# Patient Record
Sex: FEMALE | Race: WHITE | NOT HISPANIC OR LATINO | Employment: OTHER | URBAN - METROPOLITAN AREA
[De-identification: names, ages, dates, MRNs, and addresses within clinical notes are randomized per-mention and may not be internally consistent; named-entity substitution may affect disease eponyms.]

---

## 2017-01-02 ENCOUNTER — COMMUNICATION - HEALTHEAST (OUTPATIENT)
Dept: INTERNAL MEDICINE | Facility: CLINIC | Age: 82
End: 2017-01-02

## 2017-01-03 ENCOUNTER — COMMUNICATION - HEALTHEAST (OUTPATIENT)
Dept: SCHEDULING | Facility: CLINIC | Age: 82
End: 2017-01-03

## 2017-01-04 ENCOUNTER — OFFICE VISIT - HEALTHEAST (OUTPATIENT)
Dept: INTERNAL MEDICINE | Facility: CLINIC | Age: 82
End: 2017-01-04

## 2017-01-04 DIAGNOSIS — R42 DIZZINESS: ICD-10-CM

## 2017-01-04 DIAGNOSIS — I10 ESSENTIAL HYPERTENSION: ICD-10-CM

## 2017-01-06 ENCOUNTER — COMMUNICATION - HEALTHEAST (OUTPATIENT)
Dept: INTERNAL MEDICINE | Facility: CLINIC | Age: 82
End: 2017-01-06

## 2017-01-18 ENCOUNTER — OFFICE VISIT - HEALTHEAST (OUTPATIENT)
Dept: INTERNAL MEDICINE | Facility: CLINIC | Age: 82
End: 2017-01-18

## 2017-01-18 DIAGNOSIS — I10 ESSENTIAL HYPERTENSION: ICD-10-CM

## 2017-01-18 DIAGNOSIS — H81.03 MENIERE'S DISEASE, BILATERAL: ICD-10-CM

## 2017-01-18 ASSESSMENT — MIFFLIN-ST. JEOR: SCORE: 842.73

## 2017-02-04 ENCOUNTER — COMMUNICATION - HEALTHEAST (OUTPATIENT)
Dept: INTERNAL MEDICINE | Facility: CLINIC | Age: 82
End: 2017-02-04

## 2017-02-08 ENCOUNTER — COMMUNICATION - HEALTHEAST (OUTPATIENT)
Dept: INTERNAL MEDICINE | Facility: CLINIC | Age: 82
End: 2017-02-08

## 2017-02-15 ENCOUNTER — OFFICE VISIT - HEALTHEAST (OUTPATIENT)
Dept: INTERNAL MEDICINE | Facility: CLINIC | Age: 82
End: 2017-02-15

## 2017-02-15 DIAGNOSIS — I10 ESSENTIAL HYPERTENSION: ICD-10-CM

## 2017-02-15 DIAGNOSIS — E87.1 HYPONATREMIA: ICD-10-CM

## 2017-02-15 DIAGNOSIS — G56.01 CARPAL TUNNEL SYNDROME OF RIGHT WRIST: ICD-10-CM

## 2017-02-15 ASSESSMENT — MIFFLIN-ST. JEOR: SCORE: 851.8

## 2017-02-16 ENCOUNTER — COMMUNICATION - HEALTHEAST (OUTPATIENT)
Dept: INTERNAL MEDICINE | Facility: CLINIC | Age: 82
End: 2017-02-16

## 2017-02-21 ENCOUNTER — COMMUNICATION - HEALTHEAST (OUTPATIENT)
Dept: SCHEDULING | Facility: CLINIC | Age: 82
End: 2017-02-21

## 2017-03-02 ENCOUNTER — COMMUNICATION - HEALTHEAST (OUTPATIENT)
Dept: INTERNAL MEDICINE | Facility: CLINIC | Age: 82
End: 2017-03-02

## 2017-03-02 DIAGNOSIS — I10 ESSENTIAL HYPERTENSION: ICD-10-CM

## 2017-03-05 ENCOUNTER — COMMUNICATION - HEALTHEAST (OUTPATIENT)
Dept: INTERNAL MEDICINE | Facility: CLINIC | Age: 82
End: 2017-03-05

## 2017-03-07 ENCOUNTER — OFFICE VISIT - HEALTHEAST (OUTPATIENT)
Dept: INTERNAL MEDICINE | Facility: CLINIC | Age: 82
End: 2017-03-07

## 2017-03-07 DIAGNOSIS — I10 ESSENTIAL HYPERTENSION: ICD-10-CM

## 2017-03-07 DIAGNOSIS — H81.03 MENIERE'S DISEASE, BILATERAL: ICD-10-CM

## 2017-03-07 DIAGNOSIS — F41.9 ANXIETY: ICD-10-CM

## 2017-03-07 ASSESSMENT — MIFFLIN-ST. JEOR: SCORE: 842.73

## 2017-03-13 ENCOUNTER — COMMUNICATION - HEALTHEAST (OUTPATIENT)
Dept: INTERNAL MEDICINE | Facility: CLINIC | Age: 82
End: 2017-03-13

## 2017-03-20 ENCOUNTER — RECORDS - HEALTHEAST (OUTPATIENT)
Dept: ADMINISTRATIVE | Facility: OTHER | Age: 82
End: 2017-03-20

## 2017-03-28 ENCOUNTER — OFFICE VISIT - HEALTHEAST (OUTPATIENT)
Dept: INTERNAL MEDICINE | Facility: CLINIC | Age: 82
End: 2017-03-28

## 2017-03-28 DIAGNOSIS — H81.03 MENIERE'S DISEASE, BILATERAL: ICD-10-CM

## 2017-03-28 DIAGNOSIS — I10 ESSENTIAL HYPERTENSION: ICD-10-CM

## 2017-03-28 ASSESSMENT — MIFFLIN-ST. JEOR: SCORE: 851.8

## 2017-04-05 ENCOUNTER — RECORDS - HEALTHEAST (OUTPATIENT)
Dept: ADMINISTRATIVE | Facility: OTHER | Age: 82
End: 2017-04-05

## 2017-04-18 ENCOUNTER — OFFICE VISIT - HEALTHEAST (OUTPATIENT)
Dept: INTERNAL MEDICINE | Facility: CLINIC | Age: 82
End: 2017-04-18

## 2017-04-18 DIAGNOSIS — E87.1 HYPONATREMIA: ICD-10-CM

## 2017-04-18 DIAGNOSIS — I10 ESSENTIAL HYPERTENSION: ICD-10-CM

## 2017-04-18 ASSESSMENT — MIFFLIN-ST. JEOR: SCORE: 856.34

## 2017-04-19 ENCOUNTER — COMMUNICATION - HEALTHEAST (OUTPATIENT)
Dept: INTERNAL MEDICINE | Facility: CLINIC | Age: 82
End: 2017-04-19

## 2017-04-26 ENCOUNTER — COMMUNICATION - HEALTHEAST (OUTPATIENT)
Dept: INTERNAL MEDICINE | Facility: CLINIC | Age: 82
End: 2017-04-26

## 2017-04-26 DIAGNOSIS — I10 ESSENTIAL HYPERTENSION: ICD-10-CM

## 2017-05-01 ENCOUNTER — COMMUNICATION - HEALTHEAST (OUTPATIENT)
Dept: INTERNAL MEDICINE | Facility: CLINIC | Age: 82
End: 2017-05-01

## 2017-05-08 ENCOUNTER — COMMUNICATION - HEALTHEAST (OUTPATIENT)
Dept: INTERNAL MEDICINE | Facility: CLINIC | Age: 82
End: 2017-05-08

## 2017-05-15 ENCOUNTER — COMMUNICATION - HEALTHEAST (OUTPATIENT)
Dept: INTERNAL MEDICINE | Facility: CLINIC | Age: 82
End: 2017-05-15

## 2017-05-30 ENCOUNTER — COMMUNICATION - HEALTHEAST (OUTPATIENT)
Dept: MEDSURG UNIT | Facility: CLINIC | Age: 82
End: 2017-05-30

## 2017-05-31 ENCOUNTER — COMMUNICATION - HEALTHEAST (OUTPATIENT)
Dept: INTERNAL MEDICINE | Facility: CLINIC | Age: 82
End: 2017-05-31

## 2017-06-05 ENCOUNTER — COMMUNICATION - HEALTHEAST (OUTPATIENT)
Dept: INTERNAL MEDICINE | Facility: CLINIC | Age: 82
End: 2017-06-05

## 2017-06-06 ENCOUNTER — OFFICE VISIT - HEALTHEAST (OUTPATIENT)
Dept: INTERNAL MEDICINE | Facility: CLINIC | Age: 82
End: 2017-06-06

## 2017-06-06 DIAGNOSIS — M25.512 ACUTE PAIN OF LEFT SHOULDER: ICD-10-CM

## 2017-06-06 ASSESSMENT — MIFFLIN-ST. JEOR: SCORE: 860.87

## 2017-06-14 ENCOUNTER — OFFICE VISIT - HEALTHEAST (OUTPATIENT)
Dept: INTERNAL MEDICINE | Facility: CLINIC | Age: 82
End: 2017-06-14

## 2017-06-14 DIAGNOSIS — T14.8XXA TORN TENDON: ICD-10-CM

## 2017-06-14 DIAGNOSIS — I10 ESSENTIAL HYPERTENSION: ICD-10-CM

## 2017-06-14 DIAGNOSIS — E87.1 HYPONATREMIA: ICD-10-CM

## 2017-06-14 ASSESSMENT — MIFFLIN-ST. JEOR: SCORE: 860.87

## 2017-06-15 ENCOUNTER — COMMUNICATION - HEALTHEAST (OUTPATIENT)
Dept: INTERNAL MEDICINE | Facility: CLINIC | Age: 82
End: 2017-06-15

## 2017-06-30 ENCOUNTER — COMMUNICATION - HEALTHEAST (OUTPATIENT)
Dept: INTERNAL MEDICINE | Facility: CLINIC | Age: 82
End: 2017-06-30

## 2017-07-23 ENCOUNTER — COMMUNICATION - HEALTHEAST (OUTPATIENT)
Dept: SCHEDULING | Facility: CLINIC | Age: 82
End: 2017-07-23

## 2017-07-24 ENCOUNTER — OFFICE VISIT - HEALTHEAST (OUTPATIENT)
Dept: INTERNAL MEDICINE | Facility: CLINIC | Age: 82
End: 2017-07-24

## 2017-07-24 DIAGNOSIS — E87.1 HYPONATREMIA: ICD-10-CM

## 2017-07-24 DIAGNOSIS — I10 ESSENTIAL HYPERTENSION: ICD-10-CM

## 2017-07-24 DIAGNOSIS — R42 DIZZINESS: ICD-10-CM

## 2017-07-24 ASSESSMENT — MIFFLIN-ST. JEOR: SCORE: 851.8

## 2017-07-25 ENCOUNTER — COMMUNICATION - HEALTHEAST (OUTPATIENT)
Dept: INTERNAL MEDICINE | Facility: CLINIC | Age: 82
End: 2017-07-25

## 2017-08-28 ENCOUNTER — COMMUNICATION - HEALTHEAST (OUTPATIENT)
Dept: INTERNAL MEDICINE | Facility: CLINIC | Age: 82
End: 2017-08-28

## 2017-09-19 ENCOUNTER — OFFICE VISIT - HEALTHEAST (OUTPATIENT)
Dept: INTERNAL MEDICINE | Facility: CLINIC | Age: 82
End: 2017-09-19

## 2017-09-19 ENCOUNTER — RECORDS - HEALTHEAST (OUTPATIENT)
Dept: ADMINISTRATIVE | Facility: OTHER | Age: 82
End: 2017-09-19

## 2017-09-19 DIAGNOSIS — H81.03 MENIERE'S DISEASE, BILATERAL: ICD-10-CM

## 2017-09-19 DIAGNOSIS — I10 ESSENTIAL HYPERTENSION: ICD-10-CM

## 2017-09-19 ASSESSMENT — MIFFLIN-ST. JEOR: SCORE: 851.8

## 2017-09-21 ENCOUNTER — COMMUNICATION - HEALTHEAST (OUTPATIENT)
Dept: INTERNAL MEDICINE | Facility: CLINIC | Age: 82
End: 2017-09-21

## 2017-09-28 ENCOUNTER — COMMUNICATION - HEALTHEAST (OUTPATIENT)
Dept: INTERNAL MEDICINE | Facility: CLINIC | Age: 82
End: 2017-09-28

## 2017-09-29 ENCOUNTER — COMMUNICATION - HEALTHEAST (OUTPATIENT)
Dept: INTERNAL MEDICINE | Facility: CLINIC | Age: 82
End: 2017-09-29

## 2017-10-25 ENCOUNTER — COMMUNICATION - HEALTHEAST (OUTPATIENT)
Dept: INTERNAL MEDICINE | Facility: CLINIC | Age: 82
End: 2017-10-25

## 2017-10-27 ENCOUNTER — COMMUNICATION - HEALTHEAST (OUTPATIENT)
Dept: INTERNAL MEDICINE | Facility: CLINIC | Age: 82
End: 2017-10-27

## 2017-10-29 ENCOUNTER — COMMUNICATION - HEALTHEAST (OUTPATIENT)
Dept: INTERNAL MEDICINE | Facility: CLINIC | Age: 82
End: 2017-10-29

## 2017-10-29 DIAGNOSIS — I10 ESSENTIAL HYPERTENSION: ICD-10-CM

## 2017-10-30 ENCOUNTER — RECORDS - HEALTHEAST (OUTPATIENT)
Dept: ADMINISTRATIVE | Facility: OTHER | Age: 82
End: 2017-10-30

## 2017-10-31 ENCOUNTER — RECORDS - HEALTHEAST (OUTPATIENT)
Dept: ADMINISTRATIVE | Facility: OTHER | Age: 82
End: 2017-10-31

## 2017-11-14 ENCOUNTER — OFFICE VISIT - HEALTHEAST (OUTPATIENT)
Dept: INTERNAL MEDICINE | Facility: CLINIC | Age: 82
End: 2017-11-14

## 2017-11-14 DIAGNOSIS — K11.7 XEROSTOMIA: ICD-10-CM

## 2017-11-14 DIAGNOSIS — E87.1 HYPONATREMIA: ICD-10-CM

## 2017-11-14 DIAGNOSIS — I10 ESSENTIAL HYPERTENSION: ICD-10-CM

## 2017-11-14 ASSESSMENT — MIFFLIN-ST. JEOR: SCORE: 874.48

## 2017-11-15 ENCOUNTER — COMMUNICATION - HEALTHEAST (OUTPATIENT)
Dept: INTERNAL MEDICINE | Facility: CLINIC | Age: 82
End: 2017-11-15

## 2017-11-22 ENCOUNTER — COMMUNICATION - HEALTHEAST (OUTPATIENT)
Dept: INTERNAL MEDICINE | Facility: CLINIC | Age: 82
End: 2017-11-22

## 2017-11-24 ENCOUNTER — RECORDS - HEALTHEAST (OUTPATIENT)
Dept: ADMINISTRATIVE | Facility: OTHER | Age: 82
End: 2017-11-24

## 2017-11-27 ENCOUNTER — RECORDS - HEALTHEAST (OUTPATIENT)
Dept: ADMINISTRATIVE | Facility: OTHER | Age: 82
End: 2017-11-27

## 2017-11-28 ENCOUNTER — COMMUNICATION - HEALTHEAST (OUTPATIENT)
Dept: INTERNAL MEDICINE | Facility: CLINIC | Age: 82
End: 2017-11-28

## 2017-12-17 ENCOUNTER — COMMUNICATION - HEALTHEAST (OUTPATIENT)
Dept: INTERNAL MEDICINE | Facility: CLINIC | Age: 82
End: 2017-12-17

## 2017-12-22 ENCOUNTER — COMMUNICATION - HEALTHEAST (OUTPATIENT)
Dept: INTERNAL MEDICINE | Facility: CLINIC | Age: 82
End: 2017-12-22

## 2017-12-29 ENCOUNTER — OFFICE VISIT - HEALTHEAST (OUTPATIENT)
Dept: CARDIOLOGY | Facility: CLINIC | Age: 82
End: 2017-12-29

## 2017-12-29 DIAGNOSIS — I10 ESSENTIAL HYPERTENSION: ICD-10-CM

## 2017-12-29 DIAGNOSIS — R01.1 HEART MURMUR, SYSTOLIC: ICD-10-CM

## 2017-12-29 DIAGNOSIS — R00.2 PALPITATIONS: ICD-10-CM

## 2017-12-29 ASSESSMENT — MIFFLIN-ST. JEOR: SCORE: 856.34

## 2018-01-18 ENCOUNTER — COMMUNICATION - HEALTHEAST (OUTPATIENT)
Dept: INTERNAL MEDICINE | Facility: CLINIC | Age: 83
End: 2018-01-18

## 2018-01-24 ENCOUNTER — RECORDS - HEALTHEAST (OUTPATIENT)
Dept: ADMINISTRATIVE | Facility: OTHER | Age: 83
End: 2018-01-24

## 2018-02-01 ENCOUNTER — HOSPITAL ENCOUNTER (OUTPATIENT)
Dept: CARDIOLOGY | Facility: CLINIC | Age: 83
Discharge: HOME | End: 2018-02-01
Attending: INTERNAL MEDICINE

## 2018-02-01 DIAGNOSIS — R00.2 PALPITATIONS: ICD-10-CM

## 2018-02-01 DIAGNOSIS — R01.1 HEART MURMUR, SYSTOLIC: ICD-10-CM

## 2018-02-01 LAB
AORTIC ROOT: 2.2 CM
AORTIC VALVE MEAN VELOCITY: 169 CM/S
AR DECEL SLOPE: 1910 MM/S2
AR PEAK VELOCITY: 425 CM/S
AV DIMENSIONLESS INDEX VTI: 0.4
AV MEAN GRADIENT: 13 MMHG
AV PEAK GRADIENT: 21 MMHG
AV REGURGITANT PEAK GRADIENT: 72.3 MMHG
AV REGURGITATION PRESSURE HALF TIME: 652 MS
AV VALVE AREA: 0.9 CM2
AV VELOCITY RATIO: 0.4
BSA FOR ECHO PROCEDURE: 1.48 M2
CV BLOOD PRESSURE: NORMAL MMHG
CV ECHO HEIGHT: 59 IN
CV ECHO WEIGHT: 117 LBS
DOP CALC AO PEAK VEL: 229 CM/S
DOP CALC AO VTI: 58.3 CM
DOP CALC LVOT AREA: 2.54 CM2
DOP CALC LVOT DIAMETER: 1.8 CM
DOP CALC LVOT PEAK VEL: 94 CM/S
DOP CALC LVOT STROKE VOLUME: 54.2 CM3
DOP CALC MV VTI: 35.8 CM
DOP CALCLVOT PEAK VEL VTI: 21.3 CM
EJECTION FRACTION: 67 % (ref 55–75)
FRACTIONAL SHORTENING: 40 % (ref 28–44)
INTERVENTRICULAR SEPTUM IN END DIASTOLE: 0.77 CM (ref 0.6–0.9)
IVS/PW RATIO: 1
LA AREA 1: 20.4 CM2
LA AREA 2: 10.2 CM2
LEFT ATRIUM LENGTH: 3.97 CM
LEFT ATRIUM SIZE: 4.2 CM
LEFT ATRIUM TO AORTIC ROOT RATIO: 1.91 NO UNITS
LEFT ATRIUM VOLUME INDEX: 30.1 ML/M2
LEFT ATRIUM VOLUME: 44.6 ML
LEFT VENTRICLE CARDIAC INDEX: 2 L/MIN/M2
LEFT VENTRICLE CARDIAC OUTPUT: 2.9 L/MIN
LEFT VENTRICLE DIASTOLIC VOLUME INDEX: 34.5 CM3/M2 (ref 34–74)
LEFT VENTRICLE DIASTOLIC VOLUME: 51 CM3 (ref 46–106)
LEFT VENTRICLE HEART RATE: 54 BPM
LEFT VENTRICLE MASS INDEX: 77.6 G/M2
LEFT VENTRICLE SYSTOLIC VOLUME INDEX: 11.5 CM3/M2 (ref 11–31)
LEFT VENTRICLE SYSTOLIC VOLUME: 17 CM3 (ref 14–42)
LEFT VENTRICULAR INTERNAL DIMENSION IN DIASTOLE: 4.58 CM (ref 3.8–5.2)
LEFT VENTRICULAR INTERNAL DIMENSION IN SYSTOLE: 2.75 CM (ref 2.2–3.5)
LEFT VENTRICULAR MASS: 114.9 G
LEFT VENTRICULAR OUTFLOW TRACT MEAN GRADIENT: 2 MMHG
LEFT VENTRICULAR OUTFLOW TRACT MEAN VELOCITY: 65.9 CM/S
LEFT VENTRICULAR OUTFLOW TRACT PEAK GRADIENT: 4 MMHG
LEFT VENTRICULAR POSTERIOR WALL IN END DIASTOLE: 0.81 CM (ref 0.6–0.9)
LV STROKE VOLUME INDEX: 36.6 ML/M2
MITRAL VALVE E/A RATIO: 1
MITRAL VALVE MEAN INFLOW VELOCITY: 65 CM/S
MITRAL VALVE PEAK VELOCITY: 106 CM/S
MV AREA VTI: 1.51 CM2
MV AVERAGE E/E' RATIO: 14.2 CM/S
MV DECELERATION TIME: 194 MS
MV E'TISSUE VEL-LAT: 7.51 CM/S
MV E'TISSUE VEL-MED: 5.46 CM/S
MV LATERAL E/E' RATIO: 12.2
MV MEAN GRADIENT: 2 MMHG
MV MEDIAL E/E' RATIO: 16.8
MV PEAK A VELOCITY: 95.3 CM/S
MV PEAK E VELOCITY: 91.8 CM/S
MV PEAK GRADIENT: 4.5 MMHG
MV VALVE AREA BY CONTINUITY EQUATION: 1.5 CM2
NUC REST DIASTOLIC VOLUME INDEX: 1872 LBS
NUC REST SYSTOLIC VOLUME INDEX: 59 IN
TRICUSPID REGURGITATION PEAK PRESSURE GRADIENT: 23.8 MMHG
TRICUSPID VALVE ANULAR PLANE SYSTOLIC EXCURSION: 1.8 CM
TRICUSPID VALVE PEAK REGURGITANT VELOCITY: 244 CM/S

## 2018-02-01 ASSESSMENT — MIFFLIN-ST. JEOR: SCORE: 856.34

## 2018-02-03 ENCOUNTER — COMMUNICATION - HEALTHEAST (OUTPATIENT)
Dept: INTERNAL MEDICINE | Facility: CLINIC | Age: 83
End: 2018-02-03

## 2018-02-03 DIAGNOSIS — H81.03 MENIERE'S DISEASE, BILATERAL: ICD-10-CM

## 2018-02-03 DIAGNOSIS — R42 DIZZINESS: ICD-10-CM

## 2018-02-05 ENCOUNTER — COMMUNICATION - HEALTHEAST (OUTPATIENT)
Dept: INTERNAL MEDICINE | Facility: CLINIC | Age: 83
End: 2018-02-05

## 2018-02-07 ENCOUNTER — OFFICE VISIT - HEALTHEAST (OUTPATIENT)
Dept: CARDIOLOGY | Facility: CLINIC | Age: 83
End: 2018-02-07

## 2018-02-07 DIAGNOSIS — I49.3 PVC (PREMATURE VENTRICULAR CONTRACTION): ICD-10-CM

## 2018-02-07 DIAGNOSIS — R42 DIZZINESS: ICD-10-CM

## 2018-02-07 DIAGNOSIS — I35.0 NONRHEUMATIC AORTIC VALVE STENOSIS: ICD-10-CM

## 2018-02-07 DIAGNOSIS — I10 ESSENTIAL HYPERTENSION: ICD-10-CM

## 2018-02-07 ASSESSMENT — MIFFLIN-ST. JEOR: SCORE: 842.73

## 2018-02-19 ENCOUNTER — OFFICE VISIT - HEALTHEAST (OUTPATIENT)
Dept: INTERNAL MEDICINE | Facility: CLINIC | Age: 83
End: 2018-02-19

## 2018-02-19 DIAGNOSIS — E87.1 HYPONATREMIA: ICD-10-CM

## 2018-02-19 DIAGNOSIS — I10 ESSENTIAL HYPERTENSION: ICD-10-CM

## 2018-02-19 ASSESSMENT — MIFFLIN-ST. JEOR: SCORE: 842.73

## 2018-02-20 LAB
ANION GAP SERPL CALCULATED.3IONS-SCNC: 12 MMOL/L (ref 5–18)
BUN SERPL-MCNC: 13 MG/DL (ref 8–28)
CALCIUM SERPL-MCNC: 9.6 MG/DL (ref 8.5–10.5)
CHLORIDE BLD-SCNC: 97 MMOL/L (ref 98–107)
CO2 SERPL-SCNC: 23 MMOL/L (ref 22–31)
CREAT SERPL-MCNC: 0.75 MG/DL (ref 0.6–1.1)
GFR SERPL CREATININE-BSD FRML MDRD: >60 ML/MIN/1.73M2
GLUCOSE BLD-MCNC: 101 MG/DL (ref 70–125)
POTASSIUM BLD-SCNC: 3.8 MMOL/L (ref 3.5–5)
SODIUM SERPL-SCNC: 132 MMOL/L (ref 136–145)

## 2018-02-21 ENCOUNTER — COMMUNICATION - HEALTHEAST (OUTPATIENT)
Dept: INTERNAL MEDICINE | Facility: CLINIC | Age: 83
End: 2018-02-21

## 2018-02-22 ENCOUNTER — COMMUNICATION - HEALTHEAST (OUTPATIENT)
Dept: INTERNAL MEDICINE | Facility: CLINIC | Age: 83
End: 2018-02-22

## 2018-02-26 ENCOUNTER — COMMUNICATION - HEALTHEAST (OUTPATIENT)
Dept: INTERNAL MEDICINE | Facility: CLINIC | Age: 83
End: 2018-02-26

## 2018-03-05 ENCOUNTER — COMMUNICATION - HEALTHEAST (OUTPATIENT)
Dept: INTERNAL MEDICINE | Facility: CLINIC | Age: 83
End: 2018-03-05

## 2018-03-05 DIAGNOSIS — I10 HYPERTENSION: ICD-10-CM

## 2018-03-17 ENCOUNTER — COMMUNICATION - HEALTHEAST (OUTPATIENT)
Dept: SCHEDULING | Facility: CLINIC | Age: 83
End: 2018-03-17

## 2018-03-20 ENCOUNTER — OFFICE VISIT - HEALTHEAST (OUTPATIENT)
Dept: INTERNAL MEDICINE | Facility: CLINIC | Age: 83
End: 2018-03-20

## 2018-03-20 DIAGNOSIS — R63.4 WEIGHT LOSS: ICD-10-CM

## 2018-03-20 DIAGNOSIS — I10 ESSENTIAL HYPERTENSION: ICD-10-CM

## 2018-03-20 DIAGNOSIS — E87.1 HYPONATREMIA: ICD-10-CM

## 2018-03-20 DIAGNOSIS — R30.0 DYSURIA: ICD-10-CM

## 2018-03-20 LAB
ALBUMIN SERPL-MCNC: 3.6 G/DL (ref 3.5–5)
ALBUMIN UR-MCNC: NEGATIVE MG/DL
ALP SERPL-CCNC: 81 U/L (ref 45–120)
ALT SERPL W P-5'-P-CCNC: 15 U/L (ref 0–45)
ANION GAP SERPL CALCULATED.3IONS-SCNC: 12 MMOL/L (ref 5–18)
APPEARANCE UR: ABNORMAL
AST SERPL W P-5'-P-CCNC: 21 U/L (ref 0–40)
BACTERIA #/AREA URNS HPF: ABNORMAL HPF
BILIRUB SERPL-MCNC: 0.9 MG/DL (ref 0–1)
BILIRUB UR QL STRIP: NEGATIVE
BUN SERPL-MCNC: 11 MG/DL (ref 8–28)
C REACTIVE PROTEIN LHE: 0.3 MG/DL (ref 0–0.8)
CALCIUM SERPL-MCNC: 9.4 MG/DL (ref 8.5–10.5)
CHLORIDE BLD-SCNC: 96 MMOL/L (ref 98–107)
CO2 SERPL-SCNC: 24 MMOL/L (ref 22–31)
COLOR UR AUTO: YELLOW
CREAT SERPL-MCNC: 0.74 MG/DL (ref 0.6–1.1)
ERYTHROCYTE [DISTWIDTH] IN BLOOD BY AUTOMATED COUNT: 12.8 % (ref 11–14.5)
GFR SERPL CREATININE-BSD FRML MDRD: >60 ML/MIN/1.73M2
GLUCOSE BLD-MCNC: 115 MG/DL (ref 70–125)
GLUCOSE UR STRIP-MCNC: NEGATIVE MG/DL
HCT VFR BLD AUTO: 42.4 % (ref 35–47)
HGB BLD-MCNC: 13.9 G/DL (ref 12–16)
HGB UR QL STRIP: ABNORMAL
HYALINE CASTS #/AREA URNS LPF: ABNORMAL LPF
KETONES UR STRIP-MCNC: NEGATIVE MG/DL
LEUKOCYTE ESTERASE UR QL STRIP: ABNORMAL
MCH RBC QN AUTO: 31 PG (ref 27–34)
MCHC RBC AUTO-ENTMCNC: 32.8 G/DL (ref 32–36)
MCV RBC AUTO: 94 FL (ref 80–100)
MUCOUS THREADS #/AREA URNS LPF: ABNORMAL LPF
NITRATE UR QL: NEGATIVE
PH UR STRIP: 7.5 [PH] (ref 4.5–8)
PLATELET # BLD AUTO: 235 THOU/UL (ref 140–440)
PMV BLD AUTO: 8.1 FL (ref 7–10)
POTASSIUM BLD-SCNC: 3.6 MMOL/L (ref 3.5–5)
PROT SERPL-MCNC: 7 G/DL (ref 6–8)
RBC # BLD AUTO: 4.5 MILL/UL (ref 3.8–5.4)
RBC #/AREA URNS AUTO: ABNORMAL HPF
SODIUM SERPL-SCNC: 132 MMOL/L (ref 136–145)
SP GR UR STRIP: 1.01 (ref 1–1.03)
SQUAMOUS #/AREA URNS AUTO: ABNORMAL LPF
TRANS CELLS #/AREA URNS HPF: ABNORMAL LPF
TRI-PHOS CRY #/AREA URNS HPF: PRESENT /[HPF]
TSH SERPL DL<=0.005 MIU/L-ACNC: 2.36 UIU/ML (ref 0.3–5)
UROBILINOGEN UR STRIP-ACNC: ABNORMAL
WBC #/AREA URNS AUTO: ABNORMAL HPF
WBC CLUMPS #/AREA URNS HPF: PRESENT /[HPF]
WBC: 9.4 THOU/UL (ref 4–11)

## 2018-03-20 ASSESSMENT — MIFFLIN-ST. JEOR: SCORE: 815.51

## 2018-03-21 LAB — BACTERIA SPEC CULT: NO GROWTH

## 2018-03-25 ENCOUNTER — RECORDS - HEALTHEAST (OUTPATIENT)
Dept: ADMINISTRATIVE | Facility: OTHER | Age: 83
End: 2018-03-25

## 2018-04-03 ENCOUNTER — COMMUNICATION - HEALTHEAST (OUTPATIENT)
Dept: SCHEDULING | Facility: CLINIC | Age: 83
End: 2018-04-03

## 2018-04-03 ENCOUNTER — AMBULATORY - HEALTHEAST (OUTPATIENT)
Dept: INTERNAL MEDICINE | Facility: CLINIC | Age: 83
End: 2018-04-03

## 2018-04-09 ENCOUNTER — COMMUNICATION - HEALTHEAST (OUTPATIENT)
Dept: INTERNAL MEDICINE | Facility: CLINIC | Age: 83
End: 2018-04-09

## 2018-04-09 ENCOUNTER — COMMUNICATION - HEALTHEAST (OUTPATIENT)
Dept: SCHEDULING | Facility: CLINIC | Age: 83
End: 2018-04-09

## 2018-04-10 ENCOUNTER — AMBULATORY - HEALTHEAST (OUTPATIENT)
Dept: INTERNAL MEDICINE | Facility: CLINIC | Age: 83
End: 2018-04-10

## 2018-04-10 DIAGNOSIS — F41.9 ANXIETY: ICD-10-CM

## 2018-04-11 ENCOUNTER — COMMUNICATION - HEALTHEAST (OUTPATIENT)
Dept: SCHEDULING | Facility: CLINIC | Age: 83
End: 2018-04-11

## 2018-04-11 ENCOUNTER — RECORDS - HEALTHEAST (OUTPATIENT)
Dept: ADMINISTRATIVE | Facility: OTHER | Age: 83
End: 2018-04-11

## 2018-04-17 ENCOUNTER — OFFICE VISIT - HEALTHEAST (OUTPATIENT)
Dept: INTERNAL MEDICINE | Facility: CLINIC | Age: 83
End: 2018-04-17

## 2018-04-17 DIAGNOSIS — F41.9 CHRONIC ANXIETY: ICD-10-CM

## 2018-04-17 DIAGNOSIS — E87.1 HYPONATREMIA: ICD-10-CM

## 2018-04-17 DIAGNOSIS — I10 ESSENTIAL HYPERTENSION: ICD-10-CM

## 2018-04-17 ASSESSMENT — MIFFLIN-ST. JEOR: SCORE: 833.66

## 2018-05-01 ENCOUNTER — HOSPITAL ENCOUNTER (OUTPATIENT)
Facility: CLINIC | Age: 83
End: 2018-05-01
Attending: OPHTHALMOLOGY | Admitting: OPHTHALMOLOGY
Payer: MEDICARE

## 2018-05-03 ENCOUNTER — COMMUNICATION - HEALTHEAST (OUTPATIENT)
Dept: INTERNAL MEDICINE | Facility: CLINIC | Age: 83
End: 2018-05-03

## 2018-05-03 DIAGNOSIS — I10 ESSENTIAL HYPERTENSION: ICD-10-CM

## 2018-05-08 ENCOUNTER — OFFICE VISIT - HEALTHEAST (OUTPATIENT)
Dept: INTERNAL MEDICINE | Facility: CLINIC | Age: 83
End: 2018-05-08

## 2018-05-08 DIAGNOSIS — F41.9 CHRONIC ANXIETY: ICD-10-CM

## 2018-05-08 DIAGNOSIS — R47.02 DYSPHASIA: ICD-10-CM

## 2018-05-08 DIAGNOSIS — E87.1 HYPONATREMIA: ICD-10-CM

## 2018-05-08 DIAGNOSIS — I10 ESSENTIAL HYPERTENSION: ICD-10-CM

## 2018-05-08 LAB
ANION GAP SERPL CALCULATED.3IONS-SCNC: 9 MMOL/L (ref 5–18)
BUN SERPL-MCNC: 12 MG/DL (ref 8–28)
CALCIUM SERPL-MCNC: 9.7 MG/DL (ref 8.5–10.5)
CHLORIDE BLD-SCNC: 97 MMOL/L (ref 98–107)
CO2 SERPL-SCNC: 26 MMOL/L (ref 22–31)
CREAT SERPL-MCNC: 0.74 MG/DL (ref 0.6–1.1)
GFR SERPL CREATININE-BSD FRML MDRD: >60 ML/MIN/1.73M2
GLUCOSE BLD-MCNC: 113 MG/DL (ref 70–125)
HGB BLD-MCNC: 13.1 G/DL (ref 12–16)
POTASSIUM BLD-SCNC: 3.4 MMOL/L (ref 3.5–5)
SODIUM SERPL-SCNC: 132 MMOL/L (ref 136–145)

## 2018-05-08 ASSESSMENT — MIFFLIN-ST. JEOR: SCORE: 820.05

## 2018-05-10 ENCOUNTER — COMMUNICATION - HEALTHEAST (OUTPATIENT)
Dept: INTERNAL MEDICINE | Facility: CLINIC | Age: 83
End: 2018-05-10

## 2018-05-11 ENCOUNTER — HOSPITAL ENCOUNTER (OUTPATIENT)
Dept: RADIOLOGY | Facility: CLINIC | Age: 83
Discharge: HOME | End: 2018-05-11
Attending: INTERNAL MEDICINE

## 2018-05-11 DIAGNOSIS — R47.02 DYSPHASIA: ICD-10-CM

## 2018-05-15 ENCOUNTER — COMMUNICATION - HEALTHEAST (OUTPATIENT)
Dept: INTERNAL MEDICINE | Facility: CLINIC | Age: 83
End: 2018-05-15

## 2018-05-15 ENCOUNTER — AMBULATORY - HEALTHEAST (OUTPATIENT)
Dept: INTERNAL MEDICINE | Facility: CLINIC | Age: 83
End: 2018-05-15

## 2018-05-15 DIAGNOSIS — R13.14 PHARYNGOESOPHAGEAL DYSPHAGIA: ICD-10-CM

## 2018-05-16 ENCOUNTER — COMMUNICATION - HEALTHEAST (OUTPATIENT)
Dept: INTERNAL MEDICINE | Facility: CLINIC | Age: 83
End: 2018-05-16

## 2018-05-18 ENCOUNTER — RECORDS - HEALTHEAST (OUTPATIENT)
Dept: ADMINISTRATIVE | Facility: OTHER | Age: 83
End: 2018-05-18

## 2018-05-29 ENCOUNTER — RECORDS - HEALTHEAST (OUTPATIENT)
Dept: ADMINISTRATIVE | Facility: OTHER | Age: 83
End: 2018-05-29

## 2018-05-30 ENCOUNTER — COMMUNICATION - HEALTHEAST (OUTPATIENT)
Dept: INTERNAL MEDICINE | Facility: CLINIC | Age: 83
End: 2018-05-30

## 2018-05-31 ENCOUNTER — COMMUNICATION - HEALTHEAST (OUTPATIENT)
Dept: SCHEDULING | Facility: CLINIC | Age: 83
End: 2018-05-31

## 2018-06-03 ENCOUNTER — COMMUNICATION - HEALTHEAST (OUTPATIENT)
Dept: MEDSURG UNIT | Facility: CLINIC | Age: 83
End: 2018-06-03

## 2018-06-04 ENCOUNTER — OFFICE VISIT - HEALTHEAST (OUTPATIENT)
Dept: INTERNAL MEDICINE | Facility: CLINIC | Age: 83
End: 2018-06-04

## 2018-06-04 DIAGNOSIS — K13.0 PERLECHE: ICD-10-CM

## 2018-06-04 ASSESSMENT — MIFFLIN-ST. JEOR: SCORE: 820.05

## 2018-06-06 ENCOUNTER — COMMUNICATION - HEALTHEAST (OUTPATIENT)
Dept: INTERNAL MEDICINE | Facility: CLINIC | Age: 83
End: 2018-06-06

## 2018-06-14 ENCOUNTER — RECORDS - HEALTHEAST (OUTPATIENT)
Dept: ADMINISTRATIVE | Facility: OTHER | Age: 83
End: 2018-06-14

## 2018-06-17 ENCOUNTER — COMMUNICATION - HEALTHEAST (OUTPATIENT)
Dept: INTERNAL MEDICINE | Facility: CLINIC | Age: 83
End: 2018-06-17

## 2018-06-18 ENCOUNTER — OFFICE VISIT - HEALTHEAST (OUTPATIENT)
Dept: INTERNAL MEDICINE | Facility: CLINIC | Age: 83
End: 2018-06-18

## 2018-06-18 DIAGNOSIS — F41.9 CHRONIC ANXIETY: ICD-10-CM

## 2018-06-18 DIAGNOSIS — E87.1 HYPONATREMIA: ICD-10-CM

## 2018-06-18 DIAGNOSIS — I10 ESSENTIAL HYPERTENSION: ICD-10-CM

## 2018-06-18 LAB
ANION GAP SERPL CALCULATED.3IONS-SCNC: 11 MMOL/L (ref 5–18)
BUN SERPL-MCNC: 13 MG/DL (ref 8–28)
CALCIUM SERPL-MCNC: 9.8 MG/DL (ref 8.5–10.5)
CHLORIDE BLD-SCNC: 93 MMOL/L (ref 98–107)
CO2 SERPL-SCNC: 25 MMOL/L (ref 22–31)
CREAT SERPL-MCNC: 0.72 MG/DL (ref 0.6–1.1)
GFR SERPL CREATININE-BSD FRML MDRD: >60 ML/MIN/1.73M2
GLUCOSE BLD-MCNC: 112 MG/DL (ref 70–125)
POTASSIUM BLD-SCNC: 4.2 MMOL/L (ref 3.5–5)
SODIUM SERPL-SCNC: 129 MMOL/L (ref 136–145)

## 2018-06-18 ASSESSMENT — MIFFLIN-ST. JEOR: SCORE: 824.59

## 2018-06-19 ENCOUNTER — COMMUNICATION - HEALTHEAST (OUTPATIENT)
Dept: INTERNAL MEDICINE | Facility: CLINIC | Age: 83
End: 2018-06-19

## 2018-06-30 ENCOUNTER — COMMUNICATION - HEALTHEAST (OUTPATIENT)
Dept: SCHEDULING | Facility: CLINIC | Age: 83
End: 2018-06-30

## 2018-06-30 ENCOUNTER — OFFICE VISIT - HEALTHEAST (OUTPATIENT)
Dept: FAMILY MEDICINE | Facility: CLINIC | Age: 83
End: 2018-06-30

## 2018-06-30 ENCOUNTER — COMMUNICATION - HEALTHEAST (OUTPATIENT)
Dept: FAMILY MEDICINE | Facility: CLINIC | Age: 83
End: 2018-06-30

## 2018-06-30 DIAGNOSIS — R60.0 BILATERAL LOWER EXTREMITY EDEMA: ICD-10-CM

## 2018-06-30 DIAGNOSIS — R42 DIZZINESS: ICD-10-CM

## 2018-06-30 LAB
ATRIAL RATE - MUSE: 76 BPM
DIASTOLIC BLOOD PRESSURE - MUSE: NORMAL MMHG
INTERPRETATION ECG - MUSE: NORMAL
P AXIS - MUSE: 1 DEGREES
PR INTERVAL - MUSE: 150 MS
QRS DURATION - MUSE: 116 MS
QT - MUSE: 394 MS
QTC - MUSE: 443 MS
R AXIS - MUSE: -21 DEGREES
SYSTOLIC BLOOD PRESSURE - MUSE: NORMAL MMHG
T AXIS - MUSE: 83 DEGREES
VENTRICULAR RATE- MUSE: 76 BPM

## 2018-07-03 ENCOUNTER — COMMUNICATION - HEALTHEAST (OUTPATIENT)
Dept: INTERNAL MEDICINE | Facility: CLINIC | Age: 83
End: 2018-07-03

## 2018-07-03 ENCOUNTER — OFFICE VISIT - HEALTHEAST (OUTPATIENT)
Dept: INTERNAL MEDICINE | Facility: CLINIC | Age: 83
End: 2018-07-03

## 2018-07-03 DIAGNOSIS — F41.9 CHRONIC ANXIETY: ICD-10-CM

## 2018-07-03 DIAGNOSIS — E87.1 HYPONATREMIA: ICD-10-CM

## 2018-07-03 DIAGNOSIS — I10 ESSENTIAL HYPERTENSION: ICD-10-CM

## 2018-07-03 DIAGNOSIS — I73.9 PVD (PERIPHERAL VASCULAR DISEASE) (H): ICD-10-CM

## 2018-07-03 LAB
ANION GAP SERPL CALCULATED.3IONS-SCNC: 8 MMOL/L (ref 5–18)
BASOPHILS # BLD AUTO: 0 THOU/UL (ref 0–0.2)
BASOPHILS NFR BLD AUTO: 0 % (ref 0–2)
BUN SERPL-MCNC: 14 MG/DL (ref 8–28)
CALCIUM SERPL-MCNC: 9.7 MG/DL (ref 8.5–10.5)
CHLORIDE BLD-SCNC: 96 MMOL/L (ref 98–107)
CO2 SERPL-SCNC: 27 MMOL/L (ref 22–31)
CREAT SERPL-MCNC: 0.71 MG/DL (ref 0.6–1.1)
EOSINOPHIL # BLD AUTO: 0.1 THOU/UL (ref 0–0.4)
EOSINOPHIL NFR BLD AUTO: 1 % (ref 0–6)
ERYTHROCYTE [DISTWIDTH] IN BLOOD BY AUTOMATED COUNT: 10.9 % (ref 11–14.5)
GFR SERPL CREATININE-BSD FRML MDRD: >60 ML/MIN/1.73M2
GLUCOSE BLD-MCNC: 113 MG/DL (ref 70–125)
HCT VFR BLD AUTO: 39.1 % (ref 35–47)
HGB BLD-MCNC: 13.2 G/DL (ref 12–16)
LYMPHOCYTES # BLD AUTO: 1.2 THOU/UL (ref 0.8–4.4)
LYMPHOCYTES NFR BLD AUTO: 12 % (ref 20–40)
MCH RBC QN AUTO: 33.2 PG (ref 27–34)
MCHC RBC AUTO-ENTMCNC: 33.7 G/DL (ref 32–36)
MCV RBC AUTO: 98 FL (ref 80–100)
MONOCYTES # BLD AUTO: 0.9 THOU/UL (ref 0–0.9)
MONOCYTES NFR BLD AUTO: 9 % (ref 2–10)
NEUTROPHILS # BLD AUTO: 7.8 THOU/UL (ref 2–7.7)
NEUTROPHILS NFR BLD AUTO: 78 % (ref 50–70)
PLATELET # BLD AUTO: 257 THOU/UL (ref 140–440)
PMV BLD AUTO: 7.8 FL (ref 7–10)
POTASSIUM BLD-SCNC: 4.9 MMOL/L (ref 3.5–5)
RBC # BLD AUTO: 3.97 MILL/UL (ref 3.8–5.4)
SODIUM SERPL-SCNC: 131 MMOL/L (ref 136–145)
WBC: 10.1 THOU/UL (ref 4–11)

## 2018-07-03 ASSESSMENT — MIFFLIN-ST. JEOR: SCORE: 838.19

## 2018-07-09 ENCOUNTER — RECORDS - HEALTHEAST (OUTPATIENT)
Dept: ADMINISTRATIVE | Facility: OTHER | Age: 83
End: 2018-07-09

## 2018-07-10 ENCOUNTER — HOSPITAL ENCOUNTER (OUTPATIENT)
Dept: ULTRASOUND IMAGING | Facility: CLINIC | Age: 83
Discharge: HOME | End: 2018-07-10
Attending: INTERNAL MEDICINE

## 2018-07-10 DIAGNOSIS — I73.9 PVD (PERIPHERAL VASCULAR DISEASE) (H): ICD-10-CM

## 2018-07-13 ENCOUNTER — AMBULATORY - HEALTHEAST (OUTPATIENT)
Dept: INTERNAL MEDICINE | Facility: CLINIC | Age: 83
End: 2018-07-13

## 2018-07-16 ENCOUNTER — OFFICE VISIT - HEALTHEAST (OUTPATIENT)
Dept: INTERNAL MEDICINE | Facility: CLINIC | Age: 83
End: 2018-07-16

## 2018-07-16 DIAGNOSIS — F41.9 CHRONIC ANXIETY: ICD-10-CM

## 2018-07-16 DIAGNOSIS — E87.1 HYPONATREMIA: ICD-10-CM

## 2018-07-16 DIAGNOSIS — H26.9 CATARACT: ICD-10-CM

## 2018-07-16 DIAGNOSIS — Z01.818 PREOP GENERAL PHYSICAL EXAM: ICD-10-CM

## 2018-07-16 DIAGNOSIS — H81.03 MENIERE'S DISEASE, BILATERAL: ICD-10-CM

## 2018-07-16 DIAGNOSIS — I10 ESSENTIAL HYPERTENSION: ICD-10-CM

## 2018-07-16 ASSESSMENT — MIFFLIN-ST. JEOR: SCORE: 829.12

## 2018-07-17 LAB
ANION GAP SERPL CALCULATED.3IONS-SCNC: 12 MMOL/L (ref 5–18)
BUN SERPL-MCNC: 10 MG/DL (ref 8–28)
CALCIUM SERPL-MCNC: 9.7 MG/DL (ref 8.5–10.5)
CHLORIDE BLD-SCNC: 99 MMOL/L (ref 98–107)
CO2 SERPL-SCNC: 23 MMOL/L (ref 22–31)
CREAT SERPL-MCNC: 0.69 MG/DL (ref 0.6–1.1)
GFR SERPL CREATININE-BSD FRML MDRD: >60 ML/MIN/1.73M2
GLUCOSE BLD-MCNC: 102 MG/DL (ref 70–125)
POTASSIUM BLD-SCNC: 4 MMOL/L (ref 3.5–5)
SODIUM SERPL-SCNC: 134 MMOL/L (ref 136–145)

## 2018-07-18 ENCOUNTER — COMMUNICATION - HEALTHEAST (OUTPATIENT)
Dept: INTERNAL MEDICINE | Facility: CLINIC | Age: 83
End: 2018-07-18

## 2018-07-18 RX ORDER — LOSARTAN POTASSIUM 25 MG/1
25 TABLET ORAL 2 TIMES DAILY
COMMUNITY
End: 2021-09-21

## 2018-07-18 RX ORDER — CLOTRIMAZOLE AND BETAMETHASONE DIPROPIONATE 10; .64 MG/G; MG/G
CREAM TOPICAL 2 TIMES DAILY
COMMUNITY
End: 2021-09-21

## 2018-07-18 RX ORDER — MIRTAZAPINE 15 MG/1
15 TABLET, FILM COATED ORAL AT BEDTIME
COMMUNITY
End: 2021-09-21

## 2018-07-18 RX ORDER — VITAMIN B COMPLEX
1000 TABLET ORAL DAILY
COMMUNITY

## 2018-07-19 ENCOUNTER — ANESTHESIA (OUTPATIENT)
Dept: SURGERY | Facility: CLINIC | Age: 83
End: 2018-07-19
Payer: MEDICARE

## 2018-07-19 ENCOUNTER — SURGERY (OUTPATIENT)
Age: 83
End: 2018-07-19

## 2018-07-19 ENCOUNTER — HOSPITAL ENCOUNTER (OUTPATIENT)
Facility: CLINIC | Age: 83
Discharge: HOME | End: 2018-07-19
Attending: OPHTHALMOLOGY | Admitting: OPHTHALMOLOGY
Payer: MEDICARE

## 2018-07-19 ENCOUNTER — ANESTHESIA EVENT (OUTPATIENT)
Dept: SURGERY | Facility: CLINIC | Age: 83
End: 2018-07-19
Payer: MEDICARE

## 2018-07-19 VITALS
SYSTOLIC BLOOD PRESSURE: 177 MMHG | RESPIRATION RATE: 15 BRPM | BODY MASS INDEX: 22.38 KG/M2 | TEMPERATURE: 97 F | HEIGHT: 59 IN | WEIGHT: 111 LBS | DIASTOLIC BLOOD PRESSURE: 96 MMHG | OXYGEN SATURATION: 96 %

## 2018-07-19 PROCEDURE — 40000170 ZZH STATISTIC PRE-PROCEDURE ASSESSMENT II: Performed by: OPHTHALMOLOGY

## 2018-07-19 PROCEDURE — 71000028 ZZH EYE RECOVERY PHASE 2 EACH 15 MINS: Performed by: OPHTHALMOLOGY

## 2018-07-19 PROCEDURE — 25000125 ZZHC RX 250: Performed by: NURSE ANESTHETIST, CERTIFIED REGISTERED

## 2018-07-19 PROCEDURE — 36000135 ZZH KERATOTOMY ARCUATE W FEMTOSECOND LASER/IMAGING FOR ATIOL: Performed by: OPHTHALMOLOGY

## 2018-07-19 PROCEDURE — 25000128 H RX IP 250 OP 636: Performed by: OPHTHALMOLOGY

## 2018-07-19 PROCEDURE — 25000125 ZZHC RX 250: Performed by: OPHTHALMOLOGY

## 2018-07-19 PROCEDURE — 37000008 ZZH ANESTHESIA TECHNICAL FEE, 1ST 30 MIN: Performed by: OPHTHALMOLOGY

## 2018-07-19 PROCEDURE — 36000101 ZZH EYE SURGERY LEVEL 3 1ST 30 MIN: Performed by: OPHTHALMOLOGY

## 2018-07-19 PROCEDURE — 25000128 H RX IP 250 OP 636: Performed by: ANESTHESIOLOGY

## 2018-07-19 PROCEDURE — V2632 POST CHMBR INTRAOCULAR LENS: HCPCS | Performed by: OPHTHALMOLOGY

## 2018-07-19 PROCEDURE — V2787 ASTIGMATISM-CORRECT FUNCTION: HCPCS | Performed by: OPHTHALMOLOGY

## 2018-07-19 PROCEDURE — 27210794 ZZH OR GENERAL SUPPLY STERILE: Performed by: OPHTHALMOLOGY

## 2018-07-19 PROCEDURE — 25000128 H RX IP 250 OP 636: Performed by: NURSE ANESTHETIST, CERTIFIED REGISTERED

## 2018-07-19 DEVICE — IMPLANTABLE DEVICE: Type: IMPLANTABLE DEVICE | Site: EYE | Status: FUNCTIONAL

## 2018-07-19 RX ORDER — MOXIFLOXACIN 5 MG/ML
1 SOLUTION/ DROPS OPHTHALMIC
Status: COMPLETED | OUTPATIENT
Start: 2018-07-19 | End: 2018-07-19

## 2018-07-19 RX ORDER — TROPICAMIDE 10 MG/ML
1 SOLUTION/ DROPS OPHTHALMIC
Status: COMPLETED | OUTPATIENT
Start: 2018-07-19 | End: 2018-07-19

## 2018-07-19 RX ORDER — BALANCED SALT SOLUTION 6.4; .75; .48; .3; 3.9; 1.7 MG/ML; MG/ML; MG/ML; MG/ML; MG/ML; MG/ML
SOLUTION OPHTHALMIC PRN
Status: DISCONTINUED | OUTPATIENT
Start: 2018-07-19 | End: 2018-07-19 | Stop reason: HOSPADM

## 2018-07-19 RX ORDER — DICLOFENAC SODIUM 1 MG/ML
1 SOLUTION/ DROPS OPHTHALMIC
Status: COMPLETED | OUTPATIENT
Start: 2018-07-19 | End: 2018-07-19

## 2018-07-19 RX ORDER — ONDANSETRON 2 MG/ML
INJECTION INTRAMUSCULAR; INTRAVENOUS PRN
Status: DISCONTINUED | OUTPATIENT
Start: 2018-07-19 | End: 2018-07-19

## 2018-07-19 RX ORDER — TETRACAINE HYDROCHLORIDE 5 MG/ML
SOLUTION OPHTHALMIC PRN
Status: DISCONTINUED | OUTPATIENT
Start: 2018-07-19 | End: 2018-07-19 | Stop reason: HOSPADM

## 2018-07-19 RX ORDER — PROPARACAINE HYDROCHLORIDE 5 MG/ML
1 SOLUTION/ DROPS OPHTHALMIC ONCE
Status: COMPLETED | OUTPATIENT
Start: 2018-07-19 | End: 2018-07-19

## 2018-07-19 RX ORDER — LIDOCAINE HYDROCHLORIDE 10 MG/ML
INJECTION, SOLUTION EPIDURAL; INFILTRATION; INTRACAUDAL; PERINEURAL PRN
Status: DISCONTINUED | OUTPATIENT
Start: 2018-07-19 | End: 2018-07-19 | Stop reason: HOSPADM

## 2018-07-19 RX ORDER — PROPARACAINE HYDROCHLORIDE 5 MG/ML
SOLUTION/ DROPS OPHTHALMIC PRN
Status: DISCONTINUED | OUTPATIENT
Start: 2018-07-19 | End: 2018-07-19 | Stop reason: HOSPADM

## 2018-07-19 RX ORDER — SODIUM CHLORIDE, SODIUM LACTATE, POTASSIUM CHLORIDE, CALCIUM CHLORIDE 600; 310; 30; 20 MG/100ML; MG/100ML; MG/100ML; MG/100ML
INJECTION, SOLUTION INTRAVENOUS CONTINUOUS
Status: DISCONTINUED | OUTPATIENT
Start: 2018-07-19 | End: 2018-07-19 | Stop reason: HOSPADM

## 2018-07-19 RX ORDER — PHENYLEPHRINE HYDROCHLORIDE 25 MG/ML
1 SOLUTION/ DROPS OPHTHALMIC
Status: COMPLETED | OUTPATIENT
Start: 2018-07-19 | End: 2018-07-19

## 2018-07-19 RX ADMIN — PHENYLEPHRINE HYDROCHLORIDE 1 DROP: 2.5 SOLUTION/ DROPS OPHTHALMIC at 13:16

## 2018-07-19 RX ADMIN — TETRACAINE HYDROCHLORIDE 1 DROP: 5 SOLUTION OPHTHALMIC at 14:43

## 2018-07-19 RX ADMIN — PHENYLEPHRINE HYDROCHLORIDE 1 DROP: 2.5 SOLUTION/ DROPS OPHTHALMIC at 13:12

## 2018-07-19 RX ADMIN — Medication 0.8 ML: at 14:53

## 2018-07-19 RX ADMIN — LIDOCAINE HYDROCHLORIDE 1 ML: 10 INJECTION, SOLUTION EPIDURAL; INFILTRATION; INTRACAUDAL; PERINEURAL at 14:52

## 2018-07-19 RX ADMIN — MOXIFLOXACIN 1 DROP: 5 SOLUTION/ DROPS OPHTHALMIC at 13:16

## 2018-07-19 RX ADMIN — MOXIFLOXACIN 1 DROP: 5 SOLUTION/ DROPS OPHTHALMIC at 13:23

## 2018-07-19 RX ADMIN — Medication 1 DROP: at 14:56

## 2018-07-19 RX ADMIN — BALANCED SALT SOLUTION 15 ML: 6.4; .75; .48; .3; 3.9; 1.7 SOLUTION OPHTHALMIC at 14:41

## 2018-07-19 RX ADMIN — TROPICAMIDE 1 DROP: 10 SOLUTION/ DROPS OPHTHALMIC at 13:16

## 2018-07-19 RX ADMIN — TROPICAMIDE 1 DROP: 10 SOLUTION/ DROPS OPHTHALMIC at 13:12

## 2018-07-19 RX ADMIN — DICLOFENAC SODIUM 1 DROP: 1 SOLUTION OPHTHALMIC at 13:23

## 2018-07-19 RX ADMIN — PHENYLEPHRINE HYDROCHLORIDE 1 DROP: 2.5 SOLUTION/ DROPS OPHTHALMIC at 13:23

## 2018-07-19 RX ADMIN — Medication 1 DROP: at 14:51

## 2018-07-19 RX ADMIN — EPINEPHRINE 500 ML: 1 INJECTION, SOLUTION, CONCENTRATE INTRAVENOUS at 14:51

## 2018-07-19 RX ADMIN — PROPARACAINE HYDROCHLORIDE 2 DROP: 5 SOLUTION/ DROPS OPHTHALMIC at 14:41

## 2018-07-19 RX ADMIN — SODIUM CHLORIDE, POTASSIUM CHLORIDE, SODIUM LACTATE AND CALCIUM CHLORIDE: 600; 310; 30; 20 INJECTION, SOLUTION INTRAVENOUS at 14:41

## 2018-07-19 RX ADMIN — DICLOFENAC SODIUM 1 DROP: 1 SOLUTION OPHTHALMIC at 13:12

## 2018-07-19 RX ADMIN — DEXMEDETOMIDINE HYDROCHLORIDE 8 MCG: 100 INJECTION, SOLUTION INTRAVENOUS at 14:41

## 2018-07-19 RX ADMIN — BALANCED SALT SOLUTION 15 ML: 6.4; .75; .48; .3; 3.9; 1.7 SOLUTION OPHTHALMIC at 14:51

## 2018-07-19 RX ADMIN — MOXIFLOXACIN 1 DROP: 5 SOLUTION/ DROPS OPHTHALMIC at 13:12

## 2018-07-19 RX ADMIN — DICLOFENAC SODIUM 1 DROP: 1 SOLUTION OPHTHALMIC at 13:16

## 2018-07-19 RX ADMIN — ONDANSETRON 4 MG: 2 INJECTION INTRAMUSCULAR; INTRAVENOUS at 14:43

## 2018-07-19 RX ADMIN — PROPARACAINE HYDROCHLORIDE 1 DROP: 5 SOLUTION/ DROPS OPHTHALMIC at 13:12

## 2018-07-19 RX ADMIN — TROPICAMIDE 1 DROP: 10 SOLUTION/ DROPS OPHTHALMIC at 13:23

## 2018-07-19 ASSESSMENT — ENCOUNTER SYMPTOMS: SEIZURES: 0

## 2018-07-19 ASSESSMENT — LIFESTYLE VARIABLES: TOBACCO_USE: 0

## 2018-07-19 ASSESSMENT — COPD QUESTIONNAIRES: COPD: 0

## 2018-07-19 NOTE — IP AVS SNAPSHOT
[facility]    [address]    [place] [address]    Phone:  [phone]                                       After Visit Summary   7/19/2018    Mariluz Arana    MRN: [id number]           After Visit Summary Signature Page     I have received my discharge instructions, and my questions have been answered. I have discussed any challenges I see with this plan with the nurse or doctor.    ..........................................................................................................................................  Patient/Patient Representative Signature      ..........................................................................................................................................  Patient Representative Print Name and Relationship to Patient    ..................................................               ................................................  Date                                            Time    ..........................................................................................................................................  Reviewed by Signature/Title    ...................................................              ..............................................  Date                                                            Time

## 2018-07-19 NOTE — DISCHARGE INSTRUCTIONS
"[facility] Discharge  Instructions  Anesthesia (Eye Care Center)   Adult Discharge Instructions    For 24 hours after surgery    1. Get plenty of rest.  Make arrangements to have a responsible adult stay with you for at least 24 hours after you leave the hospital.  2. Do not drive or use heavy equipment for 24 hours.    3. Do not drink alcohol for 24 hours.  4. Do not sign legal documents or make important decisions for 24 hours.  5. Avoid strenuous or risky activities. You may feel lightheaded.  If so, sit for a few minutes before standing.  Have someone help you get up.   6. Conscious sedation patients may resume a regular diet..  7. Any questions of medical nature, call your physician.    Cataract Surgery Postoperative Instructions  Dr. Roberth Chambers      Postoperative Medications: After surgery, you will use eye drop medications. In most cases, you will start these eye drops 2 days before the day of surgery.   Follow the directions provided to you from the pharmacy or from the doctor's office.    The drops might sting a little when they are instilled, and that is normal.    It doesn't matter what order you put the drops into your eyes, but you should wait at least one minute between drops.    Recently we started using a compounded drop that contains all three prescriptions in one bottle. If you have this drop you only need to use one drop 4 time per day. Many people choose to do the drops at breakfast, lunch, dinner, and bedtime.    Artificial Tears- Are lubricating drops used to moisturize the eye.  You can use these as much as you want.  Particularly if your eyes feel watery, gritty, or uncomfortable.  Chilling these drops in the refrigerator results in a more soothing feeling.  There are several brands of artificial tears available including, but not limited to, Optive, Refresh, Systane, Blink, Genteal, Soothe, and others.  You should not use drops that are \"gets the red " "out.\"  You do not need a prescription for these medications.     Please continue any glaucoma, dry eye, or other medications you were using prior to the surgery.      Please allow 24 to 48 hours when requesting refills, and call BEFORE you run out of drops.    Restriction on Activities-  It is extremely important that you DO NOT RUB THE TREATED EYE.    - You will be given a clear plastic shield to wear as protection over your eye the night after surgery.    - Refrain from many activities that may put your eye at risk of injury, as well as areas containing a high volume of chemicals, dust, and debris.    - Do Not wear any eye makeup or moisturizer around the eye for 1 week after surgery.    - Do Not swim or go into a hot-tub, jacuzzi, or sauna for 1 week following surgery.  You can take showers as normal, but avoid getting shampoo or soap into your eyes.     - Avoid strenuous activity, including lifting more than 10 pounds, for 1 week after surgery.       - It is fine to bathe, read, watch TV, and use the computer.    Symptoms requiring medical attention:     - Sudden onset of increased discharge from the eye.  - Persistent or increasing pain in the eye.  - Sudden decrease in vision.  - Persistent nausea or vomiting.      If you have any questions or concerns before or after your surgery, please contact Dr. Chambers's office at [phone].         Revised 3/27/2017  "

## 2018-07-19 NOTE — ANESTHESIA POSTPROCEDURE EVALUATION
Patient: Mariluz Arana    Procedure(s):  RIGHT EYE PHACOEMULSIFICATION CLEAR CORNEA WITH STANDARD INTRAOCULAR LENS IMPLANT  - Wound Class: I-Clean    Diagnosis:CATARACTS RIGHT EYE  Diagnosis Additional Information: No value filed.    Anesthesia Type:  MAC    Note:  Anesthesia Post Evaluation    Patient location during evaluation: Bedside  Patient participation: Able to fully participate in evaluation  Level of consciousness: awake and alert  Pain management: adequate  Airway patency: patent  Cardiovascular status: acceptable  Respiratory status: acceptable  Hydration status: acceptable  PONV: none             Last vitals:  Vitals:    07/19/18 1314 07/19/18 1507 07/19/18 1519   BP: (!) 197/99 157/87 (!) 177/96   Resp: 16 15 15   Temp: 36.1  C (97  F)     SpO2: 98% 96% 96%         Electronically Signed By: Hal Simental MD  July 19, 2018  5:24 PM

## 2018-07-19 NOTE — OP NOTE
PATIENT NAME:  Mariluz Arana    :  1931    PATIENT NUMBER:  [id number]    DATE OF SURGERY:  2018    SURGEON:  Roberth Chambers M.D.    PREOPERATIVE DIAGNOSIS:   1. Cataract right eye  2. Astigmatism right eye    POSTOPERATIVE DIAGNOSIS:  Same    PROCEDURE:   1. Phacoemulusification of cataract with intraocular lens implant right eye.  2. Femtosecond LASER incisions (astigmatic correction) for cataract surgery right eye and nuclear softening.    DETAILS: The patient was brought to the LASER suite. In a supine position the head was secured and the docking apparatus was applied to the eye. When good suction was achieved BSS was added to the well. The chair was then positioned underneath the LASER and docking was successfully achieved. OCT scanning was initiated and approved. The LASER was then used to deliver the desired incisions. See scanned paper note for LASER parameters.     The patient was then moved to the operative suite in stable condition where the eye was prepped and draped in the usual sterile fashion.  A lid speculum was applied. A super sharp blade was used to create a paracentesis, through which 1% preservative free Lidocaine was injected.  Visoelastic was then used to inflate the anterior chamber.  A biplanar incision at the temporal limbus was created with a 2.4 mm keratome or if the main wound was made with the Femtosecond LASER it was bluntly dissected.  A continuous curvilinear capsulorrhexis was started with a cystitome and completed using Utrata forceps.  The lens was hydrodissected and hydro delineated using BSS on a cannula.  The lens nucleus was removed using phacoemulsification.  Remaining cortex was removed using irrigation and aspiration.  Viscoelastic was injected to inflate the capsular bag and a 23.0 D HPX599 (TORIC) IOL was inserted into the capsular bag without difficulty.  The lens markings were then aligned in the 22 degree meridian that was previously marked  on the corneal surface.  Residual viscoelastic and provisc material was removed with irrigation and aspiration.  BSS was used to hydrate the corneal incision and paracentesis sites which were checked and noted to be watertight.  Vigamox  was applied to drop of Vigamox was applied to the eye and a clear plastic shield was placed.  The patient tolerated the procedure well and left the operative suite in stable condition.    Roberth Chambers MD

## 2018-07-19 NOTE — ANESTHESIA CARE TRANSFER NOTE
Patient: Mariluz Arana    Procedure(s):  RIGHT EYE PHACOEMULSIFICATION CLEAR CORNEA WITH STANDARD INTRAOCULAR LENS IMPLANT  - Wound Class: I-Clean    Diagnosis: CATARACTS RIGHT EYE  Diagnosis Additional Information: No value filed.    Anesthesia Type:   MAC     Note:  Airway :Room Air  Patient transferred to:PACU  Handoff Report: Identifed the Patient, Identified the Reponsible Provider, Reviewed the pertinent medical history, Discussed the surgical course, Reviewed Intra-OP anesthesia mangement and issues during anesthesia, Set expectations for post-procedure period and Allowed opportunity for questions and acknowledgement of understanding      Vitals: (Last set prior to Anesthesia Care Transfer)    CRNA VITALS  7/19/2018 1433 - 7/19/2018 1505      7/19/2018             Resp Rate (set): 10                Electronically Signed By: DUSTIN Chester CRNA  July 19, 2018  3:05 PM

## 2018-07-19 NOTE — IP AVS SNAPSHOT
MRN:[id number]                      After Visit Summary   7/19/2018    Mariluz Arana    MRN: [id number]           Thank you!     Thank you for choosing [facility] for your care. Our goal is always to provide you with excellent care. Hearing back from our patients is one way we can continue to improve our services. Please take a few minutes to complete the written survey that you may receive in the mail after you visit with us. Thank you!        Patient Information     Date Of Birth          2/13/1931        About your hospital stay     You were admitted on:  July 19, 2018 You last received care in the:  [facility] [place]    You were discharged on:  July 19, 2018       Who to Call     For medical emergencies, please call 911.  For non-urgent questions about your medical care, please call your primary care provider or clinic, [phone]  For questions related to your surgery, please call your surgery clinic        Attending Provider     Provider Specialty    Roberth Chambers MD Ophthalmology       Primary Care Provider Office Phone # Fax #    Sang Owens [phone]      Further instructions from your care team       [facility] Discharge  Instructions  Anesthesia (Eye Care Center)   Adult Discharge Instructions    For 24 hours after surgery    1. Get plenty of rest.  Make arrangements to have a responsible adult stay with you for at least 24 hours after you leave the hospital.  2. Do not drive or use heavy equipment for 24 hours.    3. Do not drink alcohol for 24 hours.  4. Do not sign legal documents or make important decisions for 24 hours.  5. Avoid strenuous or risky activities. You may feel lightheaded.  If so, sit for a few minutes before standing.  Have someone help you get up.   6. Conscious sedation patients may resume a regular diet..  7. Any questions of medical nature, call your physician.    Cataract Surgery  "Postoperative Instructions  Dr. Roberth Chambers      Postoperative Medications: After surgery, you will use eye drop medications. In most cases, you will start these eye drops 2 days before the day of surgery.   Follow the directions provided to you from the pharmacy or from the doctor's office.    The drops might sting a little when they are instilled, and that is normal.    It doesn't matter what order you put the drops into your eyes, but you should wait at least one minute between drops.    Recently we started using a compounded drop that contains all three prescriptions in one bottle. If you have this drop you only need to use one drop 4 time per day. Many people choose to do the drops at breakfast, lunch, dinner, and bedtime.    Artificial Tears- Are lubricating drops used to moisturize the eye.  You can use these as much as you want.  Particularly if your eyes feel watery, gritty, or uncomfortable.  Chilling these drops in the refrigerator results in a more soothing feeling.  There are several brands of artificial tears available including, but not limited to, Optive, Refresh, Systane, Blink, Genteal, Soothe, and others.  You should not use drops that are \"gets the red out.\"  You do not need a prescription for these medications.     Please continue any glaucoma, dry eye, or other medications you were using prior to the surgery.      Please allow 24 to 48 hours when requesting refills, and call BEFORE you run out of drops.    Restriction on Activities-  It is extremely important that you DO NOT RUB THE TREATED EYE.    - You will be given a clear plastic shield to wear as protection over your eye the night after surgery.    - Refrain from many activities that may put your eye at risk of injury, as well as areas containing a high volume of chemicals, dust, and debris.    - Do Not wear any eye makeup or moisturizer around the eye for 1 week after surgery.    - Do Not swim or go into a hot-tub, jacuzzi, or sauna " "for 1 week following surgery.  You can take showers as normal, but avoid getting shampoo or soap into your eyes.     - Avoid strenuous activity, including lifting more than 10 pounds, for 1 week after surgery.       - It is fine to bathe, read, watch TV, and use the computer.    Symptoms requiring medical attention:     - Sudden onset of increased discharge from the eye.  - Persistent or increasing pain in the eye.  - Sudden decrease in vision.  - Persistent nausea or vomiting.      If you have any questions or concerns before or after your surgery, please contact Dr. Chambers's office at [phone].         Revised 3/27/2017    Pending Results     No orders found from 2018 to 2018.            Admission Information     Date & Time Provider Department Dept. Phone    2018 Roberth Chambers MD [facility] [phone]      Your Vitals Were     Blood Pressure Temperature Respirations Height Weight Pulse Oximetry    197/99 97  F (36.1  C) (Temporal) 16 1.499 m (4' 11\") 50.3 kg (111 lb) 98%    BMI (Body Mass Index)                   22.42 kg/m2           MyChart Information     [employer] lets you send messages to your doctor, view your test results, renew your prescriptions, schedule appointments and more. To sign up, go to [email or web address] . Click on \"Log in\" on the left side of the screen, which will take you to the Welcome page. Then click on \"Sign up Now\" on the right side of the page.     You will be asked to enter the access code listed below, as well as some personal information. Please follow the directions to create your username and password.     Your access code is: [email or web address]  Expires: 10/17/2018  2:59 PM     Your access code will  in 90 days. If you need help or a new code, please call your [place] clinic or [phone].        Care EveryWhere ID     This is your Care EveryWhere ID. This could be used by other organizations to access your [place] " medical records  [id number]        Equal Access to Services     ELIAS MONTOYA : Hadii aad lindsey alessandra Soanshuali, waaxda luqadaha, qaybta kaalmada yifanabbey, waxolamide idiin haykimasael sahaharley starkeycandy ogden. So [facility] [phone].    ATENCIÓN: Si habla español, tiene a murrell disposición servicios gratuitos de asistencia lingüística. Llame al [phone].    We comply with applicable federal civil rights laws and [place] laws. We do not discriminate on the basis of race, color, national origin, age, disability, sex, sexual orientation, or gender identity.               Review of your medicines      UNREVIEWED medicines. Ask your doctor about these medicines        Dose / Directions    ATENOLOL PO        Dose:  25 mg   Take 25 mg by mouth daily.   Refills:  0       CENTRUM SILVER per tablet        Dose:  1 tablet   Take 1 tablet by mouth daily.   Refills:  0       clotrimazole-betamethasone cream   Commonly known as:  LOTRISONE        Apply topically 2 times daily   Refills:  0       Fluticasone Propionate Powd        Refills:  0       IBUPROFEN PO        Dose:  200-400 mg   Take 200-400 mg by mouth every 8 hours as needed for moderate pain   Refills:  0       loperamide 2 MG capsule   Commonly known as:  IMODIUM        Dose:  2 mg   Take 2 mg by mouth 4 times daily as needed.   Refills:  0       LORAZEPAM PO        Dose:  0.5 mg   Take 0.5 mg by mouth At Bedtime.   Refills:  0       losartan 25 MG tablet   Commonly known as:  COZAAR        Dose:  25 mg   Take 25 mg by mouth 2 times daily   Refills:  0       meclizine 25 MG tablet   Commonly known as:  ANTIVERT        Dose:  25 mg   Take 25 mg by mouth 4 times daily.   Refills:  0       mirtazapine 15 MG tablet   Commonly known as:  REMERON        Dose:  15 mg   Take 15 mg by mouth At Bedtime   Refills:  0       oxazepam 10 MG capsule   Commonly known as:  SERAX        Dose:  10 mg   Take 10 mg by mouth nightly as needed.   Refills:  0       SIMVASTATIN PO        Dose:  20 mg    Take 20 mg by mouth At Bedtime.   Refills:  0       VITAMIN D (CHOLECALCIFEROL) PO        Dose:  1000 Units   Take 1,000 Units by mouth daily   Refills:  0       ZOFRAN PO        Dose:  4 mg   Take 4 mg by mouth every 8 hours as needed.   Refills:  0                Protect others around you: Learn how to safely use, store and throw away your medicines at www.disposemymeds.org.             Medication List: This is a list of all your medications and when to take them. Check marks below indicate your daily home schedule. Keep this list as a reference.      Medications           Morning Afternoon Evening Bedtime As Needed    ATENOLOL PO   Take 25 mg by mouth daily.                                CENTRUM SILVER per tablet   Take 1 tablet by mouth daily.                                clotrimazole-betamethasone cream   Commonly known as:  LOTRISONE   Apply topically 2 times daily                                Fluticasone Propionate Powd                                IBUPROFEN PO   Take 200-400 mg by mouth every 8 hours as needed for moderate pain                                loperamide 2 MG capsule   Commonly known as:  IMODIUM   Take 2 mg by mouth 4 times daily as needed.                                LORAZEPAM PO   Take 0.5 mg by mouth At Bedtime.                                losartan 25 MG tablet   Commonly known as:  COZAAR   Take 25 mg by mouth 2 times daily                                meclizine 25 MG tablet   Commonly known as:  ANTIVERT   Take 25 mg by mouth 4 times daily.                                mirtazapine 15 MG tablet   Commonly known as:  REMERON   Take 15 mg by mouth At Bedtime                                oxazepam 10 MG capsule   Commonly known as:  SERAX   Take 10 mg by mouth nightly as needed.                                SIMVASTATIN PO   Take 20 mg by mouth At Bedtime.                                VITAMIN D (CHOLECALCIFEROL) PO   Take 1,000 Units by mouth daily                                 ZOFRAN PO   Take 4 mg by mouth every 8 hours as needed.

## 2018-07-19 NOTE — ANESTHESIA PREPROCEDURE EVALUATION
"  Anesthesia Evaluation     . Pt has had prior anesthetic.     No history of anesthetic complications          ROS/MED HX    ENT/Pulmonary:      (-) tobacco use, asthma, COPD and sleep apnea   Neurologic:      (-) seizures, CVA and TIA   Cardiovascular:     (+) Dyslipidemia, hypertension----. : . . . :. Irregular Heartbeat/Palpitations, valvular problems/murmurs type: AS mild-moderate per most recent TTE:.       METS/Exercise Tolerance:     Hematologic:        (-) history of blood clots   Musculoskeletal:   (+) arthritis, , , -       GI/Hepatic:     (+) GERD      (-) hepatitis   Renal/Genitourinary:      (-) renal disease   Endo:      (-) Type II DM   Psychiatric:     (+) psychiatric history anxiety      Infectious Disease:         Malignancy:         Other: Comment: Vertigo/Meniere's disease                    Physical Exam  Normal systems: dental    Airway   Mallampati: II  TM distance: >3 FB  Neck ROM: full    Dental     Cardiovascular   Rhythm and rate: regular      Pulmonary    breath sounds clear to auscultation                    Anesthesia Plan      History & Physical Review  History and physical reviewed and following examination; no interval change.    ASA Status:  2 .    NPO Status:  > 8 hours    Plan for MAC Reason for MAC:  Procedure to face, neck, head or breast  PONV prophylaxis:  Ondansetron (or other 5HT-3)  Hard of hearing, may need to lean close to ear, particularly in the supine position    Low dose midazolam and fentanyl, precedex as needed      Postoperative Care  Postoperative pain management:  Oral pain medications.      Consents        Procedure: Procedure(s):  PHACOEMULSIFICATION CLEAR CORNEA WITH STANDARD INTRAOCULAR LENS IMPLANT  Preop diagnosis: CATARACTS RIGHT EYE    Allergies   Allergen Reactions     Actonel [Risedronate]      GI upset     Clindamycin      Cortisone Other (See Comments)     \"heart symptoms\", low blood pressure     Fosamax Other (See Comments)     \"burning my lower " "esophagus\"     Hmg-Coa-R Inhibitors      Shortness of breath.  Tolerating Simvastatin (Sept 2016)     Kenalog [Triamcinolone]      Metronidazole      Minipress [Prazosin]      Hypotension     Penicillin G      Tetanus Toxoid      Prednisone Rash     Zithromax [Azithromycin Dihydrate] Rash     States got rash after using z rubio     Past Medical History:   Diagnosis Date     Arthritis     Osteoarthritis     Bilateral cataracts      Chronic anxiety      Gastroesophageal reflux disease      Heart murmur      Hypercholesteremia      Hypertension      Hyponatremia      Irregular heart beat      Irritable bowel      Meniere syndrome      Meniere's disease, bilateral      Vertigo      Past Surgical History:   Procedure Laterality Date     DILATION AND CURETTAGE       ENHANCE ENDOLYMPHATIC SAC, DEC SIGMOID SINUS, EXTND FACIAL RECESS APPRCH, MASTOIDECTOMY, BMT, COMBO  4/23/2012    Procedure:COMBINED ENHANCE ENDOLYMPHATIC SAC, DECOMPRESS SIGMOID SINUS, EXTENDED FACIAL RECESS APPROACH, COMPLETE MASTOIDECTOMY, MYRINGOTOMY, INSERT TUBE; Left Endolymphatic Sac Enhancement, Sigmoid Sinus Decom-  pression, Extended Facial Recess Approach, Myringotomy  , Complete Mastoidectomy; Surgeon:LINN MATHIS; Location:[facility]     TONSILLECTOMY       Social History   Substance Use Topics     Smoking status: Never Smoker     Smokeless tobacco: Never Used     Alcohol use Yes      Comment: 2-3 times weekly, glass of wine     Prior to Admission medications    Medication Sig Start Date End Date Taking? Authorizing Provider   ATENOLOL PO Take 25 mg by mouth daily.   Yes Reported, Patient   clotrimazole-betamethasone (LOTRISONE) cream Apply topically 2 times daily   Yes Reported, Patient   Fluticasone Propionate POWD    Yes Reported, Patient   IBUPROFEN PO Take 200-400 mg by mouth every 8 hours as needed for moderate pain   Yes Reported, Patient   loperamide (IMODIUM) 2 MG capsule Take 2 mg by mouth 4 times daily as needed.   Yes Reported, Patient "   LORAZEPAM PO Take 0.5 mg by mouth At Bedtime.   Yes Reported, Patient   losartan (COZAAR) 25 MG tablet Take 25 mg by mouth 2 times daily   Yes Reported, Patient   VITAMIN D, CHOLECALCIFEROL, PO Take 1,000 Units by mouth daily   Yes Reported, Patient   meclizine (ANTIVERT) 25 MG tablet Take 25 mg by mouth 4 times daily.    Reported, Patient   mirtazapine (REMERON) 15 MG tablet Take 15 mg by mouth At Bedtime    Reported, Patient   Multiple Vitamins-Minerals (CENTRUM SILVER) per tablet Take 1 tablet by mouth daily.    Reported, Patient   Ondansetron HCl (ZOFRAN PO) Take 4 mg by mouth every 8 hours as needed.    Reported, Patient   oxazepam (SERAX) 10 MG capsule Take 10 mg by mouth nightly as needed.    Reported, Patient   SIMVASTATIN PO Take 20 mg by mouth At Bedtime.    Reported, Patient     Current Facility-Administered Medications Ordered in Epic   Medication Dose Route Frequency Last Rate Last Dose     lactated ringers infusion   Intravenous Continuous         lidocaine 1 % 1 mL  1 mL Other Q1H PRN         sodium chloride (PF) 0.9% PF flush 3 mL  3 mL Intracatheter Q1H PRN         No current [facility]-ordered outpatient prescriptions on file.       lactated ringers       Wt Readings from Last 1 Encounters:   18 50.3 kg (111 lb)     Temp Readings from Last 1 Encounters:   18 36.1  C (97  F) (Temporal)     BP Readings from Last 6 Encounters:   18 (!) 197/99   09/10/12 148/91   12 95/52     Pulse Readings from Last 4 Encounters:   09/10/12 72     Resp Readings from Last 1 Encounters:   18 16     SpO2 Readings from Last 1 Encounters:   18 98%     EC - Sinus bradycardia  Left ventricular hypertrophy with QRS widening and repolarization abnormality  Cannot rule out Septal infarct , age undetermined  Abnormal ECG  When compared with ECG of 22-DEC-2017 13:24,  Minimal criteria for Septal infarct are now Present  ECHO: 2018 1. Normal left ventricular size and systolic performance  with a visually   estimated ejection fraction of 55%.   2. There is mild to moderate aortic stenosis.   3. There is trace aortic insufficiency.   4. Normal right ventricular size and systolic performance.   5. There is mild left atrial enlargement.

## 2018-07-19 NOTE — OR NURSING
Post op instructions reviewed with pt and responsible adult.  MDA aware of BP, pt and dtr state this is normal for her.  Writer advised pt to take her BP meds as usual before bed tonight., denies pain.  D/C'd to home.

## 2018-07-24 ENCOUNTER — COMMUNICATION - HEALTHEAST (OUTPATIENT)
Dept: SCHEDULING | Facility: CLINIC | Age: 83
End: 2018-07-24

## 2018-07-25 ENCOUNTER — COMMUNICATION - HEALTHEAST (OUTPATIENT)
Dept: INTERNAL MEDICINE | Facility: CLINIC | Age: 83
End: 2018-07-25

## 2018-07-25 ENCOUNTER — COMMUNICATION - HEALTHEAST (OUTPATIENT)
Dept: SCHEDULING | Facility: CLINIC | Age: 83
End: 2018-07-25

## 2018-07-30 ENCOUNTER — OFFICE VISIT - HEALTHEAST (OUTPATIENT)
Dept: INTERNAL MEDICINE | Facility: CLINIC | Age: 83
End: 2018-07-30

## 2018-07-30 DIAGNOSIS — F41.9 CHRONIC ANXIETY: ICD-10-CM

## 2018-07-30 DIAGNOSIS — I10 ESSENTIAL HYPERTENSION: ICD-10-CM

## 2018-07-30 ASSESSMENT — MIFFLIN-ST. JEOR: SCORE: 829.12

## 2018-07-31 ENCOUNTER — COMMUNICATION - HEALTHEAST (OUTPATIENT)
Dept: INTERNAL MEDICINE | Facility: CLINIC | Age: 83
End: 2018-07-31

## 2018-08-01 ENCOUNTER — COMMUNICATION - HEALTHEAST (OUTPATIENT)
Dept: INTERNAL MEDICINE | Facility: CLINIC | Age: 83
End: 2018-08-01

## 2018-08-03 ENCOUNTER — COMMUNICATION - HEALTHEAST (OUTPATIENT)
Dept: INTERNAL MEDICINE | Facility: CLINIC | Age: 83
End: 2018-08-03

## 2018-08-06 ENCOUNTER — COMMUNICATION - HEALTHEAST (OUTPATIENT)
Dept: INTERNAL MEDICINE | Facility: CLINIC | Age: 83
End: 2018-08-06

## 2018-08-06 ENCOUNTER — OFFICE VISIT - HEALTHEAST (OUTPATIENT)
Dept: INTERNAL MEDICINE | Facility: CLINIC | Age: 83
End: 2018-08-06

## 2018-08-06 DIAGNOSIS — F41.9 CHRONIC ANXIETY: ICD-10-CM

## 2018-08-06 DIAGNOSIS — I10 ESSENTIAL HYPERTENSION: ICD-10-CM

## 2018-08-06 LAB
ANION GAP SERPL CALCULATED.3IONS-SCNC: 10 MMOL/L (ref 5–18)
BUN SERPL-MCNC: 11 MG/DL (ref 8–28)
CALCIUM SERPL-MCNC: 9.7 MG/DL (ref 8.5–10.5)
CHLORIDE BLD-SCNC: 102 MMOL/L (ref 98–107)
CO2 SERPL-SCNC: 25 MMOL/L (ref 22–31)
CREAT SERPL-MCNC: 0.74 MG/DL (ref 0.6–1.1)
GFR SERPL CREATININE-BSD FRML MDRD: >60 ML/MIN/1.73M2
GLUCOSE BLD-MCNC: 109 MG/DL (ref 70–125)
POTASSIUM BLD-SCNC: 4.2 MMOL/L (ref 3.5–5)
SODIUM SERPL-SCNC: 137 MMOL/L (ref 136–145)

## 2018-08-06 ASSESSMENT — MIFFLIN-ST. JEOR: SCORE: 815.51

## 2018-08-07 ENCOUNTER — COMMUNICATION - HEALTHEAST (OUTPATIENT)
Dept: INTERNAL MEDICINE | Facility: CLINIC | Age: 83
End: 2018-08-07

## 2018-08-08 ENCOUNTER — HOSPITAL ENCOUNTER (OUTPATIENT)
Dept: ULTRASOUND IMAGING | Facility: CLINIC | Age: 83
Discharge: HOME | End: 2018-08-08
Attending: INTERNAL MEDICINE

## 2018-08-08 DIAGNOSIS — I10 ESSENTIAL HYPERTENSION: ICD-10-CM

## 2018-08-09 ENCOUNTER — COMMUNICATION - HEALTHEAST (OUTPATIENT)
Dept: INTERNAL MEDICINE | Facility: CLINIC | Age: 83
End: 2018-08-09

## 2018-08-12 ENCOUNTER — COMMUNICATION - HEALTHEAST (OUTPATIENT)
Dept: SCHEDULING | Facility: CLINIC | Age: 83
End: 2018-08-12

## 2018-08-13 ENCOUNTER — OFFICE VISIT - HEALTHEAST (OUTPATIENT)
Dept: INTERNAL MEDICINE | Facility: CLINIC | Age: 83
End: 2018-08-13

## 2018-08-13 DIAGNOSIS — I10 ESSENTIAL HYPERTENSION: ICD-10-CM

## 2018-08-13 DIAGNOSIS — R42 DIZZINESS: ICD-10-CM

## 2018-08-13 DIAGNOSIS — F41.9 CHRONIC ANXIETY: ICD-10-CM

## 2018-08-13 LAB
ANION GAP SERPL CALCULATED.3IONS-SCNC: 14 MMOL/L (ref 5–18)
BUN SERPL-MCNC: 15 MG/DL (ref 8–28)
CALCIUM SERPL-MCNC: 9.8 MG/DL (ref 8.5–10.5)
CHLORIDE BLD-SCNC: 86 MMOL/L (ref 98–107)
CO2 SERPL-SCNC: 24 MMOL/L (ref 22–31)
CREAT SERPL-MCNC: 0.76 MG/DL (ref 0.6–1.1)
GFR SERPL CREATININE-BSD FRML MDRD: >60 ML/MIN/1.73M2
GLUCOSE BLD-MCNC: 112 MG/DL (ref 70–125)
POTASSIUM BLD-SCNC: 3.6 MMOL/L (ref 3.5–5)
SODIUM SERPL-SCNC: 124 MMOL/L (ref 136–145)

## 2018-08-13 ASSESSMENT — MIFFLIN-ST. JEOR: SCORE: 820.05

## 2018-08-14 LAB
COLLECT DURATION TIME SPEC: 24 HR
CREAT 24H UR-MRATE: 481 MG/D (ref 400–1300)
CREAT UR-MCNC: 26 MG/DL
METANEPH 24H UR-MCNC: 62 UG/L
METANEPH 24H UR-MRATE: 115 UG/D (ref 39–143)
METANEPH+NORMETANEPH UR-IMP: ABNORMAL
METANEPH/CREAT 24H UR: 238 UG/G CRT (ref 0–300)
NORMETANEPHRINE 24H UR-MCNC: 167 UG/L
NORMETANEPHRINE 24H UR-MRATE: 309 UG/D (ref 109–393)
NORMETANEPHRINE/CREAT 24H UR: 642 UG/G CRT (ref 0–400)
SPECIMEN VOL ?TM UR: 1850 ML

## 2018-08-15 ENCOUNTER — COMMUNICATION - HEALTHEAST (OUTPATIENT)
Dept: INTERNAL MEDICINE | Facility: CLINIC | Age: 83
End: 2018-08-15

## 2018-08-15 LAB
5HIAA & CREATININE UR-IMP: NORMAL
5OH-INDOLEACETATE 24H UR-MCNC: 1.6 MG/L
5OH-INDOLEACETATE 24H UR-MRATE: 3 MG/D (ref 0–15)
5OH-INDOLEACETATE/CREAT 24H UR: 6 MG/GCR (ref 0–14)
COLLECT DURATION TIME SPEC: 24 HR
CREAT 24H UR-MRATE: 481 MG/D (ref 400–1300)
CREAT UR-MCNC: 26 MG/DL
SPECIMEN VOL ?TM UR: 1850 ML

## 2018-08-27 ENCOUNTER — OFFICE VISIT - HEALTHEAST (OUTPATIENT)
Dept: INTERNAL MEDICINE | Facility: CLINIC | Age: 83
End: 2018-08-27

## 2018-08-27 DIAGNOSIS — E87.1 HYPONATREMIA: ICD-10-CM

## 2018-08-27 DIAGNOSIS — H81.03 MENIERE'S DISEASE, BILATERAL: ICD-10-CM

## 2018-08-27 DIAGNOSIS — F41.9 CHRONIC ANXIETY: ICD-10-CM

## 2018-08-27 DIAGNOSIS — I10 ESSENTIAL HYPERTENSION: ICD-10-CM

## 2018-08-27 LAB
ANION GAP SERPL CALCULATED.3IONS-SCNC: 8 MMOL/L (ref 5–18)
BUN SERPL-MCNC: 11 MG/DL (ref 8–28)
CALCIUM SERPL-MCNC: 9.8 MG/DL (ref 8.5–10.5)
CHLORIDE BLD-SCNC: 99 MMOL/L (ref 98–107)
CO2 SERPL-SCNC: 26 MMOL/L (ref 22–31)
CREAT SERPL-MCNC: 0.72 MG/DL (ref 0.6–1.1)
GFR SERPL CREATININE-BSD FRML MDRD: >60 ML/MIN/1.73M2
GLUCOSE BLD-MCNC: 117 MG/DL (ref 70–125)
POTASSIUM BLD-SCNC: 3.9 MMOL/L (ref 3.5–5)
SODIUM SERPL-SCNC: 133 MMOL/L (ref 136–145)

## 2018-08-27 ASSESSMENT — MIFFLIN-ST. JEOR: SCORE: 829.12

## 2018-08-28 ENCOUNTER — COMMUNICATION - HEALTHEAST (OUTPATIENT)
Dept: INTERNAL MEDICINE | Facility: CLINIC | Age: 83
End: 2018-08-28

## 2018-09-17 ENCOUNTER — OFFICE VISIT - HEALTHEAST (OUTPATIENT)
Dept: INTERNAL MEDICINE | Facility: CLINIC | Age: 83
End: 2018-09-17

## 2018-09-17 ENCOUNTER — COMMUNICATION - HEALTHEAST (OUTPATIENT)
Dept: INTERNAL MEDICINE | Facility: CLINIC | Age: 83
End: 2018-09-17

## 2018-09-17 DIAGNOSIS — F41.9 CHRONIC ANXIETY: ICD-10-CM

## 2018-09-17 DIAGNOSIS — I10 ESSENTIAL HYPERTENSION: ICD-10-CM

## 2018-09-17 DIAGNOSIS — Z23 NEED FOR VACCINATION: ICD-10-CM

## 2018-09-17 LAB
ANION GAP SERPL CALCULATED.3IONS-SCNC: 9 MMOL/L (ref 5–18)
BUN SERPL-MCNC: 16 MG/DL (ref 8–28)
CALCIUM SERPL-MCNC: 9.4 MG/DL (ref 8.5–10.5)
CHLORIDE BLD-SCNC: 98 MMOL/L (ref 98–107)
CO2 SERPL-SCNC: 25 MMOL/L (ref 22–31)
CREAT SERPL-MCNC: 0.71 MG/DL (ref 0.6–1.1)
GFR SERPL CREATININE-BSD FRML MDRD: >60 ML/MIN/1.73M2
GLUCOSE BLD-MCNC: 119 MG/DL (ref 70–125)
POTASSIUM BLD-SCNC: 4 MMOL/L (ref 3.5–5)
SODIUM SERPL-SCNC: 132 MMOL/L (ref 136–145)

## 2018-09-17 ASSESSMENT — MIFFLIN-ST. JEOR: SCORE: 829.12

## 2018-09-18 ENCOUNTER — RECORDS - HEALTHEAST (OUTPATIENT)
Dept: ADMINISTRATIVE | Facility: OTHER | Age: 83
End: 2018-09-18

## 2018-09-19 ENCOUNTER — COMMUNICATION - HEALTHEAST (OUTPATIENT)
Dept: INTERNAL MEDICINE | Facility: CLINIC | Age: 83
End: 2018-09-19

## 2018-10-01 ENCOUNTER — COMMUNICATION - HEALTHEAST (OUTPATIENT)
Dept: INTERNAL MEDICINE | Facility: CLINIC | Age: 83
End: 2018-10-01

## 2018-10-18 ENCOUNTER — COMMUNICATION - HEALTHEAST (OUTPATIENT)
Dept: SCHEDULING | Facility: CLINIC | Age: 83
End: 2018-10-18

## 2018-10-19 ENCOUNTER — OFFICE VISIT - HEALTHEAST (OUTPATIENT)
Dept: INTERNAL MEDICINE | Facility: CLINIC | Age: 83
End: 2018-10-19

## 2018-10-19 DIAGNOSIS — E87.1 HYPONATREMIA: ICD-10-CM

## 2018-10-19 DIAGNOSIS — I10 ESSENTIAL HYPERTENSION: ICD-10-CM

## 2018-10-19 DIAGNOSIS — F41.9 CHRONIC ANXIETY: ICD-10-CM

## 2018-10-19 LAB
ANION GAP SERPL CALCULATED.3IONS-SCNC: 15 MMOL/L (ref 5–18)
BUN SERPL-MCNC: 12 MG/DL (ref 8–28)
CALCIUM SERPL-MCNC: 9.8 MG/DL (ref 8.5–10.5)
CHLORIDE BLD-SCNC: 101 MMOL/L (ref 98–107)
CO2 SERPL-SCNC: 21 MMOL/L (ref 22–31)
CREAT SERPL-MCNC: 0.75 MG/DL (ref 0.6–1.1)
GFR SERPL CREATININE-BSD FRML MDRD: >60 ML/MIN/1.73M2
GLUCOSE BLD-MCNC: 110 MG/DL (ref 70–125)
POTASSIUM BLD-SCNC: 3.5 MMOL/L (ref 3.5–5)
SODIUM SERPL-SCNC: 137 MMOL/L (ref 136–145)

## 2018-10-19 ASSESSMENT — MIFFLIN-ST. JEOR: SCORE: 842.73

## 2018-10-22 ENCOUNTER — COMMUNICATION - HEALTHEAST (OUTPATIENT)
Dept: INTERNAL MEDICINE | Facility: CLINIC | Age: 83
End: 2018-10-22

## 2018-10-22 ENCOUNTER — RECORDS - HEALTHEAST (OUTPATIENT)
Dept: ADMINISTRATIVE | Facility: OTHER | Age: 83
End: 2018-10-22

## 2018-10-24 ENCOUNTER — COMMUNICATION - HEALTHEAST (OUTPATIENT)
Dept: INTERNAL MEDICINE | Facility: CLINIC | Age: 83
End: 2018-10-24

## 2018-10-25 ENCOUNTER — COMMUNICATION - HEALTHEAST (OUTPATIENT)
Dept: INTERNAL MEDICINE | Facility: CLINIC | Age: 83
End: 2018-10-25

## 2018-10-26 ENCOUNTER — COMMUNICATION - HEALTHEAST (OUTPATIENT)
Dept: INTERNAL MEDICINE | Facility: CLINIC | Age: 83
End: 2018-10-26

## 2018-10-26 ENCOUNTER — RECORDS - HEALTHEAST (OUTPATIENT)
Dept: ADMINISTRATIVE | Facility: OTHER | Age: 83
End: 2018-10-26

## 2018-10-30 ENCOUNTER — COMMUNICATION - HEALTHEAST (OUTPATIENT)
Dept: INTERNAL MEDICINE | Facility: CLINIC | Age: 83
End: 2018-10-30

## 2018-10-30 DIAGNOSIS — I10 HTN (HYPERTENSION): ICD-10-CM

## 2018-10-30 DIAGNOSIS — F41.9 CHRONIC ANXIETY: ICD-10-CM

## 2018-11-20 ENCOUNTER — COMMUNICATION - HEALTHEAST (OUTPATIENT)
Dept: INTERNAL MEDICINE | Facility: CLINIC | Age: 83
End: 2018-11-20

## 2018-11-27 ENCOUNTER — RECORDS - HEALTHEAST (OUTPATIENT)
Dept: ADMINISTRATIVE | Facility: OTHER | Age: 83
End: 2018-11-27

## 2018-11-29 ENCOUNTER — COMMUNICATION - HEALTHEAST (OUTPATIENT)
Dept: INTERNAL MEDICINE | Facility: CLINIC | Age: 83
End: 2018-11-29

## 2018-12-10 ENCOUNTER — RECORDS - HEALTHEAST (OUTPATIENT)
Dept: ADMINISTRATIVE | Facility: OTHER | Age: 83
End: 2018-12-10

## 2018-12-17 ENCOUNTER — COMMUNICATION - HEALTHEAST (OUTPATIENT)
Dept: INTERNAL MEDICINE | Facility: CLINIC | Age: 83
End: 2018-12-17

## 2018-12-17 DIAGNOSIS — F41.9 CHRONIC ANXIETY: ICD-10-CM

## 2018-12-19 ENCOUNTER — OFFICE VISIT - HEALTHEAST (OUTPATIENT)
Dept: INTERNAL MEDICINE | Facility: CLINIC | Age: 83
End: 2018-12-19

## 2018-12-19 DIAGNOSIS — R30.0 DYSURIA: ICD-10-CM

## 2018-12-19 DIAGNOSIS — F41.9 CHRONIC ANXIETY: ICD-10-CM

## 2018-12-19 DIAGNOSIS — I10 ESSENTIAL HYPERTENSION: ICD-10-CM

## 2018-12-19 LAB
ALBUMIN UR-MCNC: NEGATIVE MG/DL
ANION GAP SERPL CALCULATED.3IONS-SCNC: 13 MMOL/L (ref 5–18)
APPEARANCE UR: CLEAR
BACTERIA #/AREA URNS HPF: ABNORMAL HPF
BILIRUB UR QL STRIP: NEGATIVE
BUN SERPL-MCNC: 15 MG/DL (ref 8–28)
CALCIUM SERPL-MCNC: 10.1 MG/DL (ref 8.5–10.5)
CHLORIDE BLD-SCNC: 99 MMOL/L (ref 98–107)
CO2 SERPL-SCNC: 22 MMOL/L (ref 22–31)
COLOR UR AUTO: YELLOW
CREAT SERPL-MCNC: 0.7 MG/DL (ref 0.6–1.1)
GFR SERPL CREATININE-BSD FRML MDRD: >60 ML/MIN/1.73M2
GLUCOSE BLD-MCNC: 100 MG/DL (ref 70–125)
GLUCOSE UR STRIP-MCNC: NEGATIVE MG/DL
HGB UR QL STRIP: NEGATIVE
KETONES UR STRIP-MCNC: NEGATIVE MG/DL
LEUKOCYTE ESTERASE UR QL STRIP: ABNORMAL
NITRATE UR QL: NEGATIVE
PH UR STRIP: 7 [PH] (ref 5–8)
POTASSIUM BLD-SCNC: 4.1 MMOL/L (ref 3.5–5)
RBC #/AREA URNS AUTO: ABNORMAL HPF
SODIUM SERPL-SCNC: 134 MMOL/L (ref 136–145)
SP GR UR STRIP: 1.01 (ref 1–1.03)
SQUAMOUS #/AREA URNS AUTO: ABNORMAL LPF
UROBILINOGEN UR STRIP-ACNC: ABNORMAL
WBC #/AREA URNS AUTO: ABNORMAL HPF

## 2018-12-19 ASSESSMENT — MIFFLIN-ST. JEOR: SCORE: 838.19

## 2018-12-20 LAB — BACTERIA SPEC CULT: NO GROWTH

## 2018-12-28 ENCOUNTER — RECORDS - HEALTHEAST (OUTPATIENT)
Dept: ADMINISTRATIVE | Facility: OTHER | Age: 83
End: 2018-12-28

## 2018-12-31 ENCOUNTER — RECORDS - HEALTHEAST (OUTPATIENT)
Dept: ADMINISTRATIVE | Facility: OTHER | Age: 83
End: 2018-12-31

## 2019-01-21 ENCOUNTER — OFFICE VISIT - HEALTHEAST (OUTPATIENT)
Dept: INTERNAL MEDICINE | Facility: CLINIC | Age: 84
End: 2019-01-21

## 2019-01-21 DIAGNOSIS — F41.9 CHRONIC ANXIETY: ICD-10-CM

## 2019-01-21 DIAGNOSIS — I10 ESSENTIAL HYPERTENSION: ICD-10-CM

## 2019-01-21 LAB
ANION GAP SERPL CALCULATED.3IONS-SCNC: 10 MMOL/L (ref 5–18)
BUN SERPL-MCNC: 11 MG/DL (ref 8–28)
CALCIUM SERPL-MCNC: 9.5 MG/DL (ref 8.5–10.5)
CHLORIDE BLD-SCNC: 102 MMOL/L (ref 98–107)
CO2 SERPL-SCNC: 23 MMOL/L (ref 22–31)
CREAT SERPL-MCNC: 0.75 MG/DL (ref 0.6–1.1)
GFR SERPL CREATININE-BSD FRML MDRD: >60 ML/MIN/1.73M2
GLUCOSE BLD-MCNC: 96 MG/DL (ref 70–125)
POTASSIUM BLD-SCNC: 4 MMOL/L (ref 3.5–5)
SODIUM SERPL-SCNC: 135 MMOL/L (ref 136–145)

## 2019-01-21 ASSESSMENT — MIFFLIN-ST. JEOR: SCORE: 842.73

## 2019-01-22 ENCOUNTER — COMMUNICATION - HEALTHEAST (OUTPATIENT)
Dept: INTERNAL MEDICINE | Facility: CLINIC | Age: 84
End: 2019-01-22

## 2019-02-19 ENCOUNTER — COMMUNICATION - HEALTHEAST (OUTPATIENT)
Dept: INTERNAL MEDICINE | Facility: CLINIC | Age: 84
End: 2019-02-19

## 2019-02-19 DIAGNOSIS — F41.9 CHRONIC ANXIETY: ICD-10-CM

## 2019-03-04 ENCOUNTER — RECORDS - HEALTHEAST (OUTPATIENT)
Dept: ADMINISTRATIVE | Facility: OTHER | Age: 84
End: 2019-03-04

## 2019-03-20 ENCOUNTER — OFFICE VISIT - HEALTHEAST (OUTPATIENT)
Dept: INTERNAL MEDICINE | Facility: CLINIC | Age: 84
End: 2019-03-20

## 2019-03-20 ENCOUNTER — COMMUNICATION - HEALTHEAST (OUTPATIENT)
Dept: INTERNAL MEDICINE | Facility: CLINIC | Age: 84
End: 2019-03-20

## 2019-03-20 DIAGNOSIS — I35.0 NONRHEUMATIC AORTIC VALVE STENOSIS: ICD-10-CM

## 2019-03-20 DIAGNOSIS — F41.9 CHRONIC ANXIETY: ICD-10-CM

## 2019-03-20 DIAGNOSIS — E87.1 HYPONATREMIA: ICD-10-CM

## 2019-03-20 DIAGNOSIS — I10 ESSENTIAL HYPERTENSION: ICD-10-CM

## 2019-03-20 LAB
ANION GAP SERPL CALCULATED.3IONS-SCNC: 12 MMOL/L (ref 5–18)
BUN SERPL-MCNC: 16 MG/DL (ref 8–28)
CALCIUM SERPL-MCNC: 9.6 MG/DL (ref 8.5–10.5)
CHLORIDE BLD-SCNC: 101 MMOL/L (ref 98–107)
CO2 SERPL-SCNC: 22 MMOL/L (ref 22–31)
CREAT SERPL-MCNC: 0.8 MG/DL (ref 0.6–1.1)
GFR SERPL CREATININE-BSD FRML MDRD: >60 ML/MIN/1.73M2
GLUCOSE BLD-MCNC: 100 MG/DL (ref 70–125)
POTASSIUM BLD-SCNC: 3.8 MMOL/L (ref 3.5–5)
SODIUM SERPL-SCNC: 135 MMOL/L (ref 136–145)

## 2019-03-20 ASSESSMENT — MIFFLIN-ST. JEOR: SCORE: 838.19

## 2019-04-22 ENCOUNTER — COMMUNICATION - HEALTHEAST (OUTPATIENT)
Dept: INTERNAL MEDICINE | Facility: CLINIC | Age: 84
End: 2019-04-22

## 2019-04-22 DIAGNOSIS — I10 HTN (HYPERTENSION): ICD-10-CM

## 2019-04-22 DIAGNOSIS — F41.9 CHRONIC ANXIETY: ICD-10-CM

## 2019-05-01 ENCOUNTER — OFFICE VISIT - HEALTHEAST (OUTPATIENT)
Dept: INTERNAL MEDICINE | Facility: CLINIC | Age: 84
End: 2019-05-01

## 2019-05-01 DIAGNOSIS — I10 ESSENTIAL HYPERTENSION: ICD-10-CM

## 2019-05-01 DIAGNOSIS — F41.9 CHRONIC ANXIETY: ICD-10-CM

## 2019-05-01 DIAGNOSIS — E87.1 HYPONATREMIA: ICD-10-CM

## 2019-05-01 DIAGNOSIS — R42 DIZZINESS: ICD-10-CM

## 2019-05-01 LAB
ANION GAP SERPL CALCULATED.3IONS-SCNC: 13 MMOL/L (ref 5–18)
BUN SERPL-MCNC: 14 MG/DL (ref 8–28)
CALCIUM SERPL-MCNC: 9.7 MG/DL (ref 8.5–10.5)
CHLORIDE BLD-SCNC: 102 MMOL/L (ref 98–107)
CO2 SERPL-SCNC: 20 MMOL/L (ref 22–31)
CREAT SERPL-MCNC: 0.79 MG/DL (ref 0.6–1.1)
GFR SERPL CREATININE-BSD FRML MDRD: >60 ML/MIN/1.73M2
GLUCOSE BLD-MCNC: 94 MG/DL (ref 70–125)
POTASSIUM BLD-SCNC: 4.2 MMOL/L (ref 3.5–5)
SODIUM SERPL-SCNC: 135 MMOL/L (ref 136–145)

## 2019-05-01 ASSESSMENT — MIFFLIN-ST. JEOR: SCORE: 851.8

## 2019-05-02 ENCOUNTER — COMMUNICATION - HEALTHEAST (OUTPATIENT)
Dept: INTERNAL MEDICINE | Facility: CLINIC | Age: 84
End: 2019-05-02

## 2019-05-09 ENCOUNTER — COMMUNICATION - HEALTHEAST (OUTPATIENT)
Dept: INTERNAL MEDICINE | Facility: CLINIC | Age: 84
End: 2019-05-09

## 2019-05-09 DIAGNOSIS — I10 ESSENTIAL HYPERTENSION: ICD-10-CM

## 2019-06-08 ENCOUNTER — COMMUNICATION - HEALTHEAST (OUTPATIENT)
Dept: INTERNAL MEDICINE | Facility: CLINIC | Age: 84
End: 2019-06-08

## 2019-06-08 DIAGNOSIS — I10 HYPERTENSION: ICD-10-CM

## 2019-06-10 ENCOUNTER — OFFICE VISIT - HEALTHEAST (OUTPATIENT)
Dept: INTERNAL MEDICINE | Facility: CLINIC | Age: 84
End: 2019-06-10

## 2019-06-10 DIAGNOSIS — N32.81 OVERACTIVE BLADDER: ICD-10-CM

## 2019-06-10 DIAGNOSIS — I35.0 NONRHEUMATIC AORTIC VALVE STENOSIS: ICD-10-CM

## 2019-06-10 DIAGNOSIS — I10 ESSENTIAL HYPERTENSION: ICD-10-CM

## 2019-06-10 DIAGNOSIS — F41.9 CHRONIC ANXIETY: ICD-10-CM

## 2019-06-10 DIAGNOSIS — E87.1 HYPONATREMIA: ICD-10-CM

## 2019-06-10 LAB
ANION GAP SERPL CALCULATED.3IONS-SCNC: 13 MMOL/L (ref 5–18)
BUN SERPL-MCNC: 14 MG/DL (ref 8–28)
CALCIUM SERPL-MCNC: 10 MG/DL (ref 8.5–10.5)
CHLORIDE BLD-SCNC: 99 MMOL/L (ref 98–107)
CO2 SERPL-SCNC: 23 MMOL/L (ref 22–31)
CREAT SERPL-MCNC: 0.78 MG/DL (ref 0.6–1.1)
ERYTHROCYTE [DISTWIDTH] IN BLOOD BY AUTOMATED COUNT: 11.9 % (ref 11–14.5)
GFR SERPL CREATININE-BSD FRML MDRD: >60 ML/MIN/1.73M2
GLUCOSE BLD-MCNC: 103 MG/DL (ref 70–125)
HCT VFR BLD AUTO: 40.9 % (ref 35–47)
HGB BLD-MCNC: 13.6 G/DL (ref 12–16)
MCH RBC QN AUTO: 31.3 PG (ref 27–34)
MCHC RBC AUTO-ENTMCNC: 33.2 G/DL (ref 32–36)
MCV RBC AUTO: 94 FL (ref 80–100)
PLATELET # BLD AUTO: 241 THOU/UL (ref 140–440)
PMV BLD AUTO: 8.3 FL (ref 7–10)
POTASSIUM BLD-SCNC: 4.2 MMOL/L (ref 3.5–5)
RBC # BLD AUTO: 4.34 MILL/UL (ref 3.8–5.4)
SODIUM SERPL-SCNC: 135 MMOL/L (ref 136–145)
WBC: 6.9 THOU/UL (ref 4–11)

## 2019-06-10 ASSESSMENT — MIFFLIN-ST. JEOR: SCORE: 838.19

## 2019-06-11 ENCOUNTER — COMMUNICATION - HEALTHEAST (OUTPATIENT)
Dept: INTERNAL MEDICINE | Facility: CLINIC | Age: 84
End: 2019-06-11

## 2019-06-13 ENCOUNTER — AMBULATORY - HEALTHEAST (OUTPATIENT)
Dept: INTERNAL MEDICINE | Facility: CLINIC | Age: 84
End: 2019-06-13

## 2019-06-13 DIAGNOSIS — N39.41 URGE INCONTINENCE OF URINE: ICD-10-CM

## 2019-06-18 ENCOUNTER — COMMUNICATION - HEALTHEAST (OUTPATIENT)
Dept: INTERNAL MEDICINE | Facility: CLINIC | Age: 84
End: 2019-06-18

## 2019-06-18 DIAGNOSIS — F41.9 CHRONIC ANXIETY: ICD-10-CM

## 2019-07-22 ENCOUNTER — OFFICE VISIT - HEALTHEAST (OUTPATIENT)
Dept: INTERNAL MEDICINE | Facility: CLINIC | Age: 84
End: 2019-07-22

## 2019-07-22 DIAGNOSIS — I10 ESSENTIAL HYPERTENSION: ICD-10-CM

## 2019-07-22 DIAGNOSIS — E87.1 HYPONATREMIA: ICD-10-CM

## 2019-07-22 DIAGNOSIS — I35.0 NONRHEUMATIC AORTIC VALVE STENOSIS: ICD-10-CM

## 2019-07-22 LAB
ANION GAP SERPL CALCULATED.3IONS-SCNC: 14 MMOL/L (ref 5–18)
BUN SERPL-MCNC: 12 MG/DL (ref 8–28)
CALCIUM SERPL-MCNC: 9.8 MG/DL (ref 8.5–10.5)
CHLORIDE BLD-SCNC: 99 MMOL/L (ref 98–107)
CO2 SERPL-SCNC: 21 MMOL/L (ref 22–31)
CREAT SERPL-MCNC: 0.74 MG/DL (ref 0.6–1.1)
GFR SERPL CREATININE-BSD FRML MDRD: >60 ML/MIN/1.73M2
GLUCOSE BLD-MCNC: 99 MG/DL (ref 70–125)
POTASSIUM BLD-SCNC: 3.9 MMOL/L (ref 3.5–5)
SODIUM SERPL-SCNC: 134 MMOL/L (ref 136–145)

## 2019-07-22 ASSESSMENT — MIFFLIN-ST. JEOR: SCORE: 838.19

## 2019-07-23 ENCOUNTER — COMMUNICATION - HEALTHEAST (OUTPATIENT)
Dept: INTERNAL MEDICINE | Facility: CLINIC | Age: 84
End: 2019-07-23

## 2019-08-06 ENCOUNTER — COMMUNICATION - HEALTHEAST (OUTPATIENT)
Dept: INTERNAL MEDICINE | Facility: CLINIC | Age: 84
End: 2019-08-06

## 2019-08-06 DIAGNOSIS — R42 DIZZINESS: ICD-10-CM

## 2019-08-06 DIAGNOSIS — H81.03 MENIERE'S DISEASE, BILATERAL: ICD-10-CM

## 2019-08-09 ENCOUNTER — COMMUNICATION - HEALTHEAST (OUTPATIENT)
Dept: INTERNAL MEDICINE | Facility: CLINIC | Age: 84
End: 2019-08-09

## 2019-08-09 DIAGNOSIS — F41.9 CHRONIC ANXIETY: ICD-10-CM

## 2019-08-26 ENCOUNTER — COMMUNICATION - HEALTHEAST (OUTPATIENT)
Dept: INTERNAL MEDICINE | Facility: CLINIC | Age: 84
End: 2019-08-26

## 2019-08-28 ENCOUNTER — OFFICE VISIT - HEALTHEAST (OUTPATIENT)
Dept: INTERNAL MEDICINE | Facility: CLINIC | Age: 84
End: 2019-08-28

## 2019-08-28 ENCOUNTER — HOSPITAL ENCOUNTER (OUTPATIENT)
Dept: CT IMAGING | Facility: CLINIC | Age: 84
Discharge: HOME | End: 2019-08-28
Attending: INTERNAL MEDICINE

## 2019-08-28 DIAGNOSIS — I10 ESSENTIAL HYPERTENSION: ICD-10-CM

## 2019-08-28 DIAGNOSIS — R42 DIZZINESS: ICD-10-CM

## 2019-08-28 DIAGNOSIS — G44.219 EPISODIC TENSION-TYPE HEADACHE, NOT INTRACTABLE: ICD-10-CM

## 2019-08-28 LAB
ANION GAP SERPL CALCULATED.3IONS-SCNC: 11 MMOL/L (ref 5–18)
BUN SERPL-MCNC: 10 MG/DL (ref 8–28)
C REACTIVE PROTEIN LHE: <0.1 MG/DL (ref 0–0.8)
CALCIUM SERPL-MCNC: 9.8 MG/DL (ref 8.5–10.5)
CHLORIDE BLD-SCNC: 102 MMOL/L (ref 98–107)
CO2 SERPL-SCNC: 22 MMOL/L (ref 22–31)
CREAT SERPL-MCNC: 0.78 MG/DL (ref 0.6–1.1)
ERYTHROCYTE [DISTWIDTH] IN BLOOD BY AUTOMATED COUNT: 11.8 % (ref 11–14.5)
ERYTHROCYTE [SEDIMENTATION RATE] IN BLOOD BY WESTERGREN METHOD: 14 MM/HR (ref 0–20)
GFR SERPL CREATININE-BSD FRML MDRD: >60 ML/MIN/1.73M2
GLUCOSE BLD-MCNC: 90 MG/DL (ref 70–125)
HCT VFR BLD AUTO: 41.2 % (ref 35–47)
HGB BLD-MCNC: 13.7 G/DL (ref 12–16)
MCH RBC QN AUTO: 31.3 PG (ref 27–34)
MCHC RBC AUTO-ENTMCNC: 33.2 G/DL (ref 32–36)
MCV RBC AUTO: 94 FL (ref 80–100)
PLATELET # BLD AUTO: 243 THOU/UL (ref 140–440)
PMV BLD AUTO: 8.5 FL (ref 7–10)
POTASSIUM BLD-SCNC: 4.3 MMOL/L (ref 3.5–5)
RBC # BLD AUTO: 4.37 MILL/UL (ref 3.8–5.4)
SODIUM SERPL-SCNC: 135 MMOL/L (ref 136–145)
WBC: 7.2 THOU/UL (ref 4–11)

## 2019-08-28 ASSESSMENT — MIFFLIN-ST. JEOR: SCORE: 833.66

## 2019-08-29 ENCOUNTER — COMMUNICATION - HEALTHEAST (OUTPATIENT)
Dept: INTERNAL MEDICINE | Facility: CLINIC | Age: 84
End: 2019-08-29

## 2019-09-03 ENCOUNTER — COMMUNICATION - HEALTHEAST (OUTPATIENT)
Dept: INTERNAL MEDICINE | Facility: CLINIC | Age: 84
End: 2019-09-03

## 2019-10-01 ENCOUNTER — OFFICE VISIT - HEALTHEAST (OUTPATIENT)
Dept: INTERNAL MEDICINE | Facility: CLINIC | Age: 84
End: 2019-10-01

## 2019-10-01 DIAGNOSIS — I10 ESSENTIAL HYPERTENSION: ICD-10-CM

## 2019-10-01 DIAGNOSIS — Z23 NEED FOR VACCINATION: ICD-10-CM

## 2019-10-01 DIAGNOSIS — R42 DIZZINESS: ICD-10-CM

## 2019-10-01 DIAGNOSIS — E87.1 HYPONATREMIA: ICD-10-CM

## 2019-10-01 DIAGNOSIS — G44.219 EPISODIC TENSION-TYPE HEADACHE, NOT INTRACTABLE: ICD-10-CM

## 2019-10-01 DIAGNOSIS — F41.9 CHRONIC ANXIETY: ICD-10-CM

## 2019-10-01 LAB
ANION GAP SERPL CALCULATED.3IONS-SCNC: 11 MMOL/L (ref 5–18)
BUN SERPL-MCNC: 12 MG/DL (ref 8–28)
C REACTIVE PROTEIN LHE: <0.1 MG/DL (ref 0–0.8)
CALCIUM SERPL-MCNC: 9.9 MG/DL (ref 8.5–10.5)
CHLORIDE BLD-SCNC: 100 MMOL/L (ref 98–107)
CO2 SERPL-SCNC: 22 MMOL/L (ref 22–31)
CREAT SERPL-MCNC: 0.75 MG/DL (ref 0.6–1.1)
ERYTHROCYTE [SEDIMENTATION RATE] IN BLOOD BY WESTERGREN METHOD: 14 MM/HR (ref 0–20)
GFR SERPL CREATININE-BSD FRML MDRD: >60 ML/MIN/1.73M2
GLUCOSE BLD-MCNC: 98 MG/DL (ref 70–125)
POTASSIUM BLD-SCNC: 4.2 MMOL/L (ref 3.5–5)
SODIUM SERPL-SCNC: 133 MMOL/L (ref 136–145)

## 2019-10-01 ASSESSMENT — MIFFLIN-ST. JEOR: SCORE: 833.66

## 2019-10-02 ENCOUNTER — COMMUNICATION - HEALTHEAST (OUTPATIENT)
Dept: INTERNAL MEDICINE | Facility: CLINIC | Age: 84
End: 2019-10-02

## 2019-10-02 ENCOUNTER — AMBULATORY - HEALTHEAST (OUTPATIENT)
Dept: INTERNAL MEDICINE | Facility: CLINIC | Age: 84
End: 2019-10-02

## 2019-10-02 DIAGNOSIS — G44.219 EPISODIC TENSION-TYPE HEADACHE, NOT INTRACTABLE: ICD-10-CM

## 2019-10-03 ENCOUNTER — COMMUNICATION - HEALTHEAST (OUTPATIENT)
Dept: INTERNAL MEDICINE | Facility: CLINIC | Age: 84
End: 2019-10-03

## 2019-10-04 ENCOUNTER — COMMUNICATION - HEALTHEAST (OUTPATIENT)
Dept: INTERNAL MEDICINE | Facility: CLINIC | Age: 84
End: 2019-10-04

## 2019-10-04 DIAGNOSIS — G44.219 EPISODIC TENSION-TYPE HEADACHE, NOT INTRACTABLE: ICD-10-CM

## 2019-10-20 ENCOUNTER — COMMUNICATION - HEALTHEAST (OUTPATIENT)
Dept: INTERNAL MEDICINE | Facility: CLINIC | Age: 84
End: 2019-10-20

## 2019-10-20 DIAGNOSIS — F41.9 CHRONIC ANXIETY: ICD-10-CM

## 2019-10-20 DIAGNOSIS — I10 HTN (HYPERTENSION): ICD-10-CM

## 2019-11-14 ENCOUNTER — OFFICE VISIT - HEALTHEAST (OUTPATIENT)
Dept: INTERNAL MEDICINE | Facility: CLINIC | Age: 84
End: 2019-11-14

## 2019-11-14 DIAGNOSIS — F41.9 CHRONIC ANXIETY: ICD-10-CM

## 2019-11-14 DIAGNOSIS — R00.2 PALPITATIONS: ICD-10-CM

## 2019-11-14 DIAGNOSIS — E87.1 HYPONATREMIA: ICD-10-CM

## 2019-11-14 DIAGNOSIS — I10 ESSENTIAL HYPERTENSION: ICD-10-CM

## 2019-11-14 LAB
ANION GAP SERPL CALCULATED.3IONS-SCNC: 11 MMOL/L (ref 5–18)
BUN SERPL-MCNC: 12 MG/DL (ref 8–28)
CALCIUM SERPL-MCNC: 9.8 MG/DL (ref 8.5–10.5)
CHLORIDE BLD-SCNC: 102 MMOL/L (ref 98–107)
CO2 SERPL-SCNC: 25 MMOL/L (ref 22–31)
CREAT SERPL-MCNC: 0.78 MG/DL (ref 0.6–1.1)
GFR SERPL CREATININE-BSD FRML MDRD: >60 ML/MIN/1.73M2
GLUCOSE BLD-MCNC: 110 MG/DL (ref 70–125)
MAGNESIUM SERPL-MCNC: 1.8 MG/DL (ref 1.8–2.6)
POTASSIUM BLD-SCNC: 4.5 MMOL/L (ref 3.5–5)
SODIUM SERPL-SCNC: 138 MMOL/L (ref 136–145)

## 2019-11-14 ASSESSMENT — MIFFLIN-ST. JEOR: SCORE: 842.73

## 2019-11-15 ENCOUNTER — COMMUNICATION - HEALTHEAST (OUTPATIENT)
Dept: INTERNAL MEDICINE | Facility: CLINIC | Age: 84
End: 2019-11-15

## 2019-11-19 ENCOUNTER — COMMUNICATION - HEALTHEAST (OUTPATIENT)
Dept: INTERNAL MEDICINE | Facility: CLINIC | Age: 84
End: 2019-11-19

## 2019-11-26 ENCOUNTER — COMMUNICATION - HEALTHEAST (OUTPATIENT)
Dept: CARE COORDINATION | Facility: CLINIC | Age: 84
End: 2019-11-26

## 2019-12-06 ENCOUNTER — COMMUNICATION - HEALTHEAST (OUTPATIENT)
Dept: INTERNAL MEDICINE | Facility: CLINIC | Age: 84
End: 2019-12-06

## 2019-12-06 DIAGNOSIS — F41.9 CHRONIC ANXIETY: ICD-10-CM

## 2019-12-18 ENCOUNTER — COMMUNICATION - HEALTHEAST (OUTPATIENT)
Dept: SCHEDULING | Facility: CLINIC | Age: 84
End: 2019-12-18

## 2019-12-19 ENCOUNTER — COMMUNICATION - HEALTHEAST (OUTPATIENT)
Dept: SCHEDULING | Facility: CLINIC | Age: 84
End: 2019-12-19

## 2019-12-31 ENCOUNTER — OFFICE VISIT - HEALTHEAST (OUTPATIENT)
Dept: INTERNAL MEDICINE | Facility: CLINIC | Age: 84
End: 2019-12-31

## 2019-12-31 DIAGNOSIS — I10 ESSENTIAL HYPERTENSION: ICD-10-CM

## 2019-12-31 DIAGNOSIS — F41.9 CHRONIC ANXIETY: ICD-10-CM

## 2019-12-31 DIAGNOSIS — I49.3 PVC (PREMATURE VENTRICULAR CONTRACTION): ICD-10-CM

## 2019-12-31 DIAGNOSIS — I35.0 NONRHEUMATIC AORTIC VALVE STENOSIS: ICD-10-CM

## 2019-12-31 LAB
ANION GAP SERPL CALCULATED.3IONS-SCNC: 11 MMOL/L (ref 5–18)
BUN SERPL-MCNC: 17 MG/DL (ref 8–28)
CALCIUM SERPL-MCNC: 9.7 MG/DL (ref 8.5–10.5)
CHLORIDE BLD-SCNC: 100 MMOL/L (ref 98–107)
CO2 SERPL-SCNC: 22 MMOL/L (ref 22–31)
CREAT SERPL-MCNC: 0.77 MG/DL (ref 0.6–1.1)
GFR SERPL CREATININE-BSD FRML MDRD: >60 ML/MIN/1.73M2
GLUCOSE BLD-MCNC: 93 MG/DL (ref 70–125)
MAGNESIUM SERPL-MCNC: 1.7 MG/DL (ref 1.8–2.6)
POTASSIUM BLD-SCNC: 4.4 MMOL/L (ref 3.5–5)
SODIUM SERPL-SCNC: 133 MMOL/L (ref 136–145)

## 2019-12-31 ASSESSMENT — MIFFLIN-ST. JEOR: SCORE: 838.19

## 2020-01-13 ENCOUNTER — COMMUNICATION - HEALTHEAST (OUTPATIENT)
Dept: INTERNAL MEDICINE | Facility: CLINIC | Age: 85
End: 2020-01-13

## 2020-01-29 ENCOUNTER — COMMUNICATION - HEALTHEAST (OUTPATIENT)
Dept: INTERNAL MEDICINE | Facility: CLINIC | Age: 85
End: 2020-01-29

## 2020-01-29 DIAGNOSIS — F41.9 CHRONIC ANXIETY: ICD-10-CM

## 2020-02-10 ENCOUNTER — COMMUNICATION - HEALTHEAST (OUTPATIENT)
Dept: SCHEDULING | Facility: CLINIC | Age: 85
End: 2020-02-10

## 2020-02-18 ENCOUNTER — OFFICE VISIT - HEALTHEAST (OUTPATIENT)
Dept: INTERNAL MEDICINE | Facility: CLINIC | Age: 85
End: 2020-02-18

## 2020-02-18 DIAGNOSIS — I35.0 NONRHEUMATIC AORTIC VALVE STENOSIS: ICD-10-CM

## 2020-02-18 DIAGNOSIS — E87.1 HYPONATREMIA: ICD-10-CM

## 2020-02-18 DIAGNOSIS — I49.3 PVC (PREMATURE VENTRICULAR CONTRACTION): ICD-10-CM

## 2020-02-18 DIAGNOSIS — F41.9 CHRONIC ANXIETY: ICD-10-CM

## 2020-02-18 DIAGNOSIS — I10 ESSENTIAL HYPERTENSION: ICD-10-CM

## 2020-02-18 LAB
ANION GAP SERPL CALCULATED.3IONS-SCNC: 10 MMOL/L (ref 5–18)
BUN SERPL-MCNC: 13 MG/DL (ref 8–28)
CALCIUM SERPL-MCNC: 9.9 MG/DL (ref 8.5–10.5)
CHLORIDE BLD-SCNC: 101 MMOL/L (ref 98–107)
CO2 SERPL-SCNC: 25 MMOL/L (ref 22–31)
CREAT SERPL-MCNC: 0.78 MG/DL (ref 0.6–1.1)
GFR SERPL CREATININE-BSD FRML MDRD: >60 ML/MIN/1.73M2
GLUCOSE BLD-MCNC: 95 MG/DL (ref 70–125)
MAGNESIUM SERPL-MCNC: 1.9 MG/DL (ref 1.8–2.6)
POTASSIUM BLD-SCNC: 4.4 MMOL/L (ref 3.5–5)
SODIUM SERPL-SCNC: 136 MMOL/L (ref 136–145)

## 2020-02-18 ASSESSMENT — MIFFLIN-ST. JEOR: SCORE: 847.27

## 2020-02-19 ENCOUNTER — COMMUNICATION - HEALTHEAST (OUTPATIENT)
Dept: INTERNAL MEDICINE | Facility: CLINIC | Age: 85
End: 2020-02-19

## 2020-02-19 ENCOUNTER — AMBULATORY - HEALTHEAST (OUTPATIENT)
Dept: INTERNAL MEDICINE | Facility: CLINIC | Age: 85
End: 2020-02-19

## 2020-02-19 DIAGNOSIS — R00.2 PALPITATIONS: ICD-10-CM

## 2020-02-20 ENCOUNTER — COMMUNICATION - HEALTHEAST (OUTPATIENT)
Dept: INTERNAL MEDICINE | Facility: CLINIC | Age: 85
End: 2020-02-20

## 2020-02-24 ENCOUNTER — HOSPITAL ENCOUNTER (OUTPATIENT)
Dept: CARDIOLOGY | Facility: CLINIC | Age: 85
Discharge: HOME | End: 2020-02-24
Attending: INTERNAL MEDICINE

## 2020-02-24 DIAGNOSIS — R00.2 PALPITATIONS: ICD-10-CM

## 2020-02-26 ENCOUNTER — COMMUNICATION - HEALTHEAST (OUTPATIENT)
Dept: INTERNAL MEDICINE | Facility: CLINIC | Age: 85
End: 2020-02-26

## 2020-02-26 DIAGNOSIS — F41.9 CHRONIC ANXIETY: ICD-10-CM

## 2020-03-03 ENCOUNTER — RECORDS - HEALTHEAST (OUTPATIENT)
Dept: ADMINISTRATIVE | Facility: OTHER | Age: 85
End: 2020-03-03

## 2020-03-04 ENCOUNTER — COMMUNICATION - HEALTHEAST (OUTPATIENT)
Dept: INTERNAL MEDICINE | Facility: CLINIC | Age: 85
End: 2020-03-04

## 2020-03-04 ENCOUNTER — OFFICE VISIT - HEALTHEAST (OUTPATIENT)
Dept: INTERNAL MEDICINE | Facility: CLINIC | Age: 85
End: 2020-03-04

## 2020-03-04 DIAGNOSIS — F41.9 CHRONIC ANXIETY: ICD-10-CM

## 2020-03-04 DIAGNOSIS — I10 ESSENTIAL HYPERTENSION: ICD-10-CM

## 2020-03-04 ASSESSMENT — MIFFLIN-ST. JEOR
SCORE: 847.27
SCORE: 847.27

## 2020-03-13 ENCOUNTER — COMMUNICATION - HEALTHEAST (OUTPATIENT)
Dept: INTERNAL MEDICINE | Facility: CLINIC | Age: 85
End: 2020-03-13

## 2020-03-13 DIAGNOSIS — I10 ESSENTIAL HYPERTENSION: ICD-10-CM

## 2020-03-31 ENCOUNTER — OFFICE VISIT - HEALTHEAST (OUTPATIENT)
Dept: INTERNAL MEDICINE | Facility: CLINIC | Age: 85
End: 2020-03-31

## 2020-04-15 ENCOUNTER — COMMUNICATION - HEALTHEAST (OUTPATIENT)
Dept: INTERNAL MEDICINE | Facility: CLINIC | Age: 85
End: 2020-04-15

## 2020-04-15 DIAGNOSIS — F41.9 CHRONIC ANXIETY: ICD-10-CM

## 2020-05-13 ENCOUNTER — COMMUNICATION - HEALTHEAST (OUTPATIENT)
Dept: INTERNAL MEDICINE | Facility: CLINIC | Age: 85
End: 2020-05-13

## 2020-06-03 ENCOUNTER — COMMUNICATION - HEALTHEAST (OUTPATIENT)
Dept: INTERNAL MEDICINE | Facility: CLINIC | Age: 85
End: 2020-06-03

## 2020-06-03 DIAGNOSIS — F41.9 CHRONIC ANXIETY: ICD-10-CM

## 2020-06-04 ENCOUNTER — COMMUNICATION - HEALTHEAST (OUTPATIENT)
Dept: INTERNAL MEDICINE | Facility: CLINIC | Age: 85
End: 2020-06-04

## 2020-06-04 DIAGNOSIS — I10 HTN (HYPERTENSION): ICD-10-CM

## 2020-06-04 DIAGNOSIS — I10 HYPERTENSION: ICD-10-CM

## 2020-06-11 ENCOUNTER — COMMUNICATION - HEALTHEAST (OUTPATIENT)
Dept: INTERNAL MEDICINE | Facility: CLINIC | Age: 85
End: 2020-06-11

## 2020-06-11 DIAGNOSIS — I10 HTN (HYPERTENSION): ICD-10-CM

## 2020-07-13 ENCOUNTER — OFFICE VISIT - HEALTHEAST (OUTPATIENT)
Dept: INTERNAL MEDICINE | Facility: CLINIC | Age: 85
End: 2020-07-13

## 2020-07-13 DIAGNOSIS — H61.21 IMPACTED CERUMEN OF RIGHT EAR: ICD-10-CM

## 2020-07-13 DIAGNOSIS — E87.1 HYPONATREMIA: ICD-10-CM

## 2020-07-13 DIAGNOSIS — F41.9 CHRONIC ANXIETY: ICD-10-CM

## 2020-07-13 DIAGNOSIS — I49.3 PVC'S (PREMATURE VENTRICULAR CONTRACTIONS): ICD-10-CM

## 2020-07-13 DIAGNOSIS — I35.0 NONRHEUMATIC AORTIC VALVE STENOSIS: ICD-10-CM

## 2020-07-13 DIAGNOSIS — L98.9 SKIN LESION: ICD-10-CM

## 2020-07-13 DIAGNOSIS — I10 ESSENTIAL HYPERTENSION: ICD-10-CM

## 2020-07-13 DIAGNOSIS — R42 DIZZINESS: ICD-10-CM

## 2020-07-13 LAB
ALBUMIN SERPL-MCNC: 4 G/DL (ref 3.5–5)
ALP SERPL-CCNC: 100 U/L (ref 45–120)
ALT SERPL W P-5'-P-CCNC: 14 U/L (ref 0–45)
ANION GAP SERPL CALCULATED.3IONS-SCNC: 10 MMOL/L (ref 5–18)
AST SERPL W P-5'-P-CCNC: 25 U/L (ref 0–40)
BILIRUB SERPL-MCNC: 1 MG/DL (ref 0–1)
BUN SERPL-MCNC: 9 MG/DL (ref 8–28)
CALCIUM SERPL-MCNC: 9.6 MG/DL (ref 8.5–10.5)
CHLORIDE BLD-SCNC: 95 MMOL/L (ref 98–107)
CO2 SERPL-SCNC: 25 MMOL/L (ref 22–31)
CREAT SERPL-MCNC: 0.81 MG/DL (ref 0.6–1.1)
GFR SERPL CREATININE-BSD FRML MDRD: >60 ML/MIN/1.73M2
GLUCOSE BLD-MCNC: 106 MG/DL (ref 70–125)
MAGNESIUM SERPL-MCNC: 1.7 MG/DL (ref 1.8–2.6)
POTASSIUM BLD-SCNC: 4.8 MMOL/L (ref 3.5–5)
PROT SERPL-MCNC: 6.9 G/DL (ref 6–8)
SODIUM SERPL-SCNC: 130 MMOL/L (ref 136–145)

## 2020-07-13 ASSESSMENT — MIFFLIN-ST. JEOR: SCORE: 833.66

## 2020-07-15 ENCOUNTER — COMMUNICATION - HEALTHEAST (OUTPATIENT)
Dept: INTERNAL MEDICINE | Facility: CLINIC | Age: 85
End: 2020-07-15

## 2020-07-16 LAB
LAB AP CHARGES (HE HISTORICAL CONVERSION): NORMAL
PATH REPORT.COMMENTS IMP SPEC: NORMAL
PATH REPORT.COMMENTS IMP SPEC: NORMAL
PATH REPORT.FINAL DX SPEC: NORMAL
PATH REPORT.GROSS SPEC: NORMAL
PATH REPORT.MICROSCOPIC SPEC OTHER STN: NORMAL
PATH REPORT.RELEVANT HX SPEC: NORMAL
RESULT FLAG (HE HISTORICAL CONVERSION): NORMAL

## 2020-07-23 ENCOUNTER — COMMUNICATION - HEALTHEAST (OUTPATIENT)
Dept: INTERNAL MEDICINE | Facility: CLINIC | Age: 85
End: 2020-07-23

## 2020-08-03 ENCOUNTER — COMMUNICATION - HEALTHEAST (OUTPATIENT)
Dept: INTERNAL MEDICINE | Facility: CLINIC | Age: 85
End: 2020-08-03

## 2020-08-03 DIAGNOSIS — F41.9 CHRONIC ANXIETY: ICD-10-CM

## 2020-08-10 ENCOUNTER — OFFICE VISIT - HEALTHEAST (OUTPATIENT)
Dept: INTERNAL MEDICINE | Facility: CLINIC | Age: 85
End: 2020-08-10

## 2020-08-10 DIAGNOSIS — E87.1 HYPONATREMIA: ICD-10-CM

## 2020-08-10 DIAGNOSIS — Z12.31 ENCOUNTER FOR SCREENING MAMMOGRAM FOR BREAST CANCER: ICD-10-CM

## 2020-08-10 DIAGNOSIS — I10 ESSENTIAL HYPERTENSION: ICD-10-CM

## 2020-08-10 LAB
ANION GAP SERPL CALCULATED.3IONS-SCNC: 12 MMOL/L (ref 5–18)
BUN SERPL-MCNC: 12 MG/DL (ref 8–28)
CALCIUM SERPL-MCNC: 10.2 MG/DL (ref 8.5–10.5)
CHLORIDE BLD-SCNC: 98 MMOL/L (ref 98–107)
CO2 SERPL-SCNC: 23 MMOL/L (ref 22–31)
CREAT SERPL-MCNC: 0.82 MG/DL (ref 0.6–1.1)
GFR SERPL CREATININE-BSD FRML MDRD: >60 ML/MIN/1.73M2
GLUCOSE BLD-MCNC: 103 MG/DL (ref 70–125)
POTASSIUM BLD-SCNC: 4.3 MMOL/L (ref 3.5–5)
SODIUM SERPL-SCNC: 133 MMOL/L (ref 136–145)

## 2020-08-10 ASSESSMENT — MIFFLIN-ST. JEOR: SCORE: 838.19

## 2020-08-11 ENCOUNTER — COMMUNICATION - HEALTHEAST (OUTPATIENT)
Dept: INTERNAL MEDICINE | Facility: CLINIC | Age: 85
End: 2020-08-11

## 2020-08-18 ENCOUNTER — OFFICE VISIT - HEALTHEAST (OUTPATIENT)
Dept: INTERNAL MEDICINE | Facility: CLINIC | Age: 85
End: 2020-08-18

## 2020-08-18 DIAGNOSIS — R33.9 URINARY RETENTION: ICD-10-CM

## 2020-08-19 ENCOUNTER — COMMUNICATION - HEALTHEAST (OUTPATIENT)
Dept: INTERNAL MEDICINE | Facility: CLINIC | Age: 85
End: 2020-08-19

## 2020-09-08 ENCOUNTER — RECORDS - HEALTHEAST (OUTPATIENT)
Dept: ADMINISTRATIVE | Facility: OTHER | Age: 85
End: 2020-09-08

## 2020-10-01 ENCOUNTER — COMMUNICATION - HEALTHEAST (OUTPATIENT)
Dept: INTERNAL MEDICINE | Facility: CLINIC | Age: 85
End: 2020-10-01

## 2020-10-01 DIAGNOSIS — F41.9 CHRONIC ANXIETY: ICD-10-CM

## 2020-10-12 ENCOUNTER — COMMUNICATION - HEALTHEAST (OUTPATIENT)
Dept: INTERNAL MEDICINE | Facility: CLINIC | Age: 85
End: 2020-10-12

## 2020-10-12 ENCOUNTER — OFFICE VISIT - HEALTHEAST (OUTPATIENT)
Dept: INTERNAL MEDICINE | Facility: CLINIC | Age: 85
End: 2020-10-12

## 2020-10-12 DIAGNOSIS — M65.30 TRIGGER FINGER, ACQUIRED: ICD-10-CM

## 2020-10-12 DIAGNOSIS — H91.93 DECREASED HEARING OF BOTH EARS: ICD-10-CM

## 2020-10-12 DIAGNOSIS — E87.1 HYPONATREMIA: ICD-10-CM

## 2020-10-12 DIAGNOSIS — I10 ESSENTIAL HYPERTENSION: ICD-10-CM

## 2020-10-12 DIAGNOSIS — Z23 NEED FOR VACCINATION: ICD-10-CM

## 2020-10-12 LAB
ANION GAP SERPL CALCULATED.3IONS-SCNC: 11 MMOL/L (ref 5–18)
BUN SERPL-MCNC: 13 MG/DL (ref 8–28)
CALCIUM SERPL-MCNC: 9.7 MG/DL (ref 8.5–10.5)
CHLORIDE BLD-SCNC: 101 MMOL/L (ref 98–107)
CO2 SERPL-SCNC: 22 MMOL/L (ref 22–31)
CREAT SERPL-MCNC: 0.84 MG/DL (ref 0.6–1.1)
GFR SERPL CREATININE-BSD FRML MDRD: >60 ML/MIN/1.73M2
GLUCOSE BLD-MCNC: 103 MG/DL (ref 70–125)
POTASSIUM BLD-SCNC: 4.3 MMOL/L (ref 3.5–5)
SODIUM SERPL-SCNC: 134 MMOL/L (ref 136–145)

## 2020-10-12 ASSESSMENT — MIFFLIN-ST. JEOR: SCORE: 838.19

## 2020-11-02 ENCOUNTER — COMMUNICATION - HEALTHEAST (OUTPATIENT)
Dept: INTERNAL MEDICINE | Facility: CLINIC | Age: 85
End: 2020-11-02

## 2020-11-02 DIAGNOSIS — F41.9 CHRONIC ANXIETY: ICD-10-CM

## 2020-11-09 ENCOUNTER — RECORDS - HEALTHEAST (OUTPATIENT)
Dept: ADMINISTRATIVE | Facility: OTHER | Age: 85
End: 2020-11-09

## 2020-11-17 ENCOUNTER — COMMUNICATION - HEALTHEAST (OUTPATIENT)
Dept: INTERNAL MEDICINE | Facility: CLINIC | Age: 85
End: 2020-11-17

## 2020-11-17 ENCOUNTER — AMBULATORY - HEALTHEAST (OUTPATIENT)
Dept: INTERNAL MEDICINE | Facility: CLINIC | Age: 85
End: 2020-11-17

## 2020-11-17 DIAGNOSIS — R60.0 LOCALIZED EDEMA: ICD-10-CM

## 2020-12-04 ENCOUNTER — COMMUNICATION - HEALTHEAST (OUTPATIENT)
Dept: INTERNAL MEDICINE | Facility: CLINIC | Age: 85
End: 2020-12-04

## 2020-12-04 DIAGNOSIS — F41.9 CHRONIC ANXIETY: ICD-10-CM

## 2020-12-08 ENCOUNTER — OFFICE VISIT - HEALTHEAST (OUTPATIENT)
Dept: INTERNAL MEDICINE | Facility: CLINIC | Age: 85
End: 2020-12-08

## 2020-12-08 DIAGNOSIS — F41.9 CHRONIC ANXIETY: ICD-10-CM

## 2020-12-08 DIAGNOSIS — I35.0 NONRHEUMATIC AORTIC VALVE STENOSIS: ICD-10-CM

## 2020-12-08 DIAGNOSIS — I10 ESSENTIAL HYPERTENSION: ICD-10-CM

## 2020-12-08 DIAGNOSIS — E87.1 HYPONATREMIA: ICD-10-CM

## 2020-12-08 LAB
ANION GAP SERPL CALCULATED.3IONS-SCNC: 16 MMOL/L (ref 5–18)
BUN SERPL-MCNC: 12 MG/DL (ref 8–28)
CALCIUM SERPL-MCNC: 9.4 MG/DL (ref 8.5–10.5)
CHLORIDE BLD-SCNC: 101 MMOL/L (ref 98–107)
CO2 SERPL-SCNC: 18 MMOL/L (ref 22–31)
CREAT SERPL-MCNC: 0.77 MG/DL (ref 0.6–1.1)
GFR SERPL CREATININE-BSD FRML MDRD: >60 ML/MIN/1.73M2
GLUCOSE BLD-MCNC: 110 MG/DL (ref 70–125)
POTASSIUM BLD-SCNC: 4.4 MMOL/L (ref 3.5–5)
SODIUM SERPL-SCNC: 135 MMOL/L (ref 136–145)

## 2020-12-08 ASSESSMENT — MIFFLIN-ST. JEOR: SCORE: 829.12

## 2020-12-09 ENCOUNTER — COMMUNICATION - HEALTHEAST (OUTPATIENT)
Dept: INTERNAL MEDICINE | Facility: CLINIC | Age: 85
End: 2020-12-09

## 2020-12-16 ENCOUNTER — COMMUNICATION - HEALTHEAST (OUTPATIENT)
Dept: SCHEDULING | Facility: CLINIC | Age: 85
End: 2020-12-16

## 2020-12-21 ENCOUNTER — OFFICE VISIT - HEALTHEAST (OUTPATIENT)
Dept: INTERNAL MEDICINE | Facility: CLINIC | Age: 85
End: 2020-12-21

## 2020-12-21 DIAGNOSIS — M79.672 LEFT FOOT PAIN: ICD-10-CM

## 2020-12-21 DIAGNOSIS — I10 ESSENTIAL HYPERTENSION: ICD-10-CM

## 2020-12-21 DIAGNOSIS — R35.0 URINARY FREQUENCY: ICD-10-CM

## 2020-12-21 LAB
ALBUMIN UR-MCNC: NEGATIVE MG/DL
APPEARANCE UR: CLEAR
BACTERIA #/AREA URNS HPF: ABNORMAL HPF
BILIRUB UR QL STRIP: NEGATIVE
COLOR UR AUTO: YELLOW
GLUCOSE UR STRIP-MCNC: NEGATIVE MG/DL
HGB UR QL STRIP: NEGATIVE
KETONES UR STRIP-MCNC: NEGATIVE MG/DL
LEUKOCYTE ESTERASE UR QL STRIP: ABNORMAL
NITRATE UR QL: NEGATIVE
PH UR STRIP: 7 [PH] (ref 5–8)
RBC #/AREA URNS AUTO: ABNORMAL HPF
SP GR UR STRIP: 1.01 (ref 1–1.03)
SQUAMOUS #/AREA URNS AUTO: ABNORMAL LPF
UROBILINOGEN UR STRIP-ACNC: ABNORMAL
WBC #/AREA URNS AUTO: ABNORMAL HPF

## 2020-12-21 ASSESSMENT — MIFFLIN-ST. JEOR: SCORE: 833.66

## 2020-12-22 ENCOUNTER — COMMUNICATION - HEALTHEAST (OUTPATIENT)
Dept: INTERNAL MEDICINE | Facility: CLINIC | Age: 85
End: 2020-12-22

## 2020-12-22 LAB — BACTERIA SPEC CULT: NO GROWTH

## 2020-12-28 ENCOUNTER — COMMUNICATION - HEALTHEAST (OUTPATIENT)
Dept: INTERNAL MEDICINE | Facility: CLINIC | Age: 85
End: 2020-12-28

## 2021-01-27 ENCOUNTER — COMMUNICATION - HEALTHEAST (OUTPATIENT)
Dept: INTERNAL MEDICINE | Facility: CLINIC | Age: 86
End: 2021-01-27

## 2021-01-27 DIAGNOSIS — F41.9 CHRONIC ANXIETY: ICD-10-CM

## 2021-02-09 ENCOUNTER — OFFICE VISIT - HEALTHEAST (OUTPATIENT)
Dept: INTERNAL MEDICINE | Facility: CLINIC | Age: 86
End: 2021-02-09

## 2021-02-09 DIAGNOSIS — I83.893 VARICOSE VEINS OF BILATERAL LOWER EXTREMITIES WITH OTHER COMPLICATIONS: ICD-10-CM

## 2021-02-09 DIAGNOSIS — F51.01 PRIMARY INSOMNIA: ICD-10-CM

## 2021-02-09 DIAGNOSIS — E83.42 HYPOMAGNESEMIA: ICD-10-CM

## 2021-02-09 DIAGNOSIS — I10 ESSENTIAL HYPERTENSION: ICD-10-CM

## 2021-02-09 DIAGNOSIS — H81.03 MENIERE'S DISEASE, BILATERAL: ICD-10-CM

## 2021-02-09 DIAGNOSIS — Z91.81 RISK FOR FALLS: ICD-10-CM

## 2021-02-09 DIAGNOSIS — E87.1 HYPONATREMIA: ICD-10-CM

## 2021-02-09 DIAGNOSIS — I49.3 PVC (PREMATURE VENTRICULAR CONTRACTION): ICD-10-CM

## 2021-02-09 DIAGNOSIS — R29.898 MUSCULAR DECONDITIONING: ICD-10-CM

## 2021-02-09 DIAGNOSIS — H54.7 VISUAL IMPAIRMENT: ICD-10-CM

## 2021-02-09 DIAGNOSIS — I35.0 NONRHEUMATIC AORTIC VALVE STENOSIS: ICD-10-CM

## 2021-02-09 DIAGNOSIS — M15.0 PRIMARY OSTEOARTHRITIS INVOLVING MULTIPLE JOINTS: ICD-10-CM

## 2021-02-09 DIAGNOSIS — F41.9 CHRONIC ANXIETY: ICD-10-CM

## 2021-02-09 DIAGNOSIS — R26.81 GAIT INSTABILITY: ICD-10-CM

## 2021-02-09 LAB
ANION GAP SERPL CALCULATED.3IONS-SCNC: 11 MMOL/L (ref 5–18)
BUN SERPL-MCNC: 14 MG/DL (ref 8–28)
CALCIUM SERPL-MCNC: 9.7 MG/DL (ref 8.5–10.5)
CHLORIDE BLD-SCNC: 102 MMOL/L (ref 98–107)
CO2 SERPL-SCNC: 23 MMOL/L (ref 22–31)
CREAT SERPL-MCNC: 0.91 MG/DL (ref 0.6–1.1)
GFR SERPL CREATININE-BSD FRML MDRD: 58 ML/MIN/1.73M2
GLUCOSE BLD-MCNC: 91 MG/DL (ref 70–125)
MAGNESIUM SERPL-MCNC: 1.8 MG/DL (ref 1.8–2.6)
POTASSIUM BLD-SCNC: 4.6 MMOL/L (ref 3.5–5)
SODIUM SERPL-SCNC: 136 MMOL/L (ref 136–145)
TSH SERPL DL<=0.005 MIU/L-ACNC: 3.02 UIU/ML (ref 0.3–5)

## 2021-02-10 ENCOUNTER — COMMUNICATION - HEALTHEAST (OUTPATIENT)
Dept: INTERNAL MEDICINE | Facility: CLINIC | Age: 86
End: 2021-02-10

## 2021-02-10 DIAGNOSIS — R26.81 GAIT INSTABILITY: ICD-10-CM

## 2021-02-10 DIAGNOSIS — R29.898 MUSCULAR DECONDITIONING: ICD-10-CM

## 2021-02-10 DIAGNOSIS — Z91.81 RISK FOR FALLS: ICD-10-CM

## 2021-02-23 ENCOUNTER — COMMUNICATION - HEALTHEAST (OUTPATIENT)
Dept: SCHEDULING | Facility: CLINIC | Age: 86
End: 2021-02-23

## 2021-03-02 ENCOUNTER — COMMUNICATION - HEALTHEAST (OUTPATIENT)
Dept: ADMINISTRATIVE | Facility: CLINIC | Age: 86
End: 2021-03-02

## 2021-03-08 ENCOUNTER — COMMUNICATION - HEALTHEAST (OUTPATIENT)
Dept: ADMINISTRATIVE | Facility: CLINIC | Age: 86
End: 2021-03-08

## 2021-03-16 ENCOUNTER — RECORDS - HEALTHEAST (OUTPATIENT)
Dept: ADMINISTRATIVE | Facility: OTHER | Age: 86
End: 2021-03-16

## 2021-03-24 ENCOUNTER — COMMUNICATION - HEALTHEAST (OUTPATIENT)
Dept: FAMILY MEDICINE | Facility: CLINIC | Age: 86
End: 2021-03-24

## 2021-03-29 ENCOUNTER — COMMUNICATION - HEALTHEAST (OUTPATIENT)
Dept: INTERNAL MEDICINE | Facility: CLINIC | Age: 86
End: 2021-03-29

## 2021-03-29 DIAGNOSIS — F41.9 CHRONIC ANXIETY: ICD-10-CM

## 2021-03-30 ENCOUNTER — RECORDS - HEALTHEAST (OUTPATIENT)
Dept: ADMINISTRATIVE | Facility: OTHER | Age: 86
End: 2021-03-30

## 2021-04-01 ENCOUNTER — COMMUNICATION - HEALTHEAST (OUTPATIENT)
Dept: ADMINISTRATIVE | Facility: CLINIC | Age: 86
End: 2021-04-01

## 2021-04-02 ENCOUNTER — COMMUNICATION - HEALTHEAST (OUTPATIENT)
Dept: INTERNAL MEDICINE | Facility: CLINIC | Age: 86
End: 2021-04-02

## 2021-04-06 ENCOUNTER — COMMUNICATION - HEALTHEAST (OUTPATIENT)
Dept: ADMINISTRATIVE | Facility: CLINIC | Age: 86
End: 2021-04-06

## 2021-04-06 ENCOUNTER — RECORDS - HEALTHEAST (OUTPATIENT)
Dept: ADMINISTRATIVE | Facility: OTHER | Age: 86
End: 2021-04-06

## 2021-04-07 ENCOUNTER — RECORDS - HEALTHEAST (OUTPATIENT)
Dept: ADMINISTRATIVE | Facility: OTHER | Age: 86
End: 2021-04-07

## 2021-04-09 ENCOUNTER — COMMUNICATION - HEALTHEAST (OUTPATIENT)
Dept: ADMINISTRATIVE | Facility: CLINIC | Age: 86
End: 2021-04-09

## 2021-04-12 ENCOUNTER — COMMUNICATION - HEALTHEAST (OUTPATIENT)
Dept: ADMINISTRATIVE | Facility: CLINIC | Age: 86
End: 2021-04-12

## 2021-04-13 ENCOUNTER — RECORDS - HEALTHEAST (OUTPATIENT)
Dept: ADMINISTRATIVE | Facility: OTHER | Age: 86
End: 2021-04-13

## 2021-04-14 ENCOUNTER — OFFICE VISIT - HEALTHEAST (OUTPATIENT)
Dept: INTERNAL MEDICINE | Facility: CLINIC | Age: 86
End: 2021-04-14

## 2021-04-14 DIAGNOSIS — H81.03 MENIERE'S DISEASE, BILATERAL: ICD-10-CM

## 2021-04-14 DIAGNOSIS — I49.3 PVC (PREMATURE VENTRICULAR CONTRACTION): ICD-10-CM

## 2021-04-14 DIAGNOSIS — F41.9 CHRONIC ANXIETY: ICD-10-CM

## 2021-04-14 DIAGNOSIS — R26.81 GAIT INSTABILITY: ICD-10-CM

## 2021-04-14 DIAGNOSIS — R29.898 MUSCULAR DECONDITIONING: ICD-10-CM

## 2021-04-14 DIAGNOSIS — Z91.81 RISK FOR FALLS: ICD-10-CM

## 2021-04-14 DIAGNOSIS — I10 ESSENTIAL HYPERTENSION: ICD-10-CM

## 2021-04-14 DIAGNOSIS — I35.0 NONRHEUMATIC AORTIC VALVE STENOSIS: ICD-10-CM

## 2021-04-22 ENCOUNTER — COMMUNICATION - HEALTHEAST (OUTPATIENT)
Dept: ADMINISTRATIVE | Facility: CLINIC | Age: 86
End: 2021-04-22

## 2021-04-23 ENCOUNTER — COMMUNICATION - HEALTHEAST (OUTPATIENT)
Dept: ADMINISTRATIVE | Facility: CLINIC | Age: 86
End: 2021-04-23

## 2021-04-30 ENCOUNTER — COMMUNICATION - HEALTHEAST (OUTPATIENT)
Dept: ADMINISTRATIVE | Facility: CLINIC | Age: 86
End: 2021-04-30

## 2021-05-04 ENCOUNTER — COMMUNICATION - HEALTHEAST (OUTPATIENT)
Dept: ADMINISTRATIVE | Facility: CLINIC | Age: 86
End: 2021-05-04

## 2021-05-11 ENCOUNTER — RECORDS - HEALTHEAST (OUTPATIENT)
Dept: ADMINISTRATIVE | Facility: OTHER | Age: 86
End: 2021-05-11

## 2021-05-13 ENCOUNTER — RECORDS - HEALTHEAST (OUTPATIENT)
Dept: ADMINISTRATIVE | Facility: OTHER | Age: 86
End: 2021-05-13

## 2021-05-17 ENCOUNTER — COMMUNICATION - HEALTHEAST (OUTPATIENT)
Dept: INTERNAL MEDICINE | Facility: CLINIC | Age: 86
End: 2021-05-17

## 2021-05-19 ENCOUNTER — RECORDS - HEALTHEAST (OUTPATIENT)
Dept: ADMINISTRATIVE | Facility: OTHER | Age: 86
End: 2021-05-19

## 2021-05-20 ENCOUNTER — RECORDS - HEALTHEAST (OUTPATIENT)
Dept: ADMINISTRATIVE | Facility: OTHER | Age: 86
End: 2021-05-20

## 2021-05-20 ENCOUNTER — OFFICE VISIT - HEALTHEAST (OUTPATIENT)
Dept: INTERNAL MEDICINE | Facility: CLINIC | Age: 86
End: 2021-05-20

## 2021-05-20 DIAGNOSIS — I35.0 NONRHEUMATIC AORTIC VALVE STENOSIS: ICD-10-CM

## 2021-05-20 DIAGNOSIS — Z91.81 RISK FOR FALLS: ICD-10-CM

## 2021-05-20 DIAGNOSIS — R26.81 GAIT INSTABILITY: ICD-10-CM

## 2021-05-20 DIAGNOSIS — I49.3 PVC (PREMATURE VENTRICULAR CONTRACTION): ICD-10-CM

## 2021-05-20 DIAGNOSIS — I10 ESSENTIAL HYPERTENSION: ICD-10-CM

## 2021-05-21 ENCOUNTER — COMMUNICATION - HEALTHEAST (OUTPATIENT)
Dept: ADMINISTRATIVE | Facility: CLINIC | Age: 86
End: 2021-05-21

## 2021-05-24 ENCOUNTER — RECORDS - HEALTHEAST (OUTPATIENT)
Dept: ADMINISTRATIVE | Facility: CLINIC | Age: 86
End: 2021-05-24

## 2021-05-25 ENCOUNTER — RECORDS - HEALTHEAST (OUTPATIENT)
Dept: ADMINISTRATIVE | Facility: OTHER | Age: 86
End: 2021-05-25

## 2021-05-25 ENCOUNTER — RECORDS - HEALTHEAST (OUTPATIENT)
Dept: ADMINISTRATIVE | Facility: CLINIC | Age: 86
End: 2021-05-25

## 2021-05-26 ENCOUNTER — RECORDS - HEALTHEAST (OUTPATIENT)
Dept: ADMINISTRATIVE | Facility: CLINIC | Age: 86
End: 2021-05-26

## 2021-05-27 ENCOUNTER — RECORDS - HEALTHEAST (OUTPATIENT)
Dept: ADMINISTRATIVE | Facility: CLINIC | Age: 86
End: 2021-05-27

## 2021-05-27 ENCOUNTER — COMMUNICATION - HEALTHEAST (OUTPATIENT)
Dept: INTERNAL MEDICINE | Facility: CLINIC | Age: 86
End: 2021-05-27

## 2021-05-27 VITALS
SYSTOLIC BLOOD PRESSURE: 130 MMHG | DIASTOLIC BLOOD PRESSURE: 86 MMHG | TEMPERATURE: 98.3 F | OXYGEN SATURATION: 98 % | HEART RATE: 66 BPM

## 2021-05-27 VITALS — BODY MASS INDEX: 22.38 KG/M2 | WEIGHT: 110.8 LBS

## 2021-05-27 DIAGNOSIS — F41.9 CHRONIC ANXIETY: ICD-10-CM

## 2021-05-28 ENCOUNTER — RECORDS - HEALTHEAST (OUTPATIENT)
Dept: ADMINISTRATIVE | Facility: CLINIC | Age: 86
End: 2021-05-28

## 2021-05-28 NOTE — TELEPHONE ENCOUNTER
Controlled Substance Refill Request  Medication:   Requested Prescriptions     Pending Prescriptions Disp Refills     losartan (COZAAR) 50 MG tablet [Pharmacy Med Name: LOSARTAN 50MG TAB] 90 tablet 2     Sig: TAKE 1/2  TABLET (= 25 MG) BY MOUTH ONCE DAILY IN THE MORNING AND  1  TABLET (= 50 MG) IN  THE  AFTERNOON.     LORazepam (ATIVAN) 0.5 MG tablet [Pharmacy Med Name: LORAZEPAM 0.5MG     TAB] 60 tablet 0     Sig: TAKE 1 TABLET BY MOUTH TWICE DAILY AS NEEDED FOR ANXIETY     Date Last Fill: 2/21/19  Pharmacy: [id number]   Submit electronically to pharmacy  Controlled Substance Agreement on File:   Encounter-Level CSA Scan Date:    There are no encounter-level csa scan date.       Last office visit: Last office visit pertaining to requested medication was 3/20/19.    Rx renewed per Medication Renewal policy  Losartan  Lab Results   Component Value Date    CREATININE 0.80 03/20/2019    BUN 16 03/20/2019     (L) 03/20/2019    K 3.8 03/20/2019     03/20/2019    CO2 22 03/20/2019     Michelle Ivan RN [facility] Triage/Medication Refill

## 2021-05-28 NOTE — TELEPHONE ENCOUNTER
Prescription Monitoring Program activity reviewed with no discrepancies noted.      Last fill per : 02/22/2019  Quantity/days supply: #60 for a 30 day supply    Controlled Substance Agreement on file: No  Date: N/A    Last office visit with provider:  3/20/2019 Sang Owens MD    Please advise.    Ayesha COTTON LPN .......... 8:10 AM  04/24/19

## 2021-05-28 NOTE — PROGRESS NOTES
"OFFICE VISIT NOTE    Subjective:   Chief Complaint:  Follow-up    This is an 88-year-old woman in for follow-up regarding hypertension, aortic stenosis, hyponatremia, chronic anxiety, chronic recurring dizziness.  He is fairly stable.  She denies any increasing dyspnea.  There is been no syncope or falls.  No unusual amount of dizziness.  Somewhat depressed.    Current Outpatient Medications   Medication Sig     atenolol (TENORMIN) 25 MG tablet TAKE ONE TABLET (25MG TOTAL) BY MOUTH TWICE DAILY     cholecalciferol, vitamin D3, 1,000 unit tablet Take 1,000 Units by mouth. approx 5 days per week     fluticasone (FLONASE) 50 mcg/actuation nasal spray Apply 2 sprays into each nostril daily.            ibuprofen (ADVIL,MOTRIN) 200 MG tablet Take 200-400 mg by mouth every 8 (eight) hours as needed for pain.      loperamide (IMODIUM) 2 mg capsule Take 2 mg by mouth 4 (four) times a day as needed for diarrhea.     LORazepam (ATIVAN) 0.5 MG tablet TAKE 1 TABLET BY MOUTH TWICE DAILY AS NEEDED FOR ANXIETY     losartan (COZAAR) 25 MG tablet Take 25 mg in the morning and 50 mg at night     losartan (COZAAR) 50 MG tablet TAKE 1/2  TABLET (= 25 MG) BY MOUTH ONCE DAILY IN THE MORNING AND  1  TABLET (= 50 MG) IN  THE  AFTERNOON.     meclizine (ANTIVERT) 25 mg tablet Take 25 mg by mouth.     simvastatin (ZOCOR) 20 MG tablet TAKE ONE TABLET BY MOUTH ONCE DAILY       PSFHx: Tobacco Status:  She  reports that she has never smoked. She has never used smokeless tobacco.    Review of Systems:  A comprehensive review of systems is negative except for the comments above    Objective:    Ht 4' 11\" (1.499 m)   Wt 116 lb (52.6 kg)   LMP  (LMP Unknown)   BMI 23.43 kg/m    GENERAL: No acute distress.  Weight is up to 1/2 to 3 pounds.  Blood pressure is 138/86.  Pulse is 74.  Oxygen saturations 98% on room air.  Trace to 1+ edema of the feet only.  Lungs are clear.  Heart shows a sinus rhythm.  There is grade 2/6 systolic ejection murmur in a " sash distribution consistent with mild to moderate aortic stenosis.  Speech is normal.  Memory seems to be intact.  Gait is okay with the use of a walker.  Cranial nerves are all intact.    Assessment & Plan   Mariluz Arana is a 88 y.o. female.    Clinically she is stable.  The dizziness tends to come and go.  Medications will remain the same as I am content with her blood pressure at the current level.  I will see her back in 6 weeks.  I am checking a basic metabolic profile today.    Diagnoses and all orders for this visit:    Dizziness    Chronic anxiety    Essential hypertension    Hyponatremia            Sang Owens MD  Transcription using voice recognition software, may contain typographical errors.

## 2021-05-29 ENCOUNTER — RECORDS - HEALTHEAST (OUTPATIENT)
Dept: ADMINISTRATIVE | Facility: CLINIC | Age: 86
End: 2021-05-29

## 2021-05-29 NOTE — TELEPHONE ENCOUNTER
Controlled Substance Refill Request  Medication:   Requested Prescriptions     Pending Prescriptions Disp Refills     LORazepam (ATIVAN) 0.5 MG tablet [Pharmacy Med Name: LORAZEPAM 0.5MG     TAB] 60 tablet 0     Sig: TAKE 1 TABLET BY MOUTH TWICE DAILY AS NEEDED FOR ANXIETY     Date Last Fill: 4/24/19  Pharmacy: [facility] [place], [place]   Submit electronically to pharmacy  Controlled Substance Agreement on File: None  Encounter-Level CSA Scan Date:    There are no encounter-level csa scan date.       Last office visit: 6/10/2019 Sang Owens MD

## 2021-05-29 NOTE — TELEPHONE ENCOUNTER
Dr. Owens,  Please review prior authorization denial below and advise how you would like to proceed.  Thank you.  Christiane FIERRO, MARISSA/CARMEN....................3:46 PM

## 2021-05-29 NOTE — TELEPHONE ENCOUNTER
Fax received from [facility], they have started the Prior Authorization Process via Cover My Meds    CoverMyMeds Key: [id number]    Medication Name:   darifenacin (ENABLEX) 7.5 MG ER tablet 7 tablet 1 6/10/2019     Sig - Route: Take 1 tablet (7.5 mg total) by mouth daily. - Oral    Sent to pharmacy as: darifenacin (ENABLEX) 7.5 MG ER tablet    E-Prescribing Status: Receipt confirmed by pharmacy (6/10/2019  2:36 PM CDT)          Insurance Plan: BCBS Part D  PBM:   Patient ID: not provided on fax    Please complete the PA process

## 2021-05-29 NOTE — PROGRESS NOTES
"OFFICE VISIT NOTE    Subjective:   Chief Complaint:  Follow-up (6 weeks) and Sinus Problem (headache X 1 week over her eyes)    This is an 88-year-old woman with multiple problems including hypertension, natremia, Ménière's disease, chronic anxiety.  Having some difficulties which she describes as urgency of urination.  No incontinence.  No hematuria no fevers or chills.  Urinary frequency at night every 2-3 hours.  Also having some difficulty with some sinus congestion especially above the right eye.  No bloody drainage.    Current Outpatient Medications   Medication Sig     atenolol (TENORMIN) 25 MG tablet Take 1 tablet (25 mg total) by mouth 2 (two) times a day.     cholecalciferol, vitamin D3, 1,000 unit tablet Take 1,000 Units by mouth. approx 5 days per week     darifenacin (ENABLEX) 7.5 MG ER tablet Take 1 tablet (7.5 mg total) by mouth daily.     fluticasone (FLONASE) 50 mcg/actuation nasal spray Apply 2 sprays into each nostril daily.            ibuprofen (ADVIL,MOTRIN) 200 MG tablet Take 200-400 mg by mouth every 8 (eight) hours as needed for pain.      loperamide (IMODIUM) 2 mg capsule Take 2 mg by mouth 4 (four) times a day as needed for diarrhea.     LORazepam (ATIVAN) 0.5 MG tablet TAKE 1 TABLET BY MOUTH TWICE DAILY AS NEEDED FOR ANXIETY     losartan (COZAAR) 25 MG tablet Take 25 mg in the morning and 50 mg at night     losartan (COZAAR) 50 MG tablet TAKE 1/2  TABLET (= 25 MG) BY MOUTH ONCE DAILY IN THE MORNING AND  1  TABLET (= 50 MG) IN  THE  AFTERNOON.     meclizine (ANTIVERT) 25 mg tablet Take 25 mg by mouth.     simvastatin (ZOCOR) 20 MG tablet TAKE ONE TABLET BY MOUTH ONCE DAILY       PSFHx: Tobacco Status:  She  reports that she has never smoked. She has never used smokeless tobacco.    Review of Systems:  A comprehensive review of systems is negative except for the comments above    Objective:    Ht 4' 11\" (1.499 m)   Wt 113 lb (51.3 kg)   LMP  (LMP Unknown)   BMI 22.82 kg/m    GENERAL: No " acute distress.  Weight is down 3 pounds.  Today's blood pressure is 154/80.  Pulse in the 80s.  Oxygen saturation 98% on room air.  Trace edema of the feet only.  Lungs are clear.  Heart does show sinus rhythm with occasional ectopic beat heard.  Abdomen is nontender.  No flank tenderness.  No nystagmus.  ENT exams normal for age.  Not tender on percussing facial sinuses    Assessment & Plan   Mariluz Arana is a 88 y.o. female.    She seems clinically stable but is bothered by a lot of minor things.  We will try her on Enablex 7.5 mg for the next week and see if it improves her urinary frequency.  She can try Claritin daily for the sinus congestion.  Avoid Sudafed as needed use of anti-anxiety agent lorazepam.  Return to clinic in 6 weeks.  She should call me in 1 week to let me know how the Enablex is working for her.  If she has any significant side effects, the drug will be discontinued.    Diagnoses and all orders for this visit:    Chronic anxiety    Essential hypertension    Nonrheumatic aortic valve stenosis  -     HM2(CBC w/o Differential)    Hyponatremia  -     Basic Metabolic Panel    Overactive bladder  -     darifenacin (ENABLEX) 7.5 MG ER tablet; Take 1 tablet (7.5 mg total) by mouth daily.  Dispense: 7 tablet; Refill: 1        The following high BMI interventions were performed this visit: encouragement to exercise    Sang Owens MD  Transcription using voice recognition software, may contain typographical errors.

## 2021-05-29 NOTE — TELEPHONE ENCOUNTER
Central PA team  [phone]  Pool: HE PA MED ([id number])          PA has been initiated.       PA form completed and faxed insurance via Cover My Meds     Key:  [id number]        Medication:Darifenacin Hydrobromide ER 7.5MG er tablets      Insurance:  [employer] [place] Medicare         Response will be received via fax and may take up to 5-10 business days depending on plan

## 2021-05-29 NOTE — TELEPHONE ENCOUNTER
Left message to call back for: response/new medication  Information to relay to patient:  See below

## 2021-05-30 ENCOUNTER — RECORDS - HEALTHEAST (OUTPATIENT)
Dept: ADMINISTRATIVE | Facility: CLINIC | Age: 86
End: 2021-05-30

## 2021-05-30 VITALS — HEIGHT: 59 IN | BODY MASS INDEX: 23.59 KG/M2 | WEIGHT: 117 LBS

## 2021-05-30 VITALS — HEIGHT: 59 IN | WEIGHT: 114 LBS | BODY MASS INDEX: 22.98 KG/M2

## 2021-05-30 VITALS — BODY MASS INDEX: 22.98 KG/M2 | WEIGHT: 114 LBS | HEIGHT: 59 IN

## 2021-05-30 VITALS — WEIGHT: 116 LBS | BODY MASS INDEX: 23.39 KG/M2 | HEIGHT: 59 IN

## 2021-05-30 VITALS — BODY MASS INDEX: 23.39 KG/M2 | WEIGHT: 116 LBS | HEIGHT: 59 IN

## 2021-05-30 NOTE — PROGRESS NOTES
"OFFICE VISIT NOTE    Subjective:   Chief Complaint:  Follow-up    88-year-old woman in for follow-up regarding hypertension, palpitations, nonrheumatic aortic stenosis, chronic anxiety.  She is having some difficulty with decreased hearing.  She wonders if she might have wax in her ears.  This has been a chronic problem for her.  Her jaw is out of line.  She has a difficult time opening the mouth all the way.  Some swelling over the left side of the face.  No fevers or chills.    Current Outpatient Medications   Medication Sig     atenolol (TENORMIN) 25 MG tablet Take 1 tablet (25 mg total) by mouth 2 (two) times a day.     cholecalciferol, vitamin D3, 1,000 unit tablet Take 1,000 Units by mouth. approx 5 days per week     darifenacin (ENABLEX) 7.5 MG ER tablet Take 1 tablet (7.5 mg total) by mouth daily.     fluticasone (FLONASE) 50 mcg/actuation nasal spray Apply 2 sprays into each nostril daily.            ibuprofen (ADVIL,MOTRIN) 200 MG tablet Take 200-400 mg by mouth every 8 (eight) hours as needed for pain.      loperamide (IMODIUM) 2 mg capsule Take 2 mg by mouth 4 (four) times a day as needed for diarrhea.     LORazepam (ATIVAN) 0.5 MG tablet TAKE 1 TABLET BY MOUTH TWICE DAILY AS NEEDED FOR ANXIETY     losartan (COZAAR) 25 MG tablet Take 25 mg in the morning and 50 mg at night     losartan (COZAAR) 50 MG tablet TAKE 1/2  TABLET (= 25 MG) BY MOUTH ONCE DAILY IN THE MORNING AND  1  TABLET (= 50 MG) IN  THE  AFTERNOON.     meclizine (ANTIVERT) 25 mg tablet Take 25 mg by mouth.     simvastatin (ZOCOR) 20 MG tablet TAKE 1 TABLET BY MOUTH ONCE DAILY     tolterodine (DETROL LA) 4 MG ER capsule Take 1 capsule (4 mg total) by mouth daily.       PSFHx: Tobacco Status:  She  reports that she has never smoked. She has never used smokeless tobacco.    Review of Systems:  A comprehensive review of systems is negative except for the comments above    Objective:    Ht 4' 11\" (1.499 m)   Wt 113 lb (51.3 kg)   LMP  (LMP " Unknown)   BMI 22.82 kg/m    GENERAL: No acute distress.  Today's blood pressure is 146/82.  Pulse is 82 and regular.  Oxygen saturation is 98%.  The left ear had a moderate amount of cerumen present.  I was able to remove most of it with a cerumen spoon.  Right ear looked normal.  She did have decreased ability to open and close the mouth.  Are clear.  Heart shows a sinus rhythm.  There is a grade 2/6 systolic ejection murmur present.  No gallop is heard.  Trace edema of the right lower extremity.    Assessment & Plan   Mariluz Arana is a 88 y.o. female.    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  Was able to manually put anterior pressure and felt a click as it went back into place.  She could not open her mouth okay.  Cerumen was removed from the left ear with some improvement in here her medications remain the same.  Check electrolytes to make sure she still does not having trouble with hyper  Medication Lorazepam seems to help with her anxiety.  Return to clinic in 6 weeks.    Diagnoses and all orders for this visit:    Essential hypertension    Nonrheumatic aortic valve stenosis    Hyponatremia  -     Basic Metabolic Panel            Sang Owens MD  Transcription using voice recognition software, may contain typographical errors.

## 2021-05-31 ENCOUNTER — RECORDS - HEALTHEAST (OUTPATIENT)
Dept: ADMINISTRATIVE | Facility: CLINIC | Age: 86
End: 2021-05-31

## 2021-05-31 VITALS — BODY MASS INDEX: 24.39 KG/M2 | WEIGHT: 121 LBS | HEIGHT: 59 IN

## 2021-05-31 VITALS — BODY MASS INDEX: 23.79 KG/M2 | HEIGHT: 59 IN | WEIGHT: 118 LBS

## 2021-05-31 VITALS — BODY MASS INDEX: 23.59 KG/M2 | HEIGHT: 59 IN | WEIGHT: 117 LBS

## 2021-05-31 VITALS — HEIGHT: 59 IN | BODY MASS INDEX: 23.39 KG/M2 | WEIGHT: 116 LBS

## 2021-05-31 VITALS — HEIGHT: 59 IN | BODY MASS INDEX: 23.59 KG/M2 | WEIGHT: 117 LBS

## 2021-05-31 NOTE — TELEPHONE ENCOUNTER
Test Results  Who is calling?:  Mariluz  Who ordered the test:  Sang Owens MD   Type of test: Lab and imaging  Date of test:  8/28/19  Where was the test performed:  Clinic  What are your questions/concerns?:  What are the results of my tests?  Okay to leave a detailed message?:  No     Please call today before 4:00 pm.

## 2021-05-31 NOTE — TELEPHONE ENCOUNTER
Left message to call back for: Mariluz  Information to relay to patient:  Please relay message below from Dr. Owens.  Thank you.  Christiane FIERRO, MARISSA/CMT....................4:51 PM

## 2021-05-31 NOTE — TELEPHONE ENCOUNTER
Spoke with patient and she is concerned regarding the dizziness. I offered her an appointment on Wednesday, but she would like to check with you first, double book in tomorrow or wait to wait till Wednesday? Thank you.    Melissa Crespo, CMA

## 2021-05-31 NOTE — TELEPHONE ENCOUNTER
The results letter was mailed to her on August 29.  Head CT did not show anything significant.  Lab blood studies looked okay.  Nothing that would cause headache or dizziness.

## 2021-05-31 NOTE — TELEPHONE ENCOUNTER
Nothing was seen in the sinuses .  If she has a lot of congestion, she can certainly see her ENT physician, Dr. Gordon.

## 2021-05-31 NOTE — TELEPHONE ENCOUNTER
Controlled Substance Refill Request  Medication:   Requested Prescriptions     Pending Prescriptions Disp Refills     LORazepam (ATIVAN) 0.5 MG tablet [Pharmacy Med Name: LORAZEPAM 0.5MG     TAB] 60 tablet 0     Sig: TAKE 1 TABLET BY MOUTH TWICE DAILY AS NEEDED FOR ANXIETY     Date Last Fill: 6/18/19  Pharmacy:  [id number]  Submit electronically to pharmacy  Controlled Substance Agreement on File:   Encounter-Level CSA Scan Date:    There are no encounter-level csa scan date.       Last office visit: Last office visit pertaining to requested medication was 7/22/19.

## 2021-05-31 NOTE — TELEPHONE ENCOUNTER
Spoke with patient and relayed below message. I have scheduled her Wednesday- but only a 20 minute time slot as that is all that is available. HILL Crespo, CMA

## 2021-05-31 NOTE — TELEPHONE ENCOUNTER
These dizzy spells are chronic recurring problem for her.  Lets have her hold off until Wednesday as my schedule is full on Tuesday.

## 2021-05-31 NOTE — TELEPHONE ENCOUNTER
Dr. Owens,  Spoke with the patient and relayed message below, she verbalized understanding, but states that she is still having headaches and is unsure what she should do next.  Patient would like to know if anything was seen in her sinuses or if she should follow up with Dr. Gordon, ENT?  Please advise.  Thank you.  Christiane FIERRO CMA/CARMEN....................1:17 PM

## 2021-05-31 NOTE — TELEPHONE ENCOUNTER
New Appointment Needed  What is the reason for the visit:    Same Date/Next Day Appt Request  What is the reason for your visit?: Headache x 10 days, dizziness    Provider Preference: PCP only  How soon do you need to be seen?: tomorrow  Waitlist offered?: No  Okay to leave a detailed message:  Yes

## 2021-05-31 NOTE — TELEPHONE ENCOUNTER
RN cannot approve Refill Request    RN can NOT refill this medication med is not covered by policy/route to provider. Last office visit: 7/22/2019 Sang Owens MD Last Physical: 7/16/2018 Last MTM visit: Visit date not found Last visit same specialty: 7/22/2019 Sang Owens MD.  Next visit within 3 mo: Visit date not found  Next physical within 3 mo: Visit date not found      Padmini Foley, [facility] Triage/Med Refill 8/7/2019    Requested Prescriptions   Pending Prescriptions Disp Refills     meclizine (ANTIVERT) 25 mg tablet [Pharmacy Med Name: MECLIZINE 25MG      TAB] 90 tablet 3     Sig: TAKE ONE TABLET BY MOUTH THREE TIMES DAILY AS NEEDED       There is no refill protocol information for this order

## 2021-05-31 NOTE — PROGRESS NOTES
"OFFICE VISIT NOTE    Subjective:   Chief Complaint:  Follow-up (pt c/o constant headaches for the past 2 weeks,she states that her eyes are bothering her and she feels not like herself-pt also c/o dizziness, the severe dizziness started on Monday)    88-year-old woman who is been having difficulty with headaches and some dizziness for the past 2 to 3 weeks.  She describes intermittent headaches which can be felt over the entire head.  Sometimes above the eyebrows.  Stated nausea or vomiting.  No loss of speech or vision.  She is having spells of dizziness.  Not true vertigo.  Negative for shortness of breath or any chest pain.  No fevers    Current Outpatient Medications   Medication Sig     atenolol (TENORMIN) 25 MG tablet Take 1 tablet (25 mg total) by mouth 2 (two) times a day.     cholecalciferol, vitamin D3, 1,000 unit tablet Take 1,000 Units by mouth. approx 5 days per week     fluticasone (FLONASE) 50 mcg/actuation nasal spray Apply 2 sprays into each nostril daily.            ibuprofen (ADVIL,MOTRIN) 200 MG tablet Take 200-400 mg by mouth every 8 (eight) hours as needed for pain.      loperamide (IMODIUM) 2 mg capsule Take 2 mg by mouth 4 (four) times a day as needed for diarrhea.     LORazepam (ATIVAN) 0.5 MG tablet TAKE 1 TABLET BY MOUTH TWICE DAILY AS NEEDED FOR ANXIETY     losartan (COZAAR) 25 MG tablet Take 25 mg in the morning and 50 mg at night     losartan (COZAAR) 50 MG tablet TAKE 1/2  TABLET (= 25 MG) BY MOUTH ONCE DAILY IN THE MORNING AND  1  TABLET (= 50 MG) IN  THE  AFTERNOON.     meclizine (ANTIVERT) 25 mg tablet TAKE ONE TABLET BY MOUTH THREE TIMES DAILY AS NEEDED     simvastatin (ZOCOR) 20 MG tablet TAKE 1 TABLET BY MOUTH ONCE DAILY       PSFHx: Tobacco Status:  She  reports that she has never smoked. She has never used smokeless tobacco.    Review of Systems:  A comprehensive review of systems is negative except for the comments above    Objective:    /86   Pulse 66   Ht 4' 11\" " (1.499 m)   Wt 112 lb (50.8 kg)   LMP  (LMP Unknown)   SpO2 98% Comment: RA  BMI 22.62 kg/m    GENERAL: No acute distress.  This is a pleasant if not worried woman in 52/86.  Pulse 66.  No nystagmus.  No hemorrhages in the eyes.  Handgrip is normal.  Finger-to-nose was normal.  Toe tap is normal.  No tenderness of the temporal arteries.  Heart does show a regular rhythm today.  No significant gallop.  [place] range of motion of the neck seems quite normal for age.  Significant decreased range of motion of both shoulders because of arthritis    Assessment & Plan   Mariluz Arana is a 88 y.o. female.    Headaches with associated dizziness.  Symptoms are severe for her.  Not going away the past 3 weeks.  Of CT scan make sure she has not had a stroke or subdural hematoma.  6 CBC as well as sed rate basic metabolic profile    Diagnoses and all orders for this visit:    Episodic tension-type headache, not intractable  -     C-Reactive Protein  -     Erythrocyte Sedimentation Rate  -     CT Head Without Contrast; Future; Expected date: 08/28/2019    Dizziness  -     HM2(CBC w/o Differential)    Essential hypertension  -     Basic Metabolic Panel            Sang Owens MD  Transcription using voice recognition software, may contain typographical errors.

## 2021-06-01 ENCOUNTER — RECORDS - HEALTHEAST (OUTPATIENT)
Dept: ADMINISTRATIVE | Facility: CLINIC | Age: 86
End: 2021-06-01

## 2021-06-01 VITALS — HEIGHT: 59 IN | WEIGHT: 113 LBS | BODY MASS INDEX: 22.78 KG/M2

## 2021-06-01 VITALS — WEIGHT: 114 LBS | BODY MASS INDEX: 22.98 KG/M2 | HEIGHT: 59 IN

## 2021-06-01 VITALS — HEIGHT: 59 IN | BODY MASS INDEX: 21.77 KG/M2 | WEIGHT: 108 LBS

## 2021-06-01 VITALS — HEIGHT: 59 IN | BODY MASS INDEX: 22.98 KG/M2 | WEIGHT: 114 LBS

## 2021-06-01 VITALS — BODY MASS INDEX: 22.38 KG/M2 | WEIGHT: 111 LBS | HEIGHT: 59 IN

## 2021-06-01 VITALS — WEIGHT: 112 LBS | HEIGHT: 59 IN | BODY MASS INDEX: 22.58 KG/M2

## 2021-06-01 VITALS — HEIGHT: 59 IN | BODY MASS INDEX: 21.97 KG/M2 | WEIGHT: 109 LBS

## 2021-06-01 VITALS — BODY MASS INDEX: 22.38 KG/M2 | HEIGHT: 59 IN | WEIGHT: 111 LBS

## 2021-06-01 VITALS — HEIGHT: 59 IN | WEIGHT: 110 LBS | BODY MASS INDEX: 22.18 KG/M2

## 2021-06-01 VITALS — WEIGHT: 111 LBS | HEIGHT: 59 IN | BODY MASS INDEX: 22.38 KG/M2

## 2021-06-01 VITALS — BODY MASS INDEX: 22.02 KG/M2 | WEIGHT: 109 LBS

## 2021-06-01 NOTE — TELEPHONE ENCOUNTER
I have called her [facility] pharmacy and sent in the prescription she can take twice daily if she needs it, for headache.  Fluids should remain restricted to 40 to 50 ounces per day.  She may increase her salt intake a little.  I would recommend a handful of salted pretzels daily

## 2021-06-01 NOTE — TELEPHONE ENCOUNTER
Gave patient her lab results and she would like to know if you have any other suggestions regarding her headaches. Medications or referral to PT/specialist? Does she just have to live with them? She is concerned about taking too much Advil. She is wondering what to do about her low sodium. Should she just increase her sodium in her diet?

## 2021-06-01 NOTE — TELEPHONE ENCOUNTER
"Patient Returning Call  Reason for call:  Provider message   Information relayed to patient:  \"Nothing was seen in the sinuses .  If she has a lot of congestion, she can certainly see her ENT physician, Dr. Gordon.\"  Patient has additional questions:  No  If YES, what are your questions/concerns:  N/A  Okay to leave a detailed message?: No call back needed  "

## 2021-06-01 NOTE — PROGRESS NOTES
OFFICE VISIT NOTE    Subjective:   Chief Complaint:  No chief complaint on file.    88-year-old woman in for follow-up regarding hypertension, chronic anxiety, hyponatremia, intermittent problems with dizziness, and chronic headaches.  She continues to be troubled by almost daily headaches.  They often start in the back of the neck and move up to the top of the scalp.  Sometimes it will settling behind her eyes.  She is seen ENT doctor who says this not sinus infection causing the problem.  There is no loss of vision.  There is no hip or low back pain.  She is chronic severe arthritis of both shoulders.    Current Outpatient Medications   Medication Sig     atenolol (TENORMIN) 25 MG tablet Take 1 tablet (25 mg total) by mouth 2 (two) times a day.     cholecalciferol, vitamin D3, 1,000 unit tablet Take 1,000 Units by mouth. approx 5 days per week     fluticasone (FLONASE) 50 mcg/actuation nasal spray Apply 2 sprays into each nostril daily.            ibuprofen (ADVIL,MOTRIN) 200 MG tablet Take 200-400 mg by mouth every 8 (eight) hours as needed for pain.      loperamide (IMODIUM) 2 mg capsule Take 2 mg by mouth 4 (four) times a day as needed for diarrhea.     LORazepam (ATIVAN) 0.5 MG tablet TAKE 1 TABLET BY MOUTH TWICE DAILY AS NEEDED FOR ANXIETY     losartan (COZAAR) 25 MG tablet Take 25 mg in the morning and 50 mg at night     losartan (COZAAR) 50 MG tablet TAKE 1/2  TABLET (= 25 MG) BY MOUTH ONCE DAILY IN THE MORNING AND  1  TABLET (= 50 MG) IN  THE  AFTERNOON.     meclizine (ANTIVERT) 25 mg tablet TAKE ONE TABLET BY MOUTH THREE TIMES DAILY AS NEEDED     simvastatin (ZOCOR) 20 MG tablet TAKE 1 TABLET BY MOUTH ONCE DAILY       PSFHx: Tobacco Status:  She  reports that she has never smoked. She has never used smokeless tobacco.    Review of Systems:  A comprehensive review of systems is negative except for the comments above    Objective:    LMP  (LMP Unknown)   GENERAL: No acute distress.  She looks about the  same.  She obviously threats and worries about her symptoms blood pressure is 144/80.  Pulse 72.  Temperature is normal.  Oxygen saturation is 98%.  I see normal.  Slightly decreased range of motion related to neck pain.  No palpable adenopathy in the neck.  No temporal artery tenderness.  Mouth and throat look normal.  TMJ joints look okay without regional tenderness or swelling.  She is able to get out of a chair without weakness or pain in the thigh and quadriceps region.    Assessment & Plan   Mariluz Arana is a 88 y.o. female.    Chronic headaches.  This may be tension variety muscle contraction type headache.  History hypertension fair control of her bp  History of hyponatremia, recheck sodium  Check a sed rate to rule out polymyalgia rheumatica.  Will consider a low-dose medication such as Skelaxin or Robaxin for muscle contraction headaches.  Avoid narcotics in this elderly woman.  Flu shot today  Diagnoses and all orders for this visit:    Dizziness    Chronic anxiety    Essential hypertension    Hyponatremia            Sang Owens MD  Transcription using voice recognition software, may contain typographical errors.

## 2021-06-02 ENCOUNTER — RECORDS - HEALTHEAST (OUTPATIENT)
Dept: ADMINISTRATIVE | Facility: CLINIC | Age: 86
End: 2021-06-02

## 2021-06-02 VITALS — BODY MASS INDEX: 22.78 KG/M2 | WEIGHT: 113 LBS | HEIGHT: 59 IN

## 2021-06-02 VITALS — HEIGHT: 59 IN | WEIGHT: 114 LBS | BODY MASS INDEX: 22.98 KG/M2

## 2021-06-02 VITALS — WEIGHT: 114 LBS | HEIGHT: 59 IN | BODY MASS INDEX: 22.98 KG/M2

## 2021-06-02 VITALS — BODY MASS INDEX: 22.38 KG/M2 | WEIGHT: 111 LBS | HEIGHT: 59 IN

## 2021-06-02 NOTE — TELEPHONE ENCOUNTER
Prior Authorization Request  Who s requesting:  Pharmacy  Pharmacy Name and Location: [id number]  Medication Name: metaxalone  Insurance Plan: [facility]  Insurance Member ID Number:  [id number]  Informed patient that prior authorizations can take up to 10 business days for response:   No  Okay to leave a detailed message: No        KEY: [id number]

## 2021-06-02 NOTE — TELEPHONE ENCOUNTER
Controlled Substance Refill Request  Medication:   Requested Prescriptions     Pending Prescriptions Disp Refills     LORazepam (ATIVAN) 0.5 MG tablet [Pharmacy Med Name: LORAZEPAM 0.5MG     TAB] 60 tablet 0     Sig: TAKE 1 TABLET BY MOUTH TWICE DAILY AS NEEDED FOR ANXIETY     Signed Prescriptions Disp Refills     losartan (COZAAR) 50 MG tablet 135 tablet 3     Sig: TAKE 1/2 TABLET ( = 25 MG) BY MOUTH ONCE DAILY IN THE MORNING AND 1 TABLET ( = 50 MG) IN THE AFTERNOON     Authorizing Provider: BHARGAVI HICKS     Ordering User: AKHIL COTTO     Date Last Fill: 8/12/19  Pharmacy:  [id number]  Submit electronically to pharmacy  Controlled Substance Agreement on File:   Encounter-Level CSA Scan Date:    There are no encounter-level csa scan date.       Last office visit: Last office visit pertaining to requested medication was 10/1/19 .

## 2021-06-02 NOTE — TELEPHONE ENCOUNTER
Refill Approved    Rx renewed per Medication Renewal Policy. Medication was last renewed on 4/24/19.    Ashlee Kennedy, [facility] Triage/Med Refill 10/20/2019     Requested Prescriptions   Pending Prescriptions Disp Refills     losartan (COZAAR) 50 MG tablet [Pharmacy Med Name: LOSARTAN 50MG TAB] 90 tablet 2     Sig: TAKE 1/2 TABLET ( = 25 MG) BY MOUTH ONCE DAILY IN THE MORNING AND 1 TABLET ( = 50 MG) IN THE AFTERNOON       Angiotensin Receptor Blocker Protocol Passed - 10/20/2019  6:30 AM        Passed - PCP or prescribing provider visit in past 12 months       Last office visit with prescriber/PCP: 10/1/2019 Sang Owens MD OR same dept: 10/1/2019 Sang Owens MD OR same specialty: 10/1/2019 Sang Owens MD  Last physical: 7/16/2018 Last MTM visit: Visit date not found   Next visit within 3 mo: Visit date not found  Next physical within 3 mo: Visit date not found  Prescriber OR PCP: Sang Owens MD  Last diagnosis associated with med order: 1. HTN (hypertension)  - losartan (COZAAR) 50 MG tablet [Pharmacy Med Name: LOSARTAN 50MG TAB]; TAKE 1/2 TABLET ( = 25 MG) BY MOUTH ONCE DAILY IN THE MORNING AND 1 TABLET ( = 50 MG) IN THE AFTERNOON  Dispense: 90 tablet; Refill: 2    2. Chronic anxiety  - LORazepam (ATIVAN) 0.5 MG tablet [Pharmacy Med Name: LORAZEPAM 0.5MG     TAB]; TAKE 1 TABLET BY MOUTH TWICE DAILY AS NEEDED FOR ANXIETY  Dispense: 60 tablet; Refill: 0    If protocol passes may refill for 12 months if within 3 months of last provider visit (or a total of 15 months).             Passed - Serum potassium within the past 12 months     Lab Results   Component Value Date    Potassium 4.2 10/01/2019             Passed - Blood pressure filed in past 12 months     BP Readings from Last 1 Encounters:   10/01/19 138/80             Passed - Serum creatinine within the past 12 months     Creatinine   Date Value Ref Range Status   10/01/2019 0.75 0.60 - 1.10 mg/dL Final             LORazepam  (ATIVAN) 0.5 MG tablet [Pharmacy Med Name: LORAZEPAM 0.5MG     TAB] 60 tablet 0     Sig: TAKE 1 TABLET BY MOUTH TWICE DAILY AS NEEDED FOR ANXIETY       Controlled Substances Refill Protocol Failed - 10/20/2019  6:30 AM        Failed - Route all Controlled Substance Requests to Provider        Failed - Patient has controlled substance agreement in past 12 months     Encounter-Level CSA Scan Date:    There are no encounter-level csa scan date.               Passed - Visit with PCP or prescribing provider visit in past 12 months      Last office visit with prescriber/PCP: 10/1/2019 Sang Owens MD OR same dept: 10/1/2019 Sang Owens MD OR same specialty: 10/1/2019 Sang Owens MD Last physical: 7/16/2018 Last MTM visit: Visit date not found    Next visit within 3 mo: Visit date not found  Next physical within 3 mo: Visit date not found  Prescriber OR PCP: Sang Owens MD  Last diagnosis associated with med order: 1. HTN (hypertension)  - losartan (COZAAR) 50 MG tablet [Pharmacy Med Name: LOSARTAN 50MG TAB]; TAKE 1/2 TABLET ( = 25 MG) BY MOUTH ONCE DAILY IN THE MORNING AND 1 TABLET ( = 50 MG) IN THE AFTERNOON  Dispense: 90 tablet; Refill: 2    2. Chronic anxiety  - LORazepam (ATIVAN) 0.5 MG tablet [Pharmacy Med Name: LORAZEPAM 0.5MG     TAB]; TAKE 1 TABLET BY MOUTH TWICE DAILY AS NEEDED FOR ANXIETY  Dispense: 60 tablet; Refill: 0

## 2021-06-02 NOTE — TELEPHONE ENCOUNTER
Medication Question or Clarification  Who is calling: Pharmacy: [facility]  What medication are you calling about? (include dose and sig)   metaxalone (SKELAXIN) 800 MG tablet           Summary: 1 tablet p.o. twice daily as needed for headache and neck pain, Normal   Start: 10/2/2019  Ord/Sold: 10/2/2019 (O)  Report  Adh:   Taking:   Long-term:   Pharmacy: [facility] [place] [id number] [facility] Dose History       Patient Si tablet p.o. twice daily as needed for headache and neck pain        Who prescribed the medication?: Dr Owens  What is your question/concern?: The pharmacist is asking if the provider will consider an alternative. Medication is not formulary.  If the prior authorization is approved this medication would be too expensive for patient stated Jes.  Also the patient was shown the size of tablet. If if split the patient states she cannot swallow the half tab.  Pharmacist is offering flexeril 5 mg tablet for provider to consider.  Please call pharmacist with providers recommendations.   Pharmacy: [facility]  Okay to leave a detailed message?: Yes [phone]    [facility] - Please call the pharmacy to obtain any additional needed information.

## 2021-06-02 NOTE — TELEPHONE ENCOUNTER
Per notes below and call to pharmacy this is prior authorization is no longer needed, provider switched to cyclobenzaprine. Closing encounter.

## 2021-06-02 NOTE — TELEPHONE ENCOUNTER
Spoke with Jes the pharmacist at [facility] and she stated the Flexeril would be the preferred for this patient based on the size of the pill and the cost to the patient.    Please send RX if appropriate.    Ayesha COTTON LPN .......... 2:14 PM  10/04/19

## 2021-06-02 NOTE — TELEPHONE ENCOUNTER
Could we please asked the pharmacist whether they prefer Flexeril versus Robaxin as an alternative?

## 2021-06-03 VITALS — WEIGHT: 113 LBS | HEIGHT: 59 IN | BODY MASS INDEX: 22.78 KG/M2

## 2021-06-03 VITALS — WEIGHT: 113 LBS | BODY MASS INDEX: 22.78 KG/M2 | HEIGHT: 59 IN

## 2021-06-03 VITALS
SYSTOLIC BLOOD PRESSURE: 138 MMHG | BODY MASS INDEX: 22.58 KG/M2 | OXYGEN SATURATION: 98 % | HEIGHT: 59 IN | WEIGHT: 112 LBS | DIASTOLIC BLOOD PRESSURE: 80 MMHG | HEART RATE: 73 BPM

## 2021-06-03 VITALS — HEIGHT: 59 IN | WEIGHT: 116 LBS | BODY MASS INDEX: 23.39 KG/M2

## 2021-06-03 VITALS — BODY MASS INDEX: 22.58 KG/M2 | WEIGHT: 112 LBS | HEIGHT: 59 IN

## 2021-06-03 VITALS
DIASTOLIC BLOOD PRESSURE: 80 MMHG | HEIGHT: 59 IN | WEIGHT: 114 LBS | OXYGEN SATURATION: 99 % | BODY MASS INDEX: 22.98 KG/M2 | SYSTOLIC BLOOD PRESSURE: 150 MMHG | HEART RATE: 74 BPM

## 2021-06-03 NOTE — TELEPHONE ENCOUNTER
Spoke with the patient and helped her get the lab results that she was requesting.  Patient has scheduled a 6 wk f/u as stated in Dr. Owens's office visit note.  She is scheduled for 12/31/19 at 1 pm.  Patient had no further questions at this time.  Christiane FIERRO CMA/CARMEN....................12:13 PM

## 2021-06-03 NOTE — PROGRESS NOTES
"OFFICE VISIT NOTE    Subjective:   Chief Complaint:  Follow-up    88-year-old woman in for follow-up regarding hypertension, history of recurrent vertigo, hyponatremia, chronic anxiety disorder.  Generally doing okay.  Does complain of some neck stiffness and soreness.  Minor headaches.  No falls or injuries.  Denies increasing shortness of breath.  No increasing edema.    Current Outpatient Medications   Medication Sig     atenolol (TENORMIN) 25 MG tablet Take 1 tablet (25 mg total) by mouth 2 (two) times a day.     cholecalciferol, vitamin D3, 1,000 unit tablet Take 1,000 Units by mouth. approx 5 days per week     fluticasone (FLONASE) 50 mcg/actuation nasal spray Apply 2 sprays into each nostril daily.            ibuprofen (ADVIL,MOTRIN) 200 MG tablet Take 200-400 mg by mouth every 8 (eight) hours as needed for pain.      loperamide (IMODIUM) 2 mg capsule Take 2 mg by mouth 4 (four) times a day as needed for diarrhea.     LORazepam (ATIVAN) 0.5 MG tablet TAKE 1 TABLET BY MOUTH TWICE DAILY AS NEEDED FOR ANXIETY     losartan (COZAAR) 25 MG tablet Take 25 mg in the morning and 50 mg at night     losartan (COZAAR) 50 MG tablet TAKE 1/2 TABLET ( = 25 MG) BY MOUTH ONCE DAILY IN THE MORNING AND 1 TABLET ( = 50 MG) IN THE AFTERNOON     meclizine (ANTIVERT) 25 mg tablet TAKE ONE TABLET BY MOUTH THREE TIMES DAILY AS NEEDED     simvastatin (ZOCOR) 20 MG tablet TAKE 1 TABLET BY MOUTH ONCE DAILY       PSFHx: Tobacco Status:  She  reports that she has never smoked. She has never used smokeless tobacco.    Review of Systems:  A comprehensive review of systems is negative except for the comments above    Objective:    Ht 4' 11\" (1.499 m)   Wt 114 lb (51.7 kg)   LMP  (LMP Unknown)   BMI 23.03 kg/m    GENERAL: No acute distress.  Weight is up 2 pounds.  Today's blood pressure is 152/80.  Pulse is 74.  Oxygen saturation is 97% on room air.  No significant edema.  Lungs seem clear.  Heart shows a sinus rhythm.  There is an " occasional ectopic beat heard.  No runs of tachycardia noted.  No bradycardia.  No JVD.  Diffuse mild to moderate osteoarthritis.  Mentally seems sharp and alert.  Not as anxious or depressed that she has been in the past.    Assessment & Plan   Mariluz Arana is a 88 y.o. female.    She seems stable.  Blood pressure is okay for an 88-year-old.  Medicines remain the same.  Anxiety can be treated with lorazepam twice daily as needed.  She does not require this daily.  Recheck electrolytes and see what the status of her hyponatremia is.  Also check magnesium level as part of the work-up for her palpitations.  Correct any magnesium or potassium deficiency.    Diagnoses and all orders for this visit:    Essential hypertension    Chronic anxiety    Hyponatremia  -     Basic Metabolic Panel    Palpitations  -     Magnesium            Sang Owens MD  Transcription using voice recognition software, may contain typographical errors.

## 2021-06-03 NOTE — TELEPHONE ENCOUNTER
Who is calling:  Patient   Reason for Call:  Caller stated that she had questions on last office visit 11/14/19 with Sang Owens MD. Patient denied to give more information.   Date of last appointment with primary care: 11/14/19  Okay to leave a detailed message: Yes  [phone]

## 2021-06-03 NOTE — PROGRESS NOTES
TCM DISCHARGE FOLLOW UP CALL    Discharge Date:  11/22/2019  Reason for hospital stay (discharge diagnosis)::  Weakness, PVCs  Are you feeling better, the same or worse since your discharge?:  Patient is feeling the same (She is tired, fatigues easily. Needs to nap BID. She doesn't check her BP. She has been drinking ~50 oz/day.)  Do you feel like you have a plan in the event of a health emergency?: Yes (Family)    As part of your discharge plan, were  home care services ordered for you?: No    Did you receive any new medications, or was there a change to your medications?: Yes    Are you taking those medications, or do you have any established regiment?:  Pt hasn't been taking magnesium. The pills are large and she is concerned she would get too much. She did take 1/2 tab today.  Do you have any follow up visits scheduled with your PCP or Specialist?:  No (12/31)  I'm glad to hear you're doing well and we want you to continue to do well. Your PCP would like to see you for a follow-up visit. Can we help set that up for your today?: No    (RN) Provided patient the PCP's phone number to call if they have any questions or concerns?: No    RN NOTES::  Reviewed good hand hygiene. Suggested pt increased fluid intake to 60 oz daily. Also asked pt to check her BP daily to make sure it isn't low.

## 2021-06-04 VITALS
HEIGHT: 59 IN | SYSTOLIC BLOOD PRESSURE: 148 MMHG | DIASTOLIC BLOOD PRESSURE: 82 MMHG | HEART RATE: 70 BPM | BODY MASS INDEX: 22.78 KG/M2 | OXYGEN SATURATION: 98 % | WEIGHT: 113 LBS

## 2021-06-04 NOTE — TELEPHONE ENCOUNTER
"Message for Dr. Owens.    What to do about Blood pressure.  150-160    119/84-  12/11/2019    116/88.2:30pm this afternoon  Patient reports, \"this is too low for me, I'm usually about 150-160 range\"  Taking Losartan 50mg in the evening, and 25mg in the morning. Regularly    Patient is concerned about taking this much medication , because her BP is too low now. At 116/88 , 2:30pm.    Patient advised that Dr. Owens is not in today, nor is he in all week.     Patient suggests to herself , that she may just take 25mg of Losartan this evening, as she feels her BP is too low.Patient advised that I, as a nurse cannot give advise on how much medication to take regarding prescription medication.      She states she has been seen by Dr. Levine as well for BP issues.  RN writer will also send note to Dr. Levine., as well.    Please call patient or advise on dosing  .thank you  Maureen Quinones RN  [facility] Triage/refill nurse              "

## 2021-06-04 NOTE — TELEPHONE ENCOUNTER
Reason for Disposition    Fall in systolic BP > 20 mm Hg from normal and NOT dizzy, lightheaded, or weak    Protocols used: LOW BLOOD PRESSURE-A-OH

## 2021-06-04 NOTE — TELEPHONE ENCOUNTER
Controlled Substance Refill Request  Medication:   Requested Prescriptions     Pending Prescriptions Disp Refills     LORazepam (ATIVAN) 0.5 MG tablet [Pharmacy Med Name: LORAZEPAM 0.5MG     TAB] 60 tablet 0     Sig: TAKE 1 TABLET BY MOUTH TWICE DAILY AS NEEDED FOR ANXIETY     Date Last Fill: 10/21/19  Pharmacy: [id number]   Submit electronically to pharmacy  Controlled Substance Agreement on File:   Encounter-Level CSA Scan Date:    There are no encounter-level csa scan date.       Last office visit: 11/14/2019 Sang Owens MD

## 2021-06-04 NOTE — PROGRESS NOTES
"OFFICE VISIT NOTE    Subjective:   Chief Complaint:  Follow-up (was INF since last visit and given magnesium. states tablets are too big and too much)    88-year-old woman in for follow-up regarding hypertension, hyponatremia, Ménière's disease, PVCs.  Overall doing well.  She has a lot of anxiety about her health.  No complaints of any chest pain or shortness of breath.  No syncope.  Some spells of occasional lightheadedness.  No orthostatic symptoms.  She is aware of palpitations but again no near  Syncope.    Current Outpatient Medications   Medication Sig     atenolol (TENORMIN) 25 MG tablet Take 1 tablet (25 mg total) by mouth 2 (two) times a day.     cholecalciferol, vitamin D3, 1,000 unit tablet Take 1,000 Units by mouth daily.            fluticasone (FLONASE) 50 mcg/actuation nasal spray Apply 1 spray into each nostril daily.            ibuprofen (ADVIL,MOTRIN) 200 MG tablet Take 200-400 mg by mouth every 8 (eight) hours as needed for pain.      loperamide (IMODIUM) 2 mg capsule Take 2 mg by mouth 4 (four) times a day as needed for diarrhea.     LORazepam (ATIVAN) 0.5 MG tablet TAKE 1 TABLET BY MOUTH TWICE DAILY AS NEEDED FOR ANXIETY     losartan (COZAAR) 25 MG tablet Take 25 mg in the morning and 50 mg at night     losartan (COZAAR) 50 MG tablet TAKE 1/2 TABLET ( = 25 MG) BY MOUTH ONCE DAILY IN THE MORNING AND 1 TABLET ( = 50 MG) IN THE AFTERNOON     magnesium oxide (MAG-OX) 400 mg (241.3 mg magnesium) tablet Take 1 tablet (400 mg total) by mouth 2 (two) times a day.     meclizine (ANTIVERT) 25 mg tablet TAKE ONE TABLET BY MOUTH THREE TIMES DAILY AS NEEDED     simvastatin (ZOCOR) 20 MG tablet TAKE 1 TABLET BY MOUTH ONCE DAILY       PSFHx: Tobacco Status:  She  reports that she has never smoked. She has never used smokeless tobacco.    Review of Systems:  A comprehensive review of systems is negative except for the comments above    Objective:    Ht 4' 11\" (1.499 m)   Wt 113 lb (51.3 kg)   LMP  (LMP " Unknown)   BMI 22.82 kg/m    GENERAL: No acute distress.  Weight is stable.  Today's blood pressure is 148/82.  Pulse is 70 with frequent premature beats.  Oxygen saturations 98%.  No significant edema.  Lungs are clear.  Heart shows a sinus rhythm.  There is a grade 2/6 systolic ejection murmur present.  There is about 8-10 PVCs per minute.  Otteman is nontender.  No JVD.  Mentally she seems sharp and alert.  She has 2 4 mm diameter areas of actinic keratosis on the forehead.  Assessment & Plan   Mariluz Arana is a 88 y.o. female.    Overall, she seems stable.  Blood pressure of 148/82 is okay for this 88-year-old woman.  I want to avoid hypotension.  Chronic anxiety stable.  PRN Ativan is used though not too frequently.  She has mild aortic stenosis which is not significant.  We will check her electrolytes and magnesium to make sure there is nothing out of contribute to her PVCs.  The 2 areas of actinic keratoses were frozen using liquid nitrogen.  5-second freezing was done.  Diagnoses and all orders for this visit:    Essential hypertension    Chronic anxiety    Nonrheumatic aortic valve stenosis    PVC (premature ventricular contraction)    Actinic keratosis        Sang Owens MD  Transcription using voice recognition software, may contain typographical errors.

## 2021-06-05 NOTE — TELEPHONE ENCOUNTER
Prescription Monitoring Program activity reviewed with no discrepancies noted.      Last fill per : 12/9/2019  Quantity/days supply: 60    Controlled Substance Agreement on file: No  Date:     Last office visit with provider:  12/31/2019 Sang Owens MD    Please advise.

## 2021-06-05 NOTE — TELEPHONE ENCOUNTER
Controlled Substance Refill Request  Medication Name:   Requested Prescriptions     Pending Prescriptions Disp Refills     LORazepam (ATIVAN) 0.5 MG tablet [Pharmacy Med Name: LORAZEPAM 0.5 MG TABLET] 60 tablet 0     Sig: TAKE 1 TABLET BY MOUTH TWICE DAILY AS NEEDED FOR ANXIETY     Date Last Fill:   LORazepam (ATIVAN) 0.5 MG tablet 60 tablet 0 12/9/2019  No   Sig: TAKE 1 TABLET BY MOUTH TWICE DAILY AS NEEDED FOR ANXIETY   Sent to pharmacy as: LORazepam 0.5 mg tablet (ATIVAN)   E-Prescribing Status: Receipt confirmed by pharmacy (12/9/2019 12:47 PM CST)     Requested Pharmacy: [facility]  Submit electronically to pharmacy  Controlled Substance Agreement on file:   Encounter-Level CSA Scan Date:    There are no encounter-level csa scan date.        Last office visit:  12/31/19

## 2021-06-05 NOTE — TELEPHONE ENCOUNTER
Spoke with the patient and relayed message below from Dr. Owens.  He verbalized understanding and had no further questions at this time.  Christiane FIERRO, MARISSA/CMT....................4:47 PM

## 2021-06-05 NOTE — TELEPHONE ENCOUNTER
Spoke with patient and she states blood pressures are running high. 1/9/20 200/90, 1/10/20 200/105, 163/87 after sitting for awhile and taken again in the other arm. She took medication earlier and rested that night. Next day 1/11/20 it was 164/90, and Sunday 1/12/20 it was 176/87. What should she do when it is this high. Has had some dizziness/lightheadness during this time and just not feeling well.  Marisela Dixon CSS

## 2021-06-05 NOTE — TELEPHONE ENCOUNTER
Blood pressure was 148/82 when I checked it 2 weeks ago.  I do not want to give her too much medicine and cause the blood pressure to be too low. S He should stay on the same meds for now.

## 2021-06-06 NOTE — TELEPHONE ENCOUNTER
She should check her standing blood pressure once or twice daily.  I will go by her standing blood pressure readings.  Will adjust meds accordingly.

## 2021-06-06 NOTE — TELEPHONE ENCOUNTER
Pt calling with blood pressure concern  Pt states BP is usually high   Pt very concerned that the diastolic BP has been fluctuating. Previously the diastolic has been in the 70s and 80. Pt states BP tonight is 157/61, taken via automatic BP cuff at home.   7:37 PM BP is 182/87 checked during phone call.   Pt plans to take her BP med tonight.    Pt states she has been experiencing some intermittent dizziness and general weakness in both legs that started Friday. Pt wondering if this is d/t her BP meds.     Care Disposition: Protocol suggests pt be seen within 3 days. Care advice given per protocol. Pt verbalizes understanding. Pt states she already has an OV scheduled for 2/18 and is requesting a call from PCP to discuss her BP meds. Will route to PCP.         Reason for Disposition    [1] Taking BP medications AND [2] feels is having side effects (e.g., impotence, cough, dizzy upon standing)    Protocols used: HIGH BLOOD PRESSURE-A-AH

## 2021-06-06 NOTE — TELEPHONE ENCOUNTER
Spoke with patient and she states she went to the fire department yesterday to check her BP and it was high 190/?, they recommend she go to the urgency room to get evaluated because her previous reading with them last week was also high. She did go to the urgency room and it was high there also 185/88. She states they didn't make any changes but asked her to take a reading in the AM and PM and follow up with you. She did schedule an appointment for next Monday afternoon. She states her BP is always high in the evening. She takes her Atenolol 25 mg and losartan 25 mg in the morning around 7:30am and Losartan 50 mg in the evening at 7:30 pm. She states she has not been checking it herself lately because she gets so stressed out that it is going to be high. How would you suggest she proceed? Do you want to make any adjustments?    ED note is in epic.        Marisela Dixon CSS

## 2021-06-06 NOTE — TELEPHONE ENCOUNTER
Spoke with the patient and relayed message below from Dr. Owens.  Patient verbalized understanding and had no further questions at this time.  Christiane FIERRO, MARISSA/CARMEN....................9:08 AM

## 2021-06-06 NOTE — TELEPHONE ENCOUNTER
Who is calling:  Patient  Reason for Call:    Patient is questioning who is calling her back to schedule the appointment for her to  the Holter monitor.  Please reach out to patient and and advise.  Thank you.  Date of last appointment with primary care: 2/18/2020  Okay to leave a detailed message: Yes

## 2021-06-06 NOTE — TELEPHONE ENCOUNTER
Goal is to keep the majority of blood pressures under 160/90.  Her blood pressures tend to fluctuate.  I am trying to avoid any low blood pressures.  The pressure 157/61, is not too low.

## 2021-06-06 NOTE — PROGRESS NOTES
OFFICE VISIT NOTE    Subjective:   Chief Complaint:  Follow-up    The 9-year-old woman with a history hypertension, Ménière's disease, chronic anxiety disorder, PVCs.  She has been feeling weak and somewhat out of sorts.  Some complaints of minor blurred vision.  She went to the fire station checked her blood pressure.  Was no 190/90 range.  Finally went to the urgency room.  CT scans x-rays of the chest EKGs blood work etc. all looked okay except for a slightly low sodium which is a chronic problem for her.  She continues to be frightened about her blood pressure.    Current Outpatient Medications   Medication Sig     atenolol (TENORMIN) 25 MG tablet Take 1 tablet (25 mg total) by mouth 2 (two) times a day.     cholecalciferol, vitamin D3, 1,000 unit tablet Take 1,000 Units by mouth daily.            fluticasone (FLONASE) 50 mcg/actuation nasal spray Apply 1 spray into each nostril daily.            ibuprofen (ADVIL,MOTRIN) 200 MG tablet Take 200-400 mg by mouth every 8 (eight) hours as needed for pain.      loperamide (IMODIUM) 2 mg capsule Take 2 mg by mouth 4 (four) times a day as needed for diarrhea.     LORazepam (ATIVAN) 0.5 MG tablet Take 1 tablet (0.5 mg total) by mouth 2 (two) times a day as needed for anxiety.     losartan (COZAAR) 25 MG tablet Take 25 mg in the morning and 50 mg at night     losartan (COZAAR) 50 MG tablet TAKE 1/2 TABLET ( = 25 MG) BY MOUTH ONCE DAILY IN THE MORNING AND 1 TABLET ( = 50 MG) IN THE AFTERNOON     magnesium oxide (MAG-OX) 400 mg (241.3 mg magnesium) tablet Take 1 tablet (400 mg total) by mouth 2 (two) times a day.     meclizine (ANTIVERT) 25 mg tablet TAKE ONE TABLET BY MOUTH THREE TIMES DAILY AS NEEDED     simvastatin (ZOCOR) 20 MG tablet TAKE 1 TABLET BY MOUTH ONCE DAILY       PSFHx: Tobacco Status:  She  reports that she has never smoked. She has never used smokeless tobacco.    Review of Systems:  A comprehensive review of systems is negative except for the comments  "above    Objective:    Ht 4' 11\" (1.499 m)   Wt 115 lb (52.2 kg)   LMP  (LMP Unknown)   BMI 23.23 kg/m    GENERAL: No acute distress.  Days blood pressure was 174/84.  Not much change on standing.  Pulse in the 80s.  Occasional PVC is heard.  In the past, she had bigeminy.  No edema.  Lungs seem clear.  Eyes are okay without hemorrhages or exudates.  Neurologic exam is normal except for her being hard of hearing.  Heart does not show any significant murmur or gallop.    Assessment & Plan   Mariluz Arana is a 89 y.o. female.    Primarily systolic hypertension.  Certainly made worse by her significant anxiety.  I am going to increase her losartan to 50 mg twice daily.  Continue on atenolol 25 mg twice daily.  Bring her back in 3 weeks to recheck blood pressure.  If needed, we can add low-dose Aldactone.  The thiazide diuretics are not tolerated as her sodium dropped significantly.  Ativan as needed for anxiety which is significant for her.  30 minutes was spent with the patient and son the majority which was counseling coordination of care.  We mostly discussed medications for her.  Possible side effects going forward.  Risks.  Diagnoses and all orders for this visit:    Essential hypertension    Chronic anxiety            Sang Owens MD  Transcription using voice recognition software, may contain typographical errors.    "

## 2021-06-06 NOTE — PROGRESS NOTES
OFFICE VISIT NOTE    Subjective:   Chief Complaint:  No chief complaint on file.    89-year-old woman with multiple problems including hypertension, PVCs with palpitations, chronic anxiety, Ménière's disease.  She is having trouble with palpitations.  They make her feel weak and sometimes somewhat short of breath.  No syncope.  No orthopnea.  No chest pain reported.  No increasing edema.    Current Outpatient Medications   Medication Sig     atenolol (TENORMIN) 25 MG tablet Take 1 tablet (25 mg total) by mouth 2 (two) times a day.     cholecalciferol, vitamin D3, 1,000 unit tablet Take 1,000 Units by mouth daily.            fluticasone (FLONASE) 50 mcg/actuation nasal spray Apply 1 spray into each nostril daily.            ibuprofen (ADVIL,MOTRIN) 200 MG tablet Take 200-400 mg by mouth every 8 (eight) hours as needed for pain.      loperamide (IMODIUM) 2 mg capsule Take 2 mg by mouth 4 (four) times a day as needed for diarrhea.     LORazepam (ATIVAN) 0.5 MG tablet TAKE 1 TABLET BY MOUTH TWICE DAILY AS NEEDED FOR ANXIETY     losartan (COZAAR) 25 MG tablet Take 25 mg in the morning and 50 mg at night     losartan (COZAAR) 50 MG tablet TAKE 1/2 TABLET ( = 25 MG) BY MOUTH ONCE DAILY IN THE MORNING AND 1 TABLET ( = 50 MG) IN THE AFTERNOON     magnesium oxide (MAG-OX) 400 mg (241.3 mg magnesium) tablet Take 1 tablet (400 mg total) by mouth 2 (two) times a day.     meclizine (ANTIVERT) 25 mg tablet TAKE ONE TABLET BY MOUTH THREE TIMES DAILY AS NEEDED     simvastatin (ZOCOR) 20 MG tablet TAKE 1 TABLET BY MOUTH ONCE DAILY       PSFHx: Tobacco Status:  She  reports that she has never smoked. She has never used smokeless tobacco.    Review of Systems:  A comprehensive review of systems is negative except for the comments above    Objective:    LMP  (LMP Unknown)   GENERAL: No acute distress.  Weight is stable.  Today's blood pressure 152/78.  Pulse is 84.  No JVD.  Trace edema of the feet.  Lungs seem clear.  Heart shows a  sinus rhythm.  At times there are some bigeminy.  Other times she has 6 PVCs per minute.  Neurologic exam is normal except for decreased hearing bilaterally.  No jaundice or any cyan ptosis.  Assessment & Plan   Mariluz Arana is a 89 y.o. female.    Palpitations.  Most likely PVCs.  Check a sodium potassium and magnesium level.  Correct if necessary.  Blood pressure is very difficult in this woman.  We definitely need to avoid hypotension as she has had this before with near syncope.  No evidence of significant aortic stenosis causing congestive heart failure.  We will talk to her tomorrow regarding her electrolytes and magnesium.    Diagnoses and all orders for this visit:    PVC (premature ventricular contraction)  -     Basic Metabolic Panel  -     Magnesium    Nonrheumatic aortic valve stenosis    Hyponatremia    Essential hypertension    Chronic anxiety            Sang Owens MD  Transcription using voice recognition software, may contain typographical errors.

## 2021-06-06 NOTE — TELEPHONE ENCOUNTER
Refill Approved    Rx renewed per Medication Renewal Policy. Medication was last renewed on 5/9/19.    Michelle Ivan, [facility] Triage/Med Refill 3/15/2020     Requested Prescriptions   Pending Prescriptions Disp Refills     atenoloL (TENORMIN) 25 MG tablet [Pharmacy Med Name: atenolol 25 mg tablet] 180 tablet 0     Sig: TAKE 1 TABLET BY MOUTH TWICE DAILY       Beta-Blockers Refill Protocol Passed - 3/13/2020 12:03 PM        Passed - PCP or prescribing provider visit in past 12 months or next 3 months     Last office visit with prescriber/PCP: 3/4/2020 Sang Owens MD OR same dept: 3/4/2020 Sang Owens MD OR same specialty: 3/4/2020 Sang Owens MD  Last physical: 7/16/2018 Last MTM visit: Visit date not found   Next visit within 3 mo: Visit date not found  Next physical within 3 mo: Visit date not found  Prescriber OR PCP: Sang Owens MD  Last diagnosis associated with med order: 1. Essential hypertension  - atenoloL (TENORMIN) 25 MG tablet [Pharmacy Med Name: atenolol 25 mg tablet]; TAKE 1 TABLET BY MOUTH TWICE DAILY  Dispense: 180 tablet; Refill: 0    If protocol passes may refill for 12 months if within 3 months of last provider visit (or a total of 15 months).             Passed - Blood pressure filed in past 12 months     BP Readings from Last 1 Encounters:   03/04/20 170/82

## 2021-06-07 NOTE — TELEPHONE ENCOUNTER
Controlled Substance Refill Request  Medication Name:   Requested Prescriptions     Pending Prescriptions Disp Refills     LORazepam (ATIVAN) 0.5 MG tablet [Pharmacy Med Name: lorazepam 0.5 mg tablet] 60 tablet 0     Sig: Take 1 tablet (0.5 mg total) by mouth 2 (two) times a day as needed for anxiety.     Date Last Fill: 2/26/20  Requested Pharmacy: [facility]  Submit electronically to pharmacy  Controlled Substance Agreement on file:   Encounter-Level CSA Scan Date:    There are no encounter-level csa scan date.        Last office visit:  3/31/20

## 2021-06-07 NOTE — PROGRESS NOTES
"Mariluz Arana is a 89 y.o. female who is being evaluated via a billable telephone visit.      The patient has been notified of following:     \"This telephone visit will be conducted via a call between you and your physician/provider. We have found that certain health care needs can be provided without the need for a physical exam.  This service lets us provide the care you need with a short phone conversation.  If a prescription is necessary we can send it directly to your pharmacy.  If lab work is needed we can place an order for that and you can then stop by our lab to have the test done at a later time.    If during the course of the call the physician/provider feels a telephone visit is not appropriate, you will not be charged for this service.\"     Patient has given verbal consent to a Telephone visit? Yes    Mariluz Arana complains of    Chief Complaint   Patient presents with     Follow-up       I have reviewed and updated the patient's Past Medical History, Social History, Family History and Medication List.    ALLERGIES  Metronidazole; Actonel [risedronate]; Clindamycin; Cortisone (hydrocortisone) [hydrocortisone]; Fosamax [alendronate]; Kenalog [triamcinolone acetonide]; Minipress [prazosin]; Penicillin g benzathine; Statins-hmg-coa reductase inhibitors; Tetanus and diphtheria toxoids, adsorbed, adult; Tetanus toxoid, adsorbed; Azithromycin; and Prednisone    Additional provider notes: Telephone call was made to this patient.  She is 89 years old with multiple problems including hypertension as well as anxiety.  Some irritable bowel syndrome.  She has been most concerned about her blood pressures.  They have been running in the 170/80 range.  No chest pain or shortness of breath.  Some minor headaches which start in the neck and move up over the head.  No nausea or vomiting.  No TIA-like symptoms.  No vertigo.  No orthopnea or swelling of the legs.  No recent infections.    Assessment/Plan:  1 " hypertension primarily systolic.  At this time, since she is so sensitive to meds, will continue her same medications.  Namely, losartan and atenolol.  2 anxiety.  This seems to be fairly stable.  No changes in medication needed.  3 Ménière's disease.  Currently quiet.  Not causing a problem.  I will see her when we are  once again scheduling patients after this coronavirus time.    Phone call duration:  20 minutes    Maureen Dixon CMA

## 2021-06-08 NOTE — TELEPHONE ENCOUNTER
Refill Request  Did you contact pharmacy: Yes  Medication name:   Requested Prescriptions     Pending Prescriptions Disp Refills     losartan (COZAAR) 50 MG tablet [Pharmacy Med Name: losartan 50 mg tablet] 135 tablet 1     Sig: TAKE 1/2 TABLET BY MOUTH IN THE MORNING AND 1 TABLET IN THE AFTERNOON     simvastatin (ZOCOR) 20 MG tablet [Pharmacy Med Name: simvastatin 20 mg tablet] 90 tablet 1     Sig: TAKE 1 TABLET BY MOUTH ONCE DAILY     Who prescribed the medication: Sang Owens MD  Requested Pharmacy: [facility] in [place]  Is patient out of medication: No, But is leaving town on Sunday. Please advise asap!  Patient notified refills processed in 3 business days:  yes  Okay to leave a detailed message: yes

## 2021-06-08 NOTE — TELEPHONE ENCOUNTER
Dr. Lanier, Dr. Owens is out of the office all next week. This patient is going to stay with her Son for a few weeks to get out of her assisted living facility. She was not doing well on quarantine. Last fill was 4/19/20 for a 30 day supply. She hasn't had to take this medication two times a day as directed so it is not an early refill. She would like to fill this before leaving on Sunday.

## 2021-06-08 NOTE — TELEPHONE ENCOUNTER
Spoke with patient and relayed your message. She was upset because she is worried about her blood pressure. It hasn't been checked since she last saw you. There is no one at her facility that is able to do it. She stated she isn't having any symptoms of elevated BP. I scheduled her in June when you are at [place] clinic to put her at ease for now. I did explain that it is not safe to go out right now for her and that in a couple of weeks we can revisit this and see if she should have her BP checked at the [place] clinic as a nurse only visit and that we may not want her to come in to clinic in June also but we won't know until closer to that date. She seemed better after this. She will call back in a few weeks. Sooner if she has any issues.    Marisela Dixon CSS

## 2021-06-08 NOTE — PROGRESS NOTES
OFFICE VISIT NOTE    Subjective:   Chief Complaint:  Follow-up (Went to ER 1/3/17 suddenly became weak, gets really lightheaded and is having a hard time walking due to this)    85-year-old woman was in emergency room last night with headache and blood pressure elevations.  She's had labile hypertension in the recent past.  She is getting lightheaded when she stands up.  Near syncope but no actual blackouts.    Current Outpatient Prescriptions   Medication Sig     atenolol (TENORMIN) 25 MG tablet Take 25 mg by mouth 2 (two) times a day.     cholecalciferol, vitamin D3, 1,000 unit tablet Take 1,000 Units by mouth daily.     cloNIDine HCl (CATAPRES) 0.1 MG tablet Take 1 tablet in the morning and 2 tablets at bedtime     ibuprofen (ADVIL,MOTRIN) 200 MG tablet Take 200-400 mg by mouth every 8 (eight) hours as needed for pain.      loperamide (IMODIUM) 2 mg capsule Take 2 mg by mouth 4 (four) times a day as needed for diarrhea.     LORazepam (ATIVAN) 0.5 MG tablet Take 1-2 tablets (0.5-1 mg total) by mouth bedtime.     LORazepam (ATIVAN) 0.5 MG tablet TAKE ONE TABLET BY MOUTH EVERY 8 HOURS AS NEEDED FOR ANXIETY     losartan (COZAAR) 100 MG tablet TAKE ONE TABLET BY MOUTH ONCE DAILY (Patient taking differently: TAKE ONE  HALF TABLET BY MOUTH ONCE DAILY)     meclizine (ANTIVERT) 25 mg tablet TAKE ONE TABLET BY MOUTH THREE TIMES DAILY AS NEEDED     simvastatin (ZOCOR) 20 MG tablet TAKE ONE TABLET BY MOUTH  ONCE DAILY     atenolol (TENORMIN) 25 MG tablet Take 1 tablet (25 mg total) by mouth 2 (two) times a day.     indapamide (LOZOL) 1.25 MG tablet Take 1 tab every other day       PSFHx: Tobacco Status:  She  reports that she has never smoked. She has never used smokeless tobacco.    Review of Systems:  A comprehensive review of systems is negative except for the comments above    Objective:      Visit Vitals     Pulse 64     SpO2 98%     GENERAL: No acute distress.  Today's blood pressure was 164/84 sitting down when she  stands it drops to 148/82.  Pulse in the 70s.  Lungs are clear.  Heart shows a regular rhythm with an occasional ectopic beat.  Neurologic exam is normal.  No peripheral edema.  She complains of left ear fullness but I do not see any signs of otitis.  She's had surgery in the left ear before.    Assessment & Plan   Mariluz Arana is a 85 y.o. female.    History of hypertension.  Dizziness may be orthostatic in nature.  I'm going to discontinue clonidine.  He was indapamide 1.25 mg every other day.  Return to clinic in 2 weeks.    Diagnoses and all orders for this visit:    Essential hypertension  -     indapamide (LOZOL) 1.25 MG tablet; Take 1 tab every other day  Dispense: 30 tablet; Refill: 0  -     atenolol (TENORMIN) 25 MG tablet; Take 1 tablet (25 mg total) by mouth 2 (two) times a day.  Dispense: 180 tablet; Refill: 3    Dizziness            Sang Owens MD  Transcription using voice recognition software, may contain typographical errors.

## 2021-06-08 NOTE — TELEPHONE ENCOUNTER
Controlled Substance Refill Request  Medication Name:   Requested Prescriptions     Pending Prescriptions Disp Refills     LORazepam (ATIVAN) 0.5 MG tablet [Pharmacy Med Name: lorazepam 0.5 mg tablet] 60 tablet 0     Sig: Take 1 tablet (0.5 mg total) by mouth 2 (two) times a day as needed for anxiety.     Date Last Fill: 4/16/20  [facility]  Submit electronically to pharmacy  Controlled Substance Agreement on file:   Encounter-Level CSA Scan Date:    There are no encounter-level csa scan date.        Last office visit:  3/31/20

## 2021-06-08 NOTE — TELEPHONE ENCOUNTER
Refills Approved x 2    Rx renewed per Medication Renewal Policy. Medication was last renewed on 10/20/2019-Losartan, and 06/10/2019 for the Simvastatin.  Last office visit was on 03/31/2020 with PCP.     Adriana Stevenson, [facility] Triage/Med Refill 6/5/2020     Requested Prescriptions   Pending Prescriptions Disp Refills     losartan (COZAAR) 50 MG tablet [Pharmacy Med Name: losartan 50 mg tablet] 135 tablet 1     Sig: TAKE 1/2 TABLET BY MOUTH IN THE MORNING AND 1 TABLET IN THE AFTERNOON       Angiotensin Receptor Blocker Protocol Passed - 6/5/2020  1:21 PM        Passed - PCP or prescribing provider visit in past 12 months       Last office visit with prescriber/PCP: 3/4/2020 Sang Owens MD OR same dept: 3/4/2020 Sang Owens MD OR same specialty: 3/4/2020 Sang Owens MD  Last physical: 7/16/2018 Last MTM visit: Visit date not found   Next visit within 3 mo: Visit date not found  Next physical within 3 mo: Visit date not found  Prescriber OR PCP: Sang Owens MD  Last diagnosis associated with med order: 1. HTN (hypertension)  - losartan (COZAAR) 50 MG tablet [Pharmacy Med Name: losartan 50 mg tablet]; TAKE 1/2 TABLET BY MOUTH IN THE MORNING AND 1 TABLET IN THE AFTERNOON  Dispense: 135 tablet; Refill: 1    2. Hypertension  - simvastatin (ZOCOR) 20 MG tablet [Pharmacy Med Name: simvastatin 20 mg tablet]; TAKE 1 TABLET BY MOUTH ONCE DAILY  Dispense: 90 tablet; Refill: 1    If protocol passes may refill for 12 months if within 3 months of last provider visit (or a total of 15 months).             Passed - Serum potassium within the past 12 months     Lab Results   Component Value Date    Potassium 4.4 02/18/2020             Passed - Blood pressure filed in past 12 months     BP Readings from Last 1 Encounters:   03/04/20 170/82             Passed - Serum creatinine within the past 12 months     Creatinine   Date Value Ref Range Status   02/18/2020 0.78 0.60 - 1.10 mg/dL Final                 simvastatin (ZOCOR) 20 MG tablet [Pharmacy Med Name: simvastatin 20 mg tablet] 90 tablet 1     Sig: TAKE 1 TABLET BY MOUTH ONCE DAILY       Statins Refill Protocol (Hmg CoA Reductase Inhibitors) Passed - 6/5/2020  1:21 PM        Passed - PCP or prescribing provider visit in past 12 months      Last office visit with prescriber/PCP: 3/4/2020 Sang Owens MD OR same dept: 3/4/2020 Sang Owens MD OR same specialty: 3/4/2020 Sang Owens MD  Last physical: 7/16/2018 Last MTM visit: Visit date not found   Next visit within 3 mo: Visit date not found  Next physical within 3 mo: Visit date not found  Prescriber OR PCP: Sang Owens MD  Last diagnosis associated with med order: 1. HTN (hypertension)  - losartan (COZAAR) 50 MG tablet [Pharmacy Med Name: losartan 50 mg tablet]; TAKE 1/2 TABLET BY MOUTH IN THE MORNING AND 1 TABLET IN THE AFTERNOON  Dispense: 135 tablet; Refill: 1    2. Hypertension  - simvastatin (ZOCOR) 20 MG tablet [Pharmacy Med Name: simvastatin 20 mg tablet]; TAKE 1 TABLET BY MOUTH ONCE DAILY  Dispense: 90 tablet; Refill: 1    If protocol passes may refill for 12 months if within 3 months of last provider visit (or a total of 15 months).

## 2021-06-08 NOTE — TELEPHONE ENCOUNTER
Patient will be leaving town on Sunday and would like to get this medication before leaving. Please advise asap!    Controlled Substance Refill Request  Medication Name:   Requested Prescriptions     Pending Prescriptions Disp Refills     LORazepam (ATIVAN) 0.5 MG tablet [Pharmacy Med Name: lorazepam 0.5 mg tablet] 60 tablet 0     Sig: Take 1 tablet (0.5 mg total) by mouth 2 (two) times a day as needed for anxiety.     Date Last Fill: 4/16/2020  Is patient out of medication?: No, 5 days left  Patient notified refills processed within 3 business days:  Yes  Requested Pharmacy: [facility]  Submit electronically to pharmacy  Controlled Substance Agreement on file:   Encounter-Level CSA Scan Date:    There are no encounter-level csa scan date.        Last office visit:  3/31/2020, virtual visit

## 2021-06-08 NOTE — TELEPHONE ENCOUNTER
Prescription Monitoring Program activity reviewed with no discrepancies noted.      Last fill per : 4/16  Quantity/days supply: 60/30    Controlled Substance Agreement on file: No  Date:     Last office visit with provider: 3/31 virtual visit    Please advise.  Alesia Cooper CMA (AAMA)

## 2021-06-08 NOTE — PROGRESS NOTES
OFFICE VISIT NOTE    Subjective:   Chief Complaint:  Hypertension (follow up, d/c'd clonidine and added indapamide 1.25mg qod)    85-year-old woman with history of hypertension as well as Ménière's disease.  I recently discontinued the medication clonidine because of fatigue and dry mouth.  In its place, added the medication indapamide 1.25 mg orally 3 times per week.  She's feeling a little better.  She still very anxious about blood pressures fluctuate.  I reviewed her blood pressure recordings and are actually quite good.  She did have one day when the systolic pressure dropped to 105.  No syncope.    Current Outpatient Prescriptions   Medication Sig     atenolol (TENORMIN) 25 MG tablet Take 25 mg by mouth 2 (two) times a day.     atenolol (TENORMIN) 25 MG tablet Take 1 tablet (25 mg total) by mouth 2 (two) times a day.     cholecalciferol, vitamin D3, 1,000 unit tablet Take 1,000 Units by mouth daily.     ibuprofen (ADVIL,MOTRIN) 200 MG tablet Take 200-400 mg by mouth every 8 (eight) hours as needed for pain.      indapamide (LOZOL) 1.25 MG tablet Take 1 tab every other day     loperamide (IMODIUM) 2 mg capsule Take 2 mg by mouth 4 (four) times a day as needed for diarrhea.     LORazepam (ATIVAN) 0.5 MG tablet Take 1-2 tablets (0.5-1 mg total) by mouth bedtime.     LORazepam (ATIVAN) 0.5 MG tablet TAKE ONE TABLET BY MOUTH EVERY 8 HOURS AS NEEDED FOR ANXIETY     losartan (COZAAR) 100 MG tablet TAKE ONE TABLET BY MOUTH ONCE DAILY (Patient taking differently: TAKE ONE  HALF TABLET BY MOUTH ONCE DAILY)     meclizine (ANTIVERT) 25 mg tablet TAKE ONE TABLET BY MOUTH THREE TIMES DAILY AS NEEDED     simvastatin (ZOCOR) 20 MG tablet TAKE ONE TABLET BY MOUTH  ONCE DAILY       PSFHx: Tobacco Status:  She  reports that she has never smoked. She has never used smokeless tobacco.    Review of Systems:  A comprehensive review of systems is negative except for the comments above    Objective:      Visit Vitals     Pulse 70      "Ht 4' 11\" (1.499 m)     Wt 114 lb (51.7 kg)     SpO2 98%     BMI 23.03 kg/m2     GENERAL: No acute distress.  Today's blood pressure 138/84.  Pulse 68 and regular.  Oxygen saturation is 96%.  There is no edema.  Lungs are clear.  Heart shows sinus rhythm without any ectopic beats today.  No JVD.  All cranial nerves are intact.  Memory is good.  Gait is good.  She seems very worried or concerned about blood pressures.    Assessment & Plan   Mariluz Arana is a 85 y.o. female.    Her blood pressure is much better.  I told her to check her blood pressure daily in the afternoon.  If the systolic pressure is less than 120, she will hold her Cozaar and atenolol.  Return to clinic in 4 weeks.  At that time I will check a basic metabolic profile.  We had a long discussion regarding medications, side effects, expectations and symmetric.  Diagnoses and all orders for this visit:    Essential hypertension    Meniere's disease, bilateral            Sang Owens MD  Transcription using voice recognition software, may contain typographical errors.    "

## 2021-06-08 NOTE — PROGRESS NOTES
"OFFICE VISIT NOTE    Subjective:   Chief Complaint:  Hypertension (follow up has been feeling better since Sunday)    86-year-old woman in for follow-up regarding hypertension.  She also has a past history of Ménière's disease.  She is also having some trouble with weakness and numbness in the right hand and will require carpal tunnel surgery in the near future.  Blood pressure recordings at home are much improved.  She's feeling a little better.  No cardiopulmonary symptoms such as chest pain or shortness of breath.    Current Outpatient Prescriptions   Medication Sig     atenolol (TENORMIN) 25 MG tablet Take 25 mg by mouth 2 (two) times a day.     atenolol (TENORMIN) 25 MG tablet Take 1 tablet (25 mg total) by mouth 2 (two) times a day.     cholecalciferol, vitamin D3, 1,000 unit tablet Take 1,000 Units by mouth daily.     ibuprofen (ADVIL,MOTRIN) 200 MG tablet Take 200-400 mg by mouth every 8 (eight) hours as needed for pain.      indapamide (LOZOL) 1.25 MG tablet Take 1 tab every other day     loperamide (IMODIUM) 2 mg capsule Take 2 mg by mouth 4 (four) times a day as needed for diarrhea.     LORazepam (ATIVAN) 0.5 MG tablet Take 1-2 tablets (0.5-1 mg total) by mouth bedtime.     LORazepam (ATIVAN) 0.5 MG tablet TAKE ONE TABLET BY MOUTH EVERY 8 HOURS AS NEEDED FOR ANXIETY     losartan (COZAAR) 100 MG tablet TAKE ONE TABLET BY MOUTH ONCE DAILY (Patient taking differently: TAKE ONE  HALF TABLET BY MOUTH ONCE DAILY)     meclizine (ANTIVERT) 25 mg tablet TAKE ONE TABLET BY MOUTH THREE TIMES DAILY AS NEEDED     simvastatin (ZOCOR) 20 MG tablet TAKE ONE TABLET BY MOUTH  ONCE DAILY       PSFHx: Tobacco Status:  She  reports that she has never smoked. She has never used smokeless tobacco.    Review of Systems:  A comprehensive review of systems is negative except for the comments above    Objective:      Visit Vitals     Ht 4' 11\" (1.499 m)     Wt 116 lb (52.6 kg)     BMI 23.43 kg/m2     GENERAL: No acute " distress.  Blood pressure today is 142/76.  Pulse 72 and regular.  No JVD.  Lungs are clear.  Heart shows a sinus rhythm without ectopic beats.  No peripheral edema.  She has significant atrophy of the muscles of the right hand from her chronic carpal tunnel syndrome.  Abdomen is nontender.  Mentally sharp and alert.  All cranial nerves are intact.    Assessment & Plan   Mariluz Arana is a 86 y.o. female.    Hypertension is under much better control.  I will check a basic metabolic profile.  Medications should remain the same.  She is certainly stable to have carpal tunnel surgery in the near future.    Diagnoses and all orders for this visit:    Essential hypertension  -     Basic Metabolic Panel    Hyponatremia    Carpal tunnel syndrome of right wrist            Sang Owens MD  Transcription using voice recognition software, may contain typographical errors.

## 2021-06-08 NOTE — TELEPHONE ENCOUNTER
Spoke with patient and she was wondering if she should go to her mental health appointment next week? It is an in person visit. She stated she would actually benefit more seeing you instead. Wondering if she should come see you next week? She is still not sure if she is allowed to leave her facility. She is in an independent living.   Marisela Dixon CSS

## 2021-06-09 NOTE — TELEPHONE ENCOUNTER
Ok to wait for Monday. Notified patient of test results. She stated she is not able to get green leafy vegetables due to her meals being prepared by her facility. They never serve her a salad. She was taking the Slo-magnesium but is unable to find it now. Can a prescription be called in? Or is there another type she can take? She stated they have 2 kinds of magnesium supplements.

## 2021-06-09 NOTE — PROGRESS NOTES
OFFICE VISIT NOTE    Subjective:   Chief Complaint:  Follow-up (has been feeling a little better since the medication change, still has a few dizzy spells but not everyday anymore. has chronic sinus problems that are acting up also)    86-year-old woman in for follow-up regarding hypertension as well as Ménière's disease.  She is also had some difficulties with hyponatremia.  She has been stable.  She quit taking the Lexapro after only 1 pill because it caused some nausea.  About 75% of her blood pressures are normal.  She will get some high ones in the 190/100 range.  I am trying to avoid low blood pressures.    Current Outpatient Prescriptions   Medication Sig     atenolol (TENORMIN) 25 MG tablet Take 25 mg by mouth 2 (two) times a day. Hold for BP less 120     cholecalciferol, vitamin D3, 1,000 unit tablet Take 1,000 Units by mouth daily.     escitalopram oxalate (LEXAPRO) 10 MG tablet Take 1 tablet (10 mg total) by mouth daily.     ibuprofen (ADVIL,MOTRIN) 200 MG tablet Take 200-400 mg by mouth every 8 (eight) hours as needed for pain.      indapamide (LOZOL) 1.25 MG tablet TAKE ONE TABLET BY MOUTH EVERY OTHER DAY     loperamide (IMODIUM) 2 mg capsule Take 2 mg by mouth 4 (four) times a day as needed for diarrhea.     LORazepam (ATIVAN) 0.5 MG tablet Take 1-2 tablets (0.5-1 mg total) by mouth bedtime.     LORazepam (ATIVAN) 0.5 MG tablet Take 1 tablet (0.5 mg total) by mouth 2 (two) times a day as needed for anxiety.     losartan (COZAAR) 25 MG tablet Take 1 tablet (25 mg total) by mouth daily.     meclizine (ANTIVERT) 25 mg tablet TAKE ONE TABLET BY MOUTH THREE TIMES DAILY AS NEEDED     simvastatin (ZOCOR) 20 MG tablet Take 20 mg by mouth bedtime.     simvastatin (ZOCOR) 20 MG tablet TAKE ONE TABLET BY MOUTH ONCE DAILY       PSFHx: Tobacco Status:  She  reports that she has never smoked. She has never used smokeless tobacco.    Review of Systems:  A comprehensive review of systems is negative except for the  "comments above    Objective:    Pulse 72  Ht 4' 11\" (1.499 m)  Wt 116 lb (52.6 kg)  SpO2 99%  BMI 23.43 kg/m2  GENERAL: No acute distress.  Blood pressure today is 152/76.  Pulse 78.  Lungs are clear.  Heart shows a sinus rhythm.  There are no ectopic beats today.  No JVD no peripheral edema.  No cyanosis.  Speech is normal.  Memory is intact.  She ambulation is normal.  The abdomen is nontender.    Assessment & Plan   Mariluz Arana is a 86 y.o. female.    Clinically she is a little better.  Would like to hold the medications as is.  She is asking about increasing losartan in the morning but I would like to avoid that for now since low blood pressures have put her in the hospital.  We will see what the blood pressures do over the next 3-4 weeks.    Diagnoses and all orders for this visit:    Essential hypertension    Meniere's disease, bilateral            Sang Owens MD  Transcription using voice recognition software, may contain typographical errors.    "

## 2021-06-09 NOTE — PROGRESS NOTES
"OFFICE VISIT NOTE    Subjective:   Chief Complaint:  Follow-up    89-year-old woman with multiple problems.  She has history of hypertension has been running high on the systolic pressure.  Denies chest pain syncope or shortness of breath.  Swallowing is sometimes a problem.  She cannot hear out of the right ear and wants me to check for wax.  She has an enlarging lesion on the right side of her face that she wants me to check.  She is having some trouble with the pandemic and the quarantine  She is anxious and more depressed.    Current Outpatient Medications   Medication Sig     atenoloL (TENORMIN) 25 MG tablet TAKE 1 TABLET BY MOUTH TWICE DAILY     cholecalciferol, vitamin D3, 1,000 unit tablet Take 1,000 Units by mouth daily.            fluticasone (FLONASE) 50 mcg/actuation nasal spray Apply 1 spray into each nostril daily.            ibuprofen (ADVIL,MOTRIN) 200 MG tablet Take 200-400 mg by mouth every 8 (eight) hours as needed for pain.      loperamide (IMODIUM) 2 mg capsule Take 2 mg by mouth 4 (four) times a day as needed for diarrhea.     LORazepam (ATIVAN) 0.5 MG tablet Take 1 tablet (0.5 mg total) by mouth 2 (two) times a day as needed for anxiety.     losartan (COZAAR) 50 MG tablet Take 1 tablet (50 mg total) by mouth 2 (two) times a day.     magnesium oxide (MAG-OX) 400 mg (241.3 mg magnesium) tablet Take 1 tablet (400 mg total) by mouth 2 (two) times a day.     meclizine (ANTIVERT) 25 mg tablet TAKE ONE TABLET BY MOUTH THREE TIMES DAILY AS NEEDED     simvastatin (ZOCOR) 20 MG tablet TAKE 1 TABLET BY MOUTH ONCE DAILY       PSFHx: Tobacco Status:  She  reports that she has never smoked. She has never used smokeless tobacco.    Review of Systems:  A comprehensive review of systems is negative except for the comments above    Objective:    Ht 4' 11\" (1.499 m)   Wt 112 lb (50.8 kg)   LMP  (LMP Unknown)   BMI 22.62 kg/m    GENERAL: No acute distress.  Blood pressure is 162/78.  Pulse 72 and " regular.  Right ear is occluded with cerumen.  She complains of a stiff jaw but she seems to have fairly good range of motion of the jaw.  Clicking on the left side.  There is an 8 mm raised red lesion on the right side of her face just anterior to the right ear  Lungs are clear  Heart shows a sinus rhythm.  There is a grade 2/6 systolic ejection murmur consistent with moderate aortic stenosis.  Pedal pulses are normal for age.  Osteoarthritis.  Hard of hearing.    Assessment & Plan   Mariluz Arana is a 89 y.o. female.        Diagnoses and all orders for this visit:    Essential hypertension  -     Comprehensive Metabolic Panel  Systolic hypertension is a problem.  She does not tolerate low pressures.  We will check her electrolytes and kidney function.  Hyponatremia  Check electrolytes  Chronic anxiety  As needed use of alprazolam.  Does not use it often.  Dizziness  Fortunately, not much of a problem at this time.  Nonrheumatic aortic valve stenosis  No chest pain shortness of breath or syncope.  Skin lesion  -     Surgical Pathology Exam  8 mm lesion.  I used half cc of Xylocaine for anesthesia.  I then curetted the lesion off the right side of her face.  Cauterized the base to stop any bleeding.  Specimen was sent for pathologic exam.  The lesion was quite superficial.  I think I removed at all.  Impacted cerumen of right ear  Cerumen was removed from the right ear, USING A CERUMEN SPOON.  PVC's (premature ventricular contractions)  -     Magnesium            Sang Owens MD  Transcription using voice recognition software, may contain typographical errors.

## 2021-06-09 NOTE — PROGRESS NOTES
"OFFICE VISIT NOTE    Subjective:   Chief Complaint:  Hospital Visit Follow Up (fatigue and dizziness, still having problems with this)    86-year-old woman for follow-up regarding recent hospitalization For fatigue and dizziness.  In the hospital blood pressures were mostly low.  I actually backed off her dosage of losartan.  She continues to have spells of weakness and dizziness.  No falls or injuries.  No rapid palpitations.  No chest pain or shortness of breath.    Current Outpatient Prescriptions   Medication Sig     atenolol (TENORMIN) 25 MG tablet Take 25 mg by mouth 2 (two) times a day. Hold for BP less 120     cholecalciferol, vitamin D3, 1,000 unit tablet Take 1,000 Units by mouth daily.     ibuprofen (ADVIL,MOTRIN) 200 MG tablet Take 200-400 mg by mouth every 8 (eight) hours as needed for pain.      indapamide (LOZOL) 1.25 MG tablet TAKE ONE TABLET BY MOUTH EVERY OTHER DAY     loperamide (IMODIUM) 2 mg capsule Take 2 mg by mouth 4 (four) times a day as needed for diarrhea.     LORazepam (ATIVAN) 0.5 MG tablet Take 1-2 tablets (0.5-1 mg total) by mouth bedtime.     LORazepam (ATIVAN) 0.5 MG tablet Take 1 tablet (0.5 mg total) by mouth 2 (two) times a day as needed for anxiety.     losartan (COZAAR) 25 MG tablet Take 1 tablet (25 mg total) by mouth daily.     meclizine (ANTIVERT) 25 mg tablet TAKE ONE TABLET BY MOUTH THREE TIMES DAILY AS NEEDED     simvastatin (ZOCOR) 20 MG tablet Take 20 mg by mouth bedtime.     escitalopram oxalate (LEXAPRO) 10 MG tablet Take 1 tablet (10 mg total) by mouth daily.       PSFHx: Tobacco Status:  She  reports that she has never smoked. She has never used smokeless tobacco.    Review of Systems:  A comprehensive review of systems is negative except for the comments above    Objective:      Visit Vitals     Pulse 73     Ht 4' 11\" (1.499 m)     Wt 114 lb (51.7 kg)     SpO2 100%     BMI 23.03 kg/m2     GENERAL: No acute distress.  Blood pressure today is 132/78.  Pulse 72.  Some " premature beats.  There is some wax in the left ear canal which I removed with a cerumen spoon.  Lungs are clear.  Heart shows sinus rhythm.  His every 2/6 systolic ejection murmur consistent with mild stenosis.  Occasional ectopic beat is heard.  No significant peripheral edema.  Speech is normal.  No nystagmus.  Mild ataxia only.  She can stand without being held up.    Assessment & Plan   Mariluz Arana is a 86 y.o. female.    Labile hypertension  Ménière's disease  Anxiety disorder.  We'll try her on low-dose Lexapro 10 mg daily.  Reduce atenolol to 25 mg once daily.  Continue to take losartan 25 mg late afternoon.  She will take indapamide every other day.  Return to clinic in 3 weeks.    Diagnoses and all orders for this visit:    Anxiety  -     escitalopram oxalate (LEXAPRO) 10 MG tablet; Take 1 tablet (10 mg total) by mouth daily.  Dispense: 30 tablet; Refill: 2    Essential hypertension    Meniere's disease, bilateral            Sang Owens MD  Transcription using voice recognition software, may contain typographical errors.

## 2021-06-10 NOTE — PROGRESS NOTES
"OFFICE VISIT NOTE    Subjective:   Chief Complaint:  Follow-up (HTN, feeling much better now)    86-year-old woman in for follow-up regarding hypertension, Ménière's disease, hyponatremia.  She is feeling a little better.  Much less dizziness.  No falls.  No nausea.  No dyspnea.  Does note occasional palpitations.  No near syncope.    Current Outpatient Prescriptions   Medication Sig     atenolol (TENORMIN) 25 MG tablet Take 25 mg by mouth 2 (two) times a day. Hold for BP less 120     cholecalciferol, vitamin D3, 1,000 unit tablet Take 1,000 Units by mouth daily.     escitalopram oxalate (LEXAPRO) 10 MG tablet Take 1 tablet (10 mg total) by mouth daily.     ibuprofen (ADVIL,MOTRIN) 200 MG tablet Take 200-400 mg by mouth every 8 (eight) hours as needed for pain.      indapamide (LOZOL) 1.25 MG tablet TAKE ONE TABLET BY MOUTH EVERY OTHER DAY     loperamide (IMODIUM) 2 mg capsule Take 2 mg by mouth 4 (four) times a day as needed for diarrhea.     LORazepam (ATIVAN) 0.5 MG tablet Take 1-2 tablets (0.5-1 mg total) by mouth bedtime.     LORazepam (ATIVAN) 0.5 MG tablet Take 1 tablet (0.5 mg total) by mouth 2 (two) times a day as needed for anxiety.     losartan (COZAAR) 25 MG tablet Take 1 tablet (25 mg total) by mouth daily.     meclizine (ANTIVERT) 25 mg tablet TAKE ONE TABLET BY MOUTH THREE TIMES DAILY AS NEEDED     simvastatin (ZOCOR) 20 MG tablet Take 20 mg by mouth bedtime.     simvastatin (ZOCOR) 20 MG tablet TAKE ONE TABLET BY MOUTH ONCE DAILY       PSFHx: Tobacco Status:  She  reports that she has never smoked. She has never used smokeless tobacco.    Review of Systems:  A comprehensive review of systems is negative except for the comments above    Objective:    Pulse 67  Ht 4' 11\" (1.499 m)  Wt 117 lb (53.1 kg)  SpO2 97%  BMI 23.63 kg/m2  GENERAL: No acute distress.  Pleasant woman who weighs the same.  Blood pressure today is 150/80.  Pulse 68.  No edema.  No carotid bruits.  No nystagmus of the " eyes.  Speech is normal.  She is alert and oriented.  Lungs are clear.  Heart shows a regular rhythm.  There was one PVC and 20 seconds.  Gait is normal for age.  No ataxia.  He does have a well-healed scar on the hand from recent carpal tunnel surgery.  No signs of infection.    Assessment & Plan   Mariluz Arana is a 86 y.o. female.    Clinically she is stable.  Blood pressure for the most part is evening out.  We are trying to avoid low blood pressures which cause her to be more dizzy.  Case remain the same.  Check a BMP.  Return to clinic in 2 months.    Diagnoses and all orders for this visit:    Essential hypertension    Hyponatremia  -     Basic Metabolic Panel            Sang Owens MD  Transcription using voice recognition software, may contain typographical errors.

## 2021-06-10 NOTE — PROGRESS NOTES
"OFFICE VISIT NOTE    Subjective:   Chief Complaint:  Follow-up (sodium)    9-year-old woman for follow-up regarding history of hypertension, Ménière's disease with dizziness, chronic anxiety.  Overall, she is doing well.  She is frustrated with the chronic pandemic and  her necessary quarantining.  Minor discomfort in the right breast.  No palpitations.  No chest pain no syncope.  She restricts her water to 1500 cc daily.  She has had a problem with hyponatremia.    Current Outpatient Medications   Medication Sig     atenoloL (TENORMIN) 25 MG tablet TAKE 1 TABLET BY MOUTH TWICE DAILY     cholecalciferol, vitamin D3, 1,000 unit tablet Take 1,000 Units by mouth daily.            fluticasone (FLONASE) 50 mcg/actuation nasal spray Apply 1 spray into each nostril daily.            ibuprofen (ADVIL,MOTRIN) 200 MG tablet Take 200-400 mg by mouth every 8 (eight) hours as needed for pain.      loperamide (IMODIUM) 2 mg capsule Take 2 mg by mouth 4 (four) times a day as needed for diarrhea.     LORazepam (ATIVAN) 0.5 MG tablet Take 1 tablet (0.5 mg total) by mouth 2 (two) times a day as needed for anxiety.     losartan (COZAAR) 50 MG tablet Take 1 tablet (50 mg total) by mouth 2 (two) times a day.     magnesium oxide (MAG-OX) 400 mg (241.3 mg magnesium) tablet Take 1 tablet (400 mg total) by mouth 2 (two) times a day.     meclizine (ANTIVERT) 25 mg tablet TAKE ONE TABLET BY MOUTH THREE TIMES DAILY AS NEEDED     simvastatin (ZOCOR) 20 MG tablet TAKE 1 TABLET BY MOUTH ONCE DAILY       PSFHx: Tobacco Status:  She  reports that she has never smoked. She has never used smokeless tobacco.    Review of Systems:  A comprehensive review of systems is negative except for the comments above    Objective:    Ht 4' 11\" (1.499 m)   Wt 113 lb (51.3 kg)   LMP  (LMP Unknown)   BMI 22.82 kg/m    GENERAL: No acute distress.  Weight is stable.  Today's blood pressure is 150/82.  Pulse in the 70s.  Some premature beats.  No edema.  Lungs are " clear.  Heart shows a regular rhythm with occasional ectopic beats.  There is a grade 2/6 systolic murmur present.  No diastolic murmur.  No JVD    Assessment & Plan   Mariluz Arana is a 89 y.o. female.    Exam is stable.  Blood pressure is good for her at this level.  She has an area of erythema on the right side of her face which could be actinic keratoses.  I recommend that she see a dermatologist.  It is about 7 mm in diameter is rough to touch  Diagnoses and all orders for this visit:    Encounter for screening mammogram for breast cancer  -     Mammo Screening Bilateral; Future; Expected date: 08/24/2020    Hyponatremia  -     Basic Metabolic Panel  Recheck her sodium today.  Essential hypertension  Blood pressure medications will remain the same.          Sang Owens MD  Transcription using voice recognition software, may contain typographical errors.

## 2021-06-10 NOTE — TELEPHONE ENCOUNTER
Controlled Substance Refill Request  Medication Name:   Requested Prescriptions     Pending Prescriptions Disp Refills     LORazepam (ATIVAN) 0.5 MG tablet [Pharmacy Med Name: lorazepam 0.5 mg tablet] 60 tablet 0     Sig: Take 1 tablet (0.5 mg total) by mouth 2 (two) times a day as needed for anxiety.     Date Last Fill: 6/8/20  [facility]ed [facility]  Submit electronically to pharmacy  Controlled Substance Agreement on file:   Encounter-Level CSA Scan Date:    There are no encounter-level csa scan date.        Last office visit:  7/13/20

## 2021-06-10 NOTE — TELEPHONE ENCOUNTER
Ok to wait for Monday  Mariluz Rendon had a telephone visit Tuesday 8/18 with Dr. Claros for dysuria. (note below) she called me 8/19 to let me know she didn't go to urgent care and that she thinks she is retaining fluid. She doesn't have any noticeable swelling but states her knees feel heavy. She is going to monitor this and watch her salt intake and continue her liquids as advised by you. If she starts to have pain or if it worsens she will got to the ED. She would like a call on Monday with your thoughts on this. Thanks Marisela    Subjective: States she is having problems with urination. Has nocturia 5X. During the day, she cannot and has been a trickle. Has been present X 3 days. Never had a UTI. No f/s/c. Does not feel confused, but has had headaches and can feel her heart beating a bit. BMP from 8/10/20. Does not feel dehydrated. Not a diabetic and no abd pain. Nocturia is not new, main thing that is different are the daytime symptoms. Does have swelling in the feet, which she has time to time, but it was a bit more swollen on Sunday. Swelling improves with elevation. No new medications. Takes APAP for headaches, but on occasion, takes an advil. No bleeding with urination. Does not have many back problems. Denies constipation and notes she has IBS. Is having a BM regularly, but does tend to get diarrhea. Not sexually active.      Assessment/Plan:  1. Urinary retention  In the setting of longstanding nocturia and OS.  Patient not a diabetic.  Tried to  her on different causes for potential urinary retention including, though less likely, neurologic, obstructive, anticholinergic or infectious cause, a first important not be unreasonable to check her urinalysis for hematuria, nitrites, leukocytes, etc.  If UA unremarkable, then would probably consider referral to urology.  However after long discussion, Ms. Lee noted that she was very distressed with this change in her symptoms for the last 3  days and eval in urgent.  I recommended she visit [facility] urgent care clinic in [place].  She agreed and was going to find transportation to attend urgent care today.

## 2021-06-10 NOTE — PROGRESS NOTES
"Mariluz Arana is a 89 y.o. female who is being evaluated via a billable telephone visit.      The patient has been notified of following:     \"This telephone visit will be conducted via a call between you and your physician/provider. We have found that certain health care needs can be provided without the need for a physical exam.  This service lets us provide the care you need with a short phone conversation.  If a prescription is necessary we can send it directly to your pharmacy.  If lab work is needed we can place an order for that and you can then stop by our lab to have the test done at a later time.    Telephone visits are billed at different rates depending on your insurance coverage. During this emergency period, for some insurers they may be billed the same as an in-person visit.  Please reach out to your insurance provider with any questions.    If during the course of the call the physician/provider feels a telephone visit is not appropriate, you will not be charged for this service.\"    Patient has given verbal consent to a Telephone visit? Yes    What phone number would you like to be contacted at? [phone]    Patient would like to receive their AVS by AVS Preference: Mail a copy.    Additional provider notes:  Subjective: States she is having problems with urination. Has nocturia 5X. During the day, she cannot and has been a trickle. Has been present X 3 days. Never had a UTI. No f/s/c. Does not feel confused, but has had headaches and can feel her heart beating a bit. BMP from 8/10/20. Does not feel dehydrated. Not a diabetic and no abd pain. Nocturia is not new, main thing that is different are the daytime symptoms. Does have swelling in the feet, which she has time to time, but it was a bit more swollen on Sunday. Swelling improves with elevation. No new medications. Takes APAP for headaches, but on occasion, takes an advil. No bleeding with urination. Does not have many back problems. Denies " constipation and notes she has IBS. Is having a BM regularly, but does tend to get diarrhea. Not sexually active.     Assessment/Plan:  1. Urinary retention  In the setting of longstanding nocturia and OS.  Patient not a diabetic.  Tried to  her on different causes for potential urinary retention including, though less likely, neurologic, obstructive, anticholinergic or infectious cause, a first important not be unreasonable to check her urinalysis for hematuria, nitrites, leukocytes, etc.  If UA unremarkable, then would probably consider referral to urology.  However after long discussion, Ms. Lee noted that she was very distressed with this change in her symptoms for the last 3 days and eval in urgent.  I recommended she visit [facility] urgent care clinic in [place].  She agreed and was going to find transportation to attend urgent care today.    Would like to get her mind at ease    Phone call duration:  23 minutes

## 2021-06-11 ENCOUNTER — RECORDS - HEALTHEAST (OUTPATIENT)
Dept: ADMINISTRATIVE | Facility: OTHER | Age: 86
End: 2021-06-11

## 2021-06-11 NOTE — PROGRESS NOTES
"OFFICE VISIT NOTE    Subjective:   Chief Complaint:  Follow-up (Lt shoulder bruising, has inproved in the area it was but has expanded up the arm farther and now is hurting)    86-year-old woman in for follow-up regarding hypertension, natremia, Ménière's disease.  She also has some swelling and discoloration of the left shoulder probably from a tear of the muscle or tendon.  I think it was related to chronic arthritis of the left shoulder.  She still having some swelling and discomfort in the left shoulder.  There is still discoloration.  No increasing dyspnea.  No chest pain to report.  No syncope.    Current Outpatient Prescriptions   Medication Sig     atenolol (TENORMIN) 25 MG tablet Take 25 mg by mouth 2 (two) times a day. Hold for BP less 120     cholecalciferol, vitamin D3, 1,000 unit tablet Take 1,000 Units by mouth daily.     ibuprofen (ADVIL,MOTRIN) 200 MG tablet Take 200-400 mg by mouth every 8 (eight) hours as needed for pain.      indapamide (LOZOL) 1.25 MG tablet TAKE ONE TABLET BY MOUTH EVERY OTHER DAY     loperamide (IMODIUM) 2 mg capsule Take 2 mg by mouth 4 (four) times a day as needed for diarrhea.     LORazepam (ATIVAN) 0.5 MG tablet Take 1-2 tablets (0.5-1 mg total) by mouth bedtime.     losartan (COZAAR) 25 MG tablet Take 1 tablet (25 mg total) by mouth daily.     meclizine (ANTIVERT) 25 mg tablet TAKE ONE TABLET BY MOUTH THREE TIMES DAILY AS NEEDED     simvastatin (ZOCOR) 20 MG tablet TAKE ONE TABLET BY MOUTH ONCE DAILY       PSFHx: Tobacco Status:  She  reports that she has never smoked. She has never used smokeless tobacco.    Review of Systems:  A comprehensive review of systems is negative except for the comments above    Objective:    Pulse 72  Ht 4' 11\" (1.499 m)  Wt 118 lb (53.5 kg)  SpO2 97%  BMI 23.83 kg/m2  GENERAL: No acute distress.  Blood pressures 154/84.  Pulse is 80.  Oxygen saturation excellent at 97%.  There is still swelling of the left shoulder.  There is ecchymoses on " top the shoulder but more so in the axillary region.  There is a small joint effusion in the left shoulder.  She is afebrile.  Lungs are clear.  Heart shows a sinus rhythm with occasional ectopic beats.  Neurologic exam is normal.  Moderate amount of cerumen in the left ear.  Some of this was removed using a cerumen spoon.  Gait is normal for age.    Assessment & Plan   Mariluz Arana is a 86 y.o. female.    Hypertension which I think is okay for her.  She does not tolerate a low blood pressure at all as she gets very dizzy and lightheaded.  I am satisfied with 140-160 systolic.  A CBC and make sure the platelet count is normal.  We will also check a basic metabolic profile.  She has had low sodiums in the past.  Return to clinic in 2 months.    Diagnoses and all orders for this visit:    Essential hypertension    Torn tendon  -     HM1(CBC and Differential)  -     HM1 (CBC with Diff)    Hyponatremia  -     Basic Metabolic Panel            Sang Owens MD  Transcription using voice recognition software, may contain typographical errors.

## 2021-06-11 NOTE — PROGRESS NOTES
"OFFICE VISIT NOTE    Subjective:   Chief Complaint:  Bleeding/Bruising (left upper arm, no injury, noticed a week ago started with a small purple spot, then multiple, then turned into 1 big purple michael)    86-year-old woman who had spontaneous onset of pain in the left shoulder followed by some ecchymoses.  The ecchymosis has increased in size the past 48 hours.  There were no falls or injuries.  She has a known history of bilateral severe osteoarthritis of both shoulders.  She has been seen at the [facility].  They have told her the only thing that could be done would be bilateral shoulder joint replacement.    Current Outpatient Prescriptions   Medication Sig     atenolol (TENORMIN) 25 MG tablet Take 25 mg by mouth 2 (two) times a day. Hold for BP less 120     cholecalciferol, vitamin D3, 1,000 unit tablet Take 1,000 Units by mouth daily.     ibuprofen (ADVIL,MOTRIN) 200 MG tablet Take 200-400 mg by mouth every 8 (eight) hours as needed for pain.      indapamide (LOZOL) 1.25 MG tablet TAKE ONE TABLET BY MOUTH EVERY OTHER DAY     loperamide (IMODIUM) 2 mg capsule Take 2 mg by mouth 4 (four) times a day as needed for diarrhea.     LORazepam (ATIVAN) 0.5 MG tablet Take 1-2 tablets (0.5-1 mg total) by mouth bedtime.     losartan (COZAAR) 25 MG tablet Take 1 tablet (25 mg total) by mouth daily.     meclizine (ANTIVERT) 25 mg tablet TAKE ONE TABLET BY MOUTH THREE TIMES DAILY AS NEEDED     simvastatin (ZOCOR) 20 MG tablet TAKE ONE TABLET BY MOUTH ONCE DAILY       Review of Systems:  A comprehensive review of systems is negative except for the comments above    Objective:    Pulse 71  Ht 4' 11\" (1.499 m)  Wt 118 lb (53.5 kg)  SpO2 97%  BMI 23.83 kg/m2  GENERAL: No acute distress.  She has obvious swelling of the left shoulder.  There is ecchymoses in the shoulder down to the antecubital space.  She has markedly decreased range of motion to the left shoulder.  This is a chronic finding.  She has good flexion extension " at the elbow.  No obvious elbow effusion.  Circulation sensation are intact.    Assessment & Plan   Mariluz Arana is a 86 y.o. female.    Chronic osteoarthritis of the left shoulder.  I suspect she has had a tendon or muscle tear from fraying of the tendon.  There is associated bleeding.  She should treat this with cold packs twice a day along with some Advil 400 mg 3 times daily for the next 5 days.  THis pain and swelling and discoloration should slowly improve.    Diagnoses and all orders for this visit:    Acute pain of left shoulder        Sang Owens MD  Transcription using voice recognition software, may contain typographical errors.

## 2021-06-12 NOTE — PROGRESS NOTES
"OFFICE VISIT NOTE    Subjective:   Chief Complaint:  Follow-up    89-year-old man for follow-up regarding hypertension, history of hyponatremia, chronic anxiety, PVCs.  In general doing okay though she is not significant decrease in hearing.  Also wants to know about her arthritic knees.  Not painful but big and swollen.  No falls or injuries.  No recent infections.  Some sinus congestion.    Current Outpatient Medications   Medication Sig     atenoloL (TENORMIN) 25 MG tablet TAKE 1 TABLET BY MOUTH TWICE DAILY     cholecalciferol, vitamin D3, 1,000 unit tablet Take 1,000 Units by mouth daily.            fluticasone (FLONASE) 50 mcg/actuation nasal spray Apply 1 spray into each nostril daily.            ibuprofen (ADVIL,MOTRIN) 200 MG tablet Take 200-400 mg by mouth every 8 (eight) hours as needed for pain.      loperamide (IMODIUM) 2 mg capsule Take 2 mg by mouth 4 (four) times a day as needed for diarrhea.     LORazepam (ATIVAN) 0.5 MG tablet Take 1 tablet (0.5 mg total) by mouth 2 times a day as needed for anxiety.     losartan (COZAAR) 50 MG tablet Take 1 tablet (50 mg total) by mouth 2 (two) times a day.     magnesium oxide (MAG-OX) 400 mg (241.3 mg magnesium) tablet Take 1 tablet (400 mg total) by mouth 2 (two) times a day.     meclizine (ANTIVERT) 25 mg tablet TAKE ONE TABLET BY MOUTH THREE TIMES DAILY AS NEEDED     simvastatin (ZOCOR) 20 MG tablet TAKE 1 TABLET BY MOUTH ONCE DAILY       PSFHx: Tobacco Status:  She  reports that she has never smoked. She has never used smokeless tobacco.    Review of Systems:  A comprehensive review of systems is negative except for the comments above    Objective:    Ht 4' 11\" (1.499 m)   Wt 113 lb (51.3 kg)   LMP  (LMP Unknown)   BMI 22.82 kg/m    GENERAL: No acute distress.  Weight is stable.  Today's blood pressure 154/82.  Pulse is 70.  Oxygen saturation 97%.  Right ear looks okay.  Certainly not occluded with cerumen.  Decreased hearing bilaterally.  Lungs seem " clear.  Heart shows a regular rhythm with occasional ectopic beats.  I would estimate 8 to 10/min.  Significant OA changes of both knees.  She has a right third trigger finger which is somewhat uncomfortable for her.    Assessment & Plan   Mariluz Arana is a 89 y.o. female.    Blood pressure is about the same for her.    Diagnoses and all orders for this visit:    Hyponatremia  Check electrolytes.  Essential hypertension  Continue losartan and atenolol.  Decreased hearing of both ears  She will need to see an ear doctor for evaluation as needed.  Need for vaccination  Flu shot needed today.  Trigger finger, acquired   [facility] regarding the trigger finger.          Sang Owens MD  Transcription using voice recognition software, may contain typographical errors.

## 2021-06-12 NOTE — PROGRESS NOTES
"OFFICE VISIT NOTE    Subjective:   Chief Complaint:  Follow-up    86-year-old woman in for follow-up regarding multiple problems including hypertension, irritable bowel syndrome, Ménière's disease, hyponatremia.  She has been fairly stable.  She has not had any muscle cramps.  No seizures.  No confusion or headaches.  No increasing dyspnea.    Current Outpatient Prescriptions   Medication Sig     atenolol (TENORMIN) 25 MG tablet Take 25 mg by mouth 2 (two) times a day. Hold for BP less 120     cholecalciferol, vitamin D3, 1,000 unit tablet Take 1,000 Units by mouth daily.     ibuprofen (ADVIL,MOTRIN) 200 MG tablet Take 200-400 mg by mouth every 8 (eight) hours as needed for pain.      indapamide (LOZOL) 1.25 MG tablet TAKE ONE TABLET BY MOUTH EVERY OTHER DAY     loperamide (IMODIUM) 2 mg capsule Take 2 mg by mouth 4 (four) times a day as needed for diarrhea.     LORazepam (ATIVAN) 0.5 MG tablet Take 1-2 tablets (0.5-1 mg total) by mouth bedtime.     LORazepam (ATIVAN) 0.5 MG tablet TAKE ONE TABLET BY MOUTH TWICE DAILY AS NEEDED FOR ANXIETY     losartan (COZAAR) 25 MG tablet Take 1 tablet (25 mg total) by mouth daily.     meclizine (ANTIVERT) 25 mg tablet TAKE ONE TABLET BY MOUTH THREE TIMES DAILY AS NEEDED     simvastatin (ZOCOR) 20 MG tablet TAKE ONE TABLET BY MOUTH ONCE DAILY       PSFHx: Tobacco Status:  She  reports that she has never smoked. She has never used smokeless tobacco.    Review of Systems:  A comprehensive review of systems is negative except for the comments above    Objective:    Ht 4' 11\" (1.499 m)  Wt 116 lb (52.6 kg)  BMI 23.43 kg/m2  GENERAL: No acute distress.  Pressure today is 148/82.  Pulse 76 and seems regular.  Trace edema of the ankles only.  Weight is down 1 pound.  Lungs are clear of any rales wheezes or rhonchi.  Heart shows a sinus rhythm.  There are no PACs today.  In the past, she frequently has premature beats.  There is a grade 2/6 systolic ejection murmur consistent with mild " aortic outflow murmur.  No diastolic murmur.  Neurologic exam remains normal.    Assessment & Plan   Mariluz Arana is a 86 y.o. female.    Clinically she is stable.  Excellent blood pressure at this time.  Her most recent sodium was 131 so we will recheck this and see if this improved.  We might have to consider stopping the medication indapamide If  sodium is any lower.    Diagnoses and all orders for this visit:    Essential hypertension    Hyponatremia  -     Renal Function Profile    Dizziness            Sang Owens MD  Transcription using voice recognition software, may contain typographical errors.

## 2021-06-12 NOTE — TELEPHONE ENCOUNTER
Controlled Substance Refill Request  Medication Name:   Requested Prescriptions     Pending Prescriptions Disp Refills     LORazepam (ATIVAN) 0.5 MG tablet [Pharmacy Med Name: lorazepam 0.5 mg tablet] 60 tablet 0     Sig: Take 1 tablet (0.5 mg total) by mouth 2 times a day as needed for anxiety.     Date Last Fill: 8/3/20  [facility]  Submit electronically to pharmacy  Controlled Substance Agreement on file:   Encounter-Level CSA Scan Date:    There are no encounter-level csa scan date.        Last office visit:  8/18/20

## 2021-06-12 NOTE — TELEPHONE ENCOUNTER
Controlled Substance Refill Request  Medication Name:   Requested Prescriptions     Pending Prescriptions Disp Refills     LORazepam (ATIVAN) 0.5 MG tablet [Pharmacy Med Name: lorazepam 0.5 mg tablet] 60 tablet 0     Sig: Take 1 tablet (0.5 mg total) by mouth 2 times a day as needed for anxiety.     Date Last Fill: 10/5/20  [facility]  Submit electronically to pharmacy  Controlled Substance Agreement on file:   Encounter-Level CSA Scan Date:    There are no encounter-level csa scan date.        Last office visit:  10/12/20

## 2021-06-13 NOTE — PROGRESS NOTES
OFFICE VISIT NOTE    Subjective:   Chief Complaint:  Follow-up    89-year-old woman with a history of hypertension as well as some chronic anxiety.  Emergency room the other night with some sharp pains in the left foot.  They woke her up.  There was no swelling and no tenderness.  No pain with standing and weightbearing.  She eventually went to the emergency room.  She was screened for evidence of any clot or vascular disease and this was negative.  The pain eventually went away.  Left foot is okay.  She feels her right foot started to throb and ache a little bit.  Never any redness or swelling.  No injuries.  Burning tingling sensation.  She is having nocturia every hour at night.  No polyuria.  No hematuria.  No fevers.  Current Outpatient Medications   Medication Sig     atenoloL (TENORMIN) 25 MG tablet Take 1 tablet (25 mg total) by mouth 2 (two) times a day.     bumetanide (BUMEX) 0.5 MG tablet 1 tablet Monday Wednesday and Friday for 1 week     cholecalciferol, vitamin D3, 1,000 unit tablet Take 1,000 Units by mouth daily.            fluticasone (FLONASE) 50 mcg/actuation nasal spray Apply 1 spray into each nostril daily.            ibuprofen (ADVIL,MOTRIN) 200 MG tablet Take 200-400 mg by mouth every 8 (eight) hours as needed for pain.      lidocaine (LIDODERM) 5 % Remove & Discard patch within 12 hours or as directed by MD     loperamide (IMODIUM) 2 mg capsule Take 2 mg by mouth 4 (four) times a day as needed for diarrhea.     LORazepam (ATIVAN) 0.5 MG tablet Take 1 tablet (0.5 mg total) by mouth 2 times a day as needed for anxiety.     losartan (COZAAR) 50 MG tablet Take 1 tablet (50 mg total) by mouth 2 (two) times a day.     magnesium chloride (SLOW-MAG) 71.5 mg TbEC Take 1 tablet by mouth daily.     magnesium oxide (MAG-OX) 400 mg (241.3 mg magnesium) tablet Take 1 tablet (400 mg total) by mouth 2 (two) times a day.     meclizine (ANTIVERT) 25 mg tablet TAKE ONE TABLET BY MOUTH THREE TIMES DAILY AS  "NEEDED     simvastatin (ZOCOR) 20 MG tablet TAKE 1 TABLET BY MOUTH ONCE DAILY     predniSONE (DELTASONE) 10 mg tablet 1 tab p.o. daily for 4 days.       PSFHx: Tobacco Status:  She  reports that she has never smoked. She has never used smokeless tobacco.    Review of Systems:  A comprehensive review of systems is negative except for the comments above    Objective:    Ht 4' 11\" (1.499 m)   Wt 112 lb (50.8 kg)   LMP  (LMP Unknown)   BMI 22.62 kg/m    GENERAL: No acute distress.  Pressure is high 162/86.  Pulse is 70 oxygen saturation is 98%  Superficial varicose veins but no evidence of any phlebitis.  The foot is not red or hot to the touch.  Good range of motion of the toes ankle etc.  Really no tenderness on exam.  Full range of motion to the ankle joint.  She can weight-bear without pain.  Pedal pulses are decreased bilaterally with the toes seen pink and normal temperature.  She had a normal arterial Doppler of the lower extremities 2 years ago.    Assessment & Plan   Mariluz Arana is a 89 y.o. female.    Left foot pain.  I wonder if this might be radiculopathy with some discomfort in the left foot.  Does not appear to be ischemic.  This is not inflammatory as it is not red, swollen, or tender.  She can bear weight without pain.  She is also complained of some urinary frequency getting up every hour at night.  We will check a UA.  Prednisone 10 mg daily for the next 4 nights to see if this pain in the foot disappears.  I do not think this is gout.  I wonder if she is having nerve pain.    Diagnoses and all orders for this visit:    Left foot pain  -     predniSONE (DELTASONE) 10 mg tablet; 1 tab p.o. daily for 4 days.  Dispense: 4 tablet; Refill: 0    Urinary frequency  -     Urinalysis-UC if Indicated    Essential hypertension            Sang Owens MD  Transcription using voice recognition software, may contain typographical errors.    "

## 2021-06-13 NOTE — TELEPHONE ENCOUNTER
Controlled Substance Refill Request  Medication Name:   Requested Prescriptions     Pending Prescriptions Disp Refills     LORazepam (ATIVAN) 0.5 MG tablet [Pharmacy Med Name: lorazepam 0.5 mg tablet] 60 tablet 0     Sig: Take 1 tablet (0.5 mg total) by mouth 2 times a day as needed for anxiety.     Date Last Fill: 11/14/20  Requested Pharmacy: [facility]  Submit electronically to pharmacy  Controlled Substance Agreement on file:   Encounter-Level CSA Scan Date:    There are no encounter-level csa scan date.        Last office visit:  10/12/20  Melinda Schuler RN, MA  [facility]    Triage Nurse Advisor

## 2021-06-13 NOTE — TELEPHONE ENCOUNTER
Probably does not relate that Kenalog as a form of cortisone.  If she is having significant foot and ankle edema, I would recommend she take Bumex 0.5 mg Monday Wednesday and Friday for 3 doses only.  We will send that prescription into her pharmacist.

## 2021-06-13 NOTE — PROGRESS NOTES
"OFFICE VISIT NOTE    Subjective:   Chief Complaint:  Follow-up    89-year-old woman in for follow-up regarding history of hypertension, anxiety, irritable bowel syndrome, vertigo.  Seems to be doing okay.  She is nervous and anxious about me retiring but I think she is okay.  No increasing cardiovascular problems.  No infections.  No falls.      Current Outpatient Medications   Medication Sig     atenoloL (TENORMIN) 25 MG tablet Take 1 tablet (25 mg total) by mouth 2 (two) times a day.     bumetanide (BUMEX) 0.5 MG tablet 1 tablet Monday Wednesday and Friday for 1 week     cholecalciferol, vitamin D3, 1,000 unit tablet Take 1,000 Units by mouth daily.            fluticasone (FLONASE) 50 mcg/actuation nasal spray Apply 1 spray into each nostril daily.            ibuprofen (ADVIL,MOTRIN) 200 MG tablet Take 200-400 mg by mouth every 8 (eight) hours as needed for pain.      loperamide (IMODIUM) 2 mg capsule Take 2 mg by mouth 4 (four) times a day as needed for diarrhea.     LORazepam (ATIVAN) 0.5 MG tablet Take 1 tablet (0.5 mg total) by mouth 2 times a day as needed for anxiety.     losartan (COZAAR) 50 MG tablet Take 1 tablet (50 mg total) by mouth 2 (two) times a day.     magnesium oxide (MAG-OX) 400 mg (241.3 mg magnesium) tablet Take 1 tablet (400 mg total) by mouth 2 (two) times a day.     meclizine (ANTIVERT) 25 mg tablet TAKE ONE TABLET BY MOUTH THREE TIMES DAILY AS NEEDED     simvastatin (ZOCOR) 20 MG tablet TAKE 1 TABLET BY MOUTH ONCE DAILY       PSFHx: Tobacco Status:  She  reports that she has never smoked. She has never used smokeless tobacco.    Review of Systems:  A comprehensive review of systems is negative except for the comments above    Objective:    Ht 4' 11\" (1.499 m)   Wt 111 lb (50.3 kg)   LMP  (LMP Unknown)   BMI 22.42 kg/m    GENERAL: No acute distress.  Weight is stable.  Today's blood pressure 136/84.  Pulse 77.  Oxygen saturations 97%.  Trace edema at the ankles only.  Lungs are " clear.  Shows a sinus rhythm with about 8-10 premature beats per minute.  There is a grade 2/6 systolic ejection murmur consistent with mild aortic stenosis.  Neurologic exam is normal except for being hard of hearing.    Assessment & Plan   Mariluz Arana is a 89 y.o. female.    Clinically she is stable.  We will check her electrolytes and see if her sodium is okay.  She handles her sodium well by restricting fluids to about 1400 to 1500 cc daily.  She should plan on seeing her new doctor in 2 to 3 months.  Diagnoses and all orders for this visit:    Hyponatremia  -     Basic Metabolic Panel    Essential hypertension  -     atenoloL (TENORMIN) 25 MG tablet; Take 1 tablet (25 mg total) by mouth 2 (two) times a day.  Dispense: 180 tablet; Refill: 3    Nonrheumatic aortic valve stenosis    Chronic anxiety  -     LORazepam (ATIVAN) 0.5 MG tablet; Take 1 tablet (0.5 mg total) by mouth 2 times a day as needed for anxiety.  Dispense: 60 tablet; Refill: 0            Sang Owens MD  Transcription using voice recognition software, may contain typographical errors.

## 2021-06-13 NOTE — TELEPHONE ENCOUNTER
Left message to call back for: Mariluz  Information to relay to patient:  Please relay message below from Dr. Owens and let her know that the prescription has been sent to her pharmacy.  Christiane FIERRO CMA/CARMEN....................4:34 PM

## 2021-06-13 NOTE — TELEPHONE ENCOUNTER
An intramuscular injection of Kenalog can sometimes cause flushing.  I doubt injecting a finger or hand would cause an allergic reaction.  Edema of the feet should be treated with reduction in salt, elevation, compression stockings.  Diuretic may be needed for a few days.

## 2021-06-13 NOTE — TELEPHONE ENCOUNTER
"\"I have pain in the top of my foot.\" Symptoms starting last night with left foot pain waking from sleep.    Patient reporting she took 2 ES Tylenol at 530 a.m. with no improvement. Describes as sharp twinges of pain.    Toes cooler to touch. Denies any known injury.    Disposition see in ED.     Ermelinda Chaudhry RN  [facility] Nurse Advisors    COVID 19 Nurse Triage Plan/Patient Instructions    Please be aware that novel coronavirus (COVID-19) may be circulating in the community. If you develop symptoms such as fever, cough, or SOB or if you have concerns about the presence of another infection including coronavirus (COVID-19), please contact your health care provider or visit [email or web address].     Disposition/Instructions    ED Visit recommended. Follow protocol based instructions.      Bring Your Own Device:  Please also bring your smart device(s) (smart phones, tablets, laptops) and their charging cables for your personal use and to communicate with your care team during your visit.      Thank you for taking steps to prevent the spread of this virus.  o Limit your contact with others.  o Wear a simple mask to cover your cough.  o Wash your hands well and often.    Resources    [facility]: About COVID-19: [email or web address]    CDC: What to Do If You're Sick: www.cdc.gov/coronavirus/2019-ncov/about/steps-when-sick.html    CDC: Ending Home Isolation: www.cdc.gov/coronavirus/2019-ncov/hcp/disposition-in-home-patients.html     CDC: Caring for Someone: www.cdc.gov/coronavirus/2019-ncov/if-you-are-sick/care-for-someone.html     [facility]: Interim Guidance for Hospital Discharge to Home: www.health.[facility].[place]/diseases/coronavirus/hcp/hospdischarge.pdf    [facility] clinical trials (COVID-19 research studies): [email or web address]/umn-clinical-trials     Below are the COVID-19 hotlines at the [place] Department of [facility] ([facility]). Interpreters are available.   o For health questions: Call " 896.993.3237 or 1-426.193.5542 (7 a.m. to 7 p.m.)  o For questions about schools and childcare: Call 398-996-3822 or 1-874.510.2720 (7 a.m. to 7 p.m.)     Reason for Disposition    Entire foot is cool or blue in comparison to other foot    Additional Information    Negative: Followed an ankle or foot injury    Negative: Ankle pain is the main symptom    Protocols used: FOOT PAIN-A-[place]

## 2021-06-13 NOTE — TELEPHONE ENCOUNTER
Patient Returning Call  Reason for call:  Return call   Information relayed to patient:  Caller was informed of message below.   Patient has additional questions:  Yes  If YES, what are your questions/concerns:  Caller stated that she does not know and hung up.   Okay to leave a detailed message?: Yes

## 2021-06-13 NOTE — PROGRESS NOTES
"OFFICE VISIT NOTE    Subjective:   Chief Complaint:  Follow-up (HTN, feels her BP has been low too much) and Arthritis (having alot of pain and trouble using hands and walking. would like some PT/OT)    86-year-old woman in for follow-up regarding essential hypertension as well as recurrent Ménière's disease.  She is doing okay.  She is concerned because she has some low blood pressures.  The lowest pressures about 106/70.  Sometimes the diastolic pressure going to low 60s.  There is been no syncope.  No chest pain or shortness of breath.  No recent flare of Ménière's vertigo.    Current Outpatient Prescriptions   Medication Sig     atenolol (TENORMIN) 25 MG tablet Take 25 mg by mouth 2 (two) times a day. Hold for BP less 120     cholecalciferol, vitamin D3, 1,000 unit tablet Take 1,000 Units by mouth daily.     ibuprofen (ADVIL,MOTRIN) 200 MG tablet Take 200-400 mg by mouth every 8 (eight) hours as needed for pain.      indapamide (LOZOL) 1.25 MG tablet TAKE ONE TABLET BY MOUTH EVERY OTHER DAY     LORazepam (ATIVAN) 0.5 MG tablet Take 1-2 tablets (0.5-1 mg total) by mouth bedtime.     losartan (COZAAR) 25 MG tablet Take 1 tablet (25 mg total) by mouth daily.     simvastatin (ZOCOR) 20 MG tablet TAKE ONE TABLET BY MOUTH ONCE DAILY     loperamide (IMODIUM) 2 mg capsule Take 2 mg by mouth 4 (four) times a day as needed for diarrhea.     meclizine (ANTIVERT) 25 mg tablet TAKE ONE TABLET BY MOUTH THREE TIMES DAILY AS NEEDED       PSFHx: Tobacco Status:  She  reports that she has never smoked. She has never used smokeless tobacco.    Review of Systems:  A comprehensive review of systems is negative except for the comments above    Objective:    Pulse 69  Ht 4' 11\" (1.499 m)  Wt 116 lb (52.6 kg)  SpO2 96%  BMI 23.43 kg/m2  GENERAL: No acute distress.  His blood pressure is 158/70.  Pulse 72.  No nystagmus.  ENT examination basically negative.  Gait is normal for age.  Markedly decreased range of motion of both " shoulders related to arthritis and tendinitis.  Heart shows a sinus rhythm.  No ectopic beats today.  Lungs are clear    Assessment & Plan   Mariluz Arana is a 86 y.o. female.    I reviewed her blood pressures.  85-90% of her pressures are actually quite good.  Therefore, we will stay on the same dosage of meds.  I told her if her systolic pressures under 110, she can hold the medication losartan that Day.  Physical therapy will be arranged for her balance as well as some complaints of arm and hand weakness.    Diagnoses and all orders for this visit:    Essential hypertension    Meniere's disease, bilateral            Sang Owens MD  Transcription using voice recognition software, may contain typographical errors.

## 2021-06-13 NOTE — TELEPHONE ENCOUNTER
Dr. Owens,  Spoke with the patient who states that the swelling is mostly in her feet and ankles.  She also states that it was a cortisone injection not kenalog.  Patient would also like to know what you would like to do about a diuretic.  Please advise.  Thank you.  Christiane FIERRO, MARISSA/CMT....................1:46 PM

## 2021-06-14 ENCOUNTER — COMMUNICATION - HEALTHEAST (OUTPATIENT)
Dept: SCHEDULING | Facility: CLINIC | Age: 86
End: 2021-06-14

## 2021-06-14 ENCOUNTER — HOSPITAL ENCOUNTER (EMERGENCY)
Dept: EMERGENCY MEDICINE | Facility: CLINIC | Age: 86
Discharge: HOME | End: 2021-06-14
Attending: EMERGENCY MEDICINE
Payer: COMMERCIAL

## 2021-06-14 DIAGNOSIS — R06.02 SHORTNESS OF BREATH: ICD-10-CM

## 2021-06-14 DIAGNOSIS — R20.2 PARESTHESIA: ICD-10-CM

## 2021-06-14 LAB
ANION GAP SERPL CALCULATED.3IONS-SCNC: 10 MMOL/L (ref 5–18)
ATRIAL RATE - MUSE: 70 BPM
BNP SERPL-MCNC: 519 PG/ML (ref 0–167)
BUN SERPL-MCNC: 11 MG/DL (ref 8–28)
CALCIUM SERPL-MCNC: 9.1 MG/DL (ref 8.5–10.5)
CHLORIDE BLD-SCNC: 99 MMOL/L (ref 98–107)
CO2 SERPL-SCNC: 23 MMOL/L (ref 22–31)
CREAT SERPL-MCNC: 0.79 MG/DL (ref 0.6–1.1)
DIASTOLIC BLOOD PRESSURE - MUSE: 67 MMHG
ERYTHROCYTE [DISTWIDTH] IN BLOOD BY AUTOMATED COUNT: 12.8 % (ref 11–14.5)
GFR SERPL CREATININE-BSD FRML MDRD: >60 ML/MIN/1.73M2
GLUCOSE BLD-MCNC: 103 MG/DL (ref 70–125)
HCT VFR BLD AUTO: 41.4 % (ref 35–47)
HGB BLD-MCNC: 13.4 G/DL (ref 12–16)
INTERPRETATION ECG - MUSE: NORMAL
MAGNESIUM SERPL-MCNC: 1.8 MG/DL (ref 1.8–2.6)
MCH RBC QN AUTO: 32.2 PG (ref 27–34)
MCHC RBC AUTO-ENTMCNC: 32.4 G/DL (ref 32–36)
MCV RBC AUTO: 100 FL (ref 80–100)
P AXIS - MUSE: 66 DEGREES
PLATELET # BLD AUTO: 191 THOU/UL (ref 140–440)
PMV BLD AUTO: 10.9 FL (ref 8.5–12.5)
POTASSIUM BLD-SCNC: 4.2 MMOL/L (ref 3.5–5)
PR INTERVAL - MUSE: 166 MS
QRS DURATION - MUSE: 118 MS
QT - MUSE: 436 MS
QTC - MUSE: 470 MS
R AXIS - MUSE: -41 DEGREES
RBC # BLD AUTO: 4.16 MILL/UL (ref 3.8–5.4)
SODIUM SERPL-SCNC: 132 MMOL/L (ref 136–145)
SYSTOLIC BLOOD PRESSURE - MUSE: 149 MMHG
T AXIS - MUSE: 113 DEGREES
TROPONIN I SERPL-MCNC: 0.01 NG/ML (ref 0–0.29)
VENTRICULAR RATE- MUSE: 70 BPM
WBC: 7.2 THOU/UL (ref 4–11)

## 2021-06-14 ASSESSMENT — MIFFLIN-ST. JEOR: SCORE: 824.59

## 2021-06-14 NOTE — TELEPHONE ENCOUNTER
Ok to wait for Monday 1/4/20  Spoke with patient and relayed urinalysis results and advice. She will do this but would like to know what your thoughts are on a urology consult. She states she is still having a little urine leakage sometimes. Not producing a lot of urine during the day. Having vaginal discomfort/pain. No itching or burning. She will call back if symptoms worsen or if she feels she needs advice sooner.     Marisela Dixon CSS

## 2021-06-14 NOTE — PROGRESS NOTES
"OFFICE VISIT NOTE    Subjective:   Chief Complaint:  Follow-up (2 months)    86-year-old woman in for follow-up regarding hypertension, hyponatremia, history of Ménière's disease.  She still having some dizziness off and on but no severe nausea or vomiting.  Blood pressures are usually good but she occasionally gets 1 as well as 90/50 in the morning.  Pulse in the 70s.  No syncope.  She does get lightheaded at times.  She is aware of palpitations.    Current Outpatient Prescriptions   Medication Sig     atenolol (TENORMIN) 25 MG tablet Take 25 mg by mouth 2 (two) times a day. Hold for BP less 120     cholecalciferol, vitamin D3, 1,000 unit tablet Take 1,000 Units by mouth daily.     ibuprofen (ADVIL,MOTRIN) 200 MG tablet Take 200-400 mg by mouth every 8 (eight) hours as needed for pain.      indapamide (LOZOL) 1.25 MG tablet TAKE ONE TABLET BY MOUTH EVERY OTHER DAY     loperamide (IMODIUM) 2 mg capsule Take 2 mg by mouth 4 (four) times a day as needed for diarrhea.     LORazepam (ATIVAN) 0.5 MG tablet Take 1-2 tablets (0.5-1 mg total) by mouth bedtime.     LORazepam (ATIVAN) 0.5 MG tablet TAKE ONE TABLET BY MOUTH TWICE DAILY AS NEEDED FOR ANXIETY     losartan (COZAAR) 25 MG tablet Take 1 tablet (25 mg total) by mouth daily.     meclizine (ANTIVERT) 25 mg tablet TAKE ONE TABLET BY MOUTH THREE TIMES DAILY AS NEEDED     simvastatin (ZOCOR) 20 MG tablet TAKE ONE TABLET BY MOUTH ONCE DAILY       PSFHx: Tobacco Status:  She  reports that she has never smoked. She has never used smokeless tobacco.    Review of Systems:  A comprehensive review of systems is negative except for the comments above    Objective:    Pulse 69  Ht 4' 11\" (1.499 m)  Wt 121 lb (54.9 kg)  SpO2 96%  BMI 24.44 kg/m2  GENERAL: No acute distress.  His blood pressure was 146/78.  Pulse was 72.  Weight is up 5 pounds.  No edema.  No JVD.  Lungs do sound clear.  Heart does show sinus rhythm without significant murmur.  She does have a rare premature " beat today.  Only heard 1 and 30 seconds.  Neurologic exam is normal.  She is wide awake alert.  Speech is normal memory is intact.    Assessment & Plan   Mariluz Arana is a 86 y.o. female.    Hypertension.  She is running some low blood pressures in the morning so we will discontinue losartan for a while and see what the blood pressure does.  She continues to take indapamide but every other day.  We will check electrolytes to make sure she is not developing a low sodium or potassium.  Continue atenolol.    Diagnoses and all orders for this visit:    Essential hypertension    Hyponatremia  -     Basic Metabolic Panel    Xerostomia            Sang Owens MD  Transcription using voice recognition software, may contain typographical errors.

## 2021-06-14 NOTE — TELEPHONE ENCOUNTER
I have given her 30 day supply to get her through her [facility] care appt.    Dr. Rutherford

## 2021-06-14 NOTE — TELEPHONE ENCOUNTER
Controlled Substance Refill Request  Medication Name:   Requested Prescriptions     Pending Prescriptions Disp Refills     LORazepam (ATIVAN) 0.5 MG tablet [Pharmacy Med Name: lorazepam 0.5 mg tablet] 60 tablet 0     Sig: Take 1 tablet (0.5 mg total) by mouth 2 times a day as needed for anxiety.     Date Last Fill: 12/8/20  Requested Pharmacy: [facility]  Submit electronically to pharmacy  Controlled Substance Agreement on file:   Encounter-Level CSA Scan Date:    There are no encounter-level csa scan date.        Last office visit:  12/21/20

## 2021-06-15 ENCOUNTER — COMMUNICATION - HEALTHEAST (OUTPATIENT)
Dept: NEUROSURGERY | Facility: CLINIC | Age: 86
End: 2021-06-15

## 2021-06-15 NOTE — PATIENT INSTRUCTIONS - HE
Establishing care for this 89-year-old woman who was previously working with Dr. Owens, and who lives in [facility], and comes to clinic today accompanied by her son Parish.  Issues are as follows: Gait instability, muscular deconditioning, at risk for falls, causative factors are multisite osteoarthritis, muscular deconditioning, and chronic vertigo.  Essential hypertension, blood pressure probably well controlled on current combination of atenolol 25 mg twice a day and losartan 50 mg twice a day.  Hypomagnesemia, for which I will put her on magnesium oxide 200 mg twice a day (40 mg daily).  Anxiety and insomnia, for which she uses lorazepam at bedtime, which seems okay continue.  Irritable bowel syndrome diarrhea predominant, uses Imodium intermittently.  Mild hyponatremia with sodium of 134 measured December 16, 2020, will recheck today February 9, 2021 and also check her thyroid status.  Moderate aortic stenosis and mild regurgitation, with severe left atrial enlargement, diastolic dysfunction, and mildly decreased systolic function by echocardiogram November 21, 2019.  Palpitations and frequent premature ventricular contractions demonstrated on Holter recording February 24, 2020.  Hyperlipidemia for which she is been on moderate dose simvastatin, tolerating well.  Chronic vertigo, history of inner ear surgery on the left in 2008, uses intermittent meclizine.  Multisite osteoarthritis including both shoulders, both hands, lumbar spine, probably hips and knees as well.  Nasal congestion, for which he uses fluticasone steroid nasal spray.  Varicose veins both legs and mild leg edema probably on the basis of venous insufficiency, no longer uses diuretics.  Received Covid vaccine first shot January 20, 2021, received second shot February 17, 2021, current on pneumococcal vaccines and seasonal influenza.    Today we will have her swing by the lab to give us a basic metabolic panel to check her  sodium concentration, lets also check magnesium and thyroid status.    Going issue by issue:    Gait instability, muscular deconditioning, at risk for falls, causative factors are multisite osteoarthritis, muscular deconditioning, and chronic vertigo.  She told me she is been physically less active during the pandemic.  Exercise has been walking the hallways.  She does use a cane, and does own a Rollator walker but has not been using it all that regularly.  I encouraged her to use her Rollator walker, because I think it is the best way for her to get exercise, stay mobile, while minimizing the risk for falls.  One thing we could consider is physical therapy for gait training.  I told her the most important thing is reducing the risk of falls.  She told me that she had physical therapy or occupational therapy go through her apartment, to make sure that there were no tripping or slipping hazards.  She has grab rails in the bathroom, and adequate lighting.    Essential hypertension, blood pressure probably well controlled on current combination of atenolol 25 mg twice a day and losartan 50 mg twice a day.  Blood pressure today looks good at 132/76.  That is right about where I want her to be.  We do not want her running too low because of her aortic stenosis.  Kidney function was satisfactory when checked on December 16, 2020.    Hypomagnesemia, for which I will put her on magnesium oxide 200 mg twice a day (40 mg daily).  Magnesium was 1.6 measured on December 16, 2020.  We will check again today.  I am prescribing her magnesium oxide 400 mg tablet.  I want her to get 1 tablet on board every day.  To help with GI tolerance, I suggested she cut the tablet in half and take half a tablet twice a day.  The purpose of keeping adequate magnesium on board is to make sure she does not have hypomagnesemia which can contribute to muscle weakness.    Anxiety and insomnia, for which she uses lorazepam at bedtime, which seems okay  continue.  She did have a couple sessions [facility], these were conducted over the phone in November 2020.  Ms. Arana found these not particularly productive, and does not feel that she needs to do them anymore, now that she is having a bit more social interaction as her neighbors get vaccinated.    Irritable bowel syndrome diarrhea predominant, uses Imodium intermittently.      Mild hyponatremia with sodium of 134 measured December 16, 2020, will recheck today February 9, 2021 and also check her thyroid status.      Moderate aortic stenosis and mild regurgitation, with severe left atrial enlargement, diastolic dysfunction, and mildly decreased systolic function by echocardiogram November 21, 2019.  I can definitely hear her aortic stenosis murmur.  She is not having symptoms of angina or heart failure.  She is only a trace amount of edema in her legs, without using diuretics.    Palpitations and frequent premature ventricular contractions demonstrated on Holter recording February 24, 2020.  The Holter recording reported moderately frequent ventricular ectopy accounting for 2.8% of heartbeats.  PVCs were uniform morphology.  No sustained ventricular tachycardia.  There was some rare atrial ectopy, no sustained runs, no atrial fibrillation observed.  She told me that she feels some irregular heartbeats every now and then, is a bit of a nuisance.  She does drink a couple coffee in the morning.  I told her that caffeine can stimulate PVCs. She has been restricting her intake of fluids to try to reduce edema.  I would rather she stay adequetly hydrated, because dehydration can make her PVCs worse.    Hyperlipidemia for which she is been on moderate dose simvastatin, tolerating well.      Chronic vertigo, history of inner ear surgery on the left in 2008, uses intermittent meclizine.      Multisite osteoarthritis including both shoulders, both hands, lumbar spine,  probably hips and knees as well.  I told her the best way to protect her joints is to try to keep her muscles strong.  Thus I think we should consider doing physical therapy at some point so she can work on a strength development program.    Nasal congestion, for which he uses fluticasone steroid nasal spray.      Varicose veins both legs and mild leg edema probably on the basis of venous insufficiency, no longer uses diuretics.  Dr. Owens once recommended compression stockings.  However these were very hard for her to put on because of her arthritic hands shoulders.  I advised her to try to keep her legs elevated as much as possible when she is not up and about.  She has been restricting her intake of fluids.  I would rather she stay adequately hydrated, because dehydration can make her PVCs worse.    Impaired vision right eye, currently working with [facility]    Earwax on the left, will irrigate that out today.  I asked her to start using an over-the-counter wax dissolving eardrop kit, example brand Debrox or Murine, do that once a week.    Received Covid vaccine first shot January 20, 2021, received second shot February 17, 2021, current on pneumococcal vaccines and seasonal influenza.

## 2021-06-15 NOTE — TELEPHONE ENCOUNTER
I approve    OT: 1 time a week for 3 weeks  1 time in one month   Addressing ADL, joint protection and adaptive equipment training

## 2021-06-15 NOTE — TELEPHONE ENCOUNTER
Reason for Call:  Home Health Care    Hernandez with [facility] called regarding (reason for call): verbal    Orders are needed for this patient. PT    PT:  Gentle soft tissue massage to neck and upper back    OT: na    Skilled Nursing: na    Pt Provider: Dr Barriga    Phone Number Homecare Nurse can be reached at: [phone]    Can we leave a detailed message on this number? Yes     Best Time: any    Call taken on 3/2/2021 at 4:07 PM by Laura L Goldberg

## 2021-06-15 NOTE — PROGRESS NOTES
Office Visit - Follow Up   Mariluz rAana   89 y.o. female    Date of Visit: 2/9/2021    Chief Complaint   Patient presents with     Establish Care        -------------------------------------------------------------------------------------------------------------------------  Assessment and Plan    Establishing care for this 89-year-old woman who was previously working with Dr. Owens, and who lives in [facility], and comes to clinic today accompanied by her son Parish.  Issues are as follows: Gait instability, muscular deconditioning, at risk for falls, causative factors are multisite osteoarthritis, muscular deconditioning, and chronic vertigo.  Essential hypertension, blood pressure probably well controlled on current combination of atenolol 25 mg twice a day and losartan 50 mg twice a day.  Hypomagnesemia, for which I will put her on magnesium oxide 200 mg twice a day (40 mg daily).  Anxiety and insomnia, for which she uses lorazepam at bedtime, which seems okay continue.  Irritable bowel syndrome diarrhea predominant, uses Imodium intermittently.  Mild hyponatremia with sodium of 134 measured December 16, 2020, will recheck today February 9, 2021 and also check her thyroid status.  Moderate aortic stenosis and mild regurgitation, with severe left atrial enlargement, diastolic dysfunction, and mildly decreased systolic function by echocardiogram November 21, 2019.  Palpitations and frequent premature ventricular contractions demonstrated on Holter recording February 24, 2020.  Hyperlipidemia for which she is been on moderate dose simvastatin, tolerating well.  Chronic vertigo, history of inner ear surgery on the left in 2008, uses intermittent meclizine.  Multisite osteoarthritis including both shoulders, both hands, lumbar spine, probably hips and knees as well.  Nasal congestion, for which he uses fluticasone steroid nasal spray.  Varicose veins both legs and mild leg edema probably on the  basis of venous insufficiency, no longer uses diuretics.  Received Covid vaccine first shot January 20, 2021, received second shot February 17, 2021, current on pneumococcal vaccines and seasonal influenza.    Today we will have her swing by the lab to give us a basic metabolic panel to check her sodium concentration, lets also check magnesium and thyroid status.    Going issue by issue:    Gait instability, muscular deconditioning, at risk for falls, causative factors are multisite osteoarthritis, muscular deconditioning, and chronic vertigo.  She told me she is been physically less active during the pandemic.  Exercise has been walking the hallways.  She does use a cane, and does own a Rollator walker but has not been using it all that regularly.  I encouraged her to use her Rollator walker, because I think it is the best way for her to get exercise, stay mobile, while minimizing the risk for falls.  One thing we could consider is physical therapy for gait training.  I told her the most important thing is reducing the risk of falls.  She told me that she had physical therapy or occupational therapy go through her apartment, to make sure that there were no tripping or slipping hazards.  She has grab rails in the bathroom, and adequate lighting.  I placed a referral for her to receive physical therapy at her senior facility.    Essential hypertension, blood pressure probably well controlled on current combination of atenolol 25 mg twice a day and losartan 50 mg twice a day.  Blood pressure today looks good at 132/76.  That is right about where I want her to be.  We do not want her running too low because of her aortic stenosis.  Kidney function was satisfactory when checked on December 16, 2020.    Hypomagnesemia, for which I will put her on magnesium oxide 200 mg twice a day (40 mg daily).  Magnesium was 1.6 measured on December 16, 2020.  We will check again today.  I am prescribing her magnesium oxide 400 mg  tablet.  I want her to get 1 tablet on board every day.  To help with GI tolerance, I suggested she cut the tablet in half and take half a tablet twice a day.  The purpose of keeping adequate magnesium on board is to make sure she does not have hypomagnesemia which can contribute to muscle weakness.    Anxiety and insomnia, for which she uses lorazepam at bedtime, which seems okay continue.  She did have a couple sessions of psychotherapy with a counselor from [facility], these were conducted over the phone in November 2020.  Ms. Arana found these not particularly productive, and does not feel that she needs to do them anymore, now that she is having a bit more social interaction as her neighbors get vaccinated.    Irritable bowel syndrome diarrhea predominant, uses Imodium intermittently.      Mild hyponatremia with sodium of 134 measured December 16, 2020, will recheck today February 9, 2021 and also check her thyroid status.      Moderate aortic stenosis and mild regurgitation, with severe left atrial enlargement, diastolic dysfunction, and mildly decreased systolic function by echocardiogram November 21, 2019.  I can definitely hear her aortic stenosis murmur.  She is not having symptoms of angina or heart failure.  She is only a trace amount of edema in her legs, without using diuretics.    Palpitations and frequent premature ventricular contractions demonstrated on Holter recording February 24, 2020.  The Holter recording reported moderately frequent ventricular ectopy accounting for 2.8% of heartbeats.  PVCs were uniform morphology.  No sustained ventricular tachycardia.  There was some rare atrial ectopy, no sustained runs, no atrial fibrillation observed.  She told me that she feels some irregular heartbeats every now and then, is a bit of a nuisance.  She does drink a couple coffee in the morning.  I told her that caffeine can stimulate PVCs. She has been restricting her intake of fluids to  try to reduce edema.  I would rather she stay adequetly hydrated, because dehydration can make her PVCs worse.    Hyperlipidemia for which she is been on moderate dose simvastatin, tolerating well.      Chronic vertigo, history of inner ear surgery on the left in 2008, uses intermittent meclizine.      Multisite osteoarthritis including both shoulders, both hands, lumbar spine, probably hips and knees as well.  I told her the best way to protect her joints is to try to keep her muscles strong.  Thus I think we should consider doing physical therapy at some point so she can work on a strength development program.    Nasal congestion, for which he uses fluticasone steroid nasal spray.      Varicose veins both legs and mild leg edema probably on the basis of venous insufficiency, no longer uses diuretics.  Dr. Owens once recommended compression stockings.  However these were very hard for her to put on because of her arthritic hands shoulders.  I advised her to try to keep her legs elevated as much as possible when she is not up and about.  She has been restricting her intake of fluids.  I would rather she stay adequately hydrated, because dehydration can make her PVCs worse.    Impaired vision right eye, currently working with [facility]    Earwax on the left, will irrigate that out today.  I asked her to start using an over-the-counter wax dissolving eardrop kit, example brand Debrox or Murine, do that once a week.    Received Covid vaccine first shot January 20, 2021, received second shot February 17, 2021, current on pneumococcal vaccines and seasonal influenza.      --------------------------------------------------------------------------------------------------------------------------  History of Present Illness  This 89 y.o. old   Establishing care for this 89-year-old woman who was previously working with Dr. Owens, and who lives in [facility], and comes to clinic today  accompanied by her son Parish.  Issues are as follows: Gait instability, muscular deconditioning, at risk for falls, causative factors are multisite osteoarthritis, muscular deconditioning, and chronic vertigo.  Essential hypertension, blood pressure probably well controlled on current combination of atenolol 25 mg twice a day and losartan 50 mg twice a day.  Hypomagnesemia, for which I will put her on magnesium oxide 200 mg twice a day (40 mg daily).  Anxiety and insomnia, for which she uses lorazepam at bedtime, which seems okay continue.  Irritable bowel syndrome diarrhea predominant, uses Imodium intermittently.  Mild hyponatremia with sodium of 134 measured 2020, will recheck today 2021 and also check her thyroid status.  Moderate aortic stenosis and mild regurgitation, with severe left atrial enlargement, diastolic dysfunction, and mildly decreased systolic function by echocardiogram 2019.  Palpitations and frequent premature ventricular contractions demonstrated on Holter recording 2020.  Hyperlipidemia for which she is been on moderate dose simvastatin, tolerating well.  Chronic vertigo, history of inner ear surgery on the left in , uses intermittent meclizine.  Multisite osteoarthritis including both shoulders, both hands, lumbar spine, probably hips and knees as well.  Nasal congestion, for which he uses fluticasone steroid nasal spray.  Varicose veins both legs and mild leg edema probably on the basis of venous insufficiency, no longer uses diuretics.  Received Covid vaccine first shot 2021, received second shot 2021, current on pneumococcal vaccines and seasonal influenza.    With Son Parish  [facility]    in 2018: aneurysm    Not much exercise during pandemic  Has Walker for stability-- walking the hallways  Less energy  Meals: common dining room is  open      12/21/2020  [facility] Sang Escoto MD  Internal Medicine    history of hypertension as well as some chronic anxiety.     Resolved left foot pain  Emergency room-- sharp pains in the left foot.  12/16/2020 (2 hours)  [facility] Emergency Department  1. Paresthesia of left foot     Hypomagnesemia  Mg 1.6 12-    Hypertension  Does not have machine  losartan (COZAAR) 50 MG tablet 50 mg, 2 times daily      atenoloL (TENORMIN) 25 MG tablet 25 mg, 2 times daily     BP Readings from Last 3 Encounters:   02/09/21 132/76   12/21/20 162/86   12/16/20 175/80     Wt Readings from Last 3 Encounters:   02/09/21 112 lb 9.6 oz (51.1 kg)   12/21/20 112 lb (50.8 kg)   12/16/20 110 lb (49.9 kg)     Anxiety  lorazepam at bedtime to sleep    Irritable bowel syndrome-- Diarrhea  Many years  loperamide (IMODIUM) 2 mg capsule    Hyponatremia  Results for orders placed or performed during the hospital encounter of 12/16/20   Basic Metabolic Panel   Result Value Ref Range    Sodium 134 (L) 136 - 145 mmol/L    Potassium 4.2 3.5 - 5.0 mmol/L    Chloride 100 98 - 107 mmol/L    CO2 23 22 - 31 mmol/L    Anion Gap, Calculation 11 5 - 18 mmol/L    Glucose 107 70 - 125 mg/dL    Calcium 9.1 8.5 - 10.5 mg/dL    BUN 13 8 - 28 mg/dL    Creatinine 0.78 0.60 - 1.10 mg/dL    GFR MDRD Af Amer >60 >60 mL/min/1.73m2    GFR MDRD Non Af Amer >60 >60 mL/min/1.73m2     Lab Results   Component Value Date    TSH 2.24 11/21/2019       Nonrheumatic aortic valve stenosis  11-21-19    Severe left atrial and mild right atrial enlargement    Left ventricle ejection fraction is mildly decreased. The calculated left ventricular ejection fraction is 54% without wall motion abnormality.    Diastolic dysfunction    Normal right ventricular size and systolic function.    Moderate aortic valve stenosis with mild regurgitation    When compared to the previous study dated 2/1/2018, mean gradient has  increased across aortic valve from 13 mmHg to 21 mmHg.    Palpitation; PVC's  Holter 2-    Palpitations    The predominant rhythm throughout the tracing was normal sinus rhythm with an average heart rate of 69 bpm.  The chronotropic response to activities of daily living appears to be normal.  The MD interval was normal.  The QRS duration was normal.  The QT interval was normal.  The study demonstrated no significant bradycardia/pauses.    The patient demonstrated rare atrial ectopy.  Nonsustained ectopic atrial tachycardia was not observed.  Sustained ectopic atrial tachycardia, atrial fibrillation, or other supraventricular tachycardia was not observed.    The patient demonstrated moderately frequent ventricular ectopy which accounted for 2.8% of the total ventricular depolarizations.  The patient's PVC morphology was uniform..  Complex ventricular ectopy was not observed.  Sustained ventricular tachycardia was none observed.    The patient did return a diary.  Patient reported symptoms on 4 occasions and included symptoms of being very tired, short of breath, palpitations, and lightheaded.  2 of the 4 episodes demonstrated sinus rhythm alone with heart rates in the 70s.  There was no ectopy or other pathologic rhythm disturbance associated with those recorded events.  The other 2 episodes demonstrated normal sinus rhythm with heart rates in the 60s-70s with either isolated PVC or bigeminal PVCs.     Impression:    Borderline Holter monitor.  It is not clear based on this recording whether or not the patient is truly symptomatic from her ventricular ectopy.  Clinical symptoms are relatively poorly correlated (see above) with any pathologic rhythm disturbance.  Further clinical correlation is warranted.    No sustained atrial or ventricular tachyarrhythmia.    No profound bradycardia or pauses.    One cup coffee in morning       Chronic anxiety  -     LORazepam (ATIVAN) 0.5 MG tablet; Take 1 tablet (0.5 mg  total) by mouth 2 times a day as needed for anxiety.  Dispense: 60 tablet; Refill: 0     Hyperlipidemia  simvastatin (ZOCOR) 20 MG tablet  No results found for: CHOL  No results found for: HDL  No results found for: LDLCALC  No results found for: TRIG  No components found for: CHOLHDL      11/23/2020 Phone Office Visit [facility]    [address]    [address]    [place] [address] [phone]   [facility]    [address]    [address]    [place], [place] [id number] [phone] (Fax)   Phone Visit; Individual Therapy  Date of Service: 11/23/20  Individuals Present: patient    individual psychotherapy session.   Diagnosis:   ICD-10-CM   1. Current moderate episode of major depressive disorder without prior episode (HC) F32.1   2. Grief F43.21   3. Generalized anxiety disorder with panic attacks F41.1   Patient reported depression and anxiety and would benefit from continued treatment.     Vertigo, history inner ear surgery, left, 2008  meclizine (ANTIVERT) 25 mg tablet    Multisite osteoarthritis, gait instability, risk for falls  She owns a Rollator walker, and I encouraged her to use it more    Nasal congestion  fluticasone (FLONASE) 50 mcg/actuation nasal spray    Hand OA  Reduced mobility right 3rd 4th    Varicose veins both legs, more severe on the left.  She used to use diuretics to control swelling.  I believe the edema is probably on the basis of chronic venous insufficiency.    Has advanced arthritis of both shoulders, which limits her arm mobility, and makes it harder to put on compression stockings.    Will get COVID#2 on 2-17-21  Immunization History   Administered Date(s) Administered     COVID-19,PF,Pfizer 01/20/2021     INFLUENZA,SEASONAL QUAD, PF, =/> 6months 11/14/2017     Influenza high dose,seasonal,PF, 65+ yrs 01/25/2016, 10/26/2016, 09/17/2018, 10/01/2019     Influenza, inj, historic,unspecified 01/01/2013, 10/01/2017      Influenza,quad,high Dose,PF, 65yr + 10/12/2020     Pneumo Conj 13-V (2010&after) 03/28/2016     Pneumo Conj 7-V(before 2010) 01/01/1996     Pneumo Polysac 23-V 02/27/1996, 06/01/2006         Wt Readings from Last 3 Encounters:   02/09/21 112 lb 9.6 oz (51.1 kg)   12/21/20 112 lb (50.8 kg)   12/16/20 110 lb (49.9 kg)     BP Readings from Last 3 Encounters:   02/09/21 132/76   12/21/20 162/86   12/16/20 175/80       Lab Results   Component Value Date    WBC 5.4 12/16/2020    HGB 12.7 12/16/2020    HCT 37.9 12/16/2020     12/16/2020    ALT 14 07/13/2020    AST 25 07/13/2020     (L) 12/16/2020    K 4.2 12/16/2020     12/16/2020    CREATININE 0.78 12/16/2020    BUN 13 12/16/2020    CO2 23 12/16/2020    TSH 2.24 11/21/2019    INR 1.06 12/22/2017        Review of Systems: A comprehensive review of systems was negative except as noted.  ---------------------------------------------------------------------------------------------------------------------------    Medications, Allergies, Social, and Problem List   Current Outpatient Medications   Medication Sig Dispense Refill     atenoloL (TENORMIN) 25 MG tablet Take 1 tablet (25 mg total) by mouth 2 (two) times a day. 180 tablet 3     cholecalciferol, vitamin D3, 1,000 unit tablet Take 1,000 Units by mouth daily.              fluticasone (FLONASE) 50 mcg/actuation nasal spray Apply 1 spray into each nostril daily.              ibuprofen (ADVIL,MOTRIN) 200 MG tablet Take 200-400 mg by mouth every 8 (eight) hours as needed for pain.        loperamide (IMODIUM) 2 mg capsule Take 2 mg by mouth 4 (four) times a day as needed for diarrhea.       LORazepam (ATIVAN) 0.5 MG tablet Take 1 tablet (0.5 mg total) by mouth 2 times a day as needed for anxiety. 30 tablet 0     losartan (COZAAR) 50 MG tablet Take 1 tablet (50 mg total) by mouth 2 (two) times a day. 180 tablet 3     meclizine (ANTIVERT) 25 mg tablet TAKE ONE TABLET BY MOUTH THREE TIMES DAILY AS NEEDED 90  tablet 3     simvastatin (ZOCOR) 20 MG tablet TAKE 1 TABLET BY MOUTH ONCE DAILY 90 tablet 3     No current facility-administered medications for this visit.      Allergies   Allergen Reactions     Metronidazole      Actonel [Risedronate]      GI UPSET     Clindamycin      Cortisone (Hydrocortisone) [Hydrocortisone]      Fosamax [Alendronate]      GI UPSET       Kenalog [Triamcinolone Acetonide]      Minipress [Prazosin] Other (See Comments)     hypotension     Penicillin G Benzathine      Statins-Hmg-Coa Reductase Inhibitors Other (See Comments)     Shortness of breath  Tolerating simvastatin (Sept 2016)     Tetanus And Diphtheria Toxoids, Adsorbed, Adult Unknown     Tetanus Toxoid, Adsorbed      Azithromycin Rash     States got rash after using z rubio     Prednisone Rash     Social History     Tobacco Use     Smoking status: Never Smoker     Smokeless tobacco: Never Used   Substance Use Topics     Alcohol use: No     Alcohol/week: 0.0 standard drinks     Drug use: No     Patient Active Problem List   Diagnosis     Hyponatremia     HTN (hypertension)     Meniere's disease, bilateral     Dizziness     Chronic anxiety     Nonrheumatic aortic valve stenosis     PVC (premature ventricular contraction)        Reviewed, reconciled and updated       Physical Exam   General Appearance: Very pleasant, cognitively seems entirely intact.  Her speech is clear and strong.  Breathing comfortably.    /76 (Patient Site: Right Arm, Patient Position: Sitting, Cuff Size: Adult Regular)   Pulse 71   Temp 97.9  F (36.6  C) (Oral)   Wt 112 lb 9.6 oz (51.1 kg)   LMP  (LMP Unknown)   SpO2 96%   BMI 22.74 kg/m      She definitely has gait instability and less than solid stance.  She is able to rise from seated to standing position, but she walks with a slightly hunched posture, takes small somewhat shuffling steps, and wobbles a bit.    Lungs are clear  Heart regular rhythm, 3 of her systolic ejection murmur consistent with known  aortic stenosis.  I did not hear irregular beats today.  Abdomen nontender  Legs with trace edema, more on the right side, also has varicose veins and telangiectasias of the lower legs.  Osteoarthritis both hands, especially involving third and fourth digits DIPs right hand.     Additional Information   I spent 40 minutes on this encounter, including reviewing interval history since our last visit, examining the patient, explaining and counseling the issues enumerated in the Assessment and Plan (patient given a copy), ordering indicated tests, ordering prescriptions (Magnesium), ordering referrals (PT)     Darrius Barriga MD

## 2021-06-15 NOTE — TELEPHONE ENCOUNTER
Left voicemail for patient to return call to clinic. When patient returns call, please give them below message.    Where should we send this PT referral?    Salina Saldana CMA ............... 4:22 PM, 02/10/21

## 2021-06-15 NOTE — TELEPHONE ENCOUNTER
Left message for Nikki with verbal orders.  Salina Saldana CMA ............... 3:31 PM, 03/08/21

## 2021-06-15 NOTE — TELEPHONE ENCOUNTER
Hernandez calling back from [facility] checking on status of order.  Not able to go back out until they receive these verbal orders.  Need as soon as possible.

## 2021-06-15 NOTE — TELEPHONE ENCOUNTER
Reason for Call:  Home Health Care    Nikki with [facility] called regarding: OT Orders    Orders are needed for this patient. Occupational Therapy     OT: 1 time a week for 3 weeks  1 time in one month   Addressing ADL, joint protection and adaptive equipment training     Pt Provider: Dr. Barriga    Can we leave a detailed message on this number? Yes     Phone number patient can be reached at: [phone]    Best Time: anytime    Call taken on 3/8/2021 at 8:20 AM by Porsche Alarcon

## 2021-06-15 NOTE — TELEPHONE ENCOUNTER
Hernandez, physical therapist, calls for a verbal order for home care. She is requesting physical therapy 1 time a week for 1 week, 2 times a week for 4 weeks, and 1 time a week for 2 weeks. This is for gait training, falls prevention, standing balance training, lower extremity and core strengthening. Hernandez is also requesting an OT evaluation for ADL's.    Please review and call with verbal order.    Evelin Dickson RN, BSN Nurse Triage Advisor 4:23 PM 2/23/2021

## 2021-06-15 NOTE — TELEPHONE ENCOUNTER
I approve    verbal order for home care. She is requesting physical therapy 1 time a week for 1 week, 2 times a week for 4 weeks, and 1 time a week for 2 weeks. This is for gait training, falls prevention, standing balance training, lower extremity and core strengthening. Hernandez is also requesting an OT evaluation for ADL's.

## 2021-06-16 NOTE — TELEPHONE ENCOUNTER
Reason for Call:  [facility]    Detailed comments:  She was supposed to see patient this week but Pt just moved into a new assisted living, pt is exhausted so she cancel appt for this week. Will see her next week for 2 visits.     Phone Number to be reached at: [phone]    Best Time: anytime    Can we leave a detailed message on this number?: No call back needed    Call taken on 4/1/2021 at 12:17 PM by Aline Arzate

## 2021-06-16 NOTE — TELEPHONE ENCOUNTER
Left detailed voicemail with verbal orders.  Salina Saldana CMA ............... 8:43 AM, 04/07/21

## 2021-06-16 NOTE — TELEPHONE ENCOUNTER
I approve    Home health aide once a week for three weeks to assist with showers.     Fall noted

## 2021-06-16 NOTE — PATIENT INSTRUCTIONS - HE
Recent Fall, a few days after moving in to her new apartment  89-year-old woman who was previously working with Dr. Owens, and who lives in [facility] independent living    comes to clinic today accompanied by her son Parish.    Had been her new apartment 4 days  Fell, lost balance when bending over April 3, was down on floor90 minutes.  Did not have her panic button on    I approved    Occupational therapy:  1x week for 2 weeks  To address safety and access ability in new living environment     Elevated blood pressure, probably related to NSAID use  Since the fall, she has been taking ibuprofen on most days, and that is probably contributing to the elevated blood pressure that we observed today April 14, 2021.  I told her that she needs really needs to stop using NSAIDs like ibuprofen.  Okay to use acetaminophen.  She only acetaminophen is an optically helpful for pain relief.  In that case maximize the nonpharmacologic measures such as gentle heating pads, ice, stretching, massage, body positioning, etc.  BP Readings from Last 3 Encounters:   04/14/21 (!) 170/92   02/09/21 132/76   12/21/20 162/86     Meniere's disease  Chronic vertigo, history of inner ear surgery on the left in 2008, uses intermittent meclizine.    Gait instability, muscular deconditioning, at risk for falls, causative factors are multisite osteoarthritis, muscular deconditioning, and chronic vertigo.  She does use a cane, and does own a Rollator walker but has not been using it all that regularly.  I encouraged her to use her Rollator walker, because I think it is the best way for her to get exercise, stay mobile, while minimizing the risk for falls.    One thing we could consider is physical therapy for gait training.  I told her the most important thing is reducing the risk of falls.  She told me that she had physical therapy or occupational therapy go through her apartment, to make sure that there were no tripping or  slipping hazards.  She has grab rails in the bathroom, and adequate lighting.  I placed a referral for her to receive physical therapy at her senior facility.     Essential hypertension, blood pressure probably well controlled on current combination of atenolol 25 mg twice a day and losartan 50 mg twice a day.     That is right about where I want her to be.   We do not want her running too low because of her aortic stenosis.  Kidney function was satisfactory when checked on December 16, 2020.    I recommend she be on a low salt diet    BP Readings from Last 3 Encounters:   04/14/21 (!) 170/92   02/09/21 132/76   12/21/20 162/86      Hypomagnesemia, for which I have her on magnesium oxide 200 mg twice a day (40 mg daily).    2/9/21 1240       Magnesium 1.8 - 2.6 mg/dL 1.8      magnesium oxide 400 mg tablet.  I want her to get 1 tablet on board every day.  To help with GI tolerance, I suggested she cut the tablet in half and take half a tablet twice a day.  The purpose of keeping adequate magnesium on board is to make sure she does not have hypomagnesemia which can contribute to muscle weakness.     Anxiety and insomnia, for which she uses lorazepam at bedtime, which seems okay continue.  She did have a couple sessions of psychotherapy with a counselor from [facility], these were conducted over the phone in November 2020.  Ms. Arana found these not particularly productive, and does not feel that she needs to do them anymore, now that she is having a bit more social interaction as her neighbors get vaccinated.     Irritable bowel syndrome diarrhea predominant, uses Imodium intermittently.    Mild hyponatremia resolved  Lab Results   Component Value Date    CREATININE 0.91 02/09/2021    BUN 14 02/09/2021     02/09/2021    K 4.6 02/09/2021     02/09/2021    CO2 23 02/09/2021     Lab Results   Component Value Date    TSH 3.02 02/09/2021     Moderate aortic stenosis and mild regurgitation, with  severe left atrial enlargement, diastolic dysfunction, and mildly decreased systolic function by echocardiogram November 21, 2019.  I can definitely hear her aortic stenosis murmur.  She is not having symptoms of angina or heart failure.  She is only a trace amount of edema in her legs, without using diuretics.    Palpitations and frequent premature ventricular contractions demonstrated on Holter recording February 24, 2020.  The Holter recording reported moderately frequent ventricular ectopy accounting for 2.8% of heartbeats.  PVCs were uniform morphology.  No sustained ventricular tachycardia.  There was some rare atrial ectopy, no sustained runs, no atrial fibrillation observed.  She told me that she feels some irregular heartbeats every now and then, is a bit of a nuisance.  She does drink a couple coffee in the morning.  I told her that caffeine can stimulate PVCs. She has been restricting her intake of fluids to try to reduce edema.  I would rather she stay adequetly hydrated, because dehydration can make her PVCs worse.    Hyperlipidemia for which she is been on moderate dose simvastatin, tolerating well    Multisite osteoarthritis including both shoulders, both hands, lumbar spine, probably hips and knees as well.  I told her the best way to protect her joints is to try to keep her muscles strong.  Thus I think we should consider doing physical therapy at some point so she can work on a strength development program.     Nasal congestion, for which he uses fluticasone steroid nasal spray.       Varicose veins both legs and mild leg edema probably on the basis of venous insufficiency, no longer uses diuretics.  Dr. Owens once recommended compression stockings.  However these were very hard for her to put on because of her arthritic hands shoulders.  I advised her to try to keep her legs elevated as much as possible when she is not up and about.  She has been restricting her intake of fluids.  I would  rather she stay adequately hydrated, because dehydration can make her PVCs worse.     Impaired vision right eye, currently working with [facility]     Earwax on the left, will irrigate that out today.  I asked her to start using an over-the-counter wax dissolving eardrop kit, example brand Debrox or Murine, do that once a week.     Received Covid vaccine first shot January 20, 2021, received second shot February 17, 2021, current on pneumococcal vaccines and seasonal influenza.

## 2021-06-16 NOTE — TELEPHONE ENCOUNTER
Reason for Call:  Home Health Care    Ashlee with [facility] called regarding (reason for call):verbal    Orders are needed for this patient. OT    PT: na    OT:  1x week for 2 weeks  To address safety and access ability in new living environment     Skilled Nursing: na    Pt Provider: Dr Darrius Barriga    Phone Number Homecare Nurse can be reached at: [phone]     Can we leave a detailed message on this number? Yes     Call taken on 4/9/2021 at 3:32 PM by Laura L Goldberg

## 2021-06-16 NOTE — TELEPHONE ENCOUNTER
3-24-21  Hernandez called from [facility] care and is looking for verbal orders for:  Continued home physical therapy 2 times for 3 weeks  makenna

## 2021-06-16 NOTE — TELEPHONE ENCOUNTER
Reason for Call:  Other   I    Detailed comments: Parish calling with concerns and would like to relay prior to his Mom's upcoming appointment with Dr Barriga on 4/14/2021.    1.  Moved to new appointment a few weeks ago.  After moving in she had a fall, she was down and helpless for 1 to 1 1/2 hours and hit her face.  2. Not adjusted to move very well and has not eaten well.  Feels that she is weaker and would like you to address this.  3.  She has been experiencing dizziness, son feels that this may be due to fall even though his Mom feel this may be due to menier's.    Parish would like you to discuss these concerns with his Mom at her appointment but feels that she may become irratated if she is aware that the family initiated the call to you with their concerns.        Phone Number Patient can be reached at: Home number on file [phone] (home)    Best Time: any    Can we leave a detailed message on this number?: No call back needed    Call taken on 4/12/2021 at 10:42 AM by Laura L Goldberg

## 2021-06-16 NOTE — TELEPHONE ENCOUNTER
Controlled Substance Refill Request  Medication Name:   Requested Prescriptions     Pending Prescriptions Disp Refills     LORazepam (ATIVAN) 0.5 MG tablet [Pharmacy Med Name: lorazepam 0.5 mg tablet] 30 tablet 0     Sig: Take 1 tablet (0.5 mg total) by mouth 2 times a day as needed for anxiety.     Date Last Fill: 1/27/21  Requested Pharmacy: [facility]  Submit electronically to pharmacy  Controlled Substance Agreement on file:   Encounter-Level CSA Scan Date:    There are no encounter-level csa scan date.        Last office visit:  2/9/21

## 2021-06-16 NOTE — TELEPHONE ENCOUNTER
I approve      OT: Once a week for 4 weeks to work on ADLs, IADL's, transfers and mobility, fall prevention, adaptive equipment training

## 2021-06-16 NOTE — PROGRESS NOTES
"OFFICE VISIT NOTE    Subjective:   Chief Complaint:  Follow-up (3 months)    One in for follow-up regarding hypertension, hyponatremia, Ménière's disease.  Her  recently passed away.  She seems to be over that shock.  Blood pressures been running 130-160 systolic.  An occasional systolic pressure is low was 105.  No chest pain no shortness of breath.  No severe vertigo.    Current Outpatient Prescriptions   Medication Sig     atenolol (TENORMIN) 25 MG tablet Take 25 mg by mouth 2 (two) times a day. Hold for BP less 120     cholecalciferol, vitamin D3, 1,000 unit tablet Take 1,000 Units by mouth. approx 5 days per week     ibuprofen (ADVIL,MOTRIN) 200 MG tablet Take 200-400 mg by mouth every 8 (eight) hours as needed for pain.      indapamide (LOZOL) 1.25 MG tablet TAKE ONE TABLET BY MOUTH EVERY OTHER DAY     loperamide (IMODIUM) 2 mg capsule Take 2 mg by mouth 4 (four) times a day as needed for diarrhea.     LORazepam (ATIVAN) 0.5 MG tablet TAKE ONE TABLET BY MOUTH TWICE DAILY AS NEEDED FOR ANXIETY     losartan (COZAAR) 25 MG tablet Take 1 tablet (25 mg total) by mouth daily.     meclizine (ANTIVERT) 25 mg tablet TAKE ONE TABLET BY MOUTH THREE TIMES DAILY AS NEEDED     simvastatin (ZOCOR) 20 MG tablet TAKE ONE TABLET BY MOUTH ONCE DAILY       PSFHx: Tobacco Status:  She  reports that she has never smoked. She has never used smokeless tobacco.    Review of Systems:  A comprehensive review of systems is negative except for the comments above    Objective:    Pulse (!) 58  Ht 4' 11\" (1.499 m)  Wt 114 lb (51.7 kg)  SpO2 97%  BMI 23.03 kg/m2  GENERAL: No acute distress.  Today's blood pressure is 152/78.  Pulse 68 and regular.  1 PVC heard in 30 seconds.  Oxygen saturations 97%.  No JVD.  Lungs are clear.  Heart shows a regular rhythm with rare ectopic beats.  No significant murmur.  There is a grade 1/6 systolic ejection murmur at the left sternal border consistent with mild aortic stenosis.  Neurologic exam " is normal.  No nystagmus.  Mentally she is sharp and alert.  Gait seems normal for age.  No significant change in pulse or blood pressure on standing.  Psychologic-emotional standpoint, she is doing well.  She has good insight to her problems.  Assessment & Plan   Mariluz Arana is a 87 y.o. female.    Most likely systolic hypertension.  I want her to take atenolol 25 mg in the morning.  Every other day, she takes indapamide 1.25 mg in the morning.  She should take losartan 25 mg in the afternoon if the systolic pressure is over 130.  I will see her in 4 weeks for a physical exam.  I am checking her electrolytes and creatinine today.    Diagnoses and all orders for this visit:    Essential hypertension    Hyponatremia  -     Basic Metabolic Panel            Sang Owens MD  Transcription using voice recognition software, may contain typographical errors.

## 2021-06-16 NOTE — TELEPHONE ENCOUNTER
Telephone Encounter by Felicita Carpenter at 6/13/2019  9:01 AM     Author: Felicita Carpenter Service: -- Author Type: --    Filed: 6/13/2019  9:04 AM Encounter Date: 6/11/2019 Status: Addendum    : Felicita Carpenter    Related Notes: Original Note by Felicita Carpenter filed at 6/13/2019  9:03 AM       Per step therapy plan both tolterodine immediate release and extended release are covered formulary alternatives

## 2021-06-16 NOTE — TELEPHONE ENCOUNTER
Telephone Encounter by Felicita Carpenter at 6/12/2019  3:15 PM     Author: Felicita Carpenter Service: -- Author Type: --    Filed: 6/12/2019  3:16 PM Encounter Date: 6/11/2019 Status: Signed    : Felicita Carpenter       PRIOR AUTHORIZATION DENIED    Denial Rational: Must try/fail at least 2 other drugs for her condition listed below        Appeal Information: This medication was denied. If physician would like to appeal because patient has contraindication or allergy to covered medication please write letter of medical necessity and route back to PA team to initiate.  If no further action is needed please close encounter thank you.

## 2021-06-16 NOTE — TELEPHONE ENCOUNTER
Reason for Call:  Home Health Care    Ashlee OT with [facility] at [facility] called regarding (reason for call): Verbal order for OT    Orders are needed for this patient. OT    PT: NA    OT: Once a week for 4 weeks to work on ADLs, IADL's, transfers and mobility, fall prevention, adaptive equipment training    Skilled Nursing: NA    Pt Provider: Dr. Barriga    Phone Number Homecare Nurse can be reached at: [phone]     Can we leave a detailed message on this number? Yes     Phone number patient can be reached at: [place] number on file [phone] (home)    Best Time: anytime    Call taken on 4/23/2021 at 4:29 PM by Aline Arzate

## 2021-06-16 NOTE — TELEPHONE ENCOUNTER
I approve    OT:  1x week for 2 weeks  To address safety and access ability in new living environment

## 2021-06-16 NOTE — PROGRESS NOTES
Office Visit - Follow Up   Mariluz Arana   89 y.o. female    Date of Visit: 4/14/2021    Chief Complaint   Patient presents with     Fall     x 10 days ago - no serious injurys        -------------------------------------------------------------------------------------------------------------------------  Assessment and Plan    Recent Fall, a few days after moving in to her new apartment  89-year-old woman who was previously working with Dr. Owens, and who lives in [facility] independent living    comes to clinic today accompanied by her son Parish.    Had been her new apartment 4 days  Fell, lost balance when bending over April 3, was down on floor90 minutes.  Did not have her panic button on    I approved    Occupational therapy:  1x week for 2 weeks  To address safety and access ability in new living environment     Elevated blood pressure, probably related to NSAID use  Since the fall, she has been taking ibuprofen on most days, and that is probably contributing to the elevated blood pressure that we observed today April 14, 2021.  I told her that she needs really needs to stop using NSAIDs like ibuprofen.  Okay to use acetaminophen.  She only acetaminophen is an optically helpful for pain relief.  In that case maximize the nonpharmacologic measures such as gentle heating pads, ice, stretching, massage, body positioning, etc.  BP Readings from Last 3 Encounters:   04/14/21 (!) 170/92   02/09/21 132/76   12/21/20 162/86     Meniere's disease  Chronic vertigo, history of inner ear surgery on the left in 2008, uses intermittent meclizine.    Gait instability, muscular deconditioning, at risk for falls, causative factors are multisite osteoarthritis, muscular deconditioning, and chronic vertigo.  She does use a cane, and does own a Rollator walker but has not been using it all that regularly.  I encouraged her to use her Rollator walker, because I think it is the best way for her to get exercise,  stay mobile, while minimizing the risk for falls.    One thing we could consider is physical therapy for gait training.  I told her the most important thing is reducing the risk of falls.  She told me that she had physical therapy or occupational therapy go through her apartment, to make sure that there were no tripping or slipping hazards.  She has grab rails in the bathroom, and adequate lighting.  I placed a referral for her to receive physical therapy at her senior facility.     Essential hypertension, blood pressure probably well controlled on current combination of atenolol 25 mg twice a day and losartan 50 mg twice a day.     That is right about where I want her to be.   We do not want her running too low because of her aortic stenosis.  Kidney function was satisfactory when checked on December 16, 2020.    I recommend she be on a low salt diet    BP Readings from Last 3 Encounters:   04/14/21 (!) 170/92   02/09/21 132/76   12/21/20 162/86      Hypomagnesemia, for which I have her on magnesium oxide 200 mg twice a day (40 mg daily).    2/9/21 1240       Magnesium 1.8 - 2.6 mg/dL 1.8      magnesium oxide 400 mg tablet.  I want her to get 1 tablet on board every day.  To help with GI tolerance, I suggested she cut the tablet in half and take half a tablet twice a day.  The purpose of keeping adequate magnesium on board is to make sure she does not have hypomagnesemia which can contribute to muscle weakness.     Anxiety and insomnia, for which she uses lorazepam at bedtime, which seems okay continue.  She did have a couple sessions of psychotherapy with a counselor from [facility], these were conducted over the phone in November 2020.  Ms. Arana found these not particularly productive, and does not feel that she needs to do them anymore, now that she is having a bit more social interaction as her neighbors get vaccinated.     Irritable bowel syndrome diarrhea predominant, uses Imodium  intermittently.    Mild hyponatremia resolved  Lab Results   Component Value Date    CREATININE 0.91 02/09/2021    BUN 14 02/09/2021     02/09/2021    K 4.6 02/09/2021     02/09/2021    CO2 23 02/09/2021     Lab Results   Component Value Date    TSH 3.02 02/09/2021     Moderate aortic stenosis and mild regurgitation, with severe left atrial enlargement, diastolic dysfunction, and mildly decreased systolic function by echocardiogram November 21, 2019.  I can definitely hear her aortic stenosis murmur.  She is not having symptoms of angina or heart failure.  She is only a trace amount of edema in her legs, without using diuretics.    Palpitations and frequent premature ventricular contractions demonstrated on Holter recording February 24, 2020.  The Holter recording reported moderately frequent ventricular ectopy accounting for 2.8% of heartbeats.  PVCs were uniform morphology.  No sustained ventricular tachycardia.  There was some rare atrial ectopy, no sustained runs, no atrial fibrillation observed.  She told me that she feels some irregular heartbeats every now and then, is a bit of a nuisance.  She does drink a couple coffee in the morning.  I told her that caffeine can stimulate PVCs. She has been restricting her intake of fluids to try to reduce edema.  I would rather she stay adequetly hydrated, because dehydration can make her PVCs worse.    Hyperlipidemia for which she is been on moderate dose simvastatin, tolerating well    Multisite osteoarthritis including both shoulders, both hands, lumbar spine, probably hips and knees as well.  I told her the best way to protect her joints is to try to keep her muscles strong.  Thus I think we should consider doing physical therapy at some point so she can work on a strength development program.     Nasal congestion, for which he uses fluticasone steroid nasal spray.       Varicose veins both legs and mild leg edema probably on the basis of venous  insufficiency, no longer uses diuretics.  Dr. Owens once recommended compression stockings.  However these were very hard for her to put on because of her arthritic hands shoulders.  I advised her to try to keep her legs elevated as much as possible when she is not up and about.  She has been restricting her intake of fluids.  I would rather she stay adequately hydrated, because dehydration can make her PVCs worse.     Impaired vision right eye, currently working with [facility]     Earwax on the left, will irrigate that out today.  I asked her to start using an over-the-counter wax dissolving eardrop kit, example brand Debrox or Murine, do that once a week.     Received Covid vaccine first shot January 20, 2021, received second shot February 17, 2021, current on pneumococcal vaccines and seasonal influenza.       --------------------------------------------------------------------------------------------------------------------------  History of Present Illness  This 89 y.o. old   Recent Fall, a few days after moving in to her new apartment  89-year-old woman who was previously working with Dr. Owens, and who lives in [facility] independent living    comes to clinic today accompanied by her son Parish.    Had been her new apartment 4 days  Fell, lost balance when bending over April 3, was down on floor90 minutes.  Did not have her panic button on    I approved    Occupational therapy:  1x week for 2 weeks  To address safety and access ability in new living environment     Elevated blood pressure, probably related to NSAID use  Since the fall, she has been taking ibuprofen on most days, and that is probably contributing to the elevated blood pressure that we observed today April 14, 2021.  I told her that she needs really needs to stop using NSAIDs like ibuprofen.  Okay to use acetaminophen.  She only acetaminophen is an optically helpful for pain relief.  In that case maximize the  nonpharmacologic measures such as gentle heating pads, ice, stretching, massage, body positioning, etc.  BP Readings from Last 3 Encounters:   04/14/21 (!) 170/92   02/09/21 132/76   12/21/20 162/86     Meniere's disease  Chronic vertigo      Wt Readings from Last 3 Encounters:   04/14/21 109 lb 4.8 oz (49.6 kg)   02/09/21 112 lb 9.6 oz (51.1 kg)   12/21/20 112 lb (50.8 kg)     BP Readings from Last 3 Encounters:   04/14/21 (!) 170/92   02/09/21 132/76   12/21/20 162/86       Lab Results   Component Value Date    WBC 5.4 12/16/2020    HGB 12.7 12/16/2020    HCT 37.9 12/16/2020     12/16/2020    ALT 14 07/13/2020    AST 25 07/13/2020     02/09/2021    K 4.6 02/09/2021     02/09/2021    CREATININE 0.91 02/09/2021    BUN 14 02/09/2021    CO2 23 02/09/2021    TSH 3.02 02/09/2021    INR 1.06 12/22/2017      ---------------------------------------------------------------------------------------------------------------------------    Medications, Allergies, Social, and Problem List   Current Outpatient Medications   Medication Sig Dispense Refill     atenoloL (TENORMIN) 25 MG tablet Take 1 tablet (25 mg total) by mouth 2 (two) times a day. 180 tablet 3     cholecalciferol, vitamin D3, 1,000 unit tablet Take 1,000 Units by mouth daily.              fluticasone (FLONASE) 50 mcg/actuation nasal spray Apply 1 spray into each nostril daily.              ibuprofen (ADVIL,MOTRIN) 200 MG tablet Take 200-400 mg by mouth every 8 (eight) hours as needed for pain.        loperamide (IMODIUM) 2 mg capsule Take 2 mg by mouth 4 (four) times a day as needed for diarrhea.       LORazepam (ATIVAN) 0.5 MG tablet Take 1 tablet (0.5 mg total) by mouth 2 times a day as needed for anxiety. 30 tablet 0     losartan (COZAAR) 50 MG tablet Take 1 tablet (50 mg total) by mouth 2 (two) times a day. 180 tablet 3     magnesium oxide (MAGOX) 400 mg (241.3 mg magnesium) tablet Take 0.5 tablets (200 mg total) by mouth 2 (two) times a  day. 200 tablet 3     meclizine (ANTIVERT) 25 mg tablet TAKE ONE TABLET BY MOUTH THREE TIMES DAILY AS NEEDED 90 tablet 3     simvastatin (ZOCOR) 20 MG tablet TAKE 1 TABLET BY MOUTH ONCE DAILY 90 tablet 3     No current facility-administered medications for this visit.      Allergies   Allergen Reactions     Metronidazole      Actonel [Risedronate]      GI UPSET     Clindamycin      Cortisone (Hydrocortisone) [Hydrocortisone]      Fosamax [Alendronate]      GI UPSET       Kenalog [Triamcinolone Acetonide]      Minipress [Prazosin] Other (See Comments)     hypotension     Penicillin G Benzathine      Statins-Hmg-Coa Reductase Inhibitors Other (See Comments)     Shortness of breath  Tolerating simvastatin (Sept 2016)     Tetanus And Diphtheria Toxoids, Adsorbed, Adult Unknown     Tetanus Toxoid, Adsorbed      Azithromycin Rash     States got rash after using z rubio     Prednisone Rash     Social History     Tobacco Use     Smoking status: Never Smoker     Smokeless tobacco: Never Used   Substance Use Topics     Alcohol use: No     Alcohol/week: 0.0 standard drinks     Drug use: No     Patient Active Problem List   Diagnosis     Hyponatremia     HTN (hypertension)     Meniere's disease, bilateral     Dizziness     Chronic anxiety     Nonrheumatic aortic valve stenosis     PVC (premature ventricular contraction)     Primary osteoarthritis involving multiple joints     Hypomagnesemia     Insomnia     Gait instability     Risk for falls     Muscular deconditioning     Visual impairment     Varicose veins of both lower extremities        Reviewed, reconciled and updated       Physical Exam   General Appearance: Breathing comfortably, cognitively seems quite intact, note elevated blood pressure    BP (!) 170/92 (Patient Site: Right Arm, Patient Position: Sitting, Cuff Size: Adult Regular)   Pulse 68   Temp 97.7  F (36.5  C) (Oral)   Wt 109 lb 4.8 oz (49.6 kg)   LMP  (LMP Unknown)   SpO2 98%   BMI 22.08 kg/m      Lungs  clear  Heart regular rhythm, harsh 3/6 systolic murmur of aortic stenosis  Abdomen nontender  Extremities trace ankle edema     Additional Information   I spent 30 minutes on this encounter, including reviewing interval history since last visit, examining the patient, explaining and counseling the issues enumerated in the Assessment and Plan (patient given a copy)       Darrius Barriga MD

## 2021-06-16 NOTE — PROGRESS NOTES
OFFICE VISIT NOTE    Subjective:   Chief Complaint:  No chief complaint on file.    87-year-old woman with multiple problems including hypertension, Ménière's disease, chronic anxiety.  Having some problems with depression since her   about 6 weeks ago.  She has lost about 8-10 pounds.  She had a couple days where she has felt very warm and hot.  She has elevated blood pressures the past 3 days.  Pressures do not seem to come down.  She is terrified about the elevated pressure.  No chest pain to report.    Current Outpatient Prescriptions   Medication Sig     atenolol (TENORMIN) 25 MG tablet Take 25 mg by mouth 2 (two) times a day. Hold for BP less 120     cholecalciferol, vitamin D3, 1,000 unit tablet Take 1,000 Units by mouth. approx 5 days per week     ibuprofen (ADVIL,MOTRIN) 200 MG tablet Take 200-400 mg by mouth every 8 (eight) hours as needed for pain.      indapamide (LOZOL) 1.25 MG tablet TAKE ONE TABLET BY MOUTH EVERY OTHER DAY     loperamide (IMODIUM) 2 mg capsule Take 2 mg by mouth 4 (four) times a day as needed for diarrhea.     LORazepam (ATIVAN) 0.5 MG tablet TAKE ONE TABLET BY MOUTH TWICE DAILY AS NEEDED FOR ANXIETY     losartan (COZAAR) 25 MG tablet Take 1 tablet (25 mg total) by mouth daily.     meclizine (ANTIVERT) 25 mg tablet TAKE ONE TABLET BY MOUTH THREE TIMES DAILY AS NEEDED     simvastatin (ZOCOR) 20 MG tablet TAKE ONE TABLET BY MOUTH ONCE DAILY       PSFHx: Tobacco Status:  She  reports that she has never smoked. She has never used smokeless tobacco.    Review of Systems:  A comprehensive review of systems is negative except for the comments above    Objective:    There were no vitals taken for this visit.  GENERAL: No acute distress.  Today's blood pressure is 172/82.  Pulse 68.  No edema.  Lungs are clear.  Heart shows a regular rhythm with rare premature beats.  Abdomen is nontender.  Neurologic exam is normal.  Speech is normal.  Memory is intact.  Gait is  normal    Assessment & Plan   Mariluz Arana is a 87 y.o. female.    Mild hypertension.  Some weight loss.  Will check a TSH CBC chemistry survey CRP.  She can take losartan 25 mg p.o. twice daily if the systolic pressure is over 130.  I will call for lab results tomorrow.  Return to clinic in 4 weeks.    Diagnoses and all orders for this visit:    Essential hypertension    Hyponatremia    Weight loss  -     HM2(CBC w/o Differential)  -     Comprehensive Metabolic Panel  -     C-Reactive Protein  -     Thyroid Cascade    Dysuria  -     Urinalysis-UC if Indicated            Sang Owens MD  Transcription using voice recognition software, may contain typographical errors.

## 2021-06-16 NOTE — TELEPHONE ENCOUNTER
Reason for Call: Request for an order or referral:    Order or referral being requested: Verbal orders for 1 additional OT visit the week of 4/5/21. Pt had a yesterday and did not want to do OT visit for today. Will reschedule to next week.    Please call verbal order to [phone]. Ok to .    Date needed: as soon as possible    Has the patient been seen by the PCP for this problem? YES    Additional comments: Verbal order only    Phone number Patient can be reached at:  Other phone number:  [phone]    Best Time:      Can we leave a detailed message on this number?  Yes    Call taken on 4/2/2021 at 11:42 AM by Aleida Hauser

## 2021-06-16 NOTE — TELEPHONE ENCOUNTER
Reason for Call: Request for an verbal order     Order or referral being requested: Calling to get Recert PT orders for 4 additional visits-1x a wk for 4 wks.     Pt is not feeling well, thinks her menienrs seems flare up. She is fatigued, lethargic. Her electrolytes have been off in the past. She will talk with the family to see if they want to bring her in.     Date needed: as soon as possible    Has the patient been seen by the PCP for this problem? YES and NO    Additional comments: NO    Phone number to be reached at:  [phone]    Best Time:  Anytime    Can we leave a detailed message on this number?  Yes    Call taken on 4/22/2021 at 2:46 PM by Aline Arzate

## 2021-06-16 NOTE — TELEPHONE ENCOUNTER
Samreen, with [facility] requesting the following orders.      -Home health aid once a week for three weeks to assist with showers.     Samreen also has FYI for provider- States that pt had a fall on Friday but had no significant injury but reporting that her shoulder are sore.      Ok to leave detailed message.       Cherry Bowers

## 2021-06-17 NOTE — TELEPHONE ENCOUNTER
Telephone Encounter by Maureen Dixon CMA at 11/23/2020  7:55 AM     Author: Maureen Dixon CMA Service: -- Author Type: Certified Medical Assistant    Filed: 11/23/2020  8:15 AM Encounter Date: 11/17/2020 Status: Signed    : Maureen Dixon CMA (Certified Medical Assistant)       Spoke with patient and she stated she had an injection before this time. Found in chart see below. Still ok for injection if needed?

## 2021-06-17 NOTE — TELEPHONE ENCOUNTER
[facility] notified - they will tell pt to make an appointment     MARISSA Tobias  5:08 PM ........ 5/4/2021

## 2021-06-17 NOTE — PROGRESS NOTES
"OFFICE VISIT NOTE    Subjective:   Chief Complaint:  Follow-up    87-year-old woman with multiple problems including chronic anxiety, hypertension, Ménière's disease.  She has been having a lot of problems since her   3 months ago.  Some panic attacks.  She is tearful.  Denies any chest pain or shortness of breath.  Appetite has not been very good.  She is a little better than she was 2 weeks ago.    Current Outpatient Prescriptions   Medication Sig     atenolol (TENORMIN) 25 MG tablet Take 25 mg by mouth daily. Hold for BP less 120     cholecalciferol, vitamin D3, 1,000 unit tablet Take 1,000 Units by mouth. approx 5 days per week     citalopram (CELEXA) 10 MG tablet Take 1 tablet (10 mg total) by mouth daily.     ibuprofen (ADVIL,MOTRIN) 200 MG tablet Take 200-400 mg by mouth every 8 (eight) hours as needed for pain.      indapamide (LOZOL) 1.25 MG tablet TAKE ONE TABLET BY MOUTH EVERY OTHER DAY     loperamide (IMODIUM) 2 mg capsule Take 2 mg by mouth 4 (four) times a day as needed for diarrhea.     LORazepam (ATIVAN) 0.5 MG tablet Take 1 tablet (0.5 mg total) by mouth 2 (two) times a day as needed for anxiety.     LORazepam (ATIVAN) 0.5 MG tablet TAKE 1 TABLET BY MOUTH TWICE DAILY AS NEEDED FOR ANXIETY     losartan (COZAAR) 25 MG tablet Take 1 tablet (25 mg total) by mouth daily.     meclizine (ANTIVERT) 25 mg tablet TAKE ONE TABLET BY MOUTH THREE TIMES DAILY AS NEEDED     simvastatin (ZOCOR) 20 MG tablet TAKE ONE TABLET BY MOUTH ONCE DAILY       PSFHx: Tobacco Status:  She  reports that she has never smoked. She has never used smokeless tobacco.    Review of Systems:  A comprehensive review of systems is negative except for the comments above    Objective:    Ht 4' 11\" (1.499 m)  Wt 112 lb (50.8 kg)  BMI 22.62 kg/m2  GENERAL: No acute distress.  Blood pressure is 144/82.  Pulse is 64.  No edema.  Weight is up 4 pounds.  Lungs are clear.  Heart shows a sinus rhythm with an occasional ectopic " beat.  Handgrip is normal.  Decreased range of motion of both shoulders with is a chronic finding.  Gait is normal.  Mentally she is sharp.  She admits to depression and some anxiety.    Assessment & Plan   Mariluz Arana is a 87 y.o. female.    Hypertension under fair control.  Potassium was only 3.4 last week.  Have her reduce indapamide to twice per week.  Other meds remain the same.  She is seeing a psychologist again this Thursday.  She took Celexa 10 mg for only 1 dose and felt sick on it so stopped it.  Right now, she is using lorazepam 0.5 mg at at bedtime.  Occasionally she might take an extra doses during the day.  She says she has taken 3 extra tablets the last 3 weeks.  I will see her again in 3 weeks and see how she is doing.    There are no diagnoses linked to this encounter.        Sang Owens MD  Transcription using voice recognition software, may contain typographical errors.

## 2021-06-17 NOTE — TELEPHONE ENCOUNTER
Telephone Encounter by Maureen Dixon CMA at 11/17/2020  7:36 AM     Author: Maureen Dixon CMA Service: -- Author Type: Certified Medical Assistant    Filed: 11/17/2020  7:48 AM Encounter Date: 11/17/2020 Status: Signed    : Maureen Dixon CMA (Certified Medical Assistant)       Spoke with patient, she wanted you to know she has had edema in both feet X 2 weeks and is wondering if there is anything she can do for this. She also would like to have PT in home for her legs/balance. She stated she is going to have imaging on her hand and was concerned because she had a reaction in the past to a cortisone injection. I did find in her chart in 2014 Dr. Gordon gave her a kenalog injection that she had an allergic reaction to per patient phone call. Is it ok for her to have a future injection?

## 2021-06-17 NOTE — PROGRESS NOTES
Office Visit - Follow Up   Mariluz Arana   89 y.o. female    Date of Visit: 5/20/2021    Chief Complaint   Patient presents with     Follow-up        -------------------------------------------------------------------------------------------------------------------------  Assessment and Plan    Follow-up since her last visit of April 14, 2021.  She did have a visit to the emergency department on April 26, with symptoms of feeling weak.  Nothing specific was found.  Normal troponin, basic metabolic panel, and CBC.    She still adapting to life in her new apartment.  Fortunately, there have been no more falls.    Her son Parish said that she seems to be adapting okay.  Blood pressures pretty good today at 130/86, and she showed me blood pressure readings from her home machine which really look quite good, most readings are in the 130-140 range systolic, and 60s to 80s diastolic.  Him to keep her blood pressure medicines right where they are.    lives in [facility] independent living, moved in April 10, 2021  accompanied by her son Parish.    Fell, lost balance when bending over April 3, 2021 was down on floor 90 minutes.     I reminded her to avoid NSAIDs like ibuprofen.   Okay to use acetaminophen  Maximize the nonpharmacologic measures such as gentle heating pads, ice, stretching, massage, body positioning, etc.    Meniere's disease  Chronic vertigo, history of inner ear surgery on the left in 2008, uses intermittent meclizine.     Gait instability, muscular deconditioning, at risk for falls, causative factors are multisite osteoarthritis, muscular deconditioning, and chronic vertigo.    She does use a cane, and does own a Rollator walker but has not been using it all that regularly.  I encouraged her to use her Rollator walker, because I think it is the best way for her to get exercise, stay mobile, while minimizing the risk for falls.     One thing we could consider is physical therapy for gait  training.  I told her the most important thing is reducing the risk of falls.  She told me that she had physical therapy or occupational therapy go through her apartment, to make sure that there were no tripping or slipping hazards.  She has grab rails in the bathroom, and adequate lighting.  I placed a referral for her to receive physical therapy at her senior facility.     Essential hypertension, blood pressure probably well controlled on current combination of atenolol 25 mg twice a day and losartan 50 mg twice a day.   That is right about where I want her to be.   We do not want her running too low because of her aortic stenosis.  Kidney function was satisfactory when checked on December 16, 2020.     I recommend she be on a low salt diet  BP Readings from Last 3 Encounters:   05/20/21 130/86   04/14/21 (!) 160/100   02/09/21 132/76     Hypomagnesemia, for which I have her on magnesium oxide 200 mg twice a day (40 mg daily).    2/9/21 1240           Magnesium 1.8 - 2.6 mg/dL 1.8     magnesium oxide 400 mg tablet.  I want her to get 1 tablet on board every day.  To help with GI tolerance, I suggested she cut the tablet in half and take half a tablet twice a day.  The purpose of keeping adequate magnesium on board is to make sure she does not have hypomagnesemia which can contribute to muscle weakness.     Anxiety and insomnia, for which she uses lorazepam at bedtime, which seems okay continue.  She did have a couple sessions of psychotherapy with a counselor from [facility], these were conducted over the phone in November 2020.  Ms. Arana found these not particularly productive, and does not feel that she needs to do them anymore, now that she is having a bit more social interaction as her neighbors get vaccinated.     Irritable bowel syndrome diarrhea predominant, uses Imodium intermittently.     Mild hyponatremia resolved    Moderate aortic stenosis and mild regurgitation, with severe left  atrial enlargement, diastolic dysfunction, and mildly decreased systolic function by echocardiogram November 21, 2019.  I can definitely hear her aortic stenosis murmur.  She is not having symptoms of angina or heart failure.  She is only a trace amount of edema in her legs, without using diuretics.     Palpitations and frequent premature ventricular contractions demonstrated on Holter recording February 24, 2020.  The Holter recording reported moderately frequent ventricular ectopy accounting for 2.8% of heartbeats.  PVCs were uniform morphology.  No sustained ventricular tachycardia.  There was some rare atrial ectopy, no sustained runs, no atrial fibrillation observed.  She told me that she feels some irregular heartbeats every now and then, is a bit of a nuisance.  She does drink a couple coffee in the morning.  I told her that caffeine can stimulate PVCs. She has been restricting her intake of fluids to try to reduce edema.  I would rather she stay adequetly hydrated, because dehydration can make her PVCs worse.     Hyperlipidemia for which she is been on moderate dose simvastatin, tolerating well     Multisite osteoarthritis including both shoulders, both hands, lumbar spine, probably hips and knees as well.  I told her the best way to protect her joints is to try to keep her muscles strong.  Thus I think we should consider doing physical therapy at some point so she can work on a strength development program.     Nasal congestion, for which he uses fluticasone steroid nasal spray.       Varicose veins both legs and mild leg edema probably on the basis of venous insufficiency, no longer uses diuretics.  Dr. Owens once recommended compression stockings.  However these were very hard for her to put on because of her arthritic hands shoulders.  I advised her to try to keep her legs elevated as much as possible when she is not up and about.  She has been restricting her intake of fluids.  I would rather she  stay adequately hydrated, because dehydration can make her PVCs worse.     Impaired vision right eye, currently working with [facility]     Earwax left  I asked her to start using an over-the-counter wax dissolving eardrop kit, example brand Debrox or Murine, do that once a week.    Received Covid vaccine first shot January 20, 2021, received second shot February 17, 2021, current on pneumococcal vaccines and seasonal influenza.    --------------------------------------------------------------------------------------------------------------------------  History of Present Illness  This 89 y.o. old     Follow-up since her last visit of April 14, 2021.  She did have a visit to the emergency department on April 26, with symptoms of feeling weak.  Nothing specific was found.  Normal troponin, basic metabolic panel, and CBC.    She still adapting to life in her new apartment.  Fortunately, there have been no more falls.    Her son Parish said that she seems to be adapting okay.  Blood pressures pretty good today at 130/86, and she showed me blood pressure readings from her home machine which really look quite good, most readings are in the 130-140 range systolic, and 60s to 80s diastolic.  Him to keep her blood pressure medicines right where they are.    lives in [facility] independent living, moved in April 10, 2021  accompanied by her son Parish.    Fell, lost balance when bending over April 3, 2021 was down on floor 90 minutes.     I reminded her to avoid NSAIDs like ibuprofen.   Okay to use acetaminophen  Maximize the nonpharmacologic measures such as gentle heating pads, ice, stretching, massage, body positioning, etc.      Wt Readings from Last 3 Encounters:   05/20/21 110 lb 12.8 oz (50.3 kg)   04/14/21 109 lb 4.8 oz (49.6 kg)   02/09/21 112 lb 9.6 oz (51.1 kg)     BP Readings from Last 3 Encounters:   05/20/21 130/86   04/14/21 (!) 160/100   02/09/21 132/76       Lab Results   Component Value  Date    WBC 5.4 12/16/2020    HGB 12.7 12/16/2020    HCT 37.9 12/16/2020     12/16/2020    ALT 14 07/13/2020    AST 25 07/13/2020     02/09/2021    K 4.6 02/09/2021     02/09/2021    CREATININE 0.91 02/09/2021    BUN 14 02/09/2021    CO2 23 02/09/2021    TSH 3.02 02/09/2021    INR 1.06 12/22/2017      ---------------------------------------------------------------------------------------------------------------------------    Medications, Allergies, Social, and Problem List   Current Outpatient Medications   Medication Sig Dispense Refill     atenoloL (TENORMIN) 25 MG tablet Take 1 tablet (25 mg total) by mouth 2 (two) times a day. 180 tablet 3     cholecalciferol, vitamin D3, 1,000 unit tablet Take 1,000 Units by mouth daily.              fluticasone (FLONASE) 50 mcg/actuation nasal spray Apply 1 spray into each nostril daily.              ibuprofen (ADVIL,MOTRIN) 200 MG tablet Take 200-400 mg by mouth every 8 (eight) hours as needed for pain.        loperamide (IMODIUM) 2 mg capsule Take 2 mg by mouth 4 (four) times a day as needed for diarrhea.       LORazepam (ATIVAN) 0.5 MG tablet Take 1 tablet (0.5 mg total) by mouth at bedtime as needed for anxiety. 30 tablet 0     losartan (COZAAR) 50 MG tablet Take 1 tablet (50 mg total) by mouth 2 (two) times a day. 180 tablet 3     magnesium oxide (MAGOX) 400 mg (241.3 mg magnesium) tablet Take 0.5 tablets (200 mg total) by mouth 2 (two) times a day. 200 tablet 3     meclizine (ANTIVERT) 25 mg tablet TAKE ONE TABLET BY MOUTH THREE TIMES DAILY AS NEEDED 90 tablet 3     simvastatin (ZOCOR) 20 MG tablet TAKE 1 TABLET BY MOUTH ONCE DAILY 90 tablet 3     No current facility-administered medications for this visit.      Allergies   Allergen Reactions     Metronidazole      Actonel [Risedronate]      GI UPSET     Clindamycin      Cortisone (Hydrocortisone) [Hydrocortisone]      Fosamax [Alendronate]      GI UPSET       Kenalog [Triamcinolone Acetonide]       Minipress [Prazosin] Other (See Comments)     hypotension     Penicillin G Benzathine      Statins-Hmg-Coa Reductase Inhibitors Other (See Comments)     Shortness of breath  Tolerating simvastatin (Sept 2016)     Tetanus And Diphtheria Toxoids, Adsorbed, Adult Unknown     Tetanus Toxoid, Adsorbed      Azithromycin Rash     States got rash after using z rubio     Prednisone Rash     Social History     Tobacco Use     Smoking status: Never Smoker     Smokeless tobacco: Never Used   Substance Use Topics     Alcohol use: No     Alcohol/week: 0.0 standard drinks     Drug use: No     Patient Active Problem List   Diagnosis     HTN (hypertension)     Meniere's disease, bilateral     Dizziness     Chronic anxiety     Nonrheumatic aortic valve stenosis     PVC (premature ventricular contraction)     Primary osteoarthritis involving multiple joints     Hypomagnesemia     Insomnia     Gait instability     Risk for falls     Muscular deconditioning     Visual impairment     Varicose veins of both lower extremities        Reviewed, reconciled and updated       Physical Exam   General Appearance: Appears well, breathing comfortably, blood pressure satisfactory.    /86 (Patient Site: Right Arm, Patient Position: Sitting, Cuff Size: Adult Regular)   Pulse 66   Temp 98.3  F (36.8  C) (Oral)   Wt 110 lb 12.8 oz (50.3 kg)   LMP  (LMP Unknown)   SpO2 98%   BMI 22.38 kg/m      Lungs clear  Heart regular rate and rhythm, 3/6 systolic ejection murmur of aortic stenosis  Abdomen nontender  Extremities no edema  Earwax occluding left tympanic canal     Additional Information   I spent 30 minutes on this encounter, including reviewing interval history since last visit, examining the patient, explaining and counseling the issues enumerated in the Assessment and Plan (patient given a copy)       Darrius Barriga MD

## 2021-06-17 NOTE — TELEPHONE ENCOUNTER
Orders being requested: To extend PT 3 more visits for once a week x 3 weeks   Reason service is needed/diagnosis: OA or osteoarthritis   When are orders needed by: ASAP   Where to send Orders:Verbal okay is great and then they will send a follow up fax to be signed. Please call Samreen at [phone], give your name and if okay and 's name and can leave a detailed voicemail   Okay to leave detailed message?  Yes

## 2021-06-17 NOTE — PATIENT INSTRUCTIONS - HE
Follow-up since her last visit of April 14, 2021.  She did have a visit to the emergency department on April 26, with symptoms of feeling weak.  Nothing specific was found.  Normal troponin, basic metabolic panel, and CBC.    She still adapting to life in her new apartment.  Fortunately, there have been no more falls.    Her son Parish said that she seems to be adapting okay.  Blood pressures pretty good today at 130/86, and she showed me blood pressure readings from her home machine which really look quite good, most readings are in the 130-140 range systolic, and 60s to 80s diastolic.  Him to keep her blood pressure medicines right where they are.    lives in [facility], moved in April 10, 2021  accompanied by her son Parish.    Fell, lost balance when bending over April 3, 2021 was down on floor 90 minutes.     I reminded her to avoid NSAIDs like ibuprofen.   Okay to use acetaminophen  Maximize the nonpharmacologic measures such as gentle heating pads, ice, stretching, massage, body positioning, etc.    Meniere's disease  Chronic vertigo, history of inner ear surgery on the left in 2008, uses intermittent meclizine.     Gait instability, muscular deconditioning, at risk for falls, causative factors are multisite osteoarthritis, muscular deconditioning, and chronic vertigo.    She does use a cane, and does own a Rollator walker but has not been using it all that regularly.  I encouraged her to use her Rollator walker, because I think it is the best way for her to get exercise, stay mobile, while minimizing the risk for falls.     One thing we could consider is physical therapy for gait training.  I told her the most important thing is reducing the risk of falls.  She told me that she had physical therapy or occupational therapy go through her apartment, to make sure that there were no tripping or slipping hazards.  She has grab rails in the bathroom, and adequate lighting.  I placed  a referral for her to receive physical therapy at her senior facility.     Essential hypertension, blood pressure probably well controlled on current combination of atenolol 25 mg twice a day and losartan 50 mg twice a day.   That is right about where I want her to be.   We do not want her running too low because of her aortic stenosis.  Kidney function was satisfactory when checked on December 16, 2020.     I recommend she be on a low salt diet  BP Readings from Last 3 Encounters:   05/20/21 130/86   04/14/21 (!) 160/100   02/09/21 132/76     Hypomagnesemia, for which I have her on magnesium oxide 200 mg twice a day (40 mg daily).    2/9/21 1240           Magnesium 1.8 - 2.6 mg/dL 1.8     magnesium oxide 400 mg tablet.  I want her to get 1 tablet on board every day.  To help with GI tolerance, I suggested she cut the tablet in half and take half a tablet twice a day.  The purpose of keeping adequate magnesium on board is to make sure she does not have hypomagnesemia which can contribute to muscle weakness.     Anxiety and insomnia, for which she uses lorazepam at bedtime, which seems okay continue.  She did have a couple sessions of psychotherapy with a counselor from [facility], these were conducted over the phone in November 2020.  Ms. Arana found these not particularly productive, and does not feel that she needs to do them anymore, now that she is having a bit more social interaction as her neighbors get vaccinated.     Irritable bowel syndrome diarrhea predominant, uses Imodium intermittently.     Mild hyponatremia resolved    Moderate aortic stenosis and mild regurgitation, with severe left atrial enlargement, diastolic dysfunction, and mildly decreased systolic function by echocardiogram November 21, 2019.  I can definitely hear her aortic stenosis murmur.  She is not having symptoms of angina or heart failure.  She is only a trace amount of edema in her legs, without using  diuretics.     Palpitations and frequent premature ventricular contractions demonstrated on Holter recording February 24, 2020.  The Holter recording reported moderately frequent ventricular ectopy accounting for 2.8% of heartbeats.  PVCs were uniform morphology.  No sustained ventricular tachycardia.  There was some rare atrial ectopy, no sustained runs, no atrial fibrillation observed.  She told me that she feels some irregular heartbeats every now and then, is a bit of a nuisance.  She does drink a couple coffee in the morning.  I told her that caffeine can stimulate PVCs. She has been restricting her intake of fluids to try to reduce edema.  I would rather she stay adequetly hydrated, because dehydration can make her PVCs worse.     Hyperlipidemia for which she is been on moderate dose simvastatin, tolerating well     Multisite osteoarthritis including both shoulders, both hands, lumbar spine, probably hips and knees as well.  I told her the best way to protect her joints is to try to keep her muscles strong.  Thus I think we should consider doing physical therapy at some point so she can work on a strength development program.     Nasal congestion, for which he uses fluticasone steroid nasal spray.       Varicose veins both legs and mild leg edema probably on the basis of venous insufficiency, no longer uses diuretics.  Dr. Owens once recommended compression stockings.  However these were very hard for her to put on because of her arthritic hands shoulders.  I advised her to try to keep her legs elevated as much as possible when she is not up and about.  She has been restricting her intake of fluids.  I would rather she stay adequately hydrated, because dehydration can make her PVCs worse.     Impaired vision right eye, currently working with [facility]     Earwax left  I asked her to start using an over-the-counter wax dissolving eardrop kit, example brand Debrox or Murine, do that once a  week.    Received Covid vaccine first shot January 20, 2021, received second shot February 17, 2021, current on pneumococcal vaccines and seasonal influenza.

## 2021-06-17 NOTE — TELEPHONE ENCOUNTER
Reason for Call: Request for a verbal order for SN    Order or referral being requested: Would like a verbal order for Skilled Nursing to do further assessment. Pt has been having ongoing fatigue, not feeling quite right. Pt was in the ER on Monday and everything came back normal. Pt does not want to come in prior to her already scheduled appt with Dr. Barriga on 5/20. This is why she wants a skilled nurse visit.     Date needed: as soon as possible    Has the patient been seen by the PCP for this problem? YES and NO    Additional comments: NO    Phone number to be reached at: [phone]    Best Time:  anytime    Can we leave a detailed message on this number?  Yes    Call taken on 4/30/2021 at 10:20 AM by Aline Arzate

## 2021-06-17 NOTE — TELEPHONE ENCOUNTER
Reason for Call:  Home Health Care    Hugo with [facility] called regarding (reason for call): verbal    Orders are needed for this patient. OT    PT: na    OT: 1x week for 3 weeks  Continue working on interventions of goal with focus of showering    Skilled Nursing: na    Home Health Aid -  1 Additional visit -  1x week for 1 week  For showering    Pt Provider: Dr Darrius Barriga    Phone Number Homecare Nurse can be reached at: [phone]    Can we leave a detailed message on this number? Yes     Best Time: any    Call taken on 5/21/2021 at 2:31 PM by Laura L Goldberg

## 2021-06-17 NOTE — TELEPHONE ENCOUNTER
Reason for Call:  Other      Detailed comments:  Deisy at [facility] calling to report  Swelling Right foot, no redness, not pitting  Hurts when standing doesn't hurt when sitting  New order or follow up needed?    Best Time: any    Can we leave a detailed message on this number?: Yes    Call taken on 5/4/2021 at 1:01 PM by Laura L Goldberg

## 2021-06-17 NOTE — PROGRESS NOTES
"OFFICE VISIT NOTE    Subjective:   Chief Complaint:  Follow-up    87-year-old woman in for follow-up regarding multiple problems including hypertension, anxiety, chronic dizziness, now complaining some dysphasia.  Some weight loss.  Chronically dry mouth.  No melena.  No bright red rectal bleeding.    Current Outpatient Prescriptions   Medication Sig     atenolol (TENORMIN) 25 MG tablet Take 25 mg by mouth daily. Hold for BP less 120     atenolol (TENORMIN) 25 MG tablet TAKE ONE TABLET (25MG TOTAL) BY MOUTH TWICE DAILY     cholecalciferol, vitamin D3, 1,000 unit tablet Take 1,000 Units by mouth. approx 5 days per week     citalopram (CELEXA) 10 MG tablet Take 1 tablet (10 mg total) by mouth daily.     ibuprofen (ADVIL,MOTRIN) 200 MG tablet Take 200-400 mg by mouth every 8 (eight) hours as needed for pain.      indapamide (LOZOL) 1.25 MG tablet Take 1 tablet (1.25 mg total) by mouth every other day.     loperamide (IMODIUM) 2 mg capsule Take 2 mg by mouth 4 (four) times a day as needed for diarrhea.     LORazepam (ATIVAN) 0.5 MG tablet Take 1 tablet (0.5 mg total) by mouth 2 (two) times a day as needed for anxiety.     LORazepam (ATIVAN) 0.5 MG tablet TAKE 1 TABLET BY MOUTH TWICE DAILY AS NEEDED FOR ANXIETY     losartan (COZAAR) 25 MG tablet Take 1 tablet (25 mg total) by mouth daily.     meclizine (ANTIVERT) 25 mg tablet TAKE ONE TABLET BY MOUTH THREE TIMES DAILY AS NEEDED     mirtazapine (REMERON) 15 MG tablet Take 1 tablet (15 mg total) by mouth at bedtime.     simvastatin (ZOCOR) 20 MG tablet TAKE ONE TABLET BY MOUTH ONCE DAILY       PSFHx: Tobacco Status:  She  reports that she has never smoked. She has never used smokeless tobacco.    Review of Systems:  A comprehensive review of systems is negative except for the comments above    Objective:    Ht 4' 11\" (1.499 m)  Wt 109 lb (49.4 kg)  BMI 22.02 kg/m2  GENERAL: No acute distress.  Blood pressure today is good 1 4282.  Pulse 84 regular without ectopic " beats.  No edema.  Lungs are clear.  Heart shows a regular rhythm without ectopic beats.  No significant gallop.  There is a grade 2/6 systolic ejection murmur.  The abdomen is nontender.  No JVD.  Mental she is sharp and alert.  She did as a bit of a flat anxious look.    Assessment & Plan   Mariluz Arana is a 87 y.o. female.    Still having trouble with dysphasia and anxiety.  Discontinue lorazepam which might be causing some of her nighttime nightmares.  Try Remeron 15 mg at bedtime.  Hold the medication citalopram.  Check a renal profile.  Check a hemoglobin.  Get an upper GI.    Diagnoses and all orders for this visit:    Dysphasia  -     Hemoglobin  -     XR Upper GI; Future; Expected date: 5/8/18    Essential hypertension  -     indapamide (LOZOL) 1.25 MG tablet; Take 1 tablet (1.25 mg total) by mouth every other day.  Dispense: 90 tablet; Refill: 2  -     Basic Metabolic Panel    Hyponatremia    Chronic anxiety  -     mirtazapine (REMERON) 15 MG tablet; Take 1 tablet (15 mg total) by mouth at bedtime.  Dispense: 15 tablet; Refill: 2        The following low BMI interventions were performed this visit: weight gain advised    Sang Owens MD  Transcription using voice recognition software, may contain typographical errors.

## 2021-06-17 NOTE — TELEPHONE ENCOUNTER
I approve    OT: 1x week for 3 weeks  Continue working on interventions of goal with focus of showering     Home Health Aid -  1 Additional visit -  1x week for 1 week  For showering

## 2021-06-18 NOTE — LETTER
Letter by Sang Owens MD at      Author: Sang Owens MD Service: -- Author Type: --    Filed:  Encounter Date: 1/22/2019 Status: (Other)       Mariluz Arana  [address]  [facility] [place] [address]             January 22, 2019         Dear Ms. Arana,    Below are the results from your recent visit:    Resulted Orders   Basic Metabolic Panel   Result Value Ref Range    Sodium 135 (L) 136 - 145 mmol/L    Potassium 4.0 3.5 - 5.0 mmol/L    Chloride 102 98 - 107 mmol/L    CO2 23 22 - 31 mmol/L    Anion Gap, Calculation 10 5 - 18 mmol/L    Glucose 96 70 - 125 mg/dL    Calcium 9.5 8.5 - 10.5 mg/dL    BUN 11 8 - 28 mg/dL    Creatinine 0.75 0.60 - 1.10 mg/dL    GFR MDRD Af Amer >60 >60 mL/min/1.73m2    GFR MDRD Non Af Amer >60 >60 mL/min/1.73m2    Narrative    Fasting Glucose reference range is 70-99 mg/dL per  American Diabetes Association (ADA) guidelines.       Mariluz, yesterday's labs are reviewed.  Sodium is minimally depressed at 135.  Certainly not a problem at all.  Blood sugar and kidney function tests, were normal.  I think any swelling of the feet should be treated with compression stockings as well as a low-salt diet and increasing your walking a bit.  We should try to avoid diuretics which cause a drop in your sodium and potassium.    Please call with questions or contact us using [employer].    Sincerely,        Electronically signed by Sang Owens MD

## 2021-06-18 NOTE — PROGRESS NOTES
"OFFICE VISIT NOTE    Subjective:   Chief Complaint:  Follow-up (HTN)    87-year-old woman in for follow-up regarding labile hypertension.  She also has past history these.  She is having episodes of dizziness and some high blood pressure especially in the afternoon.  She is scared and is going to the emergency room twice.  Eventually the blood pressure always seems to come down.  No chest pain and no shortness of breath    Current Outpatient Prescriptions   Medication Sig     atenolol (TENORMIN) 25 MG tablet TAKE ONE TABLET (25MG TOTAL) BY MOUTH TWICE DAILY     cholecalciferol, vitamin D3, 1,000 unit tablet Take 1,000 Units by mouth. approx 5 days per week     clotrimazole-betamethasone (LOTRISONE) cream Apply to affected area 2 times daily     ibuprofen (ADVIL,MOTRIN) 200 MG tablet Take 200-400 mg by mouth every 8 (eight) hours as needed for pain.      indapamide (LOZOL) 1.25 MG tablet Take 1 tablet (1.25 mg total) by mouth every other day.     loperamide (IMODIUM) 2 mg capsule Take 2 mg by mouth 4 (four) times a day as needed for diarrhea.     LORazepam (ATIVAN) 0.5 MG tablet TAKE 1 TABLET BY MOUTH TWICE DAILY AS NEEDED FOR ANXIETY     meclizine (ANTIVERT) 25 mg tablet TAKE ONE TABLET BY MOUTH THREE TIMES DAILY AS NEEDED     mirtazapine (REMERON) 15 MG tablet Take 1 tablet (15 mg total) by mouth at bedtime.     simvastatin (ZOCOR) 20 MG tablet TAKE ONE TABLET BY MOUTH ONCE DAILY       PSFHx: Tobacco Status:  She  reports that she has never smoked. She has never used smokeless tobacco.    Review of Systems:  A comprehensive review of systems is negative except for the comments above    Objective:    Ht 4' 11\" (1.499 m)  Wt 109 lb (49.4 kg)  BMI 22.02 kg/m2  GENERAL: No acute distress.  Today's blood pressure is 150/70.  Pulse 70.  Heart shows regular rhythm with occasional ectopic beat.  Trace edema of the feet.  Lungs are clear.  Mentally sharp and alert.  Gait is normal without any assistance.  Abdomen is " nontender.  Area of perleche corners of  mouth.    Assessment & Plan   Mariluz Arana is a 87 y.o. female.    Try Lotrisone cream to the corner of the mouth daily.  Apply twice daily.  Continue losartan 25 mg twice daily.  Continue atenolol 25 mg twice daily.  Indapamide's taken twice per week.  Return to clinic in 2 weeks.  She will need electrolytes and BUN/creatinine drawn at that time.    Diagnoses and all orders for this visit:    Perleche  -     clotrimazole-betamethasone (LOTRISONE) cream; Apply to affected area 2 times daily  Dispense: 15 g; Refill: 1        The following low BMI interventions were performed this visit: prescribed diet education    Sang Owens MD  Transcription using voice recognition software, may contain typographical errors.

## 2021-06-18 NOTE — PROGRESS NOTES
OFFICE VISIT NOTE    Subjective:   Chief Complaint:  Follow-up (HTN)    87-year-old woman in for follow-up regarding hypertension.  She also has a chronic anxiety disorder.  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  She is doing about the same.  Blood pressure recordings by her are usually okay.  Occasionally she will get a pressure to 190 systolic range.  They are usually 140-150.    Current Outpatient Prescriptions   Medication Sig     atenolol (TENORMIN) 25 MG tablet TAKE ONE TABLET (25MG TOTAL) BY MOUTH TWICE DAILY     cholecalciferol, vitamin D3, 1,000 unit tablet Take 1,000 Units by mouth. approx 5 days per week     clotrimazole-betamethasone (LOTRISONE) cream Apply to affected area 2 times daily     ibuprofen (ADVIL,MOTRIN) 200 MG tablet Take 200-400 mg by mouth every 8 (eight) hours as needed for pain.      indapamide (LOZOL) 1.25 MG tablet Take 1 tablet (1.25 mg total) by mouth every other day. (Patient taking differently: Take 1.25 mg by mouth 2 (two) times a week. )     loperamide (IMODIUM) 2 mg capsule Take 2 mg by mouth 4 (four) times a day as needed for diarrhea.     LORazepam (ATIVAN) 0.5 MG tablet TAKE 1 TABLET BY MOUTH TWICE DAILY AS NEEDED FOR ANXIETY     LORazepam (ATIVAN) 0.5 MG tablet TAKE 1 TABLET BY MOUTH TWICE DAILY AS NEEDED FOR ANXIETY     losartan (COZAAR) 25 MG tablet Take 1 tablet (25 mg total) by mouth 2 (two) times a day.     losartan (COZAAR) 25 MG tablet Take 25 mg by mouth 2 (two) times a day.     meclizine (ANTIVERT) 25 mg tablet TAKE ONE TABLET BY MOUTH THREE TIMES DAILY AS NEEDED     mirtazapine (REMERON) 15 MG tablet Take 1 tablet (15 mg total) by mouth at bedtime.     simvastatin (ZOCOR) 20 MG tablet TAKE ONE TABLET BY MOUTH ONCE DAILY       PSFHx: Tobacco Status:  She  reports that she has never smoked. She has never used smokeless tobacco.    Review of Systems:  A comprehensive review of systems is negative except for the comments above    Objective:    Ht  "4' 11\" (1.499 m)  Wt 110 lb (49.9 kg)  BMI 22.22 kg/m2  GENERAL: No acute distress.  Blood pressures 140/78.  Left ear was examined as some cerumen.  I removed with a cerumen spoon.  Lungs seem clear.  Heart shows a regular rhythm without ectopic beats today.  No JVD.  1-2+ ankle edema.  Weight is up 1 pound.  Neurologic exam is normal.  She recently had the upper GI endoscopy.  We will need to get the report and see what the recommendations are.  She feels she is swallowing okay so I am not inclined to put her through any surgery at age 87.  Other than being hard of hearing her neurologic exam remains stable and normal.    Assessment & Plan   Mariluz Arana is a 87 y.o. female.    Clinically she stable.  I am happy with the current blood pressure levels.  I will review the recent GI consultation and decide what we should do here.  She will need eye surgery sometime in July.  She has a cataract.    Diagnoses and all orders for this visit:    Essential hypertension    Hyponatremia  -     Basic Metabolic Panel    Chronic anxiety        The following low BMI interventions were performed this visit: weight gain advised    Sang Owens MD  Transcription using voice recognition software, may contain typographical errors.    "

## 2021-06-19 NOTE — PROGRESS NOTES
"  Assessment:       Dizziness  Lower extremity edema      Plan:       1. Spoke with on-call cardiologist to review current ECG due to reading of possible septal infarct. Upon review with the cardiologist and with comparison of previous ECG on 5/31/18 there were no acute changes seen from previous ECG.  2. Follow-up with PCP in 2-3 days for ongoing management and possible change in diuretic medications at that time to treat lower extremity edema        Patient Instructions   You were seen today for worsening dizziness and leg swelling. Recommend follow-up with your primary care provider in 2-3 days for further evaluation. There were no acute changes on your EKG to suggest a heart attack and your neurologic exam today was negative.    Reasons to return for immediate evaluation:  - Chest pain or discomfort with jaw pain or arm pain  - Difficulty breathing or shortness of breath  - Slurred speech, confusion, or one-sided weakness    Subjective:       Mariluz Arana is a 87 y.o. female with history of meniere's disease, dizziness, palpitations, anxiety, and HTN here for evaluation of leg swelling. Onset was 2 days ago. Associated symptoms include purple/cold toes, dizziness, and mental \"fogginess\". Patient currently taking diuretics to help treat her blood pressure. She denies chest pain, shortness of breath, vision changes, calf tenderness, numbness, and tingling.     The following portions of the patient's history were reviewed and updated as appropriate: allergies, current medications and problem list.    Review of Systems  Pertinent items are noted in HPI.     Allergies  Allergies   Allergen Reactions     Metronidazole      Actonel [Risedronate]      GI UPSET     Clindamycin      Cortisone (Hydrocortisone) [Hydrocortisone]      Fosamax [Alendronate]      GI UPSET       Kenalog [Triamcinolone Acetonide]      Minipress [Prazosin] Other (See Comments)     hypotension     Penicillin G Benzathine      Statins-Hmg-Coa " Reductase Inhibitors Other (See Comments)     Shortness of breath  Tolerating simvastatin (Sept 2016)     Tetanus And Diphtheria Toxoids, Adsorbed, Adult Unknown     Tetanus Toxoid, Adsorbed      Prednisone Rash         Objective:       /80 (Patient Site: Left Arm, Patient Position: Sitting, Cuff Size: Adult Regular)  Pulse 68  Temp 98.1  F (36.7  C) (Oral)   Resp 16  Wt 109 lb (49.4 kg)  LMP  (LMP Unknown)  SpO2 98%  Breastfeeding? No  BMI 22.02 kg/m2  General appearance: alert, appears stated age, cooperative, no distress and non-toxic  Head: Normocephalic, without obvious abnormality, atraumatic  Ears: left: unable to visualize TM due to cerumen, moderated crusted blood in the ear canal, no other discharge; ear tender to tragus palpation. Right: TM intact with no fluid, erythema, or bulging; external ear normal  Nose: no discharge  Throat: erythematous tongue; no tonsil swelling, erythema, or exudate; MMM, lips and gums normal  Neck: no adenopathy and supple, symmetrical, trachea midline  Lungs: clear to auscultation bilaterally and no rhonchi, rales, or wheezing  Heart: systolic murmur heard, regularly regular rhythm  Extremities: cyanosis of the toes and heel bilaterally, moderate edema of the feet bilaterally; no calf tenderness  Pulses: dorsalis pedal pulses intact, upper extremity pulses intact  Skin: toes and heel cool to the touch bilaterally, remaining skin warm to the touch, no rashes or lesions  Neurologic: Cranial nerves: II: pupils equal, round, reactive to light and accommodation, III,IV,VI: extraocular muscles extra-ocular motions intact, V: facial light touch sensation normal bilaterally, VII: upper facial muscle function normal bilaterally, VII: lower facial muscle function normal bilaterally, VIII: hearing normal, IX: soft palate elevation normal in midline, XI: sternocleidomastoid strength normal bilaterally, XII: tongue strength normal   Sensory: sensation to light touch  intact  Motor:grossly normal  Coordination: normal finger to nose test, normal heel to shin test     Lab Results    Recent Results (from the past 24 hour(s))   Electrocardiogram Perform and Read   Result Value Ref Range    SYSTOLIC BLOOD PRESSURE  mmHg    DIASTOLIC BLOOD PRESSURE  mmHg    VENTRICULAR RATE 76 BPM    ATRIAL RATE 76 BPM    P-R INTERVAL 150 ms    QRS DURATION 116 ms    Q-T INTERVAL 394 ms    QTC CALCULATION (BEZET) 443 ms    P Axis 1 degrees    R AXIS -21 degrees    T AXIS 83 degrees    MUSE DIAGNOSIS       Normal sinus rhythm  Possible Left atrial enlargement  Left ventricular hypertrophy with QRS widening and repolarization abnormality  Cannot rule out Septal infarct (cited on or before 31-MAY-2018)  Abnormal ECG  When compared with ECG of 31-MAY-2018 19:32,  T wave inversion more evident in Lateral leads       I personally reviewed these results and discussed findings with the patient.

## 2021-06-19 NOTE — PROGRESS NOTES
OFFICE VISIT NOTE    Subjective:   Chief Complaint:  No chief complaint on file.    87-year-old woman in for follow-up regarding labile hypertension as well as history of Ménière's disease and anxiety.  She still feels lousy despite taking Spironolactone.  Most of her blood pressure over 180 at home.  She feels like her head is swollen she was thinking is poor.  Sometimes a little clumsy.  No chest pain or any shortness of breath.    Current Outpatient Prescriptions   Medication Sig     atenolol (TENORMIN) 25 MG tablet TAKE ONE TABLET (25MG TOTAL) BY MOUTH TWICE DAILY     chlorthalidone (HYGROTEN) 25 MG tablet Half tablet p.o. daily in the morning     cholecalciferol, vitamin D3, 1,000 unit tablet Take 1,000 Units by mouth. approx 5 days per week     clotrimazole-betamethasone (LOTRISONE) cream Apply to affected area 2 times daily     ibuprofen (ADVIL,MOTRIN) 200 MG tablet Take 200-400 mg by mouth every 8 (eight) hours as needed for pain.      loperamide (IMODIUM) 2 mg capsule Take 2 mg by mouth 4 (four) times a day as needed for diarrhea.     LORazepam (ATIVAN) 0.5 MG tablet Take 1 tablet (0.5 mg total) by mouth 2 (two) times a day as needed for anxiety.     losartan (COZAAR) 25 MG tablet Take 25 mg in the morning and 50 mg at night     simvastatin (ZOCOR) 20 MG tablet TAKE ONE TABLET BY MOUTH ONCE DAILY       PSFHx: Tobacco Status:  She  reports that she has never smoked. She has never used smokeless tobacco.    Review of Systems:  A comprehensive review of systems is negative except for the comments above    Objective:    LMP  (LMP Unknown)  GENERAL: No acute distress.  I checked the blood pressure 3 times a day average is 160/80.  Pulse is 76 and regular.  Lungs are clear  1+ edema left ankle.  No other edema.  No presacral edema.  No JVD.  Heart shows a sinus rhythm without any ectopic beats or significant murmurs.    Assessment & Plan   Mariluz Arana is a 87 y.o. female.    Her pressure still is not  controlled.  I think a lot of the problem continues to be panic disorder.  Lorazepam does help her.  We will discontinue Aldactone since not working.  Try her on chlorthalidone 12.5 mg daily.  I will see her again in 1 week.  Renal ultrasound will be done.  Also urine for metanephrines and 5-hydroxyindoleacetic acid will be collected.  35 minutes was spent with this patient and to members of the family discussing her hypertension, anxiety etc.  She will be seen again in the clinic in 1 week.    Diagnoses and all orders for this visit:    Essential hypertension  -     Basic Metabolic Panel  -     Metanephrines Fractionated by HPLC-MS/MS, Urine; Future  -     5-Hydroxyindoleacetic Acid (HIAA), Urine; Future  -     US Renal Artery Bilateral; Future; Expected date: 8/6/18  -     chlorthalidone (HYGROTEN) 25 MG tablet; Half tablet p.o. daily in the morning  Dispense: 15 tablet; Refill: 0  -     losartan (COZAAR) 25 MG tablet; Take 25 mg in the morning and 50 mg at night  Dispense: 90 tablet; Refill: 2    Chronic anxiety  -     LORazepam (ATIVAN) 0.5 MG tablet; Take 1 tablet (0.5 mg total) by mouth 2 (two) times a day as needed for anxiety.  Dispense: 60 tablet; Refill: 0            Sang Owens MD  Transcription using voice recognition software, may contain typographical errors.

## 2021-06-19 NOTE — LETTER
Letter by Sang Owens MD at      Author: Sang Owens MD Service: -- Author Type: --    Filed:  Encounter Date: 11/15/2019 Status: Signed         Mariluz Arana  [address]  [facility] [place] [address]             November 15, 2019         Dear Ms. Arana,    Below are the results from your recent visit:    Resulted Orders   Basic Metabolic Panel   Result Value Ref Range    Sodium 138 136 - 145 mmol/L    Potassium 4.5 3.5 - 5.0 mmol/L    Chloride 102 98 - 107 mmol/L    CO2 25 22 - 31 mmol/L    Anion Gap, Calculation 11 5 - 18 mmol/L    Glucose 110 70 - 125 mg/dL    Calcium 9.8 8.5 - 10.5 mg/dL    BUN 12 8 - 28 mg/dL    Creatinine 0.78 0.60 - 1.10 mg/dL    GFR MDRD Af Amer >60 >60 mL/min/1.73m2    GFR MDRD Non Af Amer >60 >60 mL/min/1.73m2    Narrative    Fasting Glucose reference range is 70-99 mg/dL per  American Diabetes Association (ADA) guidelines.   Magnesium   Result Value Ref Range    Magnesium 1.8 1.8 - 2.6 mg/dL       Mariluz, recent labs are reviewed.  I was pleased to see that your sodium is 138.  Potassium and kidney functions normal.  I also measured your magnesium level which was normal, but in a low normal range.  Green leafy vegetables have a lot of magnesium.  You can certainly liberalize your fluids to 60 ounces per day.    Please call with questions or contact us using [employer].    Sincerely,        Electronically signed by Sang Owens MD

## 2021-06-19 NOTE — LETTER
Letter by Sang Owens MD at      Author: Sang Owens MD Service: -- Author Type: --    Filed:  Encounter Date: 10/2/2019 Status: Signed         Mariluz Arana  [address]  [facility] [place] [address]             October 2, 2019         Dear Ms. Arana,    Below are the results from your recent visit:    Resulted Orders   Basic Metabolic Panel   Result Value Ref Range    Sodium 133 (L) 136 - 145 mmol/L    Potassium 4.2 3.5 - 5.0 mmol/L    Chloride 100 98 - 107 mmol/L    CO2 22 22 - 31 mmol/L    Anion Gap, Calculation 11 5 - 18 mmol/L    Glucose 98 70 - 125 mg/dL    Calcium 9.9 8.5 - 10.5 mg/dL    BUN 12 8 - 28 mg/dL    Creatinine 0.75 0.60 - 1.10 mg/dL    GFR MDRD Af Amer >60 >60 mL/min/1.73m2    GFR MDRD Non Af Amer >60 >60 mL/min/1.73m2    Narrative    Fasting Glucose reference range is 70-99 mg/dL per  American Diabetes Association (ADA) guidelines.   C-Reactive Protein   Result Value Ref Range    CRP <0.1 0.0 - 0.8 mg/dL   Erythrocyte Sedimentation Rate   Result Value Ref Range    Sed Rate 14 0 - 20 mm/hr       Mariluz, recent labs are reviewed.  Electrolytes still show a slightly low sodium at 133.  Your potassium and kidney functions, were normal.  I did  Tests  called CRP and sed rate which measures inflammation.  There was no evidence of any significant inflammatory problem .  No vasculitis etc. that would cause headaches.      Please call with questions or contact us using [email or web address].    Sincerely,        Electronically signed by Sang Owens MD

## 2021-06-19 NOTE — LETTER
Letter by Sang Owens MD at      Author: Sang Owens MD Service: -- Author Type: --    Filed:  Encounter Date: 6/11/2019 Status: (Other)         Mariluz Arana  [address]  [facility] [place] [address]             June 11, 2019         Dear Ms. Arana,    Below are the results from your recent visit:    Resulted Orders   Basic Metabolic Panel   Result Value Ref Range    Sodium 135 (L) 136 - 145 mmol/L    Potassium 4.2 3.5 - 5.0 mmol/L    Chloride 99 98 - 107 mmol/L    CO2 23 22 - 31 mmol/L    Anion Gap, Calculation 13 5 - 18 mmol/L    Glucose 103 70 - 125 mg/dL    Calcium 10.0 8.5 - 10.5 mg/dL    BUN 14 8 - 28 mg/dL    Creatinine 0.78 0.60 - 1.10 mg/dL    GFR MDRD Af Amer >60 >60 mL/min/1.73m2    GFR MDRD Non Af Amer >60 >60 mL/min/1.73m2    Narrative    Fasting Glucose reference range is 70-99 mg/dL per  American Diabetes Association (ADA) guidelines.   HM2(CBC w/o Differential)   Result Value Ref Range    WBC 6.9 4.0 - 11.0 thou/uL    RBC 4.34 3.80 - 5.40 mill/uL    Hemoglobin 13.6 12.0 - 16.0 g/dL    Hematocrit 40.9 35.0 - 47.0 %    MCV 94 80 - 100 fL    MCH 31.3 27.0 - 34.0 pg    MCHC 33.2 32.0 - 36.0 g/dL    RDW 11.9 11.0 - 14.5 %    Platelets 241 140 - 440 thou/uL    MPV 8.3 7.0 - 10.0 fL       Mariluz, recent labs are reviewed.  Electrolytes are normal.  Sodium remains stable at 135.  Kidney functions are normal.  Your hemoglobin is excellent at 13.6.    Please call with questions or contact us using [employer].    Sincerely,        Electronically signed by Sang Owens MD

## 2021-06-19 NOTE — LETTER
Letter by Sang Owens MD at      Author: Sang Owens MD Service: -- Author Type: --    Filed:  Encounter Date: 5/2/2019 Status: (Other)         Mariluz Arana  [address]  [facility] [place] [address]             May 2, 2019         Dear Ms. Arana,    Below are the results from your recent visit:    Resulted Orders   Basic Metabolic Panel   Result Value Ref Range    Sodium 135 (L) 136 - 145 mmol/L    Potassium 4.2 3.5 - 5.0 mmol/L    Chloride 102 98 - 107 mmol/L    CO2 20 (L) 22 - 31 mmol/L    Anion Gap, Calculation 13 5 - 18 mmol/L    Glucose 94 70 - 125 mg/dL    Calcium 9.7 8.5 - 10.5 mg/dL    BUN 14 8 - 28 mg/dL    Creatinine 0.79 0.60 - 1.10 mg/dL    GFR MDRD Af Amer >60 >60 mL/min/1.73m2    GFR MDRD Non Af Amer >60 >60 mL/min/1.73m2    Narrative    Fasting Glucose reference range is 70-99 mg/dL per  American Diabetes Association (ADA) guidelines.       Mariluz, recent labs are reviewed.  Blood sugar and kidney functions remain normal sodium is stable.  It is at 135.  Normals 136-145, so you are very good at this stage.  I think you should stay on the same dosage of meds.    Please call with questions or contact us using [email or web address].    Sincerely,        Electronically signed by Sang Owens MD

## 2021-06-19 NOTE — PROGRESS NOTES
Preoperative Exam    Scheduled Procedure: cataract right eye  Surgery Date:  7/19/18  Surgery Location: [facility]    Surgeon: Dr. Chambers    Assessment/Plan:     1. Preop general physical exam  Medically she is stable we can proceed with surgery.    2. Cataract  Bilateral cataracts.  Right side is the worst.  She will have that side done on 7/19/2018    3. Essential hypertension  Today's blood pressure 138/78    4. Hyponatremia  This is a chronic problem.  Will check today's electrolytes  - Basic Metabolic Panel    5. Meniere's disease, bilateral  Chronic problems.  No change    6. Chronic anxiety  Uses lorazepam perhaps once per day.        Surgical Procedure Risk: Low (reported cardiac risk generally < 1%)  Have you had prior anesthesia?: No  Have you or any family members had a previous anesthesia reaction:  No  Do you or any family members have a history of a clotting or bleeding disorder?: No  Cardiac Risk Assessment: no increased risk for major cardiac complications    Patient approved for surgery with general or local anesthesia.        Functional Status: Independent  Patient plans to recover at home with family.     Subjective:      Mariluz Arana is a 87 y.o. female who presents for a preoperative consultation.  She has bilateral cataracts, right side being the worse.  Surgery has been recommended.  Medically she has been stable.  She does have a history of hypertension as well as Ménière's disease and hyponatremia.  She did not tolerate the diuretics indapamide or, Bumex.    All other systems reviewed and are negative, other than those listed in the HPI.    Pertinent History  Do you have difficulty breathing or chest pain after walking up a flight of stairs: No  History of obstructive sleep apnea: No  Steroid use in the last 6 months: No  Frequent Aspirin/NSAID use: No  Prior Blood Transfusion: No  Prior Blood Transfusion Reaction: No  If for some reason prior to, during or after the  procedure, if it is medically indicated, would you be willing to have a blood transfusion?:  There is no transfusion refusal.    Current Outpatient Prescriptions   Medication Sig Dispense Refill     atenolol (TENORMIN) 25 MG tablet TAKE ONE TABLET (25MG TOTAL) BY MOUTH TWICE DAILY 180 tablet 3     cholecalciferol, vitamin D3, 1,000 unit tablet Take 1,000 Units by mouth. approx 5 days per week       clotrimazole-betamethasone (LOTRISONE) cream Apply to affected area 2 times daily 15 g 1     LORazepam (ATIVAN) 0.5 MG tablet TAKE 1 TABLET BY MOUTH TWICE DAILY AS NEEDED FOR ANXIETY 60 tablet 0     losartan (COZAAR) 25 MG tablet Take 25 mg by mouth 2 (two) times a day.       mirtazapine (REMERON) 15 MG tablet Take 1 tablet (15 mg total) by mouth at bedtime. 15 tablet 2     simvastatin (ZOCOR) 20 MG tablet TAKE ONE TABLET BY MOUTH ONCE DAILY 90 tablet 4     fluticasone propionate, bulk, 100 % Powd Use As Directed.       ibuprofen (ADVIL,MOTRIN) 200 MG tablet Take 200-400 mg by mouth every 8 (eight) hours as needed for pain.        loperamide (IMODIUM) 2 mg capsule Take 2 mg by mouth 4 (four) times a day as needed for diarrhea.       No current facility-administered medications for this visit.         Allergies   Allergen Reactions     Metronidazole      Actonel [Risedronate]      GI UPSET     Clindamycin      Cortisone (Hydrocortisone) [Hydrocortisone]      Fosamax [Alendronate]      GI UPSET       Kenalog [Triamcinolone Acetonide]      Minipress [Prazosin] Other (See Comments)     hypotension     Penicillin G Benzathine      Statins-Hmg-Coa Reductase Inhibitors Other (See Comments)     Shortness of breath  Tolerating simvastatin (Sept 2016)     Tetanus And Diphtheria Toxoids, Adsorbed, Adult Unknown     Tetanus Toxoid, Adsorbed      Prednisone Rash       Patient Active Problem List   Diagnosis     Hyponatremia     HTN (hypertension)     Meniere's disease, bilateral     Dizziness     Ataxia     Chronic anxiety       Past  "Medical History:   Diagnosis Date     Arthritis     Osteoarthritis     Generalized anxiety disorder      GERD (gastroesophageal reflux disease)      Heart murmur      Heart palpitations      Hypercholesteremia      Hypertension      Irritable bowel      Meniere's disease      Osteoporosis        Past Surgical History:   Procedure Laterality Date     DILATION AND CURETTAGE OF UTERUS       ENDOLYMPHATIC SAC OPERATION  2012    enhancement     INNER EAR SURGERY      Per Pt     MASTOIDECTOMY  2012     TONSILLECTOMY         Social History     Social History     Marital status:      Spouse name: N/A     Number of children: 7     Years of education: N/A     Occupational History     Not on file.     Social History Main Topics     Smoking status: Never Smoker     Smokeless tobacco: Never Used     Alcohol use 2.4 oz/week     4 Glasses of wine per week     Drug use: No     Sexual activity: No     Other Topics Concern     Not on file     Social History Narrative    Patient is , they are retired.    Lives in assisted care for past 2 years (11/2/2016).             Objective:     Vitals:    07/16/18 1414   Pulse: 72   SpO2: 96%   Weight: 111 lb (50.3 kg)   Height: 4' 11\" (1.499 m)         Physical Exam:  Present woman was slightly hard of hearing.  Blood pressures 138/78.  Pulse is 70 and regular.  Bilateral cataracts.  ENT examination is normal.  1+ edema at the ankles.  Pedal pulses are graded 3 out of 4.  Abdomen is soft without masses or any  Lungs are clear.  Heart shows a sinus rhythm with an occasional ectopic beat.  No significant murmur.  Decreased range of motion of the right shoulder.  Neurologic exam remains normal.    There are no Patient Instructions on file for this visit.        Labs:  Labs pending at this time.  Results will be reviewed when available.    Immunization History   Administered Date(s) Administered     Influenza high dose, seasonal 01/25/2016, 10/26/2016     Influenza, inj, " historic,unspecified 01/01/2013, 10/01/2017     Pneumo Conj 13-V (2010&after) 03/28/2016     Pneumo Conj 7-V(before 2010) 01/01/1996     Pneumo Polysac 23-V 02/27/1996, 06/01/2006           Electronically signed by Sang Owens MD 07/16/18 2:16 PM

## 2021-06-19 NOTE — LETTER
Letter by Sang Owens MD at      Author: Sang Owens MD Service: -- Author Type: --    Filed:  Encounter Date: 8/29/2019 Status: (Other)         Mariluz Arana  [address]  [facility] [place] [address]             August 29, 2019         Dear Ms. Arana,    Below are the results from your recent visit:    Resulted Orders   Basic Metabolic Panel   Result Value Ref Range    Sodium 135 (L) 136 - 145 mmol/L    Potassium 4.3 3.5 - 5.0 mmol/L    Chloride 102 98 - 107 mmol/L    CO2 22 22 - 31 mmol/L    Anion Gap, Calculation 11 5 - 18 mmol/L    Glucose 90 70 - 125 mg/dL    Calcium 9.8 8.5 - 10.5 mg/dL    BUN 10 8 - 28 mg/dL    Creatinine 0.78 0.60 - 1.10 mg/dL    GFR MDRD Af Amer >60 >60 mL/min/1.73m2    GFR MDRD Non Af Amer >60 >60 mL/min/1.73m2    Narrative    Fasting Glucose reference range is 70-99 mg/dL per  American Diabetes Association (ADA) guidelines.   HM2(CBC w/o Differential)   Result Value Ref Range    WBC 7.2 4.0 - 11.0 thou/uL    RBC 4.37 3.80 - 5.40 mill/uL    Hemoglobin 13.7 12.0 - 16.0 g/dL    Hematocrit 41.2 35.0 - 47.0 %    MCV 94 80 - 100 fL    MCH 31.3 27.0 - 34.0 pg    MCHC 33.2 32.0 - 36.0 g/dL    RDW 11.8 11.0 - 14.5 %    Platelets 243 140 - 440 thou/uL    MPV 8.5 7.0 - 10.0 fL   C-Reactive Protein   Result Value Ref Range    CRP <0.1 0.0 - 0.8 mg/dL   Erythrocyte Sedimentation Rate   Result Value Ref Range    Sed Rate 14 0 - 20 mm/hr       Mariluz, recent labs are reviewed.  Your electrolytes looked okay with minimal decrease in sodium to 135.  That should not cause any headache or dizziness.  Hemoglobin was normal at 13.7.  Sed rate and CRP which are blood test measuring inflammation were all in a normal range.  So labs look okay.  Hopefully these headaches and dizziness will disappear.    Please call with questions or contact us using [employer].    Sincerely,        Electronically signed by Sang Owens MD

## 2021-06-19 NOTE — PROGRESS NOTES
"OFFICE VISIT NOTE    Subjective:   Chief Complaint:  Follow-up    87-year-old woman in for follow-up regarding hypertension, chronic anxiety, Ménière's disease.  I saw her last week.  She took the diuretic hydrochlorothiazide was normal within 1 day she felt sick with it.  We then reduce the dosage to 6.25 mg daily she still feels sick and upset stomach with it.  Blood pressures are actually quite good for her.    Current Outpatient Prescriptions   Medication Sig     atenolol (TENORMIN) 25 MG tablet TAKE ONE TABLET (25MG TOTAL) BY MOUTH TWICE DAILY     cholecalciferol, vitamin D3, 1,000 unit tablet Take 1,000 Units by mouth. approx 5 days per week     clotrimazole-betamethasone (LOTRISONE) cream Apply to affected area 2 times daily     ibuprofen (ADVIL,MOTRIN) 200 MG tablet Take 200-400 mg by mouth every 8 (eight) hours as needed for pain.      loperamide (IMODIUM) 2 mg capsule Take 2 mg by mouth 4 (four) times a day as needed for diarrhea.     LORazepam (ATIVAN) 0.5 MG tablet Take 1 tablet (0.5 mg total) by mouth 2 (two) times a day as needed for anxiety.     losartan (COZAAR) 25 MG tablet Take 25 mg in the morning and 50 mg at night     simvastatin (ZOCOR) 20 MG tablet TAKE ONE TABLET BY MOUTH ONCE DAILY       PSFHx: Tobacco Status:  She  reports that she has never smoked. She has never used smokeless tobacco.    Review of Systems:  A comprehensive review of systems is negative except for the comments above    Objective:    Ht 4' 11\" (1.499 m)  Wt 109 lb (49.4 kg)  LMP  (LMP Unknown)  BMI 22.02 kg/m2  GENERAL: No acute distress.  Today's blood pressure standing was 142/78.  Pulse 76 and regular.  Lungs are clear.  Heart shows a regular rhythm.  There is grade 1/6 systolic ejection murmur.  No edema.  Abdomen is soft without any bladder distention.  She was complaining of no urine output on Sunday.  No flank pain.  Emotionally quite labile today.  I spent 35 minutes talking with the patient and daughter-in-law " regarding blood pressures side effects etc.    Assessment & Plan   Mariluz Arana is a 87 y.o. female.    Discontinue Hygroton.   Continue losartan 25 mg in the morning 50 mg in the afternoon  Continue atenolol 25 mg twice daily.  Check basic metabolic profile.  Electrolytes are okay would consider very low-dose hydrochlorothiazide which she tolerated in the past.  Ménière's disease remains stable.  No recurrent vertigo  Lorazepam does help her anxiety.  Return to clinic in 2 weeks.  35 minutes spent with the patient and daughter today.    Diagnoses and all orders for this visit:    Dizziness    Essential hypertension  -     Metanephrines Fractionated by HPLC-MS/MS, Urine  -     5-Hydroxyindoleacetic Acid (HIAA), Urine  -     Basic Metabolic Panel    Chronic anxiety            Sang Owens MD  Transcription using voice recognition software, may contain typographical errors.

## 2021-06-19 NOTE — PROGRESS NOTES
"OFFICE VISIT NOTE    Subjective:   Chief Complaint:  Follow-up    Complicated 87-year-old woman with hypertension, Ménière's disease, chronic anxiety.  She continues to run hypertension crisis he is going to the emergency room etc.  She had her dosage of losartan increased last week when she was in the emergency room.  She continues to have high blood pressure recordings at home.  She is frightened.  She will spend all day in bed some days.    Current Outpatient Prescriptions   Medication Sig     atenolol (TENORMIN) 25 MG tablet TAKE ONE TABLET (25MG TOTAL) BY MOUTH TWICE DAILY     cholecalciferol, vitamin D3, 1,000 unit tablet Take 1,000 Units by mouth. approx 5 days per week     clotrimazole-betamethasone (LOTRISONE) cream Apply to affected area 2 times daily     ibuprofen (ADVIL,MOTRIN) 200 MG tablet Take 200-400 mg by mouth every 8 (eight) hours as needed for pain.      loperamide (IMODIUM) 2 mg capsule Take 2 mg by mouth 4 (four) times a day as needed for diarrhea.     LORazepam (ATIVAN) 0.5 MG tablet TAKE 1 TABLET BY MOUTH TWICE DAILY AS NEEDED FOR ANXIETY     losartan (COZAAR) 25 MG tablet Take 25 mg in the morning and 50 mg at night     simvastatin (ZOCOR) 20 MG tablet TAKE ONE TABLET BY MOUTH ONCE DAILY     spironolactone (ALDACTONE) 25 MG tablet Take 1 tablet (25 mg total) by mouth daily.       PSFHx: Tobacco Status:  She  reports that she has never smoked. She has never used smokeless tobacco.    Review of Systems:  A comprehensive review of systems is negative except for the comments above    Objective:    Ht 4' 11\" (1.499 m)  Wt 111 lb (50.3 kg)  LMP  (LMP Unknown)  BMI 22.42 kg/m2  GENERAL: No acute distress.  Today's blood pressure was 188/98.  Pulse 80 and regular.  Trace edema at the ankles.  Lungs are clear.  Heart shows a regular rhythm with no ectopic beats today.  In the past she has frequent palpitations.  Neurologic exam is normal.  No carotid bruits.  Mild to moderate osteoarthritic " changes of the hands etc.    Assessment & Plan   Mariluz Arana is a 87 y.o. female.    Hypertension.  This is labile.  Somewhat related to her anxiety.  Pressure definitely went up and we discontinued her diuretic.  Started on Aldactone 25 mg p.o. daily.  She will continue atenolol 25 mg twice daily.  Continue losartan 25 mg in the morning 50 mg at night.  She will return to clinic in 1 week and recheck her pressure as well as electrolytes and renal function.  I spent 35 minutes with this patient as well as meeting with her son before our visit.  She is very difficult.  Much of her visit was related to coordination of care, medication side effects, expectations etc.  Ménière's disease currently is stable.  Diagnoses and all orders for this visit:    Essential hypertension  -     spironolactone (ALDACTONE) 25 MG tablet; Take 1 tablet (25 mg total) by mouth daily.  Dispense: 14 tablet; Refill: 2    Chronic anxiety            Sang Owens MD  Transcription using voice recognition software, may contain typographical errors.

## 2021-06-19 NOTE — LETTER
Letter by Sang Owens MD at      Author: Sang Owens MD Service: -- Author Type: --    Filed:  Encounter Date: 3/20/2019 Status: (Other)         Mariluz Arana  [address]  [facility] [place] [address]             March 20, 2019         Dear Ms. Arana,    Below are the results from your recent visit:    Resulted Orders   Basic Metabolic Panel   Result Value Ref Range    Sodium 135 (L) 136 - 145 mmol/L    Potassium 3.8 3.5 - 5.0 mmol/L    Chloride 101 98 - 107 mmol/L    CO2 22 22 - 31 mmol/L    Anion Gap, Calculation 12 5 - 18 mmol/L    Glucose 100 70 - 125 mg/dL    Calcium 9.6 8.5 - 10.5 mg/dL    BUN 16 8 - 28 mg/dL    Creatinine 0.80 0.60 - 1.10 mg/dL    GFR MDRD Af Amer >60 >60 mL/min/1.73m2    GFR MDRD Non Af Amer >60 >60 mL/min/1.73m2    Narrative    Fasting Glucose reference range is 70-99 mg/dL per  American Diabetes Association (ADA) guidelines.       Mariluz, today's labs are reviewed.  Sodium was just a little low at 135 and the same as last time the potassium and kidney functions remain normal.  I think you are doing okay with the current fluid allow allowance.  I would not increase it.  It was good to see you again today.  I think you are doing okay.  See you again in 6 weeks.    Please call with questions or contact us using [employer].    Sincerely,        Electronically signed by Sang Owens MD

## 2021-06-19 NOTE — LETTER
Letter by Sang Owens MD at      Author: Sang Owens MD Service: -- Author Type: --    Filed:  Encounter Date: 8/29/2019 Status: (Other)         Mariluz Arana  [address]  [facility] [place] [address]             August 29, 2019         Dear Ms. Arana,    Below are the results from your recent visit:    Resulted Orders   CT Head Without Contrast    Narrative    EXAM: CT HEAD WO CONTRAST  LOCATION: [facility]  DATE/TIME: 8/28/2019 11:46 AM    INDICATION: Headache, acute, normal neuro exam  COMPARISON: Head CT August 20, 2016. MRI brain February 21, 2017.  TECHNIQUE: Routine without IV contrast. Multiplanar reformats. Dose reduction techniques were used.    FINDINGS:  No evidence of acute intracranial hemorrhage or mass effect. Chronic left basal ganglia infarct. Scattered foci of decreased attenuation within the cerebral hemispheric white matter which are nonspecific, though most commonly ascribed to chronic small   vessel ischemic disease. The ventricles and sulci are prominent consistent with mild brain parenchymal volume loss. Gray-white matter differentiation is maintained. The basilar cisterns are patent.    Postsurgical changes right cataract surgery. The partially imaged globes are otherwise unremarkable. The partially imaged paranasal sinuses are unremarkable. Postsurgical changes left wall up mastoidectomy. The mastoid bowl is clear. Mastoid air cells   are otherwise unremarkable. The visualized skull base and calvarium are unremarkable.      Impression    CONCLUSION:    1.  No evidence of acute intracranial hemorrhage or mass effect.  2.  Chronic left basal ganglia infarct.  3.  Mild nonspecific white matter changes.  4.  Mild brain parenchymal volume loss.  5.  Postsurgical changes left wall up mastoidectomy. The mastoid bowl is clear.       Mariluz, your recent CT scan did not show any significant abnormalities such as stroke, tumor, bleed, in the brain.  There  were some nonspecific chronic changes but nothing acute.  Certainly nothing that would explain headache and acute dizziness.    Please call with questions or contact us using [employer].    Sincerely,        Electronically signed by Sang Owens MD

## 2021-06-19 NOTE — PROGRESS NOTES
OFFICE VISIT NOTE    Subjective:   Chief Complaint:  Follow-up (was at [facility] over the weekend)    A 87-year-old woman in for multiple problems including Ménière's disease, recurrent dizziness, chronic anxiety, hypertension, hyponatremia.  She recently was in the urgent care center with edema.  This spontaneously did resolve.  No increasing shortness of breath though she notes palpitations.  She says the medication indapamide tends to make her sick.    Current Outpatient Prescriptions   Medication Sig     atenolol (TENORMIN) 25 MG tablet TAKE ONE TABLET (25MG TOTAL) BY MOUTH TWICE DAILY     cholecalciferol, vitamin D3, 1,000 unit tablet Take 1,000 Units by mouth. approx 5 days per week     clotrimazole-betamethasone (LOTRISONE) cream Apply to affected area 2 times daily     fluticasone propionate, bulk, 100 % Powd Use As Directed.     ibuprofen (ADVIL,MOTRIN) 200 MG tablet Take 200-400 mg by mouth every 8 (eight) hours as needed for pain.      loperamide (IMODIUM) 2 mg capsule Take 2 mg by mouth 4 (four) times a day as needed for diarrhea.     LORazepam (ATIVAN) 0.5 MG tablet TAKE 1 TABLET BY MOUTH TWICE DAILY AS NEEDED FOR ANXIETY     LORazepam (ATIVAN) 0.5 MG tablet TAKE 1 TABLET BY MOUTH TWICE DAILY AS NEEDED FOR ANXIETY     losartan (COZAAR) 25 MG tablet Take 1 tablet (25 mg total) by mouth 2 (two) times a day.     losartan (COZAAR) 25 MG tablet Take 25 mg by mouth 2 (two) times a day.     meclizine (ANTIVERT) 25 mg tablet TAKE ONE TABLET BY MOUTH THREE TIMES DAILY AS NEEDED     mirtazapine (REMERON) 15 MG tablet Take 1 tablet (15 mg total) by mouth at bedtime.     simvastatin (ZOCOR) 20 MG tablet TAKE ONE TABLET BY MOUTH ONCE DAILY     bumetanide (BUMEX) 0.5 MG tablet 1 tablet every other day in the morning     potassium bicarbonate (K-LYTE) 25 MEQ disintegrating tablet 1 tablet dissolved in water every other day       PSFHx: Tobacco Status:  She  reports that she has never smoked. She has never used smokeless  "tobacco.    Review of Systems:  A comprehensive review of systems is negative except for the comments above    Objective:    Ht 4' 11\" (1.499 m)  Wt 113 lb (51.3 kg)  LMP  (LMP Unknown)  BMI 22.82 kg/m2  GENERAL: No acute distress.  Blood pressures 138/78.  Pulse in the 70s.  Oxygen saturations 98%.    1+ edema of the ankles.  Arterial circulation on the left foot is 2 out of 4.  Right foot 1/4  Lungs are clear  Heart shows a regular rhythm.  There are no ectopic beats heard at this time.  Abdomen is soft.  Minor tenderness and sensitivity in the epigastric region.  No masses or any organomegaly.  Decreased hearing but this is a chronic finding.  Neurologically she is intact.  She is very anxious.  Discoloration of her legs and feet is basically related to superficial varicose veins.    Assessment & Plan   Mariluz Arana is a 87 y.o. female.     she continues to have lots of difficulties.  Weakness, dizziness, anxiety etc.  Discontinue indapamide which is causing some nausea.  Try Bumex 0.5 mg every other day.  On the day she takes Bumex will give her K-Lyte 25 mEq every other day.  Check electrolytes today.  She will be back in in 2 weeks for preop and I will check electrolytes again that day.  Arterial ultrasound of the lower legs has been ordered.    Diagnoses and all orders for this visit:    Essential hypertension  -     Basic Metabolic Panel  -     bumetanide (BUMEX) 0.5 MG tablet; 1 tablet every other day in the morning  Dispense: 30 tablet; Refill: 1  -     potassium bicarbonate (K-LYTE) 25 MEQ disintegrating tablet; 1 tablet dissolved in water every other day  Dispense: 30 tablet; Refill: 1    Hyponatremia    Chronic anxiety    PVD (peripheral vascular disease) (H)  -     US Arterial Legs Bilateral Complete; Future; Expected date: 7/3/18            Sang Owens MD  Transcription using voice recognition software, may contain typographical errors.    "

## 2021-06-19 NOTE — LETTER
Letter by Sang Owens MD at      Author: Sang Owens MD Service: -- Author Type: --    Filed:  Encounter Date: 7/23/2019 Status: (Other)         Mariluz Arana  [address]  [facility] [place] [address]             July 23, 2019         Dear Ms. Arana,    Below are the results from your recent visit:    Resulted Orders   Basic Metabolic Panel   Result Value Ref Range    Sodium 134 (L) 136 - 145 mmol/L    Potassium 3.9 3.5 - 5.0 mmol/L    Chloride 99 98 - 107 mmol/L    CO2 21 (L) 22 - 31 mmol/L    Anion Gap, Calculation 14 5 - 18 mmol/L    Glucose 99 70 - 125 mg/dL    Calcium 9.8 8.5 - 10.5 mg/dL    BUN 12 8 - 28 mg/dL    Creatinine 0.74 0.60 - 1.10 mg/dL    GFR MDRD Af Amer >60 >60 mL/min/1.73m2    GFR MDRD Non Af Amer >60 >60 mL/min/1.73m2    Narrative    Fasting Glucose reference range is 70-99 mg/dL per  American Diabetes Association (ADA) guidelines.       Mariluz, recent labs are reviewed.  Electrolytes are reviewed.  Sodium was just a little low at 134.  Last time in, it was 135.  You are very close to normal.  I am going to continue to have you hold your fluids to 55 to 60 ounces of fluid per day.  I think you are doing okay.    Please call with questions or contact us using [employer].    Sincerely,        Electronically signed by Sang Owens MD

## 2021-06-20 NOTE — LETTER
Letter by Sang Owens MD at      Author: Sang Owens MD Service: -- Author Type: --    Filed:  Encounter Date: 7/15/2020 Status: (Other)         Mariluz Arana  [address]  [facility] [place] [address]             July 17, 2020         Dear Ms. Arana,    Below are the results from your recent visit:    Resulted Orders   Comprehensive Metabolic Panel   Result Value Ref Range    Sodium 130 (L) 136 - 145 mmol/L    Potassium 4.8 3.5 - 5.0 mmol/L    Chloride 95 (L) 98 - 107 mmol/L    CO2 25 22 - 31 mmol/L    Anion Gap, Calculation 10 5 - 18 mmol/L    Glucose 106 70 - 125 mg/dL    BUN 9 8 - 28 mg/dL    Creatinine 0.81 0.60 - 1.10 mg/dL    GFR MDRD Af Amer >60 >60 mL/min/1.73m2    GFR MDRD Non Af Amer >60 >60 mL/min/1.73m2    Bilirubin, Total 1.0 0.0 - 1.0 mg/dL    Calcium 9.6 8.5 - 10.5 mg/dL    Protein, Total 6.9 6.0 - 8.0 g/dL    Albumin 4.0 3.5 - 5.0 g/dL    Alkaline Phosphatase 100 45 - 120 U/L    AST 25 0 - 40 U/L    ALT 14 0 - 45 U/L    Narrative    Fasting Glucose reference range is 70-99 mg/dL per  American Diabetes Association (ADA) guidelines.   Magnesium   Result Value Ref Range    Magnesium 1.7 (L) 1.8 - 2.6 mg/dL   Surgical Pathology Exam   Result Value Ref Range    Case Report       Surgical Pathology Report                         Case: [id number]                                    Authorizing Provider:  Sang Owens MD      Collected:           07/13/2020 1140              Ordering Location:     [facility]   Received:            07/13/2020 1155                                     Internal Medicine                                                            Pathologist:           Froylan Deleon MD                                                      Specimen:    Facial, Right side of face                                                                 Final Diagnosis       SKIN BIOPSY, RIGHT SIDE OF FACE:     -   SQUAMOPROLIFERATIVE LESION WITH SEVERE  "EPIDERMAL ATYPIA     -   LESION EXTENDS TO DEEP BIOPSY MARGIN (SEE COMMENT BELOW)    Comment       The deep biopsy margin partially transects the lesion, and for this reason a well-differentiated squamous cell neoplasm can not be totally excluded.    Microscopic Description       Microscopic examination performed, substantiating the above diagnosis.    Clinical Information       Clinical history: Enlarging lesion, right side of face  Reason for procedure:  Area is getting bigger    Gross Description       The specimen is received in formalin, labeled with the patient's name and \"right side of face.\" It consists of a 0.4 x 0.3-cm shave biopsy of tan skin based with a slightly raised surface. The base is inked black. Bisected, TE-1C.  Also in the specimen container are three flakes of soft tan tissue measuring 0.2-0.6 cm in greatest dimensions. TE-1C  APRIL:efrem    Charges CPT: 31635  ICD-10: L98.9     Result Flag        Comment:      SPECIMEN PROCESSING:    All histology slide preparation and stains; and cytology slide preparation, staining, and cytotechnologist screening done at [facility] are performed at [facility], [address] [place] [address], [facility], with final interpretation, frozen section analysis, and cytology adequacy assessment at indicated laboratory.           Mariluz, recent biopsy is reviewed. There were some inflammatory changes but no evidence of malignancy.I will examine the area again at your next visit.    Please call with questions or contact us using [employer].    Sincerely,        Electronically signed by Sang Owens MD       "

## 2021-06-20 NOTE — LETTER
Letter by Sang Owens MD at      Author: Sang Owens MD Service: -- Author Type: --    Filed:  Encounter Date: 7/15/2020 Status: (Other)         Mariluz Arana  [address]  [facility] [place] [address]             July 15, 2020         Dear Ms. Arana,    Below are the results from your recent visit:    Resulted Orders   Comprehensive Metabolic Panel   Result Value Ref Range    Sodium 130 (L) 136 - 145 mmol/L    Potassium 4.8 3.5 - 5.0 mmol/L    Chloride 95 (L) 98 - 107 mmol/L    CO2 25 22 - 31 mmol/L    Anion Gap, Calculation 10 5 - 18 mmol/L    Glucose 106 70 - 125 mg/dL    BUN 9 8 - 28 mg/dL    Creatinine 0.81 0.60 - 1.10 mg/dL    GFR MDRD Af Amer >60 >60 mL/min/1.73m2    GFR MDRD Non Af Amer >60 >60 mL/min/1.73m2    Bilirubin, Total 1.0 0.0 - 1.0 mg/dL    Calcium 9.6 8.5 - 10.5 mg/dL    Protein, Total 6.9 6.0 - 8.0 g/dL    Albumin 4.0 3.5 - 5.0 g/dL    Alkaline Phosphatase 100 45 - 120 U/L    AST 25 0 - 40 U/L    ALT 14 0 - 45 U/L    Narrative    Fasting Glucose reference range is 70-99 mg/dL per  American Diabetes Association (ADA) guidelines.   Magnesium   Result Value Ref Range    Magnesium 1.7 (L) 1.8 - 2.6 mg/dL       Mariluz, recent labs are reviewed.  Your electrolytes still show a sodium low at 130.  Kidney functions and liver function tests are normal.  Your magnesium level was a little low at 1.7.  Green leafy vegetables as well as knots have a lot of magnesium.  I will try to increase the dose in your diet.  I would continue to hold your water intake at about 1400 to 1500 cc/day.(About 50 ounces)    Please call with questions or contact us using [employer].    Sincerely,        Electronically signed by Sang Owens MD

## 2021-06-20 NOTE — LETTER
Letter by Sang Owens MD at      Author: Sang Owens MD Service: -- Author Type: --    Filed:  Encounter Date: 10/12/2020 Status: (Other)         Mariluz Arana  [address]  [facility] [place] [address]             October 12, 2020         Dear Ms. Arana,    Below are the results from your recent visit:    Resulted Orders   Basic Metabolic Panel   Result Value Ref Range    Sodium 134 (L) 136 - 145 mmol/L    Potassium 4.3 3.5 - 5.0 mmol/L    Chloride 101 98 - 107 mmol/L    CO2 22 22 - 31 mmol/L    Anion Gap, Calculation 11 5 - 18 mmol/L    Glucose 103 70 - 125 mg/dL    Calcium 9.7 8.5 - 10.5 mg/dL    BUN 13 8 - 28 mg/dL    Creatinine 0.84 0.60 - 1.10 mg/dL    GFR MDRD Af Amer >60 >60 mL/min/1.73m2    GFR MDRD Non Af Amer >60 >60 mL/min/1.73m2    Narrative    Fasting Glucose reference range is 70-99 mg/dL per  American Diabetes Association (ADA) guidelines.       Mariluz, recent lab data are reviewed.  Sodium was 134 which is minimally decreased.  Actually improved a little bit over last time.  Kidney functions and other electrolytes, are normal.    Please call with questions or contact us using [employer].    Sincerely,        Electronically signed by Sang Owens MD

## 2021-06-20 NOTE — PROGRESS NOTES
"OFFICE VISIT NOTE    Subjective:   Chief Complaint:  Hypertension; Blurred Vision; Edema (face,ankles and fingers); Gait Problem (unsteady); and Fatigue (feels weak)    This is a complicated 87-year-old woman with a history of labile hypertension, panic anxiety, Ménière's disease, hyponatremia.  She continues to have difficulty with feeling sluggish and lethargic.  Some edema now that she no longer takes a diuretic.  Previous diuretics made her sick to her stomach and had feeling of generally being unwell.  Now without the diuretic, she is feeling edematous.  Having some issues with insomnia and anxiety yet.    Current Outpatient Prescriptions   Medication Sig     atenolol (TENORMIN) 25 MG tablet TAKE ONE TABLET (25MG TOTAL) BY MOUTH TWICE DAILY     cholecalciferol, vitamin D3, 1,000 unit tablet Take 1,000 Units by mouth. approx 5 days per week     LORazepam (ATIVAN) 0.5 MG tablet Take 1 tablet (0.5 mg total) by mouth 2 (two) times a day as needed for anxiety.     losartan (COZAAR) 25 MG tablet Take 25 mg in the morning and 50 mg at night     simvastatin (ZOCOR) 20 MG tablet TAKE ONE TABLET BY MOUTH ONCE DAILY     hydroCHLOROthiazide (HYDRODIURIL) 25 MG tablet Half tablet p.o. daily in the morning     ibuprofen (ADVIL,MOTRIN) 200 MG tablet Take 200-400 mg by mouth every 8 (eight) hours as needed for pain.      loperamide (IMODIUM) 2 mg capsule Take 2 mg by mouth 4 (four) times a day as needed for diarrhea.       PSFHx: Tobacco Status:  She  reports that she has never smoked. She has never used smokeless tobacco.    Review of Systems:  A comprehensive review of systems is negative except for the comments above    Objective:    /90  Pulse 74  Ht 4' 11\" (1.499 m)  Wt 111 lb (50.3 kg)  LMP  (LMP Unknown)  SpO2 99%  BMI 22.42 kg/m2  GENERAL: No acute distress.  Weight is up 2 pounds.  Today's blood pressures 162/82.  Pulse is regular today.  1+ edema of the ankles.  Some periorbital edema.  Lungs are clear of " any rales wheezes or rhonchi.  Heart does show sinus rhythm today.  There are no ectopic beats.  She has had irregular heartbeats in the past.  No JVD.  No jaundice.  No ascites.  Balance is normal.  Overall coordination seems okay.  No nystagmus.    Assessment & Plan   Mariluz Arana is a 87 y.o. female.    Blood pressure is fair at this time but she is clearly showing some fluid retention.  We will start her on hydrochlorothiazide 12.5 mg daily.  Asked her to eat a high potassium food daily.  Regarding her anxiety she can take up to 2 lorazepam a day.  Think the hyponatremia should be improving now that she is no longer drinking too much water.  25 minutes spent with pt and family.  Diagnoses and all orders for this visit:    Essential hypertension  -     hydroCHLOROthiazide (HYDRODIURIL) 25 MG tablet; Half tablet p.o. daily in the morning  Dispense: 30 tablet; Refill: 2    Hyponatremia  -     Basic Metabolic Panel    Meniere's disease, bilateral    Chronic anxiety            Sang Owens MD  Transcription using voice recognition software, may contain typographical errors.

## 2021-06-20 NOTE — PROGRESS NOTES
"OFFICE VISIT NOTE    Subjective:   Chief Complaint:  Follow-up (d/c'd HCTZ 9/8 unable to tolerate, hard time seeping)    87-year-old woman with multiple problems including hypertension, chronic anxiety, Ménière's disease.  We are trying to control her pressure.  Recently hydrochlorothiazide was prescribed but she did not tolerate this and felt 6.  At about 9 days ago.  She continues to have weak spells.  She is quite anxious and nervous.  Some depression.  No suicide thoughts.  Swallowing is a little better and her weight is stable.    Current Outpatient Prescriptions   Medication Sig     atenolol (TENORMIN) 25 MG tablet TAKE ONE TABLET (25MG TOTAL) BY MOUTH TWICE DAILY     cholecalciferol, vitamin D3, 1,000 unit tablet Take 1,000 Units by mouth. approx 5 days per week     ibuprofen (ADVIL,MOTRIN) 200 MG tablet Take 200-400 mg by mouth every 8 (eight) hours as needed for pain.      loperamide (IMODIUM) 2 mg capsule Take 2 mg by mouth 4 (four) times a day as needed for diarrhea.     LORazepam (ATIVAN) 0.5 MG tablet Take 1 tablet (0.5 mg total) by mouth 2 (two) times a day as needed for anxiety.     losartan (COZAAR) 25 MG tablet Take 25 mg in the morning and 50 mg at night     simvastatin (ZOCOR) 20 MG tablet TAKE ONE TABLET BY MOUTH ONCE DAILY       PSFHx: Tobacco Status:  She  reports that she has never smoked. She has never used smokeless tobacco.    Review of Systems:  A comprehensive review of systems is negative except for the comments above    Objective:    Pulse 84  Ht 4' 11\" (1.499 m)  Wt 111 lb (50.3 kg)  LMP  (LMP Unknown)  SpO2 100%  BMI 22.42 kg/m2  GENERAL: No acute distress.  It is stable.  Today's blood pressures 140/70.  Pulse is 76.  No ectopic beats no arrhythmias.  No edema.  Abdomen is nontender.  Lungs are clear  Heart shows a sinus rhythm without any ectopic beats.  No significant murmur gallop.  No JVD no.  No orbital edema  Neurologic exam is grossly.  Decreased range of motion of the " right shoulder.    Assessment & Plan   Mariluz Arana is a 87 y.o. female.    Pressures okay today.  She continues to suffer from anxiety depression.  She does have an appointment to see a psychiatrist in early October.  For now, she should take lorazepam up to 2 daily but not to exceed that dosage.  Flu shot today.  Check electrolytes today.  Sodium tends to be low in this woman.    Diagnoses and all orders for this visit:    Chronic anxiety    Essential hypertension  -     Basic Metabolic Panel    Need for vaccination  -     Influenza High Dose, Seasonal 65+ yrs            Sang Owens MD  Transcription using voice recognition software, may contain typographical errors.

## 2021-06-21 NOTE — LETTER
Letter by Sang Owens MD at      Author: Sang Owens MD Service: -- Author Type: --    Filed:  Encounter Date: 12/9/2020 Status: (Other)         Mariluz Arana  [address]  [facility] [place] [address]             December 9, 2020         Dear Ms. Arana,    Below are the results from your recent visit:    Resulted Orders   Basic Metabolic Panel   Result Value Ref Range    Sodium 135 (L) 136 - 145 mmol/L    Potassium 4.4 3.5 - 5.0 mmol/L    Chloride 101 98 - 107 mmol/L    CO2 18 (L) 22 - 31 mmol/L    Anion Gap, Calculation 16 5 - 18 mmol/L    Glucose 110 70 - 125 mg/dL    Calcium 9.4 8.5 - 10.5 mg/dL    BUN 12 8 - 28 mg/dL    Creatinine 0.77 0.60 - 1.10 mg/dL    GFR MDRD Af Amer >60 >60 mL/min/1.73m2    GFR MDRD Non Af Amer >60 >60 mL/min/1.73m2    Narrative    Fasting Glucose reference range is 70-99 mg/dL per  American Diabetes Association (ADA) guidelines.       Mariluz, your recent electrolytes and kidney functions look good.  Sodium is almost near normal at 135.  That is one of the better levels you have had the past year.  Once again Mariluz, thanks again for having the confidence to trust me with your care.  I enjoyed taking care of you and your family the past decades.  I wish you well.  Please call with questions or contact us using [employer].    Sincerely,        Electronically signed by Sang Owens MD

## 2021-06-21 NOTE — LETTER
Letter by Sang Owens MD at      Author: Sang Owens MD Service: -- Author Type: --    Filed:  Encounter Date: 12/22/2020 Status: (Other)         Mariluz Arana  [address]  [facility] [place] [address]             December 22, 2020         Dear Ms. Arana,    Below are the results from your recent visit:    Resulted Orders   Urinalysis-UC if Indicated   Result Value Ref Range    Color, UA Yellow Colorless, Yellow, Straw, Light Yellow    Clarity, UA Clear Clear    Glucose, UA Negative Negative    Bilirubin, UA Negative Negative    Ketones, UA Negative Negative    Specific Gravity, UA 1.015 1.005 - 1.030    Blood, UA Negative Negative    pH, UA 7.0 5.0 - 8.0    Protein, UA Negative Negative mg/dL    Urobilinogen, UA 0.2 E.U./dL 0.2 E.U./dL, 1.0 E.U./dL    Nitrite, UA Negative Negative    Leukocytes, UA Moderate (!) Negative    Bacteria, UA Few (!) None Seen hpf    RBC, UA None Seen None Seen, 0-2 hpf    WBC, UA 0-5 None Seen, 0-5 hpf    Squam Epithel, UA 0-5 None Seen, 0-5 lpf    Narrative    Urine Culture ordered based on [facility]' criteria   Culture, Urine   Result Value Ref Range    Culture No Growth        Mariluz, the recent urine specimen looked okay, and the culture was negative for any infection.  Try to avoid drinking the last 2 hours before going to bed.  That may reduce the number of times you have to urinate at night.    Please call with questions or contact us using [employer].    Sincerely,        Electronically signed by Sang Owens MD

## 2021-06-21 NOTE — PROGRESS NOTES
"OFFICE VISIT NOTE    Subjective:   Chief Complaint:  swollen ankles; Fall (had fall since last seen, bruising); and medicatin check (Other Dr put her on Remeron, possible side effects)    87-year-old woman with history of hypertension, chronic anxiety, Ménière's disease.  Doing okay though she did have a fall a couple of weeks ago.  Contusion of the face only with no fractures.  No loss of consciousness.  Is having trouble with chronic arthritis of the shoulders right side worse than left.  She notes more swelling.  No increasing chest pain.  No shortness of breath.  Does have increasing swelling of the feet she relates it to starting the medication Remeron recently.  She stopped the Remeron 3 or 4 days ago.    Current Outpatient Prescriptions   Medication Sig     atenolol (TENORMIN) 25 MG tablet TAKE ONE TABLET (25MG TOTAL) BY MOUTH TWICE DAILY     cholecalciferol, vitamin D3, 1,000 unit tablet Take 1,000 Units by mouth. approx 5 days per week     ibuprofen (ADVIL,MOTRIN) 200 MG tablet Take 200-400 mg by mouth every 8 (eight) hours as needed for pain.      loperamide (IMODIUM) 2 mg capsule Take 2 mg by mouth 4 (four) times a day as needed for diarrhea.     LORazepam (ATIVAN) 0.5 MG tablet TAKE 1 TABLET BY MOUTH TWICE DAILY AS NEEDED FOR ANXIETY     losartan (COZAAR) 25 MG tablet Take 25 mg in the morning and 50 mg at night     simvastatin (ZOCOR) 20 MG tablet TAKE ONE TABLET BY MOUTH ONCE DAILY       PSFHx: Tobacco Status:  She  reports that she has never smoked. She has never used smokeless tobacco.    Review of Systems:  A comprehensive review of systems is negative except for the comments above    Objective:    Pulse 71  Ht 4' 11\" (1.499 m)  Wt 114 lb (51.7 kg)  LMP  (LMP Unknown)  SpO2 94%  BMI 23.03 kg/m2  GENERAL: No acute distress.  Weight is stable.  Blood pressures 156/82.  Pulse 70.  1-2+ edema the feet and ankles.  Legs do not look bad.  No presacral edema.  Lungs are clear.  Heart shows a sinus " rhythm without ectopic beats.  There is a grade 2/6 systolic ejection murmur which is chronic.  [place] loudest on the left sternal border.  Significant osteoarthritis of both shoulders.  Both shoulders have joint effusions right side worse than the left.  The joints are not hot.  Some discoloration under the right eye from recent fall and contusion.  Cranial nerves were intact.  TMJs normal.  Good range of motion of both eyes.    Assessment & Plan   Mariluz Arana is a 87 y.o. female.    Hypertension primarily systolic  Facial contusion  Chronic anxiety  Edema probably related to the medication.  This is been stopped.  Edema is a chronic problem for her.  She does not tolerate diuretics.  We will see if the edema clears by itself.  I am checking a basic metabolic profile today.  Return to clinic in about 8 weeks.  Blood pressure meds remain the same.    Diagnoses and all orders for this visit:    Essential hypertension  -     Basic Metabolic Panel    Hyponatremia    Chronic anxiety            Sang Owens MD  Transcription using voice recognition software, may contain typographical errors.

## 2021-06-21 NOTE — LETTER
Letter by Darrius Barriga MD at      Author: Darrius Barriga MD Service: -- Author Type: --    Filed:  Encounter Date: 2/10/2021 Status: (Other)         Mariluz Arana  [address]  [facility] [place] [address]             February 10, 2021         Dear Ms. Arana,    The sodium concentration was normal, and the TSH thyroid test was also normal.  Magnesium level was right at the lower limit of normal.  I do want you to take the magnesium tablet that I prescribed yesterday.    Resulted Orders   Basic Metabolic Panel   Result Value Ref Range    Sodium 136 136 - 145 mmol/L    Potassium 4.6 3.5 - 5.0 mmol/L    Chloride 102 98 - 107 mmol/L    CO2 23 22 - 31 mmol/L    Anion Gap, Calculation 11 5 - 18 mmol/L    Glucose 91 70 - 125 mg/dL    Calcium 9.7 8.5 - 10.5 mg/dL    BUN 14 8 - 28 mg/dL    Creatinine 0.91 0.60 - 1.10 mg/dL    GFR MDRD Af Amer >60 >60 mL/min/1.73m2    GFR MDRD Non Af Amer 58 (L) >60 mL/min/1.73m2    Narrative    Fasting Glucose reference range is 70-99 mg/dL per  American Diabetes Association (ADA) guidelines.   Thyroid Cascade   Result Value Ref Range    TSH 3.02 0.30 - 5.00 uIU/mL   Magnesium   Result Value Ref Range    Magnesium 1.8 1.8 - 2.6 mg/dL         Please call with questions or contact us using [employer].    Sincerely,        Electronically signed by Darrius Barriga MD

## 2021-06-22 NOTE — PROGRESS NOTES
"OFFICE VISIT NOTE    Subjective:   Chief Complaint:  Follow-up    87-year-old woman in for follow-up regarding hypertension, Ménière's disease, chronic anxiety.  She seems to be doing a little bit better.  Blood pressures are reviewed and they are quite good for her.  No complaints of shortness of breath or any orthopnea.  Minor edema at the end of the day.  Minor dizziness tends to come and go.  No falls since I last saw her    Current Outpatient Medications   Medication Sig     atenolol (TENORMIN) 25 MG tablet TAKE ONE TABLET (25MG TOTAL) BY MOUTH TWICE DAILY     cholecalciferol, vitamin D3, 1,000 unit tablet Take 1,000 Units by mouth. approx 5 days per week     ibuprofen (ADVIL,MOTRIN) 200 MG tablet Take 200-400 mg by mouth every 8 (eight) hours as needed for pain.      loperamide (IMODIUM) 2 mg capsule Take 2 mg by mouth 4 (four) times a day as needed for diarrhea.     LORazepam (ATIVAN) 0.5 MG tablet TAKE 1 TABLET BY MOUTH TWICE DAILY AS NEEDED FOR ANXIETY     losartan (COZAAR) 25 MG tablet Take 25 mg in the morning and 50 mg at night     losartan (COZAAR) 50 MG tablet TAKE 1/2 (ONE-HALF) TABLET (25MG) BY MOUTH ONCE DAILY IN THE MORNING AND 1 TAB (50 MG) AFTERNOON     meclizine (ANTIVERT) 25 mg tablet Take 25 mg by mouth.     simvastatin (ZOCOR) 20 MG tablet TAKE ONE TABLET BY MOUTH ONCE DAILY       PSFHx: Tobacco Status:  She  reports that  has never smoked. she has never used smokeless tobacco.    Review of Systems:  A comprehensive review of systems is negative except for the comments above    Objective:    Ht 4' 11\" (1.499 m)   Wt 113 lb (51.3 kg)   LMP  (LMP Unknown)   BMI 22.82 kg/m    GENERAL: No acute distress.  Weight is stable.  Today's blood pressure is 142/74.  Pulse is 74 and regular.  Trace to 1+ edema of the ankles.  No presacral edema.  Lungs are clear of any rales wheezes or rhonchi heart today shows a sinus rhythm.  There are no signal beats.  No jugular vein distention.  Osteoarthritic " changes of the hands.  Left ear was occluded with cerumen.  Most of this was removed using a cerumen spoon.  Her mood and affect seem much better.    Assessment & Plan   Mariluz Arana is a 87 y.o. female.    Clinically she seems to be stable.  Blood pressure is good and we will continue the same meds as before.  She does use lorazepam on a as needed basis for anxiety.  Is complaining of some minor dysuria.  I will check a urine analysis.  Also check a basic metabolic profile.  Medicines will be the same.  Return to clinic in about 2 months.    Diagnoses and all orders for this visit:    Essential hypertension  -     Basic Metabolic Panel    Chronic anxiety    Dysuria  -     Urinalysis-UC if Indicated            Sang Owens MD  Transcription using voice recognition software, may contain typographical errors.

## 2021-06-23 NOTE — PROGRESS NOTES
"OFFICE VISIT NOTE    Subjective:   Chief Complaint:  Follow-up    87-year-old woman in for follow-up regarding hypertension, anxiety, history she is doing a little bit better.  Less anxiety and no more panic attacks.  Denies chest pain.  Occasionally gets palpitations.  No syncope.    Current Outpatient Medications   Medication Sig     atenolol (TENORMIN) 25 MG tablet TAKE ONE TABLET (25MG TOTAL) BY MOUTH TWICE DAILY     cholecalciferol, vitamin D3, 1,000 unit tablet Take 1,000 Units by mouth. approx 5 days per week     fluticasone (FLONASE) 50 mcg/actuation nasal spray Apply 1 spray into each nostril daily.     ibuprofen (ADVIL,MOTRIN) 200 MG tablet Take 200-400 mg by mouth every 8 (eight) hours as needed for pain.      loperamide (IMODIUM) 2 mg capsule Take 2 mg by mouth 4 (four) times a day as needed for diarrhea.     LORazepam (ATIVAN) 0.5 MG tablet TAKE 1 TABLET BY MOUTH TWICE DAILY AS NEEDED FOR ANXIETY     losartan (COZAAR) 25 MG tablet Take 25 mg in the morning and 50 mg at night     losartan (COZAAR) 50 MG tablet TAKE 1/2 (ONE-HALF) TABLET (25MG) BY MOUTH ONCE DAILY IN THE MORNING AND 1 TAB (50 MG) AFTERNOON     meclizine (ANTIVERT) 25 mg tablet Take 25 mg by mouth.     simvastatin (ZOCOR) 20 MG tablet TAKE ONE TABLET BY MOUTH ONCE DAILY       PSFHx: Tobacco Status:  She  reports that  has never smoked. she has never used smokeless tobacco.    Review of Systems:  A comprehensive review of systems is negative except for the comments above    Objective:    Ht 4' 11\" (1.499 m)   Wt 114 lb (51.7 kg)   LMP  (LMP Unknown)   BMI 23.03 kg/m    GENERAL: No acute distress.  It is up 1 pound.  Today's blood pressure is 148/78.  Pulse 66 and regular.  Oxygen saturation 97% on room air  Mentally sharp and alert.  Lungs are clear.  Heart shows a sinus rhythm.  There is an innocent sounding ejection murmur present.  There are no ectopic beats heard today.  Trace to 1+ ankle edema.  Nothing in the pretibial region.  No " presacral edema.  Eyes are normal.  No nystagmus.    Assessment & Plan   Mariluz Arana is a 87 y.o. female.    Likely she stable.  Blood pressure is adequate for her age.  Anxiety seems to be under fairly good control.  She does use Ativan as needed.  She will continue losartan and atenolol for the blood pressure.  I me to check a basic metabolic profile.  Return to clinic in 2 months.    Diagnoses and all orders for this visit:    Chronic anxiety    Essential hypertension        The following high BMI interventions were performed this visit: encouragement to exercise    Sang Owens MD  Transcription using voice recognition software, may contain typographical errors.

## 2021-06-24 NOTE — TELEPHONE ENCOUNTER
Controlled Substance Refill Request  Medication:   Requested Prescriptions     Pending Prescriptions Disp Refills     LORazepam (ATIVAN) 0.5 MG tablet [Pharmacy Med Name: LORAZEPAM 0.5MG     TAB] 60 tablet 0     Sig: TAKE 1 TABLET BY MOUTH TWICE DAILY AS NEEDED FOR ANXIETY     Date Last Fill: 12/18/18  #60 R-0  Pharmacy: [id number]   Submit electronically to pharmacy  Controlled Substance Agreement on File:   Encounter-Level CSA Scan Date:    There are no encounter-level csa scan date.       Last office visit: 1/21/2019 Sang Owens MD Katie Beck RN [facility]

## 2021-06-25 NOTE — TELEPHONE ENCOUNTER
"Mariluz Arana was presented to [facility]'s ED on 06/14/2021 with paresthesias in all extremities. Cervical CT. MR, XR were reviewed by Dr. Barber. A neurosurgery follow up in clinic was recommended.    Called Mariluz to see how she is doing and to offer an appt in clinic this week. \"I am doing better today\", she said. She denied new or worsening symptoms. She declined appt in clinic stating she has to discuss it with her family. Our office phone number was provided.  Crystal Jackson, RN, CNRN      "

## 2021-06-25 NOTE — PROGRESS NOTES
"OFFICE VISIT NOTE    Subjective:   Chief Complaint:  Follow-up    This is an 88-year-old woman with a history of hypertension, hyponatremia, Ménière's disease, chronic anxiety.  She is doing fairly well.  She still gets periods of extreme exhaustion after visiting with relatives etc.  No falls or injuries.  She gets a little short of breath with exertion but no  orthopnea.  No chest pain .    Current Outpatient Medications   Medication Sig     atenolol (TENORMIN) 25 MG tablet TAKE ONE TABLET (25MG TOTAL) BY MOUTH TWICE DAILY     cholecalciferol, vitamin D3, 1,000 unit tablet Take 1,000 Units by mouth. approx 5 days per week     fluticasone (FLONASE) 50 mcg/actuation nasal spray Apply 2 sprays into each nostril daily.            ibuprofen (ADVIL,MOTRIN) 200 MG tablet Take 200-400 mg by mouth every 8 (eight) hours as needed for pain.      loperamide (IMODIUM) 2 mg capsule Take 2 mg by mouth 4 (four) times a day as needed for diarrhea.     LORazepam (ATIVAN) 0.5 MG tablet TAKE 1 TABLET BY MOUTH TWICE DAILY AS NEEDED FOR ANXIETY     losartan (COZAAR) 25 MG tablet Take 25 mg in the morning and 50 mg at night     losartan (COZAAR) 50 MG tablet TAKE 1/2 (ONE-HALF) TABLET (25MG) BY MOUTH ONCE DAILY IN THE MORNING AND 1 TAB (50 MG) AFTERNOON     meclizine (ANTIVERT) 25 mg tablet Take 25 mg by mouth.     simvastatin (ZOCOR) 20 MG tablet TAKE ONE TABLET BY MOUTH ONCE DAILY       PSFHx: Tobacco Status:  She  reports that  has never smoked. she has never used smokeless tobacco.    Review of Systems:  A comprehensive review of systems is negative except for the comments above    Objective:    Ht 4' 11\" (1.499 m)   Wt 113 lb (51.3 kg)   LMP  (LMP Unknown)   BMI 22.82 kg/m    GENERAL: No acute distress.  Weight is stable.  Today's blood pressure 144/78.  Pulse is 65 and regular.  Oxygen saturations 94% on room air.  No significant edema at 1:30 in the afternoon.  Lungs do sound clear of any rales or heart does show a sinus " rhythm.  There is a grade 2/6 systolic ejection murmur consistent with aortic stenosis.  No diastolic murmurs heard.  She walks with the use of a cane but she can walk without this as well.  No ataxia no nystagmus.  Left ear was occluded with cerumen.  This was removed using a cerumen spoon.    Assessment & Plan   Mariluz Arana is a 88 y.o. female.    She seems stable.  I think a lot of her exhaustion is basically some anxiety and just her age.  I will check her electrolytes as she has had trouble in the past with hyponatremia.  She wants to be seen every 6 weeks.  She uses Ativan once daily for anxiety  Diagnoses and all orders for this visit:    Chronic anxiety    Essential hypertension  -     Basic Metabolic Panel    Hyponatremia            Sang Owens MD  Transcription using voice recognition software, may contain typographical errors.

## 2021-06-25 NOTE — ED TRIAGE NOTES
Patient is here numbness and tingling bilateral to her upper extremities. She does have the issue of cool extremities due to shoulder issues, today is worse than normal. She is also complain of weakness and tingling to bilateral lower extremities. Hx of irritable bowel syndrome and did have some diarrhea today. No cardiac issues, she does have bilateral edema to her feet and ankle area. She also is complain of not being able to get good air exchange with breathing.

## 2021-06-25 NOTE — TELEPHONE ENCOUNTER
Controlled Substance Refill Request  Medication Name:   Requested Prescriptions     Pending Prescriptions Disp Refills     LORazepam (ATIVAN) 0.5 MG tablet [Pharmacy Med Name: lorazepam 0.5 mg tablet] 30 tablet 0     Sig: Take 1 tablet (0.5 mg total) by mouth at bedtime as needed for anxiety.     Date Last Fill: 4/14/21  Requested Pharmacy: [facility] pharmacy  Submit electronically to pharmacy  Controlled Substance Agreement on file:   Encounter-Level CSA Scan Date:    There are no encounter-level csa scan date.        Last office visit:  5/20/21         Jessica Dennis RN, BSN Nurse Triage Advisor 11:54 AM 5/27/2021

## 2021-06-25 NOTE — TELEPHONE ENCOUNTER
"Patient reports that she is not feeling well. She woke up at 5 AM and didn't feel right. Patient reports that she feels short of breath when sitting, denies difficulty breathing when standing. Patient is having tingling and weakness in her arms and legs, feels more unsteady than normal walking. She denies chest pain, but reports that she does have palpitations and feels is feeling them.    Patient is advised on ED visit at this time. Patient will try to get someone to take her, she does not want to call an ambulance. Patient is encouraged to call 911 if unable to get a ride soon. She verbalizes understanding and denies further questions at this time.    Evelin Dickson RN, BSN Nurse Triage Advisor 2:30 PM 6/14/2021      Reason for Disposition    Extra heart beats OR irregular heart beating   (i.e., \"palpitations\")    Additional Information    Negative: Breathing stopped and hasn't returned    Negative: Choking on something    Negative: SEVERE difficulty breathing (e.g., struggling for each breath, speaks in single words, pulse > 120)    Negative: Bluish (or gray) lips or face    Negative: Difficult to awaken or acting confused (e.g., disoriented, slurred speech)    Negative: Passed out (i.e., fainted, collapsed and was not responding)    Negative: Wheezing started suddenly after medicine, an allergic food, or bee sting    Negative: Stridor    Negative: Slow, shallow and weak breathing    Negative: Sounds like a life-threatening emergency to the triager    Negative: Chest pain    Negative: Wheezing (high pitched whistling sound) and previous asthma attacks or use of asthma medicines    Negative: Difficulty breathing and only present when coughing    Negative: Difficulty breathing and only from stuffy or runny nose    Negative: Difficulty breathing and within 14 days of COVID-19 Exposure    Negative: MODERATE difficulty breathing (e.g., speaks in phrases, SOB even at rest, pulse 100-120) of new onset or worse than " "normal    Negative: Wheezing can be heard across the room    Negative: Drooling or spitting out saliva (because can't swallow)    Negative: Any history of prior \"blood clot\" in leg or lungs    Negative: Illness requiring prolonged bedrest in past month (e.g., immobilization, long hospital stay)    Negative: Hip or leg fracture (broken bone) in past month (or had cast on leg or ankle in past month)    Negative: Major surgery in the past month    Negative: Long-distance travel in past month (e.g., car, bus, train, plane; with trip lasting 6 or more hours)    Protocols used: BREATHING DIFFICULTY-A-OH    COVID 19 Nurse Triage Plan/Patient Instructions    Please be aware that novel coronavirus (COVID-19) may be circulating in the community. If you develop symptoms such as fever, cough, or SOB or if you have concerns about the presence of another infection including coronavirus (COVID-19), please contact your health care provider or visit  [email or web address].    Disposition/Instructions    ED Visit recommended. Follow protocol based instructions.      Bring Your Own Device:  Please also bring your smart device(s) (smart phones, tablets, laptops) and their charging cables for your personal use and to communicate with your care team during your visit.      Thank you for taking steps to prevent the spread of this virus.  o Limit your contact with others.  o Wear a simple mask to cover your cough.  o Wash your hands well and often.    Resources    [facility]: About COVID-19: [email or web address]    CDC: What to Do If You're Sick: www.cdc.gov/coronavirus/2019-ncov/about/steps-when-sick.html    CDC: Ending Home Isolation: www.cdc.gov/coronavirus/2019-ncov/hcp/disposition-in-home-patients.html     CDC: Caring for Someone: www.cdc.gov/coronavirus/2019-ncov/if-you-are-sick/care-for-someone.html     [facility]: Interim Guidance for Hospital Discharge to Home: " www.health.[facility].mn.us/diseases/coronavirus/hcp/hospdischarge.pdf    [facility] clinical trials (COVID-19 research studies): [email or web address]/umn-clinical-trials     Below are the COVID-19 hotlines at the [facility] of Health ([facility]). Interpreters are available.   o For health questions: Call 223-955-8740 or 1-281.935.6062 (7 a.m. to 7 p.m.)  o For questions about schools and childcare: Call [phone] or 1-194.277.7353 (7 a.m. to 7 p.m.)

## 2021-06-26 NOTE — PLAN OF CARE
Neurosurgery plan of care:    Cervical CT, MRI, XR reviewed by Dr Barber. There is mild to moderate central canal stenosis only. There are cystic changes within the dens with surrounding sclerosis of the posterior joint capsule, severe atlantoaxial joint changes. No fractures.  There is no cord compression or instability.    Imaging is not overly impressive for etiology of patient's 4-limb tingling. That being said, tingling in all extremities is not normal, sounds pathologic.    Recommend neurology opinion. If neurology is content with her discharging, we could see her in the neurosurgery clinic tomorrow. If neurology feels she needs to be admitted, we would request anywhere other than [facility].    Angela JOSEPH-C  [facility]  O. [phone]

## 2021-06-26 NOTE — PROGRESS NOTES
Progress Notes by Tash Patterson MD at 12/29/2017  2:20 PM     Author: Tash Patterson MD Service: -- Author Type: Physician    Filed: 12/29/2017  3:11 PM Encounter Date: 12/29/2017 Status: Signed    : Tash Patterson MD (Physician)           Click to link to [facility]     Health system HEART CARE NOTE    Thank you, Dr. May, for asking the [facility] team to see Ms. Mariluz Arana in the rapid access clinic to evaluate palpitations.    Assessment/Recommendations   Assessment:    1.  Palpitations.  Suspect this may represent PACs or PVCs that she describes a sense of skipped heartbeats for many years although more prominent in the recent weeks.  Her ECG in the ED demonstrated marked sinus bradycardia at a rate of 45, likely aggravated by beta-blocker therapy.  At this point, I told her I did not want to discontinue her atenolol as it may increase the frequency of her palpitations.  I suggested that we obtain a Holter monitor as she states her palpitations occur almost daily.  2.  Systolic murmur.  This suggests the possibility of aortic valve sclerosis as well as possible mitral insufficiency.  Advised an echocardiogram for further evaluation  3.  Essential hypertension, borderline controlled with atenolol.  She had been on losartan as well although this was discontinued because of a few low blood pressure readings.  I will leave it up to the primary physician whether to restart the losartan if her systolic pressures continue to remain above 140.    Plan:  1.  Continue current medications for now  2.  Set up echocardiogram and Holter monitor  3.  Follow-up back in clinic in 3 weeks       History of Present Illness    Ms. Mariluz Arana is a 86 y.o. female with history of essential hypertension, heart murmur of unclear etiology, anxiety disorder and palpitations for many years who recently presented to the ED for evaluation of palpitations.  She states she has had these for many years now  but they have become more prevalent in the last several months.  She describes it more as a skipping sensation but occasionally notes her heart racing.  There are now occurring nearly daily.  Unclear whether these are associated with some symptoms of lightheadedness which she has periodically.  She denies any chest discomfort, shortness of breath.  She tells me she has had a heart murmur for many years.  She believes she had an echocardiogram performed although no echo is documented in the [facility] system or through Care Everywhere.    ECG (personally reviewed): Recent ECG from the ED demonstrated marked sinus bradycardia, rate 45.  There is left ventricular hypertrophy by voltage    Cardiac Imaging Studies (personally reviewed): No cardiac imaging     Physical Examination Review of Systems   Vitals:    12/29/17 1427   BP: 144/90   Pulse:    Resp:      Body mass index is 23.63 kg/(m^2).  Wt Readings from Last 3 Encounters:   12/29/17 117 lb (53.1 kg)   12/22/17 115 lb (52.2 kg)   11/14/17 121 lb (54.9 kg)     General Appearance:   Awake, Alert, No acute distress.   HEENT:  No scleral icterus; the mucous membranes were pink and moist.   Neck: No cervical bruits or jugular venous distention.  Possible transmitted systolic murmur versus bruit in the right carotid.   Chest: The spine was straight. The chest was symmetric.   Lungs:   Respirations unlabored; the lungs are clear to auscultation. No wheezing   Cardiovascular:    Regular rate and rhythm with occasional to frequent ectopic beats.  S1, S2 normal.  A 1/6 systolic murmur is heard at the left upper sternal border.  There is also 1/6 holosystolic murmur heard in the axilla.   Abdomen:  No organomegaly, masses, bruits, or tenderness. Bowels sounds are present   Extremities:  No peripheral edema, clubbing or cyanosis.   Skin: No xanthelasma. Warm, Dry.   Musculoskeletal: No tenderness.   Neurologic: Mood and affect are appropriate.    General: WNL  Eyes:  WNL  Ears/Nose/Throat: WNL  Lungs: WNL  Heart: WNL  Stomach: WNL  Bladder: WNL  Muscle/Joints: WNL  Skin: WNL  Nervous System: WNL  Mental Health: WNL     Blood: WNL     Medical History  Surgical History Family History Social History   Past Medical History:   Diagnosis Date   ? Arthritis     Osteoarthritis   ? Generalized anxiety disorder    ? GERD (gastroesophageal reflux disease)    ? Heart murmur    ? Heart palpitations    ? Hypercholesteremia    ? Hypertension    ? Irritable bowel    ? Meniere's disease    ? Osteoporosis     Past Surgical History:   Procedure Laterality Date   ? DILATION AND CURETTAGE OF UTERUS     ? ENDOLYMPHATIC SAC OPERATION  2012    enhancement   ? INNER EAR SURGERY      Per Pt   ? MASTOIDECTOMY  2012   ? TONSILLECTOMY      Family History   Problem Relation Age of Onset   ? Heart disease Mother    ? Clotting disorder Mother      Dies of a blood clot, per pt   ? Diabetes Father    ? Stroke Father    ? Hypertension Son    ? Osteoporosis Daughter    ? Hypertension Daughter    ? Cancer Sister      Colon   ? Glaucoma Sister     Social History     Social History   ? Marital status:      Spouse name: N/A   ? Number of children: 7   ? Years of education: N/A     Occupational History   ? Not on file.     Social History Main Topics   ? Smoking status: Never Smoker   ? Smokeless tobacco: Never Used   ? Alcohol use 2.4 oz/week     4 Glasses of wine per week   ? Drug use: No   ? Sexual activity: No     Other Topics Concern   ? Not on file     Social History Narrative    Patient is , they are retired.    Lives in assisted care for past 2 years (11/2/2016).          Medications  Allergies   Current Outpatient Prescriptions   Medication Sig Dispense Refill   ? atenolol (TENORMIN) 25 MG tablet Take 25 mg by mouth 2 (two) times a day. Hold for BP less 120     ? cholecalciferol, vitamin D3, 1,000 unit tablet Take 1,000 Units by mouth. approx 5 days per week     ? ibuprofen (ADVIL,MOTRIN) 200 MG  tablet Take 200-400 mg by mouth every 8 (eight) hours as needed for pain.      ? indapamide (LOZOL) 1.25 MG tablet TAKE ONE TABLET BY MOUTH EVERY OTHER DAY 90 tablet 2   ? loperamide (IMODIUM) 2 mg capsule Take 2 mg by mouth 4 (four) times a day as needed for diarrhea.     ? LORazepam (ATIVAN) 0.5 MG tablet Take 1-2 tablets (0.5-1 mg total) by mouth bedtime. 30 tablet 0   ? LORazepam (ATIVAN) 0.5 MG tablet TAKE ONE TABLET BY MOUTH TWICE DAILY AS NEEDED FOR ANXIETY 60 tablet 0   ? meclizine (ANTIVERT) 25 mg tablet TAKE ONE TABLET BY MOUTH THREE TIMES DAILY AS NEEDED 30 tablet 3   ? simvastatin (ZOCOR) 20 MG tablet TAKE ONE TABLET BY MOUTH ONCE DAILY 90 tablet 3   ? LORazepam (ATIVAN) 0.5 MG tablet TAKE ONE TABLET BY MOUTH TWICE DAILY AS NEEDED FOR ANXIETY 60 tablet 0   ? losartan (COZAAR) 25 MG tablet Take 1 tablet (25 mg total) by mouth daily. 90 tablet 3     No current facility-administered medications for this visit.       Allergies   Allergen Reactions   ? Metronidazole    ? Actonel [Risedronate]      GI UPSET   ? Clindamycin    ? Cortisone (Hydrocortisone) [Hydrocortisone]    ? Fosamax [Alendronate]      GI UPSET     ? Kenalog [Triamcinolone Acetonide]    ? Minipress [Prazosin] Other (See Comments)     hypotension   ? Penicillin G Benzathine    ? Statins-Hmg-Coa Reductase Inhibitors Other (See Comments)     Shortness of breath  Tolerating simvastatin (Sept 2016)   ? Tetanus And Diphtheria Toxoids, Adsorbed, Adult Unknown   ? Tetanus Toxoid, Adsorbed    ? Prednisone Rash         Lab Results    Chemistry/lipid CBC Cardiac Enzymes/BNP/TSH/INR   Lab Results   Component Value Date    CREATININE 0.70 12/22/2017    BUN 14 12/22/2017    K 3.5 12/22/2017     12/22/2017     12/22/2017    CO2 26 12/22/2017    Lab Results   Component Value Date    WBC 5.9 12/22/2017    HGB 12.3 12/22/2017    HCT 36.8 12/22/2017    MCV 95 12/22/2017     12/22/2017    Lab Results   Component Value Date    CKTOTAL 333 (H)  09/30/2009    CKMB 3 04/27/2015    TROPONINI <0.01 12/22/2017     (H) 12/22/2017    TSH 2.03 12/22/2017    INR 1.06 12/22/2017

## 2021-06-26 NOTE — ED PROVIDER NOTES
EMERGENCY DEPARTMENT ENCOUnter      NAME: Mariluz Arana  AGE: 89 y.o. female  YOB: 1931  MRN: [id number]  EVALUATION DATE & TIME: 6/14/2021  3:58 PM    PCP: Darrius Barriga MD    ED PROVIDER: Torey Cavazos M.D.      Chief Complaint   Patient presents with     Numbness     Fatigue         FINAL IMPRESSION:  1. Paresthesia    2. Shortness of breath          ED COURSE & MEDICAL DECISION MAKING:    Pertinent Labs & Imaging studies reviewed. (See chart for details)  89 y.o. female presents to the Emergency Department for evaluation of bilateral upper extremity tingling, shortness of breath. Patient appears non toxic with stable vitals signs, patient is afebrile with no tachycardia or hypoxia.  Patient has a GCS of 15 with no focal neuro deficits, I do not appreciate any focal weakness, she denies any falls or trauma, denies fevers, vomiting, or other systemic signs of illness.  Does endorse this shortness of breath since 7:30 this morning but denies any chest pain, pleurisy, no ripping or tearing chest discomfort of the back or shoulders, hemoptysis, productive cough.  Lungs are clear on exam.  Her story is very atypical for ACS, she is not tachycardic or hypoxic, she is afebrile, clinically nothing to suggest PE, dissection, esophageal rupture, pneumonia, Covid, CHF, COPD, asthma or other restrictive lung process.  Again benign neurologic exam with no true loss of sensation, unilateral symptoms, no true focal weakness, nothing suggest CVA, intracranial bleed or mass, hemorrhage sinus thrombosis, or other more malicious etiology of symptoms.  We will obtain screening labs, ECG and a chest x-ray.  Discussed patient case with neurology who agrees with care plan and recommended MRI imaging of the cervical spine and if negative discharge and close follow-up.     Reassessment: Troponin negative, ECG nonischemic, chest x-ray reported no acute concerning findings, did note BNP of 519, that said with a  clear chest x-ray, no chest pain, duration of symptoms and otherwise atypical presentation certainly nothing to suggest cardiac ischemia.  Otherwise labs showed no acute concerning findings.  MRI of the cervical spine reported moderate to severe neural foraminal narrowing as well as some spinal canal narrowing along the cervical spine, of note concern was for moderate marrow edema involving the dens which was new.  Did discuss these findings and patient case with neurosurgery who recommended CT imaging of the cervical spine as well as flexion and extension plain films.  We did obtain these imaging studies which reported no acute concerning findings, specifically no new fracture.  That said neurosurgery discussed possibility of admission given the findings on the extension and flexion films but ultimately neurosurgery was amenable to discharge as long as neurology agreed.  I again consulted neurology with these new findings and after discussing patient case neurology also felt it was reasonable to discharge and have the patient follow-up with outpatient neurosurgery.  I ultimately felt this was also reasonable given her benign exam and overall well appearance.  Will discharge and recommend close follow-up with neurosurgery as well as provide outpatient follow-up with pulmonology given her continued endorsement of shortness of breath the work-up here was negative.  Discussed all these findings recommendations with patient and family and they ultimately felt very reassured and comfortable with discharge.  Return precautions provided.    4:07 PM I performed my initial history and physical exam as well as discussed ED course and plan.     4:29 PM I spoke with Dr. Olsen of neurology in regards to the patient's case.  Updated patient on need for MRI.   7:39 PM I updated the patient of lab and imaging results as well as need for neurosurgery consultation.    8:10 PM I spoke with Dr. Barber of neurosurgery in regards to the  patient's case.  Updated patient of care plan for CT.    10:44 PM I spoke with Angela De Loen NP of neurosurgery who recommended neurology consult.    10:49 PM I spoke with Dr. Olsen of neurology who agrees with care plan.  I updated the patient.      At the conclusion of the encounter I discussed the results of all of the tests and the disposition. The questions were answered and return precautions provided. The patient or family acknowledged understanding and was agreeable with the care plan.         MEDICATIONS GIVEN IN THE EMERGENCY:  Medications   sodium chloride flush 10 mL (NS) (has no administration in time range)   LORazepam tablet 0.5 mg (ATIVAN) (0.5 mg Oral Given 6/14/21 1701)   gadobutroL 7.5 mmol/7.5 mL (1 mmol/mL) injection 5 mL (GADAVIST) (5 mL Intravenous Given 6/14/21 1810)       NEW PRESCRIPTIONS STARTED AT TODAY'S ER VISIT  Discharge Medication List as of 6/14/2021 11:05 PM      CONTINUE these medications which have NOT CHANGED    Details   acetaminophen (TYLENOL) 500 MG tablet Take 500 mg by mouth every 6 (six) hours as needed for pain., Historical Med      atenoloL (TENORMIN) 25 MG tablet Take 1 tablet (25 mg total) by mouth 2 (two) times a day., Starting Tue 12/8/2020, Normal      cholecalciferol, vitamin D3, 1,000 unit tablet Take 1,000 Units by mouth daily.       , Historical Med      fluticasone (FLONASE) 50 mcg/actuation nasal spray Apply 1 spray into each nostril daily.       , Historical Med      ibuprofen (ADVIL,MOTRIN) 200 MG tablet Take 200-400 mg by mouth every 8 (eight) hours as needed for pain. , Until Discontinued, Historical Med      loperamide (IMODIUM) 2 mg capsule Take 2 mg by mouth 4 (four) times a day as needed for diarrhea., Until Discontinued, Historical Med      LORazepam (ATIVAN) 0.5 MG tablet Take 1 tablet (0.5 mg total) by mouth at bedtime as needed for anxiety., Starting Thu 5/27/2021, Normal      losartan (COZAAR) 50 MG tablet Take 1 tablet (50 mg total) by mouth  2 (two) times a day., Starting Thu 6/11/2020, Normal      magnesium oxide (MAGOX) 400 mg (241.3 mg magnesium) tablet Take 0.5 tablets (200 mg total) by mouth 2 (two) times a day., Starting Tue 2/9/2021, Normal      meclizine (ANTIVERT) 25 mg tablet TAKE ONE TABLET BY MOUTH THREE TIMES DAILY AS NEEDED, Normal      simvastatin (ZOCOR) 20 MG tablet TAKE 1 TABLET BY MOUTH ONCE DAILY, Normal                =================================================================    HPI    Patient information was obtained from: Patient    Use of Intrepreter: N/A         Mariluz Arana is a 89 y.o. female with a history of hypertension, chronic anxiety ,and meniere's disease who presents with tingling and dyspnea.    Patient reports that she woke up at 0500 this morning and was unable to fall back asleep which is unusual for her.  She states while sitting at her table, she began to feel short of breath with associated tinging in her bilateral arms and hands.  This occurred approximately at 0730.  She notes these symptoms are more prominent when she is sitting down and appear to be somewhat improved when standing.  She notes chronic tingling in her fingers but notes today's symptoms were much more severe.  Patient also describes a tingling sensation in her bilateral legs.  She denies any recent medications changes or falls.  She notes no recent chest pain, vomiting, lightheadedness, numbness ,dizziness, or changes in her bowel or bladder habits.        REVIEW OF SYSTEMS   Constitutional:  Denies fever, chills, excessive weight loss  Eyes:  Denies any vision changes  Respiratory: Positive for shortness of breath.   Denies productive cough  Cardiovascular:  Denies chest pain or palpitations  GI:  Denies abdominal pain, nausea, vomiting, or change in bowel or bladder habits   Musculoskeletal:  Denies any new muscle/joint pain.    Skin:  Denies rash   Neurologic: Positive for tingling Denies headache, focal weakness or sensory changes    Psych: Mood and affect normal  All systems reviewed and are negative except as marked.     PAST MEDICAL HISTORY:  Past Medical History:   Diagnosis Date     GERD (gastroesophageal reflux disease)      Hypercholesteremia      Irritable bowel      Osteoporosis        PAST SURGICAL HISTORY:  Past Surgical History:   Procedure Laterality Date     DILATION AND CURETTAGE OF UTERUS       ENDOLYMPHATIC SAC OPERATION  2012    enhancement     INNER EAR SURGERY      Per Pt     MASTOIDECTOMY  2012     TONSILLECTOMY             CURRENT MEDICATIONS:    No current facility-administered medications on file prior to encounter.      Current Outpatient Medications on File Prior to Encounter   Medication Sig     acetaminophen (TYLENOL) 500 MG tablet Take 500 mg by mouth every 6 (six) hours as needed for pain.     atenoloL (TENORMIN) 25 MG tablet Take 1 tablet (25 mg total) by mouth 2 (two) times a day.     cholecalciferol, vitamin D3, 1,000 unit tablet Take 1,000 Units by mouth daily.            fluticasone (FLONASE) 50 mcg/actuation nasal spray Apply 1 spray into each nostril daily.            ibuprofen (ADVIL,MOTRIN) 200 MG tablet Take 200-400 mg by mouth every 8 (eight) hours as needed for pain.      loperamide (IMODIUM) 2 mg capsule Take 2 mg by mouth 4 (four) times a day as needed for diarrhea.     LORazepam (ATIVAN) 0.5 MG tablet Take 1 tablet (0.5 mg total) by mouth at bedtime as needed for anxiety.     losartan (COZAAR) 50 MG tablet Take 1 tablet (50 mg total) by mouth 2 (two) times a day.     magnesium oxide (MAGOX) 400 mg (241.3 mg magnesium) tablet Take 0.5 tablets (200 mg total) by mouth 2 (two) times a day.     meclizine (ANTIVERT) 25 mg tablet TAKE ONE TABLET BY MOUTH THREE TIMES DAILY AS NEEDED     simvastatin (ZOCOR) 20 MG tablet TAKE 1 TABLET BY MOUTH ONCE DAILY       ALLERGIES:  Allergies   Allergen Reactions     Metronidazole      Actonel [Risedronate]      GI UPSET     Clindamycin      Cortisone (Hydrocortisone)  [Hydrocortisone]      Fosamax [Alendronate]      GI UPSET       Kenalog [Triamcinolone Acetonide]      Minipress [Prazosin] Other (See Comments)     hypotension     Penicillin G Benzathine      Statins-Hmg-Coa Reductase Inhibitors Other (See Comments)     Shortness of breath  Tolerating simvastatin (Sept 2016)     Tetanus And Diphtheria Toxoids, Adsorbed, Adult Unknown     Tetanus Toxoid, Adsorbed      Azithromycin Rash     States got rash after using z rubio     Prednisone Rash       FAMILY HISTORY:  Family History   Problem Relation Age of Onset     Heart disease Mother      Clotting disorder Mother         Dies of a blood clot, per pt     Diabetes Father      Stroke Father      Hypertension Son      Osteoporosis Daughter      Hypertension Daughter      Cancer Sister         Colon     Glaucoma Sister        SOCIAL HISTORY:   Social History     Socioeconomic History     Marital status:      Spouse name: Not on file     Number of children: 7     Years of education: Not on file     Highest education level: Not on file   Occupational History     Not on file   Social Needs     Financial resource strain: Not on file     Food insecurity     Worry: Not on file     Inability: Not on file     Transportation needs     Medical: Not on file     Non-medical: Not on file   Tobacco Use     Smoking status: Never Smoker     Smokeless tobacco: Never Used   Substance and Sexual Activity     Alcohol use: No     Alcohol/week: 0.0 standard drinks     Drug use: No     Sexual activity: Never     Partners: Male   Lifestyle     Physical activity     Days per week: Not on file     Minutes per session: Not on file     Stress: Not on file   Relationships     Social connections     Talks on phone: Not on file     Gets together: Not on file     Attends Orthodoxy service: Not on file     Active member of club or organization: Not on file     Attends meetings of clubs or organizations: Not on file     Relationship status: Not on file      "Intimate partner violence     Fear of current or ex partner: Not on file     Emotionally abused: Not on file     Physically abused: Not on file     Forced sexual activity: Not on file   Other Topics Concern     Not on file   Social History Narrative    Patient is , they are retired.    Lives in assisted care for past 2 years (11/2/2016).       VITALS:  Patient Vitals for the past 24 hrs:   BP Temp Temp src Pulse Resp SpO2 Height Weight   06/14/21 2300 143/66 -- -- 67 -- 99 % -- --   06/14/21 2230 142/65 -- -- (!) 57 16 99 % -- --   06/14/21 2200 143/65 -- -- (!) 56 -- 99 % -- --   06/14/21 2156 165/68 -- -- 63 -- 98 % -- --   06/14/21 2048 -- -- -- 61 -- 97 % -- --   06/14/21 2039 157/76 -- -- -- -- -- -- --   06/14/21 2030 149/67 -- -- 64 -- 94 % -- --   06/14/21 2000 138/65 -- -- 62 -- 98 % -- --   06/14/21 1930 135/80 -- -- 76 -- 97 % -- --   06/14/21 1900 154/71 -- -- 63 -- 99 % -- --   06/14/21 1836 171/79 -- -- 70 -- 99 % -- --   06/14/21 1700 158/70 -- -- 72 26 99 % -- --   06/14/21 1631 149/67 -- -- 64 -- 99 % -- --   06/14/21 1554 (!) 163/108 97.7  F (36.5  C) Oral 69 18 99 % 4' 11\" (1.499 m) 110 lb (49.9 kg)        PHYSICAL EXAM    Constitutional:  Awake, alert, in no apparent distress  HENT:  Normocephalic, Atraumatic, Bilateral external ears normal, Oropharynx moist, Nose normal. Neck- Normal range of motion, No tenderness, Supple, No stridor.   Eyes:  PERRL, EOMI, Conjunctiva normal, No discharge.   Respiratory:  Normal breath sounds, No respiratory distress, No wheezing   Cardiovascular:  Normal heart rate, irregular rhythm, Soft systolic murmur appreciated.  No appreciable rubs or gallops.   GI:  Soft, No tenderness, No distension, No palpable or pulsatile masses  Musculoskeletal:  Intact distal pulses, No edema. Good range of motion in all major joints. No tenderness to palpation or major deformities noted. Back: No midline cervical, thoracic, or lumbar tenderness with no step offs or signs of " trauma.   Integument:  Warm, Dry, No erythema, No rash.   Neurologic:  Alert & oriented, Normal motor function, Normal sensory function, No focal deficits noted. CNS II-XII intact, normal visual field testing with no nystagmus, no dysarthria or dysmetria with normal heel to shin.    Psychiatric:  Slightly anxious appearing.      LAB:  All pertinent labs reviewed and interpreted.  Results for orders placed or performed during the hospital encounter of 06/14/21   Basic Metabolic Panel   Result Value Ref Range    Sodium 132 (L) 136 - 145 mmol/L    Potassium 4.2 3.5 - 5.0 mmol/L    Chloride 99 98 - 107 mmol/L    CO2 23 22 - 31 mmol/L    Anion Gap, Calculation 10 5 - 18 mmol/L    Glucose 103 70 - 125 mg/dL    Calcium 9.1 8.5 - 10.5 mg/dL    BUN 11 8 - 28 mg/dL    Creatinine 0.79 0.60 - 1.10 mg/dL    GFR MDRD Af Amer >60 >60 mL/min/1.73m2    GFR MDRD Non Af Amer >60 >60 mL/min/1.73m2   Magnesium   Result Value Ref Range    Magnesium 1.8 1.8 - 2.6 mg/dL   HM2 (CBC W/O DIFF)   Result Value Ref Range    WBC 7.2 4.0 - 11.0 thou/uL    RBC 4.16 3.80 - 5.40 mill/uL    Hemoglobin 13.4 12.0 - 16.0 g/dL    Hematocrit 41.4 35.0 - 47.0 %     80 - 100 fL    MCH 32.2 27.0 - 34.0 pg    MCHC 32.4 32.0 - 36.0 g/dL    RDW 12.8 11.0 - 14.5 %    Platelets 191 140 - 440 thou/uL    MPV 10.9 8.5 - 12.5 fL   Troponin I   Result Value Ref Range    Troponin I 0.01 0.00 - 0.29 ng/mL   BNP(B-type Natriuretic Peptide)   Result Value Ref Range     (H) 0 - 167 pg/mL   ECG 12 lead nursing unit performed   Result Value Ref Range    SYSTOLIC BLOOD PRESSURE 149 mmHg    DIASTOLIC BLOOD PRESSURE 67 mmHg    VENTRICULAR RATE 70 BPM    ATRIAL RATE 70 BPM    P-R INTERVAL 166 ms    QRS DURATION 118 ms    Q-T INTERVAL 436 ms    QTC CALCULATION (BEZET) 470 ms    P Axis 66 degrees    R AXIS -41 degrees    T AXIS 113 degrees    MUSE DIAGNOSIS       Sinus rhythm with frequent Premature ventricular complexes  Possible Left atrial enlargement  Left axis  deviation  Left ventricular hypertrophy with QRS widening and repolarization abnormality  Abnormal ECG  When compared with ECG of 21-NOV-2019 09:23,  No significant change was found  Confirmed by SEE ED PROVIDER NOTE FOR, ECG INTERPRETATION ([id number]),  ADRIANO JARRELL ([id number]) on 6/14/2021 4:58:25 PM         RADIOLOGY:  Reviewed all pertinent imaging. Please see official radiology report.  Xr Chest 2 Views    Result Date: 6/14/2021  EXAM: XR CHEST 2 VIEWS LOCATION: [facility] DATE/TIME: 6/14/2021 6:30 PM INDICATION: sob COMPARISON: 04/26/2021     No evidence for CHF or pneumonia. No pleural effusion or pneumothorax. Severe degenerative change in the shoulders.    Ct Cervical Spine Without Contrast    Result Date: 6/14/2021  EXAM: CT CERVICAL SPINE WO CONTRAST LOCATION: [facility] DATE/TIME: 6/14/2021 9:05 PM INDICATION: Neck pain, concern for dens fracture. COMPARISON: 6/14/2021. TECHNIQUE: Routine CT Cervical Spine without IV contrast. Multiplanar reformats. Dose reduction techniques were used. 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 Cystic changes within the dens with surrounding sclerosis, thickening and mineralization of the posterior joint capsule and transverse ligament and severe left and moderate right  atlantoaxial joint degenerative change. No evidence for acute fracture. There is straightening of the cervical lordosis. 0.3 cm anterolisthesis of C2 on C3. 0.3 cm retrolisthesis of C3 on C4. Severe degenerative disc disease throughout the cervical spine. CANAL/FORAMINA: Thickened, degenerative pannus posterior to the dens results in mild to moderate spinal canal stenosis just below the foramen magnum. Moderate spinal canal stenosis with severe right and moderate left foraminal stenosis at C3-C4. Moderate  bilateral foraminal stenosis at C4-C5. Mild to moderate bilateral foraminal stenosis at C5-C6. Moderate  right and severe left foraminal stenosis at C6-C7. Severe bilateral foraminal stenosis at C7-T1. PARASPINAL: No extraspinal abnormality.     1.  No fracture or posttraumatic subluxation. 2.  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 3.  No evidence for dens fracture as clinically queried. 4.  Cervical spondylosis with foraminal and spinal canal stenosis as described.    Mr Cervical Spine With Without Contrast    Result Date: 6/14/2021  EXAM: MR CERVICAL SPINE W WO CONTRAST LOCATION: [facility] DATE/TIME: 6/14/2021 6:17 PM INDICATION: Tingling bilateral upper extremities. COMPARISON: Correlation with brain MRI 2/21/2017. CONTRAST: Gadavist 5 mL TECHNIQUE: MRI Cervical Spine without and with IV contrast. FINDINGS: 2 mm spondylolisthesis of C2 on C3. 3 mm retrolisthesis of C3 on C4. 2 mm retrolisthesis of C7 on T1. 1 mm retrolisthesis of T1 on T2. Moderate marrow edema involving the dens, new compared to the prior brain MRI. Nonpathologic marrow signal in  the rest of the cervical spine. Mild volume loss of the cerebellar vermis. No abnormal cord signal. No abnormal enhancement. Moderate symmetric atrophy of the posterior paraspinal musculature. The flow-voids of the vertebral arteries are present. Modic type I changes at T1-T2. Redemonstration of soft tissue posterior to the dens which causes moderate spinal canal narrowing at the craniocervical junction. C2-C3: Mild intervertebral disc height loss. Uncovering of the disc. Broad-based disc bulge. Moderate right and mild left facet arthropathy. No spinal canal narrowing. Moderate right neural foraminal narrowing. No left neural foraminal narrowing. C3-C4: Moderate to severe intervertebral disc height loss. Circumferential disc osteophyte complex. Moderate bilateral facet arthropathy. Moderate spinal canal narrowing. Moderate to severe right neural foraminal narrowing. Moderate left neural foraminal  narrowing. C4-C5:  Moderate intervertebral disc height loss. Small circumferential disc osteophyte complex. Moderate bilateral facet arthropathy. No spinal canal narrowing. Moderate to severe right neural foraminal narrowing. Moderate to severe left neural foraminal  narrowing. C5-C6: Severe intervertebral disc height loss. There is suggestion of interbody ankylosis. Severe right and moderate left facet arthropathy. No spinal canal narrowing. Moderate right neural foraminal narrowing. Mild to moderate left neural foraminal narrowing. C6-C7: Moderate to severe intervertebral disc height loss. Circumferential disc osteophyte complex with superimposed right foraminal disc protrusion. Moderate bilateral facet arthropathy. Mild narrowing of the right lateral recess. Moderate to severe right neural foraminal narrowing. Moderate to severe left neural foraminal narrowing. C7-T1: Moderate to severe intervertebral disc height loss. Circumferential disc osteophyte complex with superimposed small central disc protrusion. Moderate bilateral facet arthropathy. Mild spinal canal narrowing. Moderate to severe right neural foraminal narrowing. Moderate left neural foraminal narrowing.     1.  Moderate marrow edema involving the dens, new compared to the prior brain MRI 2/21/2017. Recommend correlating with CT to exclude a fracture. 2.  Redemonstration of soft tissue posterior to the dens which causes moderate spinal canal narrowing at the craniocervical junction. 3.  Moderate spinal canal narrowing at C3-C4. 4.  Mild spinal canal narrowing at C7-T1. 5.  Mild narrowing of the right lateral recess at C6-C7. 6.  Moderate to severe right and moderate left neural foraminal narrowing at C3-C4. 7.  Moderate to severe bilateral neural foraminal narrowing at C4-C5 and C6-C7. 8.  Modic type I changes at T1-T2.     Xr Cervical Spine Flexion Extension 2-3    Result Date: 6/14/2021  EXAM: XR CERVICAL SPINE FLEXION EXTENSION 2 - 3 S LOCATION: [facility]  [facility] DATE/TIME: 6/14/2021 9:51 PM INDICATION: arm tingling COMPARISON: 6/14/2021 cervical spine CT, MRI TECHNIQUE: CR Cervical Spine.     Normal vertebral body heights. 0.4 cm anterolisthesis of C2 on C3 in the neutral position that decreases to 0.2 cm with extension. 0.3 cm retrolisthesis of C3 on C4, and 0.3 cm anterolisthesis of C6 on C7, that does not change with flexion or  extension. Advanced atlantodental joint degenerative changes and cystic/sclerotic changes within the dens and C2 body consistent with CPPD. Severe degenerative disc disease throughout the cervical spine. Normal prevertebral soft tissues. Severe left shoulder joint osteoarthrosis. Levoconvex curvature of the thoracic spine with apex at T11-T12.       EKG:    Sinus rhythm with frequent PVCs, no specific ST acute ischemic changes, no concerning dysrhythmias or interval prolongation, and compared to ECG of November 21, 2019, no specific ST acute ischemic changes appreciated  I have independently reviewed and interpreted the EKG(s) documented above.    PROCEDURES:          I, Froylan Byrne, am serving as a scribe to document services personally performed by Torey Cavazos MD, based on my observation and the provider's statements to me. I, Torey Cavazos MD attest that Froylan Byrne is acting in a scribe capacity, has observed my performance of the services and has documented them in accordance with my direction.    Torey Cavazos M.D.  Emergency Medicine  [facility] EMERGENCY ROOM  [address] [id number]  Dept: [phone]  Loc: [phone]     Torey Cavazos MD  06/15/21 0012

## 2021-06-26 NOTE — PROGRESS NOTES
Pharmacy Note - Admission Medication History    Pertinent Provider Information: patient is a good historian.      ______________________________________________________________________    Prior To Admission (PTA) med list completed and updated in EMR.       PTA Med List   Medication Sig Note Last Dose     acetaminophen (TYLENOL) 500 MG tablet Take 500 mg by mouth every 6 (six) hours as needed for pain.  6/13/2021 at Unknown time     atenoloL (TENORMIN) 25 MG tablet Take 1 tablet (25 mg total) by mouth 2 (two) times a day.  6/14/2021 at x1     cholecalciferol, vitamin D3, 1,000 unit tablet Take 1,000 Units by mouth daily.         6/14/2021 at Unknown time     fluticasone (FLONASE) 50 mcg/actuation nasal spray Apply 1 spray into each nostril daily.         6/14/2021 at Not with     ibuprofen (ADVIL,MOTRIN) 200 MG tablet Take 200-400 mg by mouth every 8 (eight) hours as needed for pain.   Past Week at Unknown time     loperamide (IMODIUM) 2 mg capsule Take 2 mg by mouth 4 (four) times a day as needed for diarrhea.  6/13/2021 at Unknown time     LORazepam (ATIVAN) 0.5 MG tablet Take 1 tablet (0.5 mg total) by mouth at bedtime as needed for anxiety.  6/13/2021 at Unknown time     losartan (COZAAR) 50 MG tablet Take 1 tablet (50 mg total) by mouth 2 (two) times a day.  6/14/2021 at x1     magnesium oxide (MAGOX) 400 mg (241.3 mg magnesium) tablet Take 0.5 tablets (200 mg total) by mouth 2 (two) times a day.  6/14/2021 at x1     meclizine (ANTIVERT) 25 mg tablet TAKE ONE TABLET BY MOUTH THREE TIMES DAILY AS NEEDED  Past Week at Unknown time     simvastatin (ZOCOR) 20 MG tablet TAKE 1 TABLET BY MOUTH ONCE DAILY 6/14/2021: Takes at bedtime 6/13/2021 at Unknown time       Information source(s): Patient and CareEverywhere/[employer]  Method of interview communication: phone    Summary of Changes to PTA Med List  New: Tylenol   Discontinued: none  Changed: none    Patient was asked about OTC/herbal products specifically.  PTA  med list reflects this.    In the past week, patient estimated taking medication this percent of the time:  50-90% due to other.    Allergies were reviewed, assessed, and updated with the patient.      Patient did not bring any medications to the hospital and can't retrieve from home. No multi-dose medications are available for use during hospital stay.     The information provided in this note is only as accurate as the sources available at the time of the update(s).    Thank you for the opportunity to participate in the care of this patient.    Lupis Adorno, PharmD  6/14/2021 10:50 PM

## 2021-06-26 NOTE — PROGRESS NOTES
Progress Notes by Tash Patterson MD at 2/7/2018 11:30 AM     Author: Tash Patterson MD Service: -- Author Type: Physician    Filed: 2/7/2018 12:43 PM Encounter Date: 2/7/2018 Status: Signed    : Tash Patterson MD (Physician)           Click to link to [facility] HEART CARE NOTE    Thank you, Dr. Owens, for asking the [facility] Heart Care team to see Ms. Mariluz Arana to follow-up on dizziness.    Assessment/Recommendations   Assessment:    1.   Dizziness, etiology unclear.  Recent echocardiogram demonstrated normal LV function without wall motion abnormality and mild to moderate aortic valve stenosis.  Her Holter monitor demonstrated moderate number of PVCs and 2 brief runs of atrial tachycardia which were asymptomatic.  No significant bradycardia or pauses were identified.  At this point, I recommended follow-up back with her primary physician.  I did encourage her to increase her fluid intake slightly as she does note mild lightheadedness with changes in position.  2.  Moderate PVCs, asymptomatic.  At this point would continue current dose of atenolol.  3.  Mild to moderate aortic valve stenosis.  Recommended follow-up echo in 1 year.      Plan:  1.  Continue current medications  2.  Follow-up with Dr. Owens regarding treatment of hypertension.  Did encourage her to take her losartan if her systolic pressure was greater than 130  3.  Follow-up echocardiogram in 1 year       History of Present Illness    Ms. Mariluz Arana is a 86 y.o. female with history of essential hypertension, anxiety disorder and Ménière's disease who I saw her recently for symptoms of dizziness and palpitations.  An ECG in the ED demonstrated sinus bradycardia with a rate of 45 and evidence of left ventricular hypertrophy.  No ischemic changes were noted.  When I saw her in the clinic, her heart rates were in the low 60s.  I did note murmur on examination suggestive of some degree of aortic valve  sclerosis or stenosis and recommended an echocardiogram along with a Holter monitor.  Her Holter monitor has demonstrated sinus rhythm with moderate number of PVCs, all occurring singly.  2 brief runs of atrial tachycardia were noted which were asymptomatic.  Her echocardiogram suggests mild to moderate aortic valve stenosis with normal LV function.  I reviewed these results with Mariluz in the office today.  Neither of these appeared to explain her symptoms of dizziness.  Her palpitations appear to likely be related to the PVCs noted and I told her I was hesitant to decrease her atenolol further because of potential for increased number of PVCs.    ECG (personally reviewed): No ECG today    Cardiac Imaging Studies (personally reviewed): Echo as reported above     Physical Examination Review of Systems   Vitals:    02/07/18 1132   BP: 118/82   Pulse: 64   Resp: 16     Body mass index is 23.03 kg/(m^2).  Wt Readings from Last 3 Encounters:   02/07/18 114 lb (51.7 kg)   02/01/18 117 lb (53.1 kg)   12/29/17 117 lb (53.1 kg)     General Appearance:   Awake, Alert, No acute distress.   HEENT:  No scleral icterus; the mucous membranes were pink and moist.   Neck: No cervical bruits or jugular venous distention    Chest: The spine was straight. The chest was symmetric.   Lungs:   Respirations unlabored; the lungs are clear to auscultation. No wheezing   Cardiovascular:    Regular rate and rhythm.  S1 normal, S2 reduced.  2/6 mid peaking systolic ejection murmur heard at the left upper sternal border radiating to the apex.  No diastolic component.   Abdomen:  No organomegaly, masses, bruits, or tenderness. Bowels sounds are present   Extremities:  No peripheral edema bilaterally   Skin: No xanthelasma. Warm, Dry.   Musculoskeletal: No tenderness.   Neurologic: Mood and affect are appropriate.    General: WNL  Eyes: WNL  Ears/Nose/Throat: WNL  Lungs: WNL  Heart: WNL  Stomach: WNL  Bladder: WNL  Muscle/Joints: WNL  Skin:  WNL  Nervous System: WNL  Mental Health: WNL     Blood: WNL     Medical History  Surgical History Family History Social History   Past Medical History:   Diagnosis Date   ? Arthritis     Osteoarthritis   ? Generalized anxiety disorder    ? GERD (gastroesophageal reflux disease)    ? Heart murmur    ? Heart palpitations    ? Hypercholesteremia    ? Hypertension    ? Irritable bowel    ? Meniere's disease    ? Osteoporosis     Past Surgical History:   Procedure Laterality Date   ? DILATION AND CURETTAGE OF UTERUS     ? ENDOLYMPHATIC SAC OPERATION  2012    enhancement   ? INNER EAR SURGERY      Per Pt   ? MASTOIDECTOMY  2012   ? TONSILLECTOMY      Family History   Problem Relation Age of Onset   ? Heart disease Mother    ? Clotting disorder Mother      Dies of a blood clot, per pt   ? Diabetes Father    ? Stroke Father    ? Hypertension Son    ? Osteoporosis Daughter    ? Hypertension Daughter    ? Cancer Sister      Colon   ? Glaucoma Sister     Social History     Social History   ? Marital status:      Spouse name: N/A   ? Number of children: 7   ? Years of education: N/A     Occupational History   ? Not on file.     Social History Main Topics   ? Smoking status: Never Smoker   ? Smokeless tobacco: Never Used   ? Alcohol use 2.4 oz/week     4 Glasses of wine per week   ? Drug use: No   ? Sexual activity: No     Other Topics Concern   ? Not on file     Social History Narrative    Patient is , they are retired.    Lives in assisted care for past 2 years (11/2/2016).          Medications  Allergies   Current Outpatient Prescriptions   Medication Sig Dispense Refill   ? atenolol (TENORMIN) 25 MG tablet Take 25 mg by mouth 2 (two) times a day. Hold for BP less 120     ? cholecalciferol, vitamin D3, 1,000 unit tablet Take 1,000 Units by mouth. approx 5 days per week     ? ibuprofen (ADVIL,MOTRIN) 200 MG tablet Take 200-400 mg by mouth every 8 (eight) hours as needed for pain.      ? indapamide (LOZOL) 1.25 MG  tablet TAKE ONE TABLET BY MOUTH EVERY OTHER DAY 90 tablet 2   ? loperamide (IMODIUM) 2 mg capsule Take 2 mg by mouth 4 (four) times a day as needed for diarrhea.     ? LORazepam (ATIVAN) 0.5 MG tablet Take 1-2 tablets (0.5-1 mg total) by mouth bedtime. 30 tablet 0   ? meclizine (ANTIVERT) 25 mg tablet TAKE ONE TABLET BY MOUTH THREE TIMES DAILY AS NEEDED 90 tablet 3   ? simvastatin (ZOCOR) 20 MG tablet TAKE ONE TABLET BY MOUTH ONCE DAILY 90 tablet 3   ? LORazepam (ATIVAN) 0.5 MG tablet TAKE ONE TABLET BY MOUTH TWICE DAILY AS NEEDED FOR ANXIETY 60 tablet 0   ? LORazepam (ATIVAN) 0.5 MG tablet TAKE ONE TABLET BY MOUTH TWICE DAILY AS NEEDED FOR ANXIETY 60 tablet 0   ? losartan (COZAAR) 25 MG tablet Take 1 tablet (25 mg total) by mouth daily. 90 tablet 3     No current facility-administered medications for this visit.       Allergies   Allergen Reactions   ? Metronidazole    ? Actonel [Risedronate]      GI UPSET   ? Clindamycin    ? Cortisone (Hydrocortisone) [Hydrocortisone]    ? Fosamax [Alendronate]      GI UPSET     ? Kenalog [Triamcinolone Acetonide]    ? Minipress [Prazosin] Other (See Comments)     hypotension   ? Penicillin G Benzathine    ? Statins-Hmg-Coa Reductase Inhibitors Other (See Comments)     Shortness of breath  Tolerating simvastatin (Sept 2016)   ? Tetanus And Diphtheria Toxoids, Adsorbed, Adult Unknown   ? Tetanus Toxoid, Adsorbed    ? Prednisone Rash         Lab Results    Chemistry/lipid CBC Cardiac Enzymes/BNP/TSH/INR   Lab Results   Component Value Date    CREATININE 0.70 12/22/2017    BUN 14 12/22/2017    K 3.5 12/22/2017     12/22/2017     12/22/2017    CO2 26 12/22/2017    Lab Results   Component Value Date    WBC 5.9 12/22/2017    HGB 12.3 12/22/2017    HCT 36.8 12/22/2017    MCV 95 12/22/2017     12/22/2017    Lab Results   Component Value Date    CKTOTAL 333 (H) 09/30/2009    CKMB 3 04/27/2015    TROPONINI <0.01 12/22/2017     (H) 12/22/2017    TSH 2.03 12/22/2017     INR 1.06 12/22/2017

## 2021-07-02 ENCOUNTER — COMMUNICATION - HEALTHEAST (OUTPATIENT)
Dept: SCHEDULING | Facility: CLINIC | Age: 86
End: 2021-07-02

## 2021-07-03 NOTE — ADDENDUM NOTE
Addendum Note by Sang Owens MD at 5/9/2018  7:05 AM     Author: Sang Owens MD Service: -- Author Type: Physician    Filed: 5/9/2018  7:05 AM Encounter Date: 5/8/2018 Status: Signed    : Sang Owens MD (Physician)    Addended by: SANG OWENS on: 5/9/2018 07:05 AM        Modules accepted: Orders

## 2021-07-04 NOTE — TELEPHONE ENCOUNTER
Telephone Encounter by Darrius Barriga MD at 7/2/2021  5:02 PM     Author: Darrius Barriga MD Service: -- Author Type: Physician    Filed: 7/2/2021  5:03 PM Encounter Date: 7/2/2021 Status: Signed    : Darrius Barriga MD (Physician)       Skip this evening losartan dose.    Be sure to stay hydrated

## 2021-07-04 NOTE — TELEPHONE ENCOUNTER
Telephone Encounter by Salina Saldana CMA at 7/2/2021  5:30 PM     Author: Salina Saldana CMA Service: -- Author Type: Certified Medical Assistant    Filed: 7/2/2021  5:30 PM Encounter Date: 7/2/2021 Status: Signed    : Salina Saldana CMA (Certified Medical Assistant)       Patient notified of clinician's message and verbalized understanding. No further questions at this time.   Salina Saldana CMA ............... 5:30 PM, 07/02/21

## 2021-07-04 NOTE — TELEPHONE ENCOUNTER
"Telephone Encounter by Perez Nevarez RN at 7/2/2021  3:41 PM     Author: Perez Nevarez RN Service: -- Author Type: Registered Nurse    Filed: 7/2/2021  3:53 PM Encounter Date: 7/2/2021 Status: Signed    : Perez Nevarez RN (Registered Nurse)       \"I was at the spine clinic earlier today and my blood pressure was 110/66. That's low for me. I take two different BP meds. I am wondering if I should take them both tonight or not.\"  Denies sx  Will route to PCP to call patient at [phone]         "

## 2021-07-06 VITALS — BODY MASS INDEX: 22.18 KG/M2 | HEIGHT: 59 IN | WEIGHT: 110 LBS

## 2021-07-08 ENCOUNTER — COMMUNICATION - HEALTHEAST (OUTPATIENT)
Dept: INTERNAL MEDICINE | Facility: CLINIC | Age: 86
End: 2021-07-08

## 2021-07-08 DIAGNOSIS — F41.9 CHRONIC ANXIETY: ICD-10-CM

## 2021-07-13 ENCOUNTER — RECORDS - HEALTHEAST (OUTPATIENT)
Dept: ADMINISTRATIVE | Facility: CLINIC | Age: 86
End: 2021-07-13

## 2021-07-15 ENCOUNTER — TELEPHONE (OUTPATIENT)
Dept: NEUROSURGERY | Facility: CLINIC | Age: 86
End: 2021-07-15

## 2021-07-15 NOTE — TELEPHONE ENCOUNTER
Order for home care was sent to [facility]. They are requesting we fax H&P and medication list which are needed before scheduling.     Please fax to: Attn: Nikki [email or web address]  F:[phone].

## 2021-07-19 ENCOUNTER — TRANSFERRED RECORDS (OUTPATIENT)
Dept: NEUROSURGERY | Facility: CLINIC | Age: 86
End: 2021-07-19

## 2021-07-21 ENCOUNTER — RECORDS - HEALTHEAST (OUTPATIENT)
Dept: ADMINISTRATIVE | Facility: CLINIC | Age: 86
End: 2021-07-21

## 2021-07-25 DIAGNOSIS — E78.2 MIXED HYPERLIPIDEMIA: Primary | ICD-10-CM

## 2021-07-25 DIAGNOSIS — I10 ESSENTIAL (PRIMARY) HYPERTENSION: ICD-10-CM

## 2021-07-26 NOTE — PROGRESS NOTES
ASSESSMENT: Mariluz Arana is a 89 year old female with past medical history significant for hypertension, aortic valve stenosis, anxiety, GERD, hypercholesterolemia, osteoporosis who presents today for new patient evaluation of 3 concerns.  1.  Chronic left neck pain.  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  There is advanced degenerative change bilateral atlantoaxial joint.  There is also multilevel high-grade foraminal stenosis.  Neck pain appears to be primarily related to the left C1-C2 joint degeneration.  She has secondary myofascial pain.  2.  Chronic bilateral upper extremity paresthesias affecting the hands including all 5 fingers.  Patient has history of carpal tunnel syndrome and had a right carpal tunnel release but this did not provide any relief of her paresthesias.  3.  2 episodes of intermittent weakness involving both arms and both legs.  Both episodes occurred within the last 6 weeks.  They came on with no trigger and resolved within 48 hours.  Unclear etiology.  She does not have any severe cervical spinal stenosis or myelomalacia in the cervical spine to cause lower extremity weakness.  She does not have any hyperreflexia or pathologic reflexes on exam today.  Strength was full today.  -On exam patient reported subjective sensory deficit in the distal aspects of all 5 fingers bilaterally.  Strength is intact.  Reflexes are physiologic.  -Patient was evaluated by neurosurgery July 7, 2021.  Patient indicated she would not want any aggressive treatment for her pain.      PLAN:  A shared decision making model was used.  The patient's values and choices were respected.  The following represents what was discussed and decided upon by the physician assistant and the patient.  Patient's daughter is present for the visit and helps to provide history.    1.  DIAGNOSTIC  "TESTS:    -I reviewed the MRI cervical spine.  -I reviewed the CT cervical spine.  -I offered to obtain MRI scans of the brain, thoracic, and lumbar spine, which was also discussed by neurosurgery, to further investigate leg weakness.  She is going to consider this and will call our clinic if she wants to pursue it.    2.  PHYSICAL THERAPY: Patient is currently getting home care physical therapy.  She has had 3 sessions of far and has more sessions scheduled.  Encouraged to continue with physical therapy and the home exercises.    3.  MEDICATIONS:    -I recommended that the patient use over-the-counter Voltaren gel on the left neck.  She can apply 2 g 4 times daily as needed.  -She can continue using over-the-counter Tylenol and Advil as needed.    4.  INTERVENTIONS: No interventions were ordered.  I do think the patient could benefit from a left C1-C2 facet joint injection if physical therapy is not helpful.  However, patient states that she had an adverse reaction to a steroid injection in her shoulder many years ago.  She thinks these were \"heart symptoms\" but does not remember any more specific symptoms.  The injection could help her neck pain but it would not affect her leg weakness.      5.  PATIENT EDUCATION: Patient is in agreement the above plan.  All questions were answered.    6.  REFERRALS: I offered a referral to neurosurgery for further evaluation of transient weakness.  She is going to consider this and will follow clinic if she wants to pursue that.    7.  FOLLOW-UP:   I will see the patient back in the clinic in 4 weeks to monitor progress with physical therapy.  She will contact our clinic in the meantime if she wishes to pursue additional diagnostic work-up for leg weakness or referral to neurology.  If she has questions or concerns in the meantime, she should not hesitate to call.        SUBJECTIVE:  Mariluz Arana  Is a 89 year old female who [facility] today [facility], bilateral upper extremity paresthesias, and 2 episodes of transient bilateral upper and lower extremity weakness.    Patient reports that she has had neck pain on and off for years.  Neck pain has been getting progressively worse over the past 3 to 4 months and got much worse after she had an MRI and CT scan last month because of how her neck was positioned while she was in the imaging studies.  Pain is located on the left neck.  Pain is most severe at the craniocervical junction.  Pain extends up into the head in the occipital region, but she denies headache.  Pain extends down the left lateral neck.  She denies any pain radiating to the shoulders or down the arms.  Neck pain is aggravated with turning to the right.  Neck pain is alleviated with applying heat.    Patient also complains of bilateral upper extremity numbness and tingling.  She states this has been present for years.  The numbness and tingling affects all 5 fingers of both hands.  She reports that she was diagnosed with carpal tunnel syndrome bilaterally.  She underwent a right carpal tunnel release but did not have any improvement in her symptoms so they did not pursue the carpal tunnel release on the left.  She has seen [facility] from this.  She has some difficulty with fine motor activities because of the numbness and tingling in her hands.    Patient also complains of intermittent weakness in both arms and both legs.  The first episode happened about 6 weeks ago.  She states it began after she did not sleep well 1 night.  She felt weakness in both arms and both legs.  She felt like she was unable to even take a step due to severe weakness.  She went to the ED for that.  Please see notes from 6/14 for details.  Symptoms resolved within 48 hours.  She states there was a second episode after that in which she experienced the weakness in both arms and both legs.  Again, there is no  "precipitating event to cause the weakness and symptoms resolved within 48 hours.  She denies any significant low back pain or pain radiating down the legs.  She denies any numbness or tingling down the legs.    Patient denies any loss of bowel or bladder control.  Denies any recent fevers, chills.  Denies any unintentional weight loss.  She did have a fall 3 months ago when she lost her balance.  Has not had any more recent falls.    Patient is currently in home care physical therapy.  She has had 3 sessions of far.  She does not go chiropractor.  She has not had any spine injections or spine surgeries.  She uses Tylenol 2 tabs twice daily.  She uses Advil 1 tab daily.  She does not feel like these are providing significant relief of her pain.    Current Outpatient Medications   Medication     ATENOLOL PO     clotrimazole-betamethasone (LOTRISONE) cream     Fluticasone Propionate POWD     IBUPROFEN PO     loperamide (IMODIUM) 2 MG capsule     LORAZEPAM PO     losartan (COZAAR) 25 MG tablet     meclizine (ANTIVERT) 25 MG tablet     mirtazapine (REMERON) 15 MG tablet     Multiple Vitamins-Minerals (CENTRUM SILVER) per tablet     Ondansetron HCl (ZOFRAN PO)     oxazepam (SERAX) 10 MG capsule     SIMVASTATIN PO     VITAMIN D, CHOLECALCIFEROL, PO         Allergies   Allergen Reactions     Actonel [Risedronate]      GI upset     Clindamycin      Cortisone Other (See Comments)     \"heart symptoms\", low blood pressure     Fosamax Other (See Comments)     \"burning my lower esophagus\"     Kenalog [Triamcinolone]      Metronidazole      Minipress [Prazosin]      Hypotension     Penicillin G      Statin Drugs [Hmg-Coa-R Inhibitors]      Shortness of breath.  Tolerating Simvastatin (Sept 2016)     Tetanus Toxoid      Prednisone Rash     Zithromax [Azithromycin Dihydrate] Rash     States got rash after using z ruibo       Past Medical History:   Diagnosis Date     Arthritis     Osteoarthritis     Bilateral cataracts      Chronic " anxiety      Gastroesophageal reflux disease      GERD (gastroesophageal reflux disease)      Heart murmur      Hypercholesteremia      Hypercholesteremia      Hypertension      Hyponatremia      Hyponatremia 7/8/2021     Irregular heart beat      Irritable bowel      Irritable bowel      Meniere syndrome      Meniere's disease, bilateral      Osteoarthritis of spine with radiculopathy, unspecified spinal region 7/8/2021     Osteoporosis      SOB (shortness of breath) 7/8/2021     Vertigo         Patient Active Problem List   Diagnosis     Dizziness and giddiness       Past Surgical History:   Procedure Laterality Date     DILATION AND CURETTAGE       DILATION AND CURETTAGE       ENDOLYMPHATIC SAC OPERATION  2012    enhancement     ENHANCE ENDOLYMPHATIC SAC, DEC SIGMOID SINUS, EXTND FACIAL RECESS APPRCH, MASTOIDECTOMY, BMT, COMBO  4/23/2012    Procedure:COMBINED ENHANCE ENDOLYMPHATIC SAC, DECOMPRESS SIGMOID SINUS, EXTENDED FACIAL RECESS APPROACH, COMPLETE MASTOIDECTOMY, MYRINGOTOMY, INSERT TUBE; Left Endolymphatic Sac Enhancement, Sigmoid Sinus Decom-  pression, Extended Facial Recess Approach, Myringotomy  , Complete Mastoidectomy; Surgeon:LINN MATHIS; Location:[facility]     INNER EAR SURGERY      Per Pt     KERATOTOMY ARCUATE WITH FEMTOSECOND LASER/IMAGING FOR ATIOL Right 7/19/2018    Procedure: KERATOTOMY ARCUATE WITH FEMTOSECOND LASER/IMAGING FOR ATIOL;  RIGHT EYE KERATOTOMY ARCUATE WITH FEMTOSECOND LASER/IMAGING FOR ATIOL ,  CAPSULOTOMY, LENS FRAGMENTATION, ARCUATE INCISIONS;  Surgeon: Roberth Chambers MD;  Location: [facility]     MASTOIDECTOMY  2012     PHACOEMULSIFICATION CLEAR CORNEA WITH TORIC INTRAOCULAR LENS IMPLANT Right 7/19/2018    Procedure: PHACOEMULSIFICATION CLEAR CORNEA WITH TORIC INTRAOCULAR LENS IMPLANT;  RIGHT EYE PHACOEMULSIFICATION CLEAR CORNEA WITH TORIC INTRAOCULAR LENS IMPLANT ;  Surgeon: Roberth Chambers MD;  Location: [facility]     TONSILLECTOMY       TONSILLECTOMY          Family History   Problem Relation Age of Onset     Heart Disease Mother      Clotting Disorder Mother         Dies of a blood clot, per pt     Diabetes Father      Cerebrovascular Disease Father      Hypertension Son      Osteoporosis Daughter      Hypertension Daughter      Cancer Sister         Colon     Glaucoma Sister        Social history: Patient is .  She denies tobacco, alcohol, illicit drug use.    ROS: Positive for head pain, change in the vision, palpitations, feet/leg swelling, shortness of breath, diarrhea, muscle fatigue, imbalance, falls, dizziness, insomnia, excessive tiredness, anxiety, depression.  Specifically negative for dysphagia, bowel/bladder dysfunction, fevers,chills, appetite changes, unexplained weight loss.   Otherwise 13 systems reviewed are negative.  Please see the patient's intake questionnaire from today for details.      OBJECTIVE:  PHYSICAL EXAMINATION:  CONSTITUTIONAL:  Vital signs as above.  No acute distress.  The patient is well nourished and well groomed.  PSYCHIATRIC:  The patient is awake, alert, oriented to person, place, time and answering questions appropriately with clear speech.    HEENT:  Normocephalic, atraumatic.  Sclera clear.    SKIN:  Skin over the face, bilateral upper extremities, and neck is clean, dry, intact without rashes.  LYMPH NODES:  No palpable or tender anterior/posterior cervical, submandibular, or supraclavicular lymph nodes.    MUSCLE STRENGTH:  5/5 strength for the bilateral shoulder abductors, elbow flexors/extensors, wrist extensors, finger flexors/abductors, hip flexors, knee flexors/extensors, ankle dorsi/plantar flexors.  NEURO: Gait is unsteady.  She uses a 4 wheeled walker for assistance.  CN III-XII are grossly intact.  1-2+ symmetric biceps, brachioradialis, triceps, patellar, and Achilles reflexes bilaterally.  Sensation to light touch subjectively diminished all 10 fingertips.  Sensation to light touch intact bilateral  lower extremities throughout.  NEgative Arteaga's bilaterally.  No ankle clonus.  Negative Babinski's bilaterally.  VASCULAR:  2/4 radial pulses bilaterally.  Warm upper limbs bilaterally.  Capillary refill in the upper extremities is less than 1 second.  MUSCULOSKELETAL: Tender to palpation left C1-C2 joint.  Hypertonicity left cervical paraspinous muscles.  Cervical range of motion is moderately restricted with flexion and extension severely restricted with lateral rotation bilaterally.  No significant tenderness palpation bilateral lower lumbar paraspinous muscles.    RESULTS:    I reviewed the MRI cervical spine from [facility] dated June 14th, 2021.  This shows moderate marrow edema involving the dens.  There is soft tissue posterior to the dens causing moderate spinal canal stenosis.  There is also moderate spinal canal stenosis C3-4, mild spinal canal stenosis C7-T1.  There is multilevel foraminal stenosis which is moderate to severe right and moderate left C3-4 moderate to severe bilaterally C4-5 and C6-7.    I reviewed the CT cervical spine from [facility] dated June 14th, 2021.  This shows advanced periodontitis CPPD with superimposed degenerative changes.  There is severe left and moderate right atlantoaxial joint degenerative change.  No evidence for fracture.

## 2021-07-27 ENCOUNTER — OFFICE VISIT (OUTPATIENT)
Dept: PHYSICAL MEDICINE AND REHAB | Facility: CLINIC | Age: 86
End: 2021-07-27
Payer: COMMERCIAL

## 2021-07-27 VITALS
HEIGHT: 59 IN | SYSTOLIC BLOOD PRESSURE: 160 MMHG | DIASTOLIC BLOOD PRESSURE: 82 MMHG | BODY MASS INDEX: 21.97 KG/M2 | WEIGHT: 109 LBS | HEART RATE: 69 BPM

## 2021-07-27 DIAGNOSIS — R29.898 WEAKNESS OF BOTH LOWER EXTREMITIES: ICD-10-CM

## 2021-07-27 DIAGNOSIS — M47.812 ARTHROPATHY OF CERVICAL FACET JOINT: Primary | ICD-10-CM

## 2021-07-27 DIAGNOSIS — R20.2 PARESTHESIA: ICD-10-CM

## 2021-07-27 PROCEDURE — 99214 OFFICE O/P EST MOD 30 MIN: CPT | Performed by: PHYSICIAN ASSISTANT

## 2021-07-27 ASSESSMENT — PAIN SCALES - GENERAL: PAINLEVEL: MODERATE PAIN (4)

## 2021-07-27 ASSESSMENT — MIFFLIN-ST. JEOR: SCORE: 820.05

## 2021-07-27 NOTE — LETTER
7/27/2021         RE: Mariluz Arana  [address]  [facility] [address]        Dear Colleague,    Thank you for referring your patient, Mariluz Arana, to the [facility] [place]. Please see a copy of my visit note below.    ASSESSMENT: Mariluz Arana is a 89 year old female with past medical history significant for hypertension, aortic valve stenosis, anxiety, GERD, hypercholesterolemia, osteoporosis who presents today for new patient evaluation of 3 concerns.  1.  Chronic left neck pain.  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  There is advanced degenerative change bilateral atlantoaxial joint.  There is also multilevel high-grade foraminal stenosis.  Neck pain appears to be primarily related to the left C1-C2 joint degeneration.  She has secondary myofascial pain.  2.  Chronic bilateral upper extremity paresthesias affecting the hands including all 5 fingers.  Patient has history of carpal tunnel syndrome and had a right carpal tunnel release but this did not provide any relief of her paresthesias.  3.  2 episodes of intermittent weakness involving both arms and both legs.  Both episodes occurred within the last 6 weeks.  They came on with no trigger and resolved within 48 hours.  Unclear etiology.  She does not have any severe cervical spinal stenosis or myelomalacia in the cervical spine to cause lower extremity weakness.  She does not have any hyperreflexia or pathologic reflexes on exam today.  Strength was full today.  -On exam patient reported subjective sensory deficit in the distal aspects of all 5 fingers bilaterally.  Strength is intact.  Reflexes are physiologic.  -Patient was evaluated by neurosurgery July 7, 2021.  Patient indicated she would not want any aggressive treatment for her pain.      PLAN:  A shared decision making  "model was used.  The patient's values and choices were respected.  The following represents what was discussed and decided upon by the physician assistant and the patient.  Patient's daughter is present for the visit and helps to provide history.    1.  DIAGNOSTIC TESTS:    -I reviewed the MRI cervical spine.  -I reviewed the CT cervical spine.  -I offered to obtain MRI scans of the brain, thoracic, and lumbar spine, which was also discussed by neurosurgery, to further investigate leg weakness.  She is going to consider this and will call our clinic if she wants to pursue it.    2.  PHYSICAL THERAPY: Patient is currently getting home care physical therapy.  She has had 3 sessions of far and has more sessions scheduled.  Encouraged to continue with physical therapy and the home exercises.    3.  MEDICATIONS:    -I recommended that the patient use over-the-counter Voltaren gel on the left neck.  She can apply 2 g 4 times daily as needed.  -She can continue using over-the-counter Tylenol and Advil as needed.    4.  INTERVENTIONS: No interventions were ordered.  I do think the patient could benefit from a left C1-C2 facet joint injection if physical therapy is not helpful.  However, patient states that she had an adverse reaction to a steroid injection in her shoulder many years ago.  She thinks these were \"heart symptoms\" but does not remember any more specific symptoms.  The injection could help her neck pain but it would not affect her leg weakness.      5.  PATIENT EDUCATION: Patient is in agreement the above plan.  All questions were answered.    6.  REFERRALS: I offered a referral to neurosurgery for further evaluation of transient weakness.  She is going to consider this and will follow clinic if she wants to pursue that.    7.  FOLLOW-UP:   I will see the patient back in the clinic in 4 weeks to monitor progress with physical therapy.  She will contact our clinic in the meantime if she wishes to pursue " additional diagnostic work-up for leg weakness or referral to neurology.  If she has questions or concerns in the meantime, she should not hesitate to call.        SUBJECTIVE:  Mariluz Arana  Is a 89 year old female who presents today in consultation request of Dr. Levine for new patient evaluation of neck pain, bilateral upper extremity paresthesias, and 2 episodes of transient bilateral upper and lower extremity weakness.    Patient reports that she has had neck pain on and off for years.  Neck pain has been getting progressively worse over the past 3 to 4 months and got much worse after she had an MRI and CT scan last month because of how her neck was positioned while she was in the imaging studies.  Pain is located on the left neck.  Pain is most severe at the craniocervical junction.  Pain extends up into the head in the occipital region, but she denies headache.  Pain extends down the left lateral neck.  She denies any pain radiating to the shoulders or down the arms.  Neck pain is aggravated with turning to the right.  Neck pain is alleviated with applying heat.    Patient also complains of bilateral upper extremity numbness and tingling.  She states this has been present for years.  The numbness and tingling affects all 5 fingers of both hands.  She reports that she was diagnosed with carpal tunnel syndrome bilaterally.  She underwent a right carpal tunnel release but did not have any improvement in her symptoms so they did not pursue the carpal tunnel release on the left.  She has seen [facility] from this.  She has some difficulty with fine motor activities because of the numbness and tingling in her hands.    Patient also complains of intermittent weakness in both arms and both legs.  The first episode happened about 6 weeks ago.  She states it began after she did not sleep well 1 night.  She felt weakness in both arms and both legs.  She felt like she was unable to even take a step due to  "severe weakness.  She went to the ED for that.  Please see notes from 6/14 for details.  Symptoms resolved within 48 hours.  She states there was a second episode after that in which she experienced the weakness in both arms and both legs.  Again, there is no precipitating event to cause the weakness and symptoms resolved within 48 hours.  She denies any significant low back pain or pain radiating down the legs.  She denies any numbness or tingling down the legs.    Patient denies any loss of bowel or bladder control.  Denies any recent fevers, chills.  Denies any unintentional weight loss.  She did have a fall 3 months ago when she lost her balance.  Has not had any more recent falls.    Patient is currently in home care physical therapy.  She has had 3 sessions of far.  She does not go chiropractor.  She has not had any spine injections or spine surgeries.  She uses Tylenol 2 tabs twice daily.  She uses Advil 1 tab daily.  She does not feel like these are providing significant relief of her pain.    Current Outpatient Medications   Medication     ATENOLOL PO     clotrimazole-betamethasone (LOTRISONE) cream     Fluticasone Propionate POWD     IBUPROFEN PO     loperamide (IMODIUM) 2 MG capsule     LORAZEPAM PO     losartan (COZAAR) 25 MG tablet     meclizine (ANTIVERT) 25 MG tablet     mirtazapine (REMERON) 15 MG tablet     Multiple Vitamins-Minerals (CENTRUM SILVER) per tablet     Ondansetron HCl (ZOFRAN PO)     oxazepam (SERAX) 10 MG capsule     SIMVASTATIN PO     VITAMIN D, CHOLECALCIFEROL, PO         Allergies   Allergen Reactions     Actonel [Risedronate]      GI upset     Clindamycin      Cortisone Other (See Comments)     \"heart symptoms\", low blood pressure     Fosamax Other (See Comments)     \"burning my lower esophagus\"     Kenalog [Triamcinolone]      Metronidazole      Minipress [Prazosin]      Hypotension     Penicillin G      Statin Drugs [Hmg-Coa-R Inhibitors]      Shortness of breath.  Tolerating " Simvastatin (Sept 2016)     Tetanus Toxoid      Prednisone Rash     Zithromax [Azithromycin Dihydrate] Rash     States got rash after using z rubio       Past Medical History:   Diagnosis Date     Arthritis     Osteoarthritis     Bilateral cataracts      Chronic anxiety      Gastroesophageal reflux disease      GERD (gastroesophageal reflux disease)      Heart murmur      Hypercholesteremia      Hypercholesteremia      Hypertension      Hyponatremia      Hyponatremia 7/8/2021     Irregular heart beat      Irritable bowel      Irritable bowel      Meniere syndrome      Meniere's disease, bilateral      Osteoarthritis of spine with radiculopathy, unspecified spinal region 7/8/2021     Osteoporosis      SOB (shortness of breath) 7/8/2021     Vertigo         Patient Active Problem List   Diagnosis     Dizziness and giddiness       Past Surgical History:   Procedure Laterality Date     DILATION AND CURETTAGE       DILATION AND CURETTAGE       ENDOLYMPHATIC SAC OPERATION  2012    enhancement     ENHANCE ENDOLYMPHATIC SAC, DEC SIGMOID SINUS, EXTND FACIAL RECESS APPRCH, MASTOIDECTOMY, BMT, COMBO  4/23/2012    Procedure:COMBINED ENHANCE ENDOLYMPHATIC SAC, DECOMPRESS SIGMOID SINUS, EXTENDED FACIAL RECESS APPROACH, COMPLETE MASTOIDECTOMY, MYRINGOTOMY, INSERT TUBE; Left Endolymphatic Sac Enhancement, Sigmoid Sinus Decom-  pression, Extended Facial Recess Approach, Myringotomy  , Complete Mastoidectomy; Surgeon:LINN MATHIS; Location:[facility]     INNER EAR SURGERY      Per Pt     KERATOTOMY ARCUATE WITH FEMTOSECOND LASER/IMAGING FOR ATIOL Right 7/19/2018    Procedure: KERATOTOMY ARCUATE WITH FEMTOSECOND LASER/IMAGING FOR ATIOL;  RIGHT EYE KERATOTOMY ARCUATE WITH FEMTOSECOND LASER/IMAGING FOR ATIOL ,  CAPSULOTOMY, LENS FRAGMENTATION, ARCUATE INCISIONS;  Surgeon: Roberth Chambers MD;  [facility]     MASTOIDECTOMY  2012     PHACOEMULSIFICATION CLEAR CORNEA WITH TORIC INTRAOCULAR LENS IMPLANT Right 7/19/2018     Procedure: PHACOEMULSIFICATION CLEAR CORNEA WITH TORIC INTRAOCULAR LENS IMPLANT;  RIGHT EYE PHACOEMULSIFICATION CLEAR CORNEA WITH TORIC INTRAOCULAR LENS IMPLANT ;  Surgeon: Roberth Chambers MD;  Location: [facility]     TONSILLECTOMY       TONSILLECTOMY         Family History   Problem Relation Age of Onset     Heart Disease Mother      Clotting Disorder Mother         Dies of a blood clot, per pt     Diabetes Father      Cerebrovascular Disease Father      Hypertension Son      Osteoporosis Daughter      Hypertension Daughter      Cancer Sister         Colon     Glaucoma Sister        Social history: Patient is .  She denies tobacco, alcohol, illicit drug use.    ROS: Positive for head pain, change in the vision, palpitations, feet/leg swelling, shortness of breath, diarrhea, muscle fatigue, imbalance, falls, dizziness, insomnia, excessive tiredness, anxiety, depression.  Specifically negative for dysphagia, bowel/bladder dysfunction, fevers,chills, appetite changes, unexplained weight loss.   Otherwise 13 systems reviewed are negative.  Please see the patient's intake questionnaire from today for details.      OBJECTIVE:  PHYSICAL EXAMINATION:  CONSTITUTIONAL:  Vital signs as above.  No acute distress.  The patient is well nourished and well groomed.  PSYCHIATRIC:  The patient is awake, alert, oriented to person, place, time and answering questions appropriately with clear speech.    HEENT:  Normocephalic, atraumatic.  Sclera clear.    SKIN:  Skin over the face, bilateral upper extremities, and neck is clean, dry, intact without rashes.  LYMPH NODES:  No palpable or tender anterior/posterior cervical, submandibular, or supraclavicular lymph nodes.    MUSCLE STRENGTH:  5/5 strength for the bilateral shoulder abductors, elbow flexors/extensors, wrist extensors, finger flexors/abductors, hip flexors, knee flexors/extensors, ankle dorsi/plantar flexors.  NEURO: Gait is unsteady.  She uses a 4 wheeled walker  for assistance.  CN III-XII are grossly intact.  1-2+ symmetric biceps, brachioradialis, triceps, patellar, and Achilles reflexes bilaterally.  Sensation to light touch subjectively diminished all 10 fingertips.  Sensation to light touch intact bilateral lower extremities throughout.  NEgative Arteaga's bilaterally.  No ankle clonus.  Negative Babinski's bilaterally.  VASCULAR:  2/4 radial pulses bilaterally.  Warm upper limbs bilaterally.  Capillary refill in the upper extremities is less than 1 second.  MUSCULOSKELETAL: Tender to palpation left C1-C2 joint.  Hypertonicity left cervical paraspinous muscles.  Cervical range of motion is moderately restricted with flexion and extension severely restricted with lateral rotation bilaterally.  No significant tenderness palpation bilateral lower lumbar paraspinous muscles.    RESULTS:    I reviewed the MRI cervical spine from [facility] dated June 14th, 2021.  This shows moderate marrow edema involving the dens.  There is soft tissue posterior to the dens causing moderate spinal canal stenosis.  There is also moderate spinal canal stenosis C3-4, mild spinal canal stenosis C7-T1.  There is multilevel foraminal stenosis which is moderate to severe right and moderate left C3-4 moderate to severe bilaterally C4-5 and C6-7.    I reviewed the CT cervical spine from [facility] dated June 14th, 2021.  This shows advanced periodontitis CPPD with superimposed degenerative changes.  There is severe left and moderate right atlantoaxial joint degenerative change.  No evidence for fracture.        Again, thank you for allowing me to participate in the care of your patient.        Sincerely,        Darlene nAtony PA-C

## 2021-07-27 NOTE — PATIENT INSTRUCTIONS
Try diclofenac gel (voltaren).  You can apply 2 g to the painful area of your neck up to 4 times daily as needed.    I am happy to further evaluate the leg weakness.  Please call our clinic at [phone] and I will order MRI scans of the thoracic spine, lumbar spine, and brain to investigate.  I can also refer you to neurology to evaluate.

## 2021-07-28 RX ORDER — LOSARTAN POTASSIUM 50 MG/1
TABLET ORAL
Qty: 180 TABLET | Refills: 3 | Status: SHIPPED | OUTPATIENT
Start: 2021-07-28 | End: 2022-07-25

## 2021-07-28 RX ORDER — SIMVASTATIN 20 MG
TABLET ORAL
Qty: 90 TABLET | Refills: 3 | Status: SHIPPED | OUTPATIENT
Start: 2021-07-28 | End: 2021-09-21

## 2021-07-28 NOTE — TELEPHONE ENCOUNTER
"simvastatin (ZOCOR) 20 MG tablet 90 tablet 3 6/5/2020  No   Sig: TAKE 1 TABLET BY MOUTH ONCE DAILY   Sent to pharmacy as: simvastatin 20 mg tablet (ZOCOR)   Notes to Pharmacy: pt has 75 tabs left on this rx, however, she is on a 90 day \"sync\" plan. could we get a new rx for a 90 day supply (which would take the place of any existing rx)? thanks, pharmacy   E-Prescribing Status: Receipt confirmed by pharmacy (6/5/2020  3:42 PM CDT)       Routing refill request to provider for review/approval because:  Labs not current:  LDL    Last Written Prescription Date:  6/5/20  Last Fill Quantity: 90,  # refills: 3   Last office visit provider:  7/8/21     Requested Prescriptions   Pending Prescriptions Disp Refills     simvastatin (ZOCOR) 20 MG tablet [Pharmacy Med Name: simvastatin 20 mg tablet] 90 tablet 3     Sig: TAKE 1 TABLET BY MOUTH ONCE DAILY       Statins Protocol Failed - 7/25/2021  8:00 AM        Failed - LDL on file in past 12 months     No lab results found.          Passed - No abnormal creatine kinase in past 12 months     No lab results found.             Passed - Recent (12 mo) or future (30 days) visit within the authorizing provider's specialty     Patient has had an office visit with the authorizing provider or a provider within the authorizing providers department within the previous 12 mos or has a future within next 30 days. See \"Patient Info\" tab in inbasket, or \"Choose Columns\" in Meds & Orders section of the refill encounter.              Passed - Medication is active on med list        Passed - Patient is age 18 or older        Passed - No active pregnancy on record        Passed - No positive pregnancy test in past 12 months             Samir Duff RN 07/28/21 10:44 AM  "

## 2021-07-28 NOTE — TELEPHONE ENCOUNTER
"losartan (COZAAR) 50 MG tablet 180 tablet 3 6/11/2020  No   Sig - Route: Take 1 tablet (50 mg total) by mouth 2 (two) times a day. - Oral   Sent to pharmacy as: losartan 50 mg tablet (COZAAR)   Notes to Pharmacy: pt states she is now taking 50mg twice daily. can we get a new prescription to reflect this? thank you, pharmacy   E-Prescribing Status: Receipt confirmed by pharmacy (6/11/2020  3:26 PM CDT)       Last Written Prescription Date:  6/11/20  Last Fill Quantity: 180,  # refills: 3   Last office visit provider:  7/8/21     Requested Prescriptions   Pending Prescriptions Disp Refills     losartan (COZAAR) 50 MG tablet [Pharmacy Med Name: losartan 50 mg tablet] 180 tablet 3     Sig: Take 1 tablet (50 mg total) by mouth 2 (two) times a day.       Angiotensin-II Receptors Passed - 7/25/2021  8:00 AM        Passed - Last blood pressure under 140/90 in past 12 months     BP Readings from Last 3 Encounters:   07/27/21 (!) 160/82   07/08/21 139/80   07/02/21 110/66                 Passed - Recent (12 mo) or future (30 days) visit within the authorizing provider's specialty     Patient has had an office visit with the authorizing provider or a provider within the authorizing providers department within the previous 12 mos or has a future within next 30 days. See \"Patient Info\" tab in inbasket, or \"Choose Columns\" in Meds & Orders section of the refill encounter.              Passed - Medication is active on med list        Passed - Patient is age 18 or older        Passed - No active pregnancy on record        Passed - Normal serum creatinine on file in past 12 months     Recent Labs   Lab Test 06/14/21  1718   CR 0.79       Ok to refill medication if creatinine is low          Passed - Normal serum potassium on file in past 12 months     Recent Labs   Lab Test 06/14/21  1718   POTASSIUM 4.2                    Passed - No positive pregnancy test in past 12 months             Samir Duff RN 07/28/21 10:42 AM  "

## 2021-08-07 ENCOUNTER — HEALTH MAINTENANCE LETTER (OUTPATIENT)
Age: 86
End: 2021-08-07

## 2021-08-17 ENCOUNTER — MEDICAL CORRESPONDENCE (OUTPATIENT)
Dept: NEUROSURGERY | Facility: CLINIC | Age: 86
End: 2021-08-17

## 2021-09-08 DIAGNOSIS — F41.9 CHRONIC ANXIETY: ICD-10-CM

## 2021-09-09 ENCOUNTER — HOSPITAL ENCOUNTER (OUTPATIENT)
Dept: CARDIOLOGY | Facility: CLINIC | Age: 86
Discharge: HOME | End: 2021-09-09
Attending: INTERNAL MEDICINE | Admitting: INTERNAL MEDICINE
Payer: COMMERCIAL

## 2021-09-09 DIAGNOSIS — R06.02 SOB (SHORTNESS OF BREATH): ICD-10-CM

## 2021-09-09 DIAGNOSIS — I35.0 NONRHEUMATIC AORTIC VALVE STENOSIS: ICD-10-CM

## 2021-09-09 LAB — LVEF ECHO: NORMAL

## 2021-09-09 PROCEDURE — 93306 TTE W/DOPPLER COMPLETE: CPT | Mod: 26 | Performed by: INTERNAL MEDICINE

## 2021-09-09 NOTE — TELEPHONE ENCOUNTER
Routing refill request to provider for review/approval because:  Controlled substance request    Last Written Prescription Date:  8/13/21  Last Fill Quantity: 30,  # refills: 0   Last office visit provider:  7/8/21     Requested Prescriptions   Pending Prescriptions Disp Refills     LORazepam (ATIVAN) 0.5 MG tablet [Pharmacy Med Name: lorazepam 0.5 mg tablet] 30 tablet 0     Sig: TAKE 1 TABLET BY MOUTH AT BEDTIME AS NEEDED FOR ANXIETY       There is no refill protocol information for this order          Samir Duff RN 09/09/21 2:05 PM

## 2021-09-10 RX ORDER — LORAZEPAM 0.5 MG/1
TABLET ORAL
Qty: 30 TABLET | Refills: 0 | Status: SHIPPED | OUTPATIENT
Start: 2021-09-10 | End: 2021-10-06

## 2021-09-21 ENCOUNTER — OFFICE VISIT (OUTPATIENT)
Dept: INTERNAL MEDICINE | Facility: CLINIC | Age: 86
End: 2021-09-21
Payer: COMMERCIAL

## 2021-09-21 VITALS
SYSTOLIC BLOOD PRESSURE: 132 MMHG | DIASTOLIC BLOOD PRESSURE: 60 MMHG | OXYGEN SATURATION: 97 % | HEIGHT: 59 IN | HEART RATE: 64 BPM | BODY MASS INDEX: 21.57 KG/M2 | WEIGHT: 107 LBS

## 2021-09-21 DIAGNOSIS — I35.0 AORTIC VALVE STENOSIS, ETIOLOGY OF CARDIAC VALVE DISEASE UNSPECIFIED: ICD-10-CM

## 2021-09-21 DIAGNOSIS — I10 ESSENTIAL (PRIMARY) HYPERTENSION: ICD-10-CM

## 2021-09-21 DIAGNOSIS — Z01.818 PREOP GENERAL PHYSICAL EXAM: Primary | ICD-10-CM

## 2021-09-21 DIAGNOSIS — H25.9 SENILE CATARACT OF LEFT EYE, UNSPECIFIED AGE-RELATED CATARACT TYPE: ICD-10-CM

## 2021-09-21 DIAGNOSIS — F41.9 CHRONIC ANXIETY: ICD-10-CM

## 2021-09-21 DIAGNOSIS — E87.1 HYPONATREMIA: ICD-10-CM

## 2021-09-21 PROBLEM — I49.3 PVC (PREMATURE VENTRICULAR CONTRACTION): Status: ACTIVE | Noted: 2019-11-21

## 2021-09-21 PROBLEM — Z91.81 RISK FOR FALLS: Status: ACTIVE | Noted: 2021-02-09

## 2021-09-21 PROBLEM — M15.0 PRIMARY OSTEOARTHRITIS INVOLVING MULTIPLE JOINTS: Status: ACTIVE | Noted: 2021-02-09

## 2021-09-21 PROBLEM — M47.20: Status: ACTIVE | Noted: 2021-07-08

## 2021-09-21 PROBLEM — R06.02 SOB (SHORTNESS OF BREATH): Status: RESOLVED | Noted: 2021-07-08 | Resolved: 2021-09-21

## 2021-09-21 PROBLEM — R26.81 GAIT INSTABILITY: Status: ACTIVE | Noted: 2021-02-09

## 2021-09-21 PROBLEM — H81.03 MENIERE'S DISEASE, BILATERAL: Status: ACTIVE | Noted: 2021-09-21

## 2021-09-21 PROBLEM — G47.00 INSOMNIA: Status: ACTIVE | Noted: 2021-02-09

## 2021-09-21 PROBLEM — F41.1 GENERALIZED ANXIETY DISORDER WITH PANIC ATTACKS: Status: ACTIVE | Noted: 2018-04-04

## 2021-09-21 PROBLEM — I83.93 VARICOSE VEINS OF BOTH LOWER EXTREMITIES: Status: ACTIVE | Noted: 2021-02-09

## 2021-09-21 PROBLEM — H54.7 VISUAL IMPAIRMENT: Status: ACTIVE | Noted: 2021-02-09

## 2021-09-21 PROBLEM — R29.898 MUSCULAR DECONDITIONING: Status: ACTIVE | Noted: 2021-02-09

## 2021-09-21 PROBLEM — F41.0 GENERALIZED ANXIETY DISORDER WITH PANIC ATTACKS: Status: ACTIVE | Noted: 2018-04-04

## 2021-09-21 PROBLEM — E83.42 HYPOMAGNESEMIA: Status: ACTIVE | Noted: 2021-02-09

## 2021-09-21 PROBLEM — R06.02 SOB (SHORTNESS OF BREATH): Status: ACTIVE | Noted: 2021-07-08

## 2021-09-21 LAB
ANION GAP SERPL CALCULATED.3IONS-SCNC: 12 MMOL/L (ref 5–18)
BUN SERPL-MCNC: 14 MG/DL (ref 8–28)
CALCIUM SERPL-MCNC: 10 MG/DL (ref 8.5–10.5)
CHLORIDE BLD-SCNC: 97 MMOL/L (ref 98–107)
CO2 SERPL-SCNC: 22 MMOL/L (ref 22–31)
CREAT SERPL-MCNC: 0.85 MG/DL (ref 0.6–1.1)
GFR SERPL CREATININE-BSD FRML MDRD: 61 ML/MIN/1.73M2
GLUCOSE BLD-MCNC: 96 MG/DL (ref 70–125)
POTASSIUM BLD-SCNC: 4.5 MMOL/L (ref 3.5–5)
SODIUM SERPL-SCNC: 131 MMOL/L (ref 136–145)

## 2021-09-21 PROCEDURE — 80048 BASIC METABOLIC PNL TOTAL CA: CPT | Performed by: INTERNAL MEDICINE

## 2021-09-21 PROCEDURE — 90662 IIV NO PRSV INCREASED AG IM: CPT | Performed by: INTERNAL MEDICINE

## 2021-09-21 PROCEDURE — G0008 ADMIN INFLUENZA VIRUS VAC: HCPCS | Performed by: INTERNAL MEDICINE

## 2021-09-21 PROCEDURE — 36415 COLL VENOUS BLD VENIPUNCTURE: CPT | Performed by: INTERNAL MEDICINE

## 2021-09-21 PROCEDURE — 99214 OFFICE O/P EST MOD 30 MIN: CPT | Mod: 25 | Performed by: INTERNAL MEDICINE

## 2021-09-21 RX ORDER — ATENOLOL 25 MG/1
25 TABLET ORAL 2 TIMES DAILY
Qty: 180 TABLET | Refills: 3
Start: 2021-09-21 | End: 2022-01-25

## 2021-09-21 RX ORDER — MECLIZINE HYDROCHLORIDE 25 MG/1
25 TABLET ORAL 3 TIMES DAILY PRN
Start: 2021-09-21 | End: 2023-07-18

## 2021-09-21 RX ORDER — ATENOLOL 25 MG/1
25 TABLET ORAL DAILY
COMMUNITY
Start: 2020-12-08 | End: 2021-09-21

## 2021-09-21 RX ORDER — ATENOLOL 25 MG/1
1 TABLET ORAL DAILY
COMMUNITY
Start: 2021-07-30 | End: 2021-09-21

## 2021-09-21 RX ORDER — SIMVASTATIN 20 MG
1 TABLET ORAL DAILY
COMMUNITY
Start: 2020-06-05 | End: 2022-07-25

## 2021-09-21 RX ORDER — IBUPROFEN 200 MG
200 TABLET ORAL EVERY 8 HOURS PRN
COMMUNITY
Start: 2021-09-21 | End: 2022-07-18

## 2021-09-21 ASSESSMENT — MIFFLIN-ST. JEOR: SCORE: 810.98

## 2021-09-21 NOTE — PROGRESS NOTES
[facility]  [address] [facility] [address]  Phone: [phone]  Fax: [phone]  Primary Provider: Maik Levine  Pre-op Performing Provider: MAIK LEVINE      PREOPERATIVE EVALUATION:  Today's date: 9/21/2021    Mariluz rAana is a 89 year old female who presents for a preoperative evaluation.    Surgical Information:  Surgery/Procedure: Left Cataract  Surgery Location: [facility]  Surgeon: Dr Obed Adam  Surgery Date: 10-  Time of Surgery: ?  Where patient plans to recover: At home with family  Fax number for surgical facility: [phone]    Type of Anesthesia Anticipated: Local with MAC    Assessment & Plan     The proposed surgical procedure is considered LOW risk.    Preop general physical exam  Plans for left cataract extraction.  Overall risk is low no contraindications to proceeding with surgery and anesthesia.    Senile cataract of left eye, unspecified age-related cataract type  Plans for cataract extraction    Essential (primary) hypertension  Good blood pressure control.  She will take her usual doses of atenolol and losartan on the morning of surgery with small sip of water  - atenolol (TENORMIN) 25 MG tablet  Dispense: 180 tablet; Refill: 3  - Basic metabolic panel  (Ca, Cl, CO2, Creat, Gluc, K, Na, BUN)  - Basic metabolic panel  (Ca, Cl, CO2, Creat, Gluc, K, Na, BUN)    Aortic valve stenosis, etiology of cardiac valve disease unspecified  Recent evaluation including echocardiogram demonstrating moderate aortic valve stenosis.  Asymptomatic.    Chronic anxiety  Managing anxiety taking lorazepam 0.5 mg nightly and has done so for decades    Hyponatremia  We will monitor electrolytes with history of hyponatremia  - Basic metabolic panel  (Ca, Cl, CO2, Creat, Gluc, K, Na, BUN)  - Basic metabolic panel  (Ca, Cl, CO2, Creat, Gluc, K, Na, BUN)               Medication Instructions:  Take your usual doses of atenolol  and losartan on the morning of surgery with a small sip of water.  Hold your other morning medications that day.    RECOMMENDATION:  APPROVAL GIVEN to proceed with proposed procedure, without further diagnostic evaluation.                Subjective     HPI related to upcoming procedure: 89-year-old woman with worsening vision with plans for left cataract extraction    No history of coronary artery disease.  She does have moderate aortic stenosis on recent echocardiogram.  Denies chest pain or increasing dyspnea.    Hypertension is well controlled with current medication        Preop Questions 9/21/2021   1. Have you ever had a heart attack or stroke? No   2. Have you ever had surgery on your heart or blood vessels, such as a stent placement, a coronary artery bypass, or surgery on an artery in your head, neck, heart, or legs? No   3. Do you have chest pain with activity? No   4. Do you have a history of  heart failure? No   5. Do you currently have a cold, bronchitis or symptoms of other infection? No   6. Do you have a cough, shortness of breath, or wheezing? No   7. Do you or anyone in your family have previous history of blood clots? YES -mother with history of DVT   8. Do you or does anyone in your family have a serious bleeding problem such as prolonged bleeding following surgeries or cuts? No   9. Have you ever had problems with anemia or been told to take iron pills? No   10. Have you had any abnormal blood loss such as black, tarry or bloody stools, or abnormal vaginal bleeding? No   11. Have you ever had a blood transfusion? No   12. Are you willing to have a blood transfusion if it is medically needed before, during, or after your surgery? Yes   13. Have you or any of your relatives ever had problems with anesthesia?   No   14. Do you have sleep apnea, excessive snoring or daytime drowsiness? No   15. Do you have any artifical heart valves or other implanted medical devices like a pacemaker, defibrillator,  or continuous glucose monitor? No   16. Do you have artificial joints? No   17. Are you allergic to latex? No       Health Care Directive:  Patient does not have a Health Care Directive or Living Will:         Review of Systems  CONSTITUTIONAL: NEGATIVE for fever, chills, change in weight  RESP: NEGATIVE for significant cough or SOB  CV: NEGATIVE for chest pain, palpitations or peripheral edema  ROS otherwise negative    Patient Active Problem List    Diagnosis Date Noted     Meniere's disease, bilateral 09/21/2021     Priority: Medium     Age-related cataract of left eye 09/21/2021     Priority: Medium     Aortic stenosis      Priority: Medium     Echocardiogram with moderate aortic stenosis September 2021       Hyponatremia 07/08/2021     Priority: Medium     Osteoarthritis of spine with radiculopathy, unspecified spinal region 07/08/2021     Priority: Medium     Gait instability 02/09/2021     Priority: Medium     Hypomagnesemia 02/09/2021     Priority: Medium     Insomnia 02/09/2021     Priority: Medium     Muscular deconditioning 02/09/2021     Priority: Medium     Primary osteoarthritis involving multiple joints 02/09/2021     Priority: Medium     Risk for falls 02/09/2021     Priority: Medium     Varicose veins of both lower extremities 02/09/2021     Priority: Medium     Visual impairment 02/09/2021     Priority: Medium     PVC (premature ventricular contraction) 11/21/2019     Priority: Medium     Chronic anxiety 04/17/2018     Priority: Medium     Generalized anxiety disorder with panic attacks 04/04/2018     Priority: Medium     HTN (hypertension) 07/18/2014     Priority: Medium     Dizziness and giddiness 09/17/2012     Priority: Medium      Past Medical History:   Diagnosis Date     Aortic stenosis     Echocardiogram with moderate aortic stenosis September 2021     Arthritis     Osteoarthritis     Bilateral cataracts      Chronic anxiety      Gastroesophageal reflux disease      GERD (gastroesophageal  reflux disease)      Heart murmur      Hypercholesteremia      Hypercholesteremia      Hypertension      Hyponatremia      Hyponatremia 07/08/2021     Irregular heart beat      Irritable bowel      Irritable bowel      Meniere syndrome      Meniere's disease, bilateral      Osteoarthritis of spine with radiculopathy, unspecified spinal region 07/08/2021     Osteoporosis      SOB (shortness of breath) 07/08/2021     Vertigo      Past Surgical History:   Procedure Laterality Date     DILATION AND CURETTAGE       DILATION AND CURETTAGE       ENDOLYMPHATIC SAC OPERATION  2012    enhancement     ENHANCE ENDOLYMPHATIC SAC, DEC SIGMOID SINUS, EXTND FACIAL RECESS APPRCH, MASTOIDECTOMY, BMT, COMBO  4/23/2012    Procedure:COMBINED ENHANCE ENDOLYMPHATIC SAC, DECOMPRESS SIGMOID SINUS, EXTENDED FACIAL RECESS APPROACH, COMPLETE MASTOIDECTOMY, MYRINGOTOMY, INSERT TUBE; Left Endolymphatic Sac Enhancement, Sigmoid Sinus Decom-  pression, Extended Facial Recess Approach, Myringotomy  , Complete Mastoidectomy; Surgeon:LINN MATHIS; Location:[facility]     INNER EAR SURGERY      Per Pt     KERATOTOMY ARCUATE WITH FEMTOSECOND LASER/IMAGING FOR ATIOL Right 7/19/2018    Procedure: KERATOTOMY ARCUATE WITH FEMTOSECOND LASER/IMAGING FOR ATIOL;  RIGHT EYE KERATOTOMY ARCUATE WITH FEMTOSECOND LASER/IMAGING FOR ATIOL ,  CAPSULOTOMY, LENS FRAGMENTATION, ARCUATE INCISIONS;  Surgeon: Roberth Chambers MD;  Location: [facility]     MASTOIDECTOMY  2012     PHACOEMULSIFICATION CLEAR CORNEA WITH TORIC INTRAOCULAR LENS IMPLANT Right 7/19/2018    Procedure: PHACOEMULSIFICATION CLEAR CORNEA WITH TORIC INTRAOCULAR LENS IMPLANT;  RIGHT EYE PHACOEMULSIFICATION CLEAR CORNEA WITH TORIC INTRAOCULAR LENS IMPLANT ;  Surgeon: Roberth Chambers MD;  Location: [facility]     TONSILLECTOMY       TONSILLECTOMY       Current Outpatient Medications   Medication Sig Dispense Refill     atenolol (TENORMIN) 25 MG tablet Take 1 tablet (25 mg) by mouth 2 times daily 180  "tablet 3     Fluticasone Propionate POWD        ibuprofen (ADVIL/MOTRIN) 200 MG tablet Take 1 tablet (200 mg) by mouth every 8 hours as needed for moderate pain       loperamide (IMODIUM) 2 MG capsule Take 2 mg by mouth 4 times daily as needed.       LORazepam (ATIVAN) 0.5 MG tablet TAKE 1 TABLET BY MOUTH AT BEDTIME AS NEEDED FOR ANXIETY 30 tablet 0     losartan (COZAAR) 50 MG tablet Take 1 tablet (50 mg total) by mouth 2 (two) times a day. 180 tablet 3     meclizine (ANTIVERT) 25 MG tablet Take 1 tablet (25 mg) by mouth 3 times daily as needed for dizziness       Multiple Vitamins-Minerals (CENTRUM SILVER) per tablet Take 1 tablet by mouth daily.       Ondansetron HCl (ZOFRAN PO) Take 4 mg by mouth every 8 hours as needed.       oxazepam (SERAX) 10 MG capsule Take 10 mg by mouth nightly as needed.       simvastatin (ZOCOR) 20 MG tablet Take 1 tablet by mouth daily       VITAMIN D, CHOLECALCIFEROL, PO Take 1,000 Units by mouth daily         Allergies   Allergen Reactions     Actonel [Risedronate]      GI upset     Clindamycin      Cortisone Other (See Comments)     \"heart symptoms\", low blood pressure     Fosamax Other (See Comments)     \"burning my lower esophagus\"     Kenalog [Triamcinolone]      Metronidazole      Minipress [Prazosin]      Hypotension     Penicillin G      Statin Drugs [Hmg-Coa-R Inhibitors]      Shortness of breath.  Tolerating Simvastatin (Sept 2016)     Tetanus Toxoid      Prednisone Rash     Zithromax [Azithromycin Dihydrate] Rash     States got rash after using z rubio        Social History     Tobacco Use     Smoking status: Never Smoker     Smokeless tobacco: Never Used   Substance Use Topics     Alcohol use: No     Alcohol/week: 0.0 standard drinks     Comment: 2-3 times weekly, glass of wine     Family History   Problem Relation Age of Onset     Heart Disease Mother      Clotting Disorder Mother         Dies of a blood clot, per pt     Diabetes Father      Cerebrovascular Disease Father  " "    Hypertension Son      Osteoporosis Daughter      Hypertension Daughter      Cancer Sister         Colon     Glaucoma Sister      History   Drug Use No         Objective     /60 (BP Location: Right arm, Patient Position: Sitting, Cuff Size: Adult Regular)   Pulse 64   Ht 1.499 m (4' 11\")   Wt 48.5 kg (107 lb)   SpO2 97%   BMI 21.61 kg/m      Physical Exam  GENERAL APPEARANCE: healthy, alert and no distress  HENT: Normal  RESP: lungs clear to auscultation - no rales, rhonchi or wheezes  CV: regular rate and rhythm, 3/6 systolic murmur.  No edema  ABDOMEN: soft, nontender, no HSM or masses and bowel sounds normal  NEURO: Normal strength and tone, sensory exam grossly normal, mentation intact and speech normal        Diagnostics:  Labs-  Sodium 131 potassium 4.5 creatinine 0.85     No EKG this visit, completed in the last 90 days.    Revised Cardiac Risk Index (RCRI):  The patient has the following serious cardiovascular risks for perioperative complications:   - No serious cardiac risks = 0 points     RCRI Interpretation: 0 points: Class I (very low risk - 0.4% complication rate)           Signed Electronically by: Maik Levine MD  Copy of this evaluation report is provided to requesting physician.      "

## 2021-09-21 NOTE — PATIENT INSTRUCTIONS

## 2021-09-22 ENCOUNTER — TELEPHONE (OUTPATIENT)
Dept: INTERNAL MEDICINE | Facility: CLINIC | Age: 86
End: 2021-09-22

## 2021-09-22 NOTE — LETTER
September 22, 2021      [address] [address] [place] [address] [facility] [address]   [facility] [address]      Dear Mariluz,    No change in chronic hyponatremia.  Your sodium usually runs between 131-134.    Component      Latest Ref Rng & Units 9/21/2021   Sodium      136 - 145 mmol/L 131 (L)   Potassium      3.5 - 5.0 mmol/L 4.5   Chloride      98 - 107 mmol/L 97 (L)   Carbon Dioxide      22 - 31 mmol/L 22   Anion Gap      5 - 18 mmol/L 12   Urea Nitrogen      8 - 28 mg/dL 14   Creatinine      0.60 - 1.10 mg/dL 0.85   Calcium      8.5 - 10.5 mg/dL 10.0   Glucose      70 - 125 mg/dL 96   GFR Estimate      >60 mL/min/1.73m2 61     Let us know if you have any questions,    Sincerely,   Dr. Maik Levine

## 2021-09-22 NOTE — TELEPHONE ENCOUNTER
9-22-21    Reason for Call:  Lab results from 9-21-21    Detailed comments: pt called and wants her lab results mailed to her house:  [address] [place] [address]        Phone Number Patient can be reached at: Home number on file [phone] (home)    Best Time: anytime    Can we leave a detailed message on this number? YES    Call taken on 9/22/2021 at 3:00 PM by Alexia Mclain

## 2021-10-06 DIAGNOSIS — F41.9 CHRONIC ANXIETY: ICD-10-CM

## 2021-10-06 RX ORDER — LORAZEPAM 0.5 MG/1
TABLET ORAL
Qty: 30 TABLET | Refills: 0 | Status: SHIPPED | OUTPATIENT
Start: 2021-10-06 | End: 2021-10-15

## 2021-10-14 ENCOUNTER — TELEPHONE (OUTPATIENT)
Dept: INTERNAL MEDICINE | Facility: CLINIC | Age: 86
End: 2021-10-14

## 2021-10-14 NOTE — TELEPHONE ENCOUNTER
Reason for Call:  Other prescription    Detailed comments: Irma from [facility] pharmacy called in wondering for future reference if patient can get medication LORazepam (ATIVAN) 0.5 MG tablet as a 90 day supply instead of every 30 days as the patient gets all of her medication mailed out to her & gets nervous when this certain medication isn't sent along and has to call in every 30 days. Please call back and advise on what Dr Levine thinks.    Phone Number Patient can be reached at: [phone]    Best Time: ANY    Can we leave a detailed message on this number? YES    Call taken on 10/14/2021 at 12:29 PM by Sadi Arzate

## 2021-10-15 DIAGNOSIS — F41.9 CHRONIC ANXIETY: ICD-10-CM

## 2021-10-15 RX ORDER — LORAZEPAM 0.5 MG/1
TABLET ORAL
Qty: 90 TABLET | Refills: 0 | Status: SHIPPED
Start: 2021-10-15 | End: 2021-11-08

## 2021-10-15 NOTE — TELEPHONE ENCOUNTER
In the future, this can be authorized as a 90-day supply.  It would be helpful if the patient were to call and remind us of this at the time of her next refill.

## 2021-11-02 DIAGNOSIS — F41.9 CHRONIC ANXIETY: ICD-10-CM

## 2021-11-03 NOTE — TELEPHONE ENCOUNTER
Routing refill request to provider for review/approval because:  Controlled substance request  Early refill requested.    Last Written Prescription Date:  10/15/21  Last Fill Quantity: 90,  # refills: 0   Last office visit provider:  9/21/21     Requested Prescriptions   Pending Prescriptions Disp Refills     LORazepam (ATIVAN) 0.5 MG tablet [Pharmacy Med Name: lorazepam 0.5 mg tablet] 30 tablet 0     Sig: TAKE 1 TABLET BY MOUTH AT BEDTIME AS NEEDED FOR ANXIETY       There is no refill protocol information for this order          Samir Duff RN 11/03/21 3:16 PM

## 2021-11-08 RX ORDER — LORAZEPAM 0.5 MG/1
TABLET ORAL
Qty: 30 TABLET | Refills: 0 | Status: SHIPPED | OUTPATIENT
Start: 2021-11-08 | End: 2021-12-06

## 2021-11-30 ENCOUNTER — MEDICAL CORRESPONDENCE (OUTPATIENT)
Dept: NEUROSURGERY | Facility: CLINIC | Age: 86
End: 2021-11-30
Payer: COMMERCIAL

## 2021-12-03 DIAGNOSIS — F41.9 CHRONIC ANXIETY: ICD-10-CM

## 2021-12-05 NOTE — TELEPHONE ENCOUNTER
Routing refill request to provider for review/approval because:  Drug not on the [facility] Refill Protocol    Last Written Prescription Date:  11/8/21  Last Fill Quantity: 30,  # refills: 0   Last office visit provider:  9/12/21    Requested Prescriptions   Pending Prescriptions Disp Refills     LORazepam (ATIVAN) 0.5 MG tablet [Pharmacy Med Name: lorazepam 0.5 mg tablet] 30 tablet 0     Sig: TAKE 1 TABLET BY MOUTH AT BEDTIME AS NEEDED FOR ANXIETY       There is no refill protocol information for this order          Luanne Morris RN 12/05/21 4:29 PM

## 2021-12-06 RX ORDER — LORAZEPAM 0.5 MG/1
TABLET ORAL
Qty: 30 TABLET | Refills: 0 | Status: SHIPPED | OUTPATIENT
Start: 2021-12-06 | End: 2022-01-03

## 2021-12-08 ENCOUNTER — MEDICAL CORRESPONDENCE (OUTPATIENT)
Dept: NEUROSURGERY | Facility: CLINIC | Age: 86
End: 2021-12-08
Payer: COMMERCIAL

## 2021-12-13 ENCOUNTER — OFFICE VISIT (OUTPATIENT)
Dept: INTERNAL MEDICINE | Facility: CLINIC | Age: 86
End: 2021-12-13
Payer: COMMERCIAL

## 2021-12-13 VITALS
DIASTOLIC BLOOD PRESSURE: 84 MMHG | WEIGHT: 107 LBS | SYSTOLIC BLOOD PRESSURE: 160 MMHG | OXYGEN SATURATION: 96 % | HEIGHT: 58 IN | HEART RATE: 69 BPM | BODY MASS INDEX: 22.46 KG/M2

## 2021-12-13 DIAGNOSIS — E83.42 HYPOMAGNESEMIA: ICD-10-CM

## 2021-12-13 DIAGNOSIS — F41.0 GENERALIZED ANXIETY DISORDER WITH PANIC ATTACKS: ICD-10-CM

## 2021-12-13 DIAGNOSIS — G47.00 INSOMNIA, UNSPECIFIED TYPE: ICD-10-CM

## 2021-12-13 DIAGNOSIS — M47.20 OSTEOARTHRITIS OF SPINE WITH RADICULOPATHY, UNSPECIFIED SPINAL REGION: ICD-10-CM

## 2021-12-13 DIAGNOSIS — M19.042 PRIMARY OSTEOARTHRITIS OF BOTH HANDS: ICD-10-CM

## 2021-12-13 DIAGNOSIS — Z00.00 ENCOUNTER FOR MEDICARE ANNUAL WELLNESS EXAM: Primary | ICD-10-CM

## 2021-12-13 DIAGNOSIS — M81.0 AGE-RELATED OSTEOPOROSIS WITHOUT CURRENT PATHOLOGICAL FRACTURE: ICD-10-CM

## 2021-12-13 DIAGNOSIS — M19.041 PRIMARY OSTEOARTHRITIS OF BOTH HANDS: ICD-10-CM

## 2021-12-13 DIAGNOSIS — R26.81 GAIT INSTABILITY: ICD-10-CM

## 2021-12-13 DIAGNOSIS — E87.1 HYPONATREMIA: ICD-10-CM

## 2021-12-13 DIAGNOSIS — M15.0 PRIMARY OSTEOARTHRITIS INVOLVING MULTIPLE JOINTS: ICD-10-CM

## 2021-12-13 DIAGNOSIS — I35.0 AORTIC VALVE STENOSIS, ETIOLOGY OF CARDIAC VALVE DISEASE UNSPECIFIED: ICD-10-CM

## 2021-12-13 DIAGNOSIS — Z91.81 RISK FOR FALLS: ICD-10-CM

## 2021-12-13 DIAGNOSIS — H54.7 VISUAL IMPAIRMENT: ICD-10-CM

## 2021-12-13 DIAGNOSIS — F41.1 GENERALIZED ANXIETY DISORDER WITH PANIC ATTACKS: ICD-10-CM

## 2021-12-13 DIAGNOSIS — E78.00 HYPERCHOLESTEROLEMIA: ICD-10-CM

## 2021-12-13 DIAGNOSIS — Z78.0 POSTMENOPAUSAL STATUS: ICD-10-CM

## 2021-12-13 DIAGNOSIS — I10 PRIMARY HYPERTENSION: ICD-10-CM

## 2021-12-13 DIAGNOSIS — H81.03 MENIERE'S DISEASE, BILATERAL: ICD-10-CM

## 2021-12-13 PROBLEM — H25.9 AGE-RELATED CATARACT OF LEFT EYE: Status: RESOLVED | Noted: 2021-09-21 | Resolved: 2021-12-13

## 2021-12-13 LAB
ALBUMIN SERPL-MCNC: 3.9 G/DL (ref 3.5–5)
ALP SERPL-CCNC: 80 U/L (ref 45–120)
ALT SERPL W P-5'-P-CCNC: 14 U/L (ref 0–45)
ANION GAP SERPL CALCULATED.3IONS-SCNC: 13 MMOL/L (ref 5–18)
AST SERPL W P-5'-P-CCNC: 23 U/L (ref 0–40)
BILIRUB SERPL-MCNC: 1.2 MG/DL (ref 0–1)
BUN SERPL-MCNC: 16 MG/DL (ref 8–28)
CALCIUM SERPL-MCNC: 9.9 MG/DL (ref 8.5–10.5)
CHLORIDE BLD-SCNC: 100 MMOL/L (ref 98–107)
CHOLEST SERPL-MCNC: 142 MG/DL
CO2 SERPL-SCNC: 23 MMOL/L (ref 22–31)
CREAT SERPL-MCNC: 0.86 MG/DL (ref 0.6–1.1)
ERYTHROCYTE [DISTWIDTH] IN BLOOD BY AUTOMATED COUNT: 11.8 % (ref 10–15)
FASTING STATUS PATIENT QL REPORTED: NORMAL
GFR SERPL CREATININE-BSD FRML MDRD: 60 ML/MIN/1.73M2
GLUCOSE BLD-MCNC: 93 MG/DL (ref 70–125)
HCT VFR BLD AUTO: 41.2 % (ref 35–47)
HDLC SERPL-MCNC: 76 MG/DL
HGB BLD-MCNC: 13.2 G/DL (ref 11.7–15.7)
LDLC SERPL CALC-MCNC: 55 MG/DL
MAGNESIUM SERPL-MCNC: 1.9 MG/DL (ref 1.8–2.6)
MCH RBC QN AUTO: 32.3 PG (ref 26.5–33)
MCHC RBC AUTO-ENTMCNC: 32 G/DL (ref 31.5–36.5)
MCV RBC AUTO: 101 FL (ref 78–100)
PLATELET # BLD AUTO: 213 10E3/UL (ref 150–450)
POTASSIUM BLD-SCNC: 4.6 MMOL/L (ref 3.5–5)
PROT SERPL-MCNC: 6.8 G/DL (ref 6–8)
RBC # BLD AUTO: 4.09 10E6/UL (ref 3.8–5.2)
RBC #/AREA URNS AUTO: NORMAL /[HPF]
SODIUM SERPL-SCNC: 136 MMOL/L (ref 136–145)
TRIGL SERPL-MCNC: 54 MG/DL
WBC # BLD AUTO: 7.3 10E3/UL (ref 4–11)
WBC #/AREA URNS AUTO: NORMAL /[HPF]

## 2021-12-13 PROCEDURE — 83735 ASSAY OF MAGNESIUM: CPT | Performed by: INTERNAL MEDICINE

## 2021-12-13 PROCEDURE — 99214 OFFICE O/P EST MOD 30 MIN: CPT | Mod: 25 | Performed by: INTERNAL MEDICINE

## 2021-12-13 PROCEDURE — 85027 COMPLETE CBC AUTOMATED: CPT | Performed by: INTERNAL MEDICINE

## 2021-12-13 PROCEDURE — 36415 COLL VENOUS BLD VENIPUNCTURE: CPT | Performed by: INTERNAL MEDICINE

## 2021-12-13 PROCEDURE — 99397 PER PM REEVAL EST PAT 65+ YR: CPT | Performed by: INTERNAL MEDICINE

## 2021-12-13 PROCEDURE — 80053 COMPREHEN METABOLIC PANEL: CPT | Performed by: INTERNAL MEDICINE

## 2021-12-13 PROCEDURE — 82306 VITAMIN D 25 HYDROXY: CPT | Performed by: INTERNAL MEDICINE

## 2021-12-13 PROCEDURE — 80061 LIPID PANEL: CPT | Performed by: INTERNAL MEDICINE

## 2021-12-13 PROCEDURE — 81001 URINALYSIS AUTO W/SCOPE: CPT | Performed by: INTERNAL MEDICINE

## 2021-12-13 PROCEDURE — 87086 URINE CULTURE/COLONY COUNT: CPT | Mod: 59 | Performed by: INTERNAL MEDICINE

## 2021-12-13 RX ORDER — MIRTAZAPINE 15 MG/1
7.5 TABLET, FILM COATED ORAL AT BEDTIME
Qty: 45 TABLET | Refills: 3 | Status: SHIPPED | OUTPATIENT
Start: 2021-12-13 | End: 2022-03-14

## 2021-12-13 ASSESSMENT — ACTIVITIES OF DAILY LIVING (ADL)
CURRENT_FUNCTION: TRANSPORTATION REQUIRES ASSISTANCE
CURRENT_FUNCTION: HOUSEWORK REQUIRES ASSISTANCE
CURRENT_FUNCTION: SHOPPING REQUIRES ASSISTANCE
CURRENT_FUNCTION: PREPARING MEALS REQUIRES ASSISTANCE

## 2021-12-13 ASSESSMENT — MIFFLIN-ST. JEOR: SCORE: 787.16

## 2021-12-13 NOTE — PATIENT INSTRUCTIONS
Covid booster tomorrow    Annual flu shot every fall    I would recommend getting your shingles vaccine, Shingrix.  This can be obtained at your pharmacy    Make sure you recheck your blood pressure at home.  I would invest in a home blood pressure monitor and have one of your children checking your blood pressure every 2 to 4 weeks.  Let me know if it is running consistently over 140/90    DEXA will be scheduled for osteoporosis evaluation    Schedule a follow-up appointment with your cataract surgeon and continue to get an annual eye exam completed          Patient Education   Personalized Prevention Plan  You are due for the preventive services outlined below.  Your care team is available to assist you in scheduling these services.  If you have already completed any of these items, please share that information with your care team to update in your medical record.  Health Maintenance Due   Topic Date Due     ANNUAL REVIEW OF HM ORDERS  Never done     Zoster (Shingles) Vaccine (1 of 2) Never done     COVID-19 Vaccine (3 - Booster for Moderna series) 08/17/2021       Understanding USDA MyPlate  The USDA has guidelines to help you make healthy food choices. These are called MyPlate. MyPlate shows the food groups that make up healthy meals using the image of a place setting. Before you eat, think about the healthiest choices for what to put on your plate or in your cup or bowl. To learn more about building a healthy plate, visit www.choosemyplate.gov.    The food groups    Fruits. Any fruit or 100% fruit juice counts as part of the Fruit Group. Fruits may be fresh, canned, frozen, or dried, and may be whole, cut-up, or pureed. Make 1/2 of your plate fruits and vegetables.    Vegetables. Any vegetable or 100% vegetable juice counts as a member of the Vegetable Group. Vegetables may be fresh, frozen, canned, or dried. They can be served raw or cooked and may be whole, cut-up, or mashed. Make 1/2 of your plate fruits  and vegetables.    Grains. All foods made from grains are part of the Grains Group. These include wheat, rice, oats, cornmeal, and barley. Grains are often used to make foods such as bread, pasta, oatmeal, cereal, tortillas, and grits. Grains should be no more than 1/4 of your plate. At least half of your grains should be whole grains.    Protein. This group includes meat, poultry, seafood, beans and peas, eggs, processed soy products (such as tofu), nuts (including nut butters), and seeds. Make protein choices no more than 1/4 of your plate. Meat and poultry choices should be lean or low fat.    Dairy. The Dairy Group includes all fluid milk products and foods made from milk that contain calcium, such as yogurt and cheese. (Foods that have little calcium, such as cream, butter, and cream cheese, are not part of this group.) Most dairy choices should be low-fat or fat-free.    Oils. Oils aren't a food group, but they do contain essential nutrients. However it's important to watch your intake of oils. These are fats that are liquid at room temperature. They include canola, corn, olive, soybean, vegetable, and sunflower oil. Foods that are mainly oil include mayonnaise, certain salad dressings, and soft margarines. You likely already get your daily oil allowance from the foods you eat.  Things to limit  Eating healthy also means limiting these things in your diet:       Salt (sodium). Many processed foods have a lot of sodium. To keep sodium intake down, eat fresh vegetables, meats, poultry, and seafood when possible. Purchase low-sodium, reduced-sodium, or no-salt-added food products at the store. And don't add salt to your meals at home. Instead, season them with herbs and spices such as dill, oregano, cumin, and paprika. Or try adding flavor with lemon or lime zest and juice.    Saturated fat. Saturated fats are most often found in animal products such as beef, pork, and chicken. They are often solid at room  temperature, such as butter. To reduce your saturated fat intake, choose leaner cuts of meat and poultry. And try healthier cooking methods such as grilling, broiling, roasting, or baking. For a simple lower-fat swap, use plain nonfat yogurt instead of mayonnaise when making potato salad or macaroni salad.    Added sugars. These are sugars added to foods. They are in foods such as ice cream, candy, soda, fruit drinks, sports drinks, energy drinks, cookies, pastries, jams, and syrups. Cut down on added sugars by sharing sweet treats with a family member or friend. You can also choose fruit for dessert, and drink water or other unsweetened beverages.     [employer] last reviewed this educational content on 6/1/2020 2000-2021 [employer]. All rights reserved. This information is not intended as a substitute for professional medical care. Always follow your healthcare professional's instructions.        Activities of Daily Living    Your Health Risk Assessment indicates you have difficulties with activities of daily living such as housework, bathing, preparing meals, taking medication, etc. Your provider addressed this with you at today's appointment.    Signs of Hearing Loss      Hearing much better with one ear can be a sign of hearing loss.   Hearing loss is a problem shared by many people. In fact, it is one of the most common health problems, particularly as people age. Most people age 65 and older have some hearing loss. By age 80, almost everyone does. Hearing loss often occurs slowly over the years. So you may not realize your hearing has gotten worse.  Have your hearing checked  Call your healthcare provider if you:    Have to strain to hear normal conversation    Have to watch other people s faces very carefully to follow what they re saying    Need to ask people to repeat what they ve said    Often misunderstand what people are saying    Turn the volume of the television or radio up so high  that others complain    Feel that people are mumbling when they re talking to you    Find that the effort to hear leaves you feeling tired and irritated    Notice, when using the phone, that you hear better with one ear than the other  [email or web address] last reviewed this educational content on 1/1/2020 2000-2021 [employer]. All rights reserved. This information is not intended as a substitute for professional medical care. Always follow your healthcare professional's instructions.          Urinary Incontinence, Female (Adult)   Urinary incontinence means loss of bladder control. This problem affects many women, especially as they get older. If you have incontinence, you may be embarrassed to ask for help. But know that this problem can be treated.   Types of Incontinence  There are different types of incontinence. Two of the main types are described here. You can have more than one type.     Stress incontinence. With this type, urine leaks when pressure (stress) is put on the bladder. This may happen when you cough, sneeze, or laugh. Stress incontinence most often occurs because the pelvic floor muscles that support the bladder and urethra are weak. This can happen after pregnancy and vaginal childbirth or a hysterectomy. It can also be due to excess body weight or hormone changes.    Urge incontinence (also called overactive bladder). With this type, a sudden urge to urinate is felt often. This may happen even though there may not be much urine in the bladder. The need to urinate often during the night is common. Urge incontinence most often occurs because of bladder spasms. This may be due to bladder irritation or infection. Damage to bladder nerves or pelvic muscles, constipation, and certain medicines can also lead to urge incontinence.  Treatment depends on the cause. Further evaluation is needed to find the type you have. This will likely include an exam and certain tests. Based on the results, you  and your healthcare provider can then plan treatment. Until a diagnosis is made, the home care tips below can help ease symptoms.   Home care    Do pelvic floor muscle exercises, if they are prescribed. The pelvic floor muscles help support the bladder and urethra. Many women find that their symptoms improve when doing special exercises that strengthen these muscles. To do the exercises, contract the muscles you would use to stop your stream of urine. But do this when you re not urinating. Hold for 10 seconds, then relax. Repeat 10 to 20 times in a row, at least 3 times a day. Your healthcare provider may give you other instructions for how to do the exercises and how often.    Keep a bladder diary. This helps track how often and how much you urinate over a set period of time. Bring this diary with you to your next visit with the provider. The information can help your provider learn more about your bladder problem.    Lose weight, if advised to by your provider. Extra weight puts pressure on the bladder. Your provider can help you create a weight-loss plan that s right for you. This may include exercising more and making certain diet changes.    Don't have foods and drinks that may irritate the bladder. These can include alcohol and caffeinated drinks.    Quit smoking. Smoking and other tobacco use can lead to a long-term (chronic) cough that strains the pelvic floor muscles. Smoking may also damage the bladder and urethra. Talk with your provider about treatments or methods you can use to quit smoking.    If drinking large amounts of fluid makes you have symptoms, you may be advised to limit your fluid intake. You may also be advised to drink most of your fluids during the day and to limit fluids at night.    If you re worried about urine leakage or accidents, you may wear absorbent pads to catch urine. Change the pads often. This helps reduce discomfort. It may also reduce the risk of skin or bladder  infections.    Follow-up care  Follow up with your healthcare provider, or as directed. It may take some to find the right treatment for your problem. But healthy lifestyle changes can be made right away. These include such things as exercising on a regular basis, eating a healthy diet, losing weight (if needed), and quitting smoking. Your treatment plan may include special therapies or medicines. Certain procedures or surgery may also be options. Talk about any questions you have with your provider.   When to seek medical advice  Call the healthcare provider right away if any of these occur:    Fever of 100.4 F (38 C) or higher, or as directed by your provider    Bladder pain or fullness    Belly swelling    Nausea or vomiting    Back pain    Weakness, dizziness, or fainting  [employer] last reviewed this educational content on 1/1/2020 2000-2021 [employer]. All rights reserved. This information is not intended as a substitute for professional medical care. Always follow your healthcare professional's instructions.          Kegel Exercises  Kegel exercises are done to help strengthen the muscles in your pelvic floor. You don t need special clothing or equipment. They re easy to learn and simple to do. And if you do them right, no one can tell you re doing them, so they can be done almost anywhere. Your healthcare provider, nurse, or physical therapist can answer any questions you have and help you get started.   A weak pelvic floor  The pelvic floor muscles may weaken due to aging, pregnancy and vaginal childbirth, injury, surgery, chronic cough, or lack of exercise. If the pelvic floor is weak, your bladder and other pelvic organs may sag out of place. The urethra may also open too easily and allow urine to leak out. Kegel exercises can help you strengthen your pelvic floor muscles. Then they can better support the pelvic organs and control urine flow.     How Kegel exercises are done  Try each of  the Kegel exercises described below. When you re doing them, try not to move your leg, buttock, or stomach muscles:     Contract as if you were stopping your urine stream. But do it when you re not urinating.    Tighten your rectum as if trying not to pass gas. Contract your anus, but don t move your buttocks.    You may place a finger or 2 in the vagina and squeeze your finger with your vagina to learn which muscles to tighten.  Try to hold each Kegel for a slow count to 5. You probably won t be able to hold them for that long at first. But keep practicing. It will get easier as your pelvic floor gets stronger. Eventually, special weights that you place in your vagina may be recommended to help make your Kegels even more effective. Talk to your healthcare provider if you have trouble doing Kegel exercises.   Helpful tips  Here are some tips to follow:    Do your Kegels as often as you can. The more you do them, the faster you ll feel the results.    Pick an activity you do often as a reminder. For instance, do your Kegels every time you sit down.    Tighten your pelvic floor before you sneeze, get up from a chair, cough, laugh, or lift. This can help prevent urine, gas, or stool leakage.  [employer] last reviewed this educational content on 8/1/2020 2000-2021 [employer]. All rights reserved. This information is not intended as a substitute for professional medical care. Always follow your healthcare professional's instructions.

## 2021-12-13 NOTE — PROGRESS NOTES
"SUBJECTIVE:   Mariluz Arana is a 89 year old female who presents for Preventive Visit.    HPI-in addition to her annual wellness visit, Mariluz is here with her son and is here to follow-up multiple chronic medical problems including generalized anxiety, insomnia, osteoporosis, aortic valve stenosis, hypertension, hyponatremia, and osteoarthritis.  See assessment and plan for details.    Patient has been advised of split billing requirements and indicates understanding: Yes   Are you in the first 12 months of your Medicare coverage?  No    Healthy Habits:     In general, how would you rate your overall health?  Good    Frequency of exercise:  2-3 days/week    Duration of exercise:  15-30 minutes    Do you usually eat at least 4 servings of fruit and vegetables a day, include whole grains    & fiber and avoid regularly eating high fat or \"junk\" foods?  No    Taking medications regularly:  Yes    Barriers to taking medications:  None    Medication side effects:  Other    Ability to successfully perform activities of daily living:  Transportation requires assistance, shopping requires assistance, preparing meals requires assistance and housework requires assistance    Home Safety:  No safety concerns identified    Hearing Impairment:  Difficulty following a conversation in a noisy restaurant or crowded room, feel that people are mumbling or not speaking clearly, difficulty following dialogue in the theater, difficult to understand a speaker at a public meeting or [employer] service, need to ask people to speak up or repeat themselves, find that men's voices are easier to understand than woman's and difficulty understanding soft or whispered speech    In the past 6 months, have you been bothered by leaking of urine? Yes    In general, how would you rate your overall mental or emotional health?  Good      PHQ-2 Total Score: 2    Additional concerns today:  Yes    Do you feel safe in your environment? Yes    Have you " ever done Advance Care Planning? (For example, a Health Directive, POLST, or a discussion with a medical provider or your loved ones about your wishes): Yes, advance care planning is on file.       Fall risk  Fallen 2 or more times in the past year?: No  Any fall with injury in the past year?: No  None  Cognitive Screening   1) Repeat 3 items (Leader, Season, Table)    2) Clock draw: ABNORMAL   3) 3 item recall: Recalls 3 objects  Results: 3 items recalled: COGNITIVE IMPAIRMENT LESS LIKELY    Mini-CogTM Copyright [place]. Licensed by the author for use in [facility]; reprinted with permission (ejanette@[email or web address]). All rights reserved.      Do you have sleep apnea, excessive snoring or daytime drowsiness?: no    Reviewed and updated as needed this visit by clinical staff  Tobacco  Allergies  Meds             Reviewed and updated as needed this visit by Provider    Meds            Social History     Tobacco Use     Smoking status: Never Smoker     Smokeless tobacco: Never Used   Substance Use Topics     Alcohol use: No     Alcohol/week: 0.0 standard drinks     Comment: 2-3 times weekly, glass of wine     If you drink alcohol do you typically have >3 drinks per day or >7 drinks per week? No    Alcohol Use 12/13/2021   Prescreen: >3 drinks/day or >7 drinks/week? No   Prescreen: >3 drinks/day or >7 drinks/week? -               Current providers sharing in care for this patient include:   Patient Care Team:  Maik Levine MD as PCP - General (Internal Medicine)  Darrius Barriga MD as Assigned PCP    The following health maintenance items are reviewed in Epic and correct as of today:  Health Maintenance Due   Topic Date Due     ANNUAL REVIEW OF HM ORDERS  Never done     ZOSTER IMMUNIZATION (1 of 2) Never done     COVID-19 Vaccine (3 - Booster for Moderna series) 08/17/2021     Lab work is in process        Review of Systems  12 point review of systems is negative other than what is discussed in  "the assessment and plan    OBJECTIVE:   BP (!) 160/84   Pulse 69   Ht 1.461 m (4' 9.5\")   Wt 48.5 kg (107 lb)   SpO2 96%   BMI 22.75 kg/m   Estimated body mass index is 22.75 kg/m  as calculated from the following:    Height as of this encounter: 1.461 m (4' 9.5\").    Weight as of this encounter: 48.5 kg (107 lb).  Physical Exam  EYES: Eyelids, conjunctiva, and sclera were normal. Pupils were normal. Cornea, iris, and lens were normal bilaterally.  HEAD, EARS, NOSE, MOUTH, AND THROAT: Head and face were normal. TMs and external auditory canals are normal  NECK: Neck appearance was normal. There were no neck masses and the thyroid was not enlarged and no nodules are felt.  No lymphadenopathy.  RESPIRATORY: Breathing pattern was normal and the chest moved symmetrically.   Lung sounds were normal and there were no rales or wheezes.  CARDIOVASCULAR: Heart rate and rhythm were normal.  S1 and S2 were normal and there were no extra sounds or murmurs. Peripheral pulses in arms and legs were normal.  Jugular venous pressure was normal.  There was no peripheral edema.  No carotid bruits.  GASTROINTESTINAL:  Bowel sounds were present.   Palpation detected no tenderness, mass, or enlarged organs.   MUSCULOSKELETAL: Skeletal configuration was normal and muscle mass was normal for age. Joint appearance was overall normal.  LYMPHATIC: There were no enlarged nodes.  SKIN/HAIR/NAILS: Skin color was normal.  Hair and nails were normal.There were no skin lesions.  NEUROLOGIC: The patient was alert and oriented to person, place, time, and circumstance. Speech was normal. Cranial nerves were normal. Motor strength was normal for age. The patient was normally coordinated.    PSYCHIATRIC: Anxious.        ASSESSMENT / PLAN:   1. Encounter for Medicare annual wellness exam  Immunizations are reviewed and recommending getting Shingrix.  Everything else is up-to-date.  She will be getting her Covid booster tomorrow.  She has a living " will.  Non-smoker.  Uses alcohol minimally.  Regular exercise discussed.  No further colonoscopy recommended at her age and with her comorbidities.  We discussed annual mammogram for breast cancer screening.  Last DEXA was over 2 years ago and this should be repeated this year. Dementia and depression screening completed.  She sees an ophthalmologist every year. Skin exam performed and recommending regular use of sunblock.  Will screen for diabetes with fasting glucose.    2. Generalized anxiety disorder with panic attacks  Longstanding problem with generalized anxiety.  She is use lorazepam at bedtime for decades.  We discussed that benzodiazepines can be dangerous in her age group increasing her risk for falling and causing confusion and other cognitive issues.  I would set a goal of trying to wean off of lorazepam over the next 4 to 6 months.  To help better manage her anxiety and insomnia, I am recommending starting 7.5 mg of mirtazapine.  This can be increased to 50 mg if necessary.  Will reassess in 3 months and if anxiety is better controlled, she can hopefully start using lorazepam on an as-needed basis.  - mirtazapine (REMERON) 15 MG tablet; Take 0.5 tablets (7.5 mg) by mouth At Bedtime  Dispense: 45 tablet; Refill: 3    3. Insomnia, unspecified type  Beginning mirtazapine as described above.  Hopefully she will be able to tolerate and this will be an alternative to lorazepam in the future  - mirtazapine (REMERON) 15 MG tablet; Take 0.5 tablets (7.5 mg) by mouth At Bedtime  Dispense: 45 tablet; Refill: 3    4. Age-related osteoporosis without current pathological fracture  She has been treated with Fosamax/Boniva in the past but is unclear when she completed treatment.  It has been several years since her last DEXA and will get this arranged.  We will recheck vitamin D level.  - Vitamin D deficiency screening; Future  - DX Hip/Pelvis/Spine; Future  - Vitamin D deficiency screening    5. Primary  hypertension  Blood pressure is not at goal today but there may be a component of whitecoat hypertension.  Well-controlled at her last visit.  We will continue current doses of losartan 50 mg twice daily and atenolol 25 mg twice daily.  I am asking for her to check her blood pressure at home with the help of her children and to report back if she is consistently running over 140/90.  - CBC with platelets; Future  - Comprehensive metabolic panel (BMP + Alb, Alk Phos, ALT, AST, Total. Bili, TP); Future  - UA Macro with Reflex to Micro and Culture - lab collect; Future  - CBC with platelets  - Comprehensive metabolic panel (BMP + Alb, Alk Phos, ALT, AST, Total. Bili, TP)  - UA Macro with Reflex to Micro and Culture - lab collect  - Urine Microscopic Exam  - Urine Culture    6. Hypercholesterolemia  We will recheck lipid profile on simvastatin  - Lipid Profile (Chol, Trig, HDL, LDL calc); Future  - Lipid Profile (Chol, Trig, HDL, LDL calc)    7. Aortic valve stenosis, etiology of cardiac valve disease unspecified  Echocardiogram in September 2021 showing normal left ventricular systolic function with moderate aortic valve stenosis and mild aortic regurgitation.  Asymptomatic.  Continue medical management.    8. Primary osteoarthritis involving multiple joints  She will use ibuprofen infrequently.  Acetaminophen would be a better choice    9. Visual impairment  Recent left cataract surgery with outcome not as good as expected.  I am encouraging her to follow-up with her surgeon    10. 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  She tries to restrict her free water.  She is not on any diuretics.  Will monitor.    11. Osteoarthritis of spine with radiculopathy, unspecified spinal region  Chronic neck and low back pain.  MRI showing moderate to severe bilateral foraminal stenosis involving cervical spine.  She met with neurosurgery.  No plans for any surgical intervention.  Continue medical  "management.    12. Primary osteoarthritis of both hands      13. Gait instability and risk for falls  Encouraging use of walker at all times.  Encouraging regular walking with her walker to maintain strength and balance.        15. Meniere's disease, bilateral  Uses meclizine as needed    16. Hypomagnesemia    - Magnesium; Future  - Magnesium    17. Postmenopausal status      Patient has been advised of split billing requirements and indicates understanding: Yes  COUNSELING:  Reviewed preventive health counseling, as reflected in patient instructions       Regular exercise       Healthy diet/nutrition       Vision screening       Dental care       Osteoporosis prevention/bone health    Estimated body mass index is 22.75 kg/m  as calculated from the following:    Height as of this encounter: 1.461 m (4' 9.5\").    Weight as of this encounter: 48.5 kg (107 lb).        She reports that she has never smoked. She has never used smokeless tobacco.      Appropriate preventive services were discussed with this patient, including applicable screening as appropriate for cardiovascular disease, diabetes, osteopenia/osteoporosis, and glaucoma.  As appropriate for age/gender, discussed screening for colorectal cancer, prostate cancer, breast cancer, and cervical cancer. Checklist reviewing preventive services available has been given to the patient.    Reviewed patients plan of care and provided an AVS. The Basic Care Plan (routine screening as documented in Health Maintenance) for Mariluz meets the Care Plan requirement. This Care Plan has been established and reviewed with the Patient.    Counseling Resources:  ATP IV Guidelines  Pooled Cohorts Equation Calculator  Breast Cancer Risk Calculator  Breast Cancer: Medication to Reduce Risk  FRAX Risk Assessment  ICSI Preventive Guidelines  Dietary Guidelines for Americans, 2010  USDA's MyPlate  ASA Prophylaxis  Lung CA Screening    Maik Levine MD  [facility] " [facility]    Identified Health Risks:      The patient was counseled and encouraged to consider modifying their diet and eating habits. She was provided with information on recommended healthy diet options.  The patient reports that she has difficulty with activities of daily living. This issue was addressed at today's appointment.  She lives in a facility and gets assistance from family  The patient was provided with written information regarding signs of hearing loss.  Information on urinary incontinence and treatment options given to patient.

## 2021-12-13 NOTE — LETTER
December 15, 2021      Mariluz AVERY [address]   [facility] [address]        Dear Mariluz,    Resulted Orders   CBC with platelets   Result Value Ref Range    WBC Count 7.3 4.0 - 11.0 10e3/uL    RBC Count 4.09 3.80 - 5.20 10e6/uL    Hemoglobin 13.2 11.7 - 15.7 g/dL    Hematocrit 41.2 35.0 - 47.0 %     (H) 78 - 100 fL    MCH 32.3 26.5 - 33.0 pg    MCHC 32.0 31.5 - 36.5 g/dL    RDW 11.8 10.0 - 15.0 %    Platelet Count 213 150 - 450 10e3/uL   Comprehensive metabolic panel (BMP + Alb, Alk Phos, ALT, AST, Total. Bili, TP)   Result Value Ref Range    Sodium 136 136 - 145 mmol/L    Potassium 4.6 3.5 - 5.0 mmol/L    Chloride 100 98 - 107 mmol/L    Carbon Dioxide (CO2) 23 22 - 31 mmol/L    Anion Gap 13 5 - 18 mmol/L    Urea Nitrogen 16 8 - 28 mg/dL    Creatinine 0.86 0.60 - 1.10 mg/dL    Calcium 9.9 8.5 - 10.5 mg/dL    Glucose 93 70 - 125 mg/dL    Alkaline Phosphatase 80 45 - 120 U/L    AST 23 0 - 40 U/L    ALT 14 0 - 45 U/L    Protein Total 6.8 6.0 - 8.0 g/dL    Albumin 3.9 3.5 - 5.0 g/dL    Bilirubin Total 1.2 (H) 0.0 - 1.0 mg/dL    GFR Estimate 60 (L) >60 mL/min/1.73m2      Comment:      As of July 11, 2021, eGFR is calculated by the CKD-EPI creatinine equation, without race adjustment. eGFR can be influenced by muscle mass, exercise, and diet. The reported eGFR is an estimation only and is only applicable if the renal function is stable.   Lipid Profile (Chol, Trig, HDL, LDL calc)   Result Value Ref Range    Cholesterol 142 <=199 mg/dL    Triglycerides 54 <=149 mg/dL    Direct Measure HDL 76 >=50 mg/dL      Comment:      HDL Cholesterol Reference Range:     0-2 years:   No reference ranges established for patients under 2 years old  [facility] lipid analytes.    2-8 years:  Greater than 45 mg/dL     18 years and older:   Female: Greater than or equal to 50 mg/dL   Male:   Greater than or equal to 40 mg/dL    LDL Cholesterol Calculated 55 <=129 mg/dL    Patient Fasting > 8hrs?  Unknown    UA Macro with Reflex to Micro and Culture - lab collect   Result Value Ref Range    Color Urine Yellow Colorless, Straw, Light Yellow, Yellow    Appearance Urine Clear Clear    Glucose Urine Negative Negative mg/dL    Bilirubin Urine Negative Negative    Ketones Urine Negative Negative mg/dL    Specific Gravity Urine 1.020 1.003 - 1.035    Blood Urine Negative Negative    pH Urine 6.5 5.0 - 7.0    Protein Albumin Urine Negative Negative mg/dL    Urobilinogen Urine 0.2 0.2, 1.0 E.U./dL    Nitrite Urine Negative Negative    Leukocyte Esterase Urine Large (A) Negative    Narrative    Urine volume less than 3 mL is not sufficient for proper microscopic evaluation.  No microscopic evaluation performed.     Vitamin D deficiency screening   Result Value Ref Range    Vitamin D, Total (25-Hydroxy) 38 30 - 80 ug/L    Narrative    Deficiency <10.0 ug/L  Insufficiency 10.0-29.9 ug/L  Sufficiency 30.0-80.0 ug/L  Toxicity (possible) >100.0 ug/L    Magnesium   Result Value Ref Range    Magnesium 1.9 1.8 - 2.6 mg/dL   Urine Microscopic Exam   Result Value Ref Range    RBC Urine        Comment:      Urine volume less than 3 mL is not sufficient for proper microscopic evaluation.  No microscopic evaluation performed.      WBC Urine        Comment:      Urine volume less than 3 mL is not sufficient for proper microscopic evaluation.  No microscopic evaluation performed.     Urine Culture   Result Value Ref Range    Culture No Growth    Urine Culture   Result Value Ref Range    Culture 50,000-100,000 CFU/mL Mixture of urogenital fernanda        All of your labs look good.  Normal kidney and liver function.  No diabetes.  No anemia.  Nothing concerning on the urinalysis and the urine culture had nonspecific findings.  Your vitamin D level looks good.  Cholesterol is well controlled.    If you have any questions or concerns, please call the clinic at the number listed above.       Sincerely,      Maik Levine MD

## 2021-12-13 NOTE — PROGRESS NOTES
The patient was counseled and encouraged to consider modifying their diet and eating habits. She was provided with information on recommended healthy diet options.  The patient reports that she has difficulty with activities of daily living. This issue was addressed at today's appointment. ***  The patient was provided with written information regarding signs of hearing loss.  Information on urinary incontinence and treatment options given to patient.

## 2021-12-14 LAB
ALBUMIN UR-MCNC: NEGATIVE MG/DL
APPEARANCE UR: CLEAR
BACTERIA UR CULT: NO GROWTH
BILIRUB UR QL STRIP: NEGATIVE
COLOR UR AUTO: YELLOW
DEPRECATED CALCIDIOL+CALCIFEROL SERPL-MC: 38 UG/L (ref 30–80)
GLUCOSE UR STRIP-MCNC: NEGATIVE MG/DL
HGB UR QL STRIP: NEGATIVE
KETONES UR STRIP-MCNC: NEGATIVE MG/DL
LEUKOCYTE ESTERASE UR QL STRIP: ABNORMAL
NITRATE UR QL: NEGATIVE
PH UR STRIP: 6.5 [PH] (ref 5–7)
SP GR UR STRIP: 1.02 (ref 1–1.03)
UROBILINOGEN UR STRIP-ACNC: 0.2 E.U./DL

## 2021-12-15 ENCOUNTER — TELEPHONE (OUTPATIENT)
Dept: INTERNAL MEDICINE | Facility: CLINIC | Age: 86
End: 2021-12-15
Payer: COMMERCIAL

## 2021-12-15 DIAGNOSIS — D75.89 MACROCYTOSIS WITHOUT ANEMIA: Primary | ICD-10-CM

## 2021-12-15 LAB — BACTERIA UR CULT: NORMAL

## 2021-12-16 NOTE — TELEPHONE ENCOUNTER
Called pt.  She wanted her MyChart deactivated because she doesn't know how to use it and wanted to make sure that her results were mailed to her.    Also, she had her covid booster on 12-14,  Not feeling good.  Shakey, wobbly and chills.   Told her to take some Tylenol or Advil and to check for a temp.  She was a little better today.  Told her if she still felt ill tomorrow to give us a call back

## 2021-12-28 ENCOUNTER — TELEPHONE (OUTPATIENT)
Dept: INTERNAL MEDICINE | Facility: CLINIC | Age: 86
End: 2021-12-28
Payer: COMMERCIAL

## 2021-12-30 DIAGNOSIS — F41.9 CHRONIC ANXIETY: ICD-10-CM

## 2022-01-02 NOTE — TELEPHONE ENCOUNTER
Routing refill request to provider for review/approval because:  Controlled Substance request.    Last Written Prescription Date:  12/06/2021  Last Fill Quantity: 30,  # refills: 0   Last office visit provider:  12/13/2021 with Dr Levine.      Requested Prescriptions   Pending Prescriptions Disp Refills     LORazepam (ATIVAN) 0.5 MG tablet [Pharmacy Med Name: lorazepam 0.5 mg tablet] 30 tablet 0     Sig: TAKE 1 TABLET BY MOUTH AT BEDTIME AS NEEDED FOR ANXIETY       There is no refill protocol information for this order          Adriana Stevenson 01/02/22 1:23 AM

## 2022-01-03 RX ORDER — LORAZEPAM 0.5 MG/1
TABLET ORAL
Qty: 30 TABLET | Refills: 0 | Status: SHIPPED | OUTPATIENT
Start: 2022-01-03 | End: 2022-01-25

## 2022-01-03 NOTE — TELEPHONE ENCOUNTER
** reply to message below:    Patient is wanting a follow up on lab results from last appointment, she didn't receive any results by mail and doesn't use [facility].    Rosa Hayward    
Called pt and she never received the letter mailed on 12-15.  Printed and sent new letter    
I will need more information regarding her concerns.  Please call to clarify what her questions are so that we can decide whether it is appropriate to address by telephone for if it requires further evaluation with an appointment  
Left message for patient that both Lesvia and Dr. Levine are out of the office today and that we would call back tomorrow.  Marisela Dixon CSS    
Reason for Call:  Other call back    Detailed comments: Patient requesting a call back about her last appt - would not go into details with me and asked for Lesvia specifically to call her back.     Phone Number Patient can be reached at: Home number on file [phone] (home)    Best Time: any    Can we leave a detailed message on this number? YES    Call taken on 12/28/2021 at 11:43 AM by Sadi Arzate      
done

## 2022-01-18 VITALS
BODY MASS INDEX: 22.38 KG/M2 | SYSTOLIC BLOOD PRESSURE: 130 MMHG | DIASTOLIC BLOOD PRESSURE: 86 MMHG | HEART RATE: 66 BPM | OXYGEN SATURATION: 98 % | TEMPERATURE: 98.3 F | WEIGHT: 110.8 LBS

## 2022-01-18 VITALS
HEIGHT: 59 IN | WEIGHT: 115 LBS | SYSTOLIC BLOOD PRESSURE: 170 MMHG | HEART RATE: 80 BPM | BODY MASS INDEX: 23.18 KG/M2 | DIASTOLIC BLOOD PRESSURE: 82 MMHG

## 2022-01-18 VITALS
SYSTOLIC BLOOD PRESSURE: 160 MMHG | OXYGEN SATURATION: 98 % | WEIGHT: 109.3 LBS | BODY MASS INDEX: 22.08 KG/M2 | TEMPERATURE: 97.7 F | HEART RATE: 68 BPM | DIASTOLIC BLOOD PRESSURE: 100 MMHG

## 2022-01-18 VITALS
WEIGHT: 113 LBS | HEIGHT: 59 IN | BODY MASS INDEX: 22.78 KG/M2 | SYSTOLIC BLOOD PRESSURE: 152 MMHG | HEART RATE: 71 BPM | DIASTOLIC BLOOD PRESSURE: 82 MMHG

## 2022-01-18 VITALS
HEIGHT: 59 IN | BODY MASS INDEX: 23.18 KG/M2 | DIASTOLIC BLOOD PRESSURE: 78 MMHG | SYSTOLIC BLOOD PRESSURE: 152 MMHG | HEART RATE: 84 BPM | WEIGHT: 115 LBS

## 2022-01-18 VITALS
DIASTOLIC BLOOD PRESSURE: 78 MMHG | HEIGHT: 59 IN | HEART RATE: 72 BPM | SYSTOLIC BLOOD PRESSURE: 162 MMHG | WEIGHT: 112 LBS | BODY MASS INDEX: 22.58 KG/M2

## 2022-01-18 VITALS
HEART RATE: 70 BPM | HEIGHT: 59 IN | SYSTOLIC BLOOD PRESSURE: 150 MMHG | WEIGHT: 113 LBS | BODY MASS INDEX: 22.78 KG/M2 | DIASTOLIC BLOOD PRESSURE: 82 MMHG

## 2022-01-18 VITALS
OXYGEN SATURATION: 97 % | WEIGHT: 111 LBS | SYSTOLIC BLOOD PRESSURE: 134 MMHG | HEIGHT: 59 IN | HEART RATE: 77 BPM | BODY MASS INDEX: 22.38 KG/M2 | DIASTOLIC BLOOD PRESSURE: 84 MMHG

## 2022-01-18 VITALS
SYSTOLIC BLOOD PRESSURE: 132 MMHG | HEART RATE: 71 BPM | DIASTOLIC BLOOD PRESSURE: 76 MMHG | TEMPERATURE: 97.9 F | WEIGHT: 112.6 LBS | BODY MASS INDEX: 22.74 KG/M2 | OXYGEN SATURATION: 96 %

## 2022-01-18 VITALS
DIASTOLIC BLOOD PRESSURE: 86 MMHG | WEIGHT: 112 LBS | SYSTOLIC BLOOD PRESSURE: 162 MMHG | HEIGHT: 59 IN | OXYGEN SATURATION: 98 % | HEART RATE: 73 BPM | BODY MASS INDEX: 22.58 KG/M2

## 2022-01-25 DIAGNOSIS — F41.9 CHRONIC ANXIETY: ICD-10-CM

## 2022-01-25 DIAGNOSIS — I10 ESSENTIAL (PRIMARY) HYPERTENSION: ICD-10-CM

## 2022-01-25 RX ORDER — ATENOLOL 25 MG/1
TABLET ORAL
Qty: 180 TABLET | Refills: 3 | Status: SHIPPED | OUTPATIENT
Start: 2022-01-25 | End: 2022-07-18

## 2022-01-25 RX ORDER — LORAZEPAM 0.5 MG/1
TABLET ORAL
Qty: 30 TABLET | Refills: 0 | Status: SHIPPED | OUTPATIENT
Start: 2022-01-25 | End: 2022-03-02

## 2022-02-28 DIAGNOSIS — F41.9 CHRONIC ANXIETY: ICD-10-CM

## 2022-03-02 NOTE — TELEPHONE ENCOUNTER
Routing refill request to provider for review/approval because:  Controlled substance request    Last Written Prescription Date:  1/25/22  Last Fill Quantity: 30,  # refills: 0   Last office visit provider:  12/13/21     Requested Prescriptions   Pending Prescriptions Disp Refills     LORazepam (ATIVAN) 0.5 MG tablet [Pharmacy Med Name: lorazepam 0.5 mg tablet] 30 tablet 0     Sig: TAKE 1 TABLET BY MOUTH AT BEDTIME AS NEEDED FOR ANXIETY       There is no refill protocol information for this order          Samir Duff RN 03/02/22 9:12 AM

## 2022-03-03 RX ORDER — LORAZEPAM 0.5 MG/1
TABLET ORAL
Qty: 30 TABLET | Refills: 0 | Status: SHIPPED | OUTPATIENT
Start: 2022-03-03 | End: 2022-03-31

## 2022-03-14 ENCOUNTER — OFFICE VISIT (OUTPATIENT)
Dept: INTERNAL MEDICINE | Facility: CLINIC | Age: 87
End: 2022-03-14
Payer: COMMERCIAL

## 2022-03-14 VITALS
BODY MASS INDEX: 23.18 KG/M2 | SYSTOLIC BLOOD PRESSURE: 148 MMHG | WEIGHT: 109 LBS | HEART RATE: 70 BPM | DIASTOLIC BLOOD PRESSURE: 86 MMHG

## 2022-03-14 DIAGNOSIS — I10 PRIMARY HYPERTENSION: Primary | ICD-10-CM

## 2022-03-14 DIAGNOSIS — F41.0 GENERALIZED ANXIETY DISORDER WITH PANIC ATTACKS: ICD-10-CM

## 2022-03-14 DIAGNOSIS — I35.0 AORTIC VALVE STENOSIS, ETIOLOGY OF CARDIAC VALVE DISEASE UNSPECIFIED: ICD-10-CM

## 2022-03-14 DIAGNOSIS — M81.0 AGE-RELATED OSTEOPOROSIS WITHOUT CURRENT PATHOLOGICAL FRACTURE: ICD-10-CM

## 2022-03-14 DIAGNOSIS — R26.81 GAIT INSTABILITY: ICD-10-CM

## 2022-03-14 DIAGNOSIS — D75.89 MACROCYTOSIS WITHOUT ANEMIA: ICD-10-CM

## 2022-03-14 DIAGNOSIS — F41.1 GENERALIZED ANXIETY DISORDER WITH PANIC ATTACKS: ICD-10-CM

## 2022-03-14 DIAGNOSIS — E87.1 HYPONATREMIA: ICD-10-CM

## 2022-03-14 LAB
ANION GAP SERPL CALCULATED.3IONS-SCNC: 14 MMOL/L (ref 5–18)
BUN SERPL-MCNC: 22 MG/DL (ref 8–28)
CALCIUM SERPL-MCNC: 10.1 MG/DL (ref 8.5–10.5)
CHLORIDE BLD-SCNC: 100 MMOL/L (ref 98–107)
CO2 SERPL-SCNC: 21 MMOL/L (ref 22–31)
CREAT SERPL-MCNC: 0.84 MG/DL (ref 0.6–1.1)
GFR SERPL CREATININE-BSD FRML MDRD: 65 ML/MIN/1.73M2
GLUCOSE BLD-MCNC: 89 MG/DL (ref 70–125)
POTASSIUM BLD-SCNC: 4.8 MMOL/L (ref 3.5–5)
SODIUM SERPL-SCNC: 135 MMOL/L (ref 136–145)
VIT B12 SERPL-MCNC: 316 PG/ML (ref 213–816)

## 2022-03-14 PROCEDURE — 36415 COLL VENOUS BLD VENIPUNCTURE: CPT | Performed by: INTERNAL MEDICINE

## 2022-03-14 PROCEDURE — 80048 BASIC METABOLIC PNL TOTAL CA: CPT | Performed by: INTERNAL MEDICINE

## 2022-03-14 PROCEDURE — 82607 VITAMIN B-12: CPT | Performed by: INTERNAL MEDICINE

## 2022-03-14 PROCEDURE — 99214 OFFICE O/P EST MOD 30 MIN: CPT | Performed by: INTERNAL MEDICINE

## 2022-03-14 RX ORDER — MIRTAZAPINE 7.5 MG/1
TABLET, FILM COATED ORAL
COMMUNITY
Start: 2021-12-13 | End: 2022-03-14

## 2022-03-14 RX ORDER — NEOMYCIN SULFATE, POLYMYXIN B SULFATE, AND DEXAMETHASONE 3.5; 10000; 1 MG/G; [USP'U]/G; MG/G
OINTMENT OPHTHALMIC
COMMUNITY
Start: 2021-10-28 | End: 2022-03-14

## 2022-03-14 RX ORDER — FLUTICASONE PROPIONATE 50 MCG
2 SPRAY, SUSPENSION (ML) NASAL DAILY
COMMUNITY

## 2022-03-14 NOTE — LETTER
March 15, 2022      Mariluz GENA [address]   [facility] [address]        Dear Lino Mcgraw Orders   Vitamin B12   Result Value Ref Range    Vitamin B12 316 213 - 816 pg/mL   Basic metabolic panel  (Ca, Cl, CO2, Creat, Gluc, K, Na, BUN)   Result Value Ref Range    Sodium 135 (L) 136 - 145 mmol/L    Potassium 4.8 3.5 - 5.0 mmol/L    Chloride 100 98 - 107 mmol/L    Carbon Dioxide (CO2) 21 (L) 22 - 31 mmol/L    Anion Gap 14 5 - 18 mmol/L    Urea Nitrogen 22 8 - 28 mg/dL    Creatinine 0.84 0.60 - 1.10 mg/dL    Calcium 10.1 8.5 - 10.5 mg/dL    Glucose 89 70 - 125 mg/dL    GFR Estimate 65 >60 mL/min/1.73m2      Comment:      Effective December 21, 2021 eGFRcr in adults is calculated using the 2021 CKD-EPI creatinine equation which includes age and gender (Kiran et al., NEJM, DOI: 10.1056/YEYQyg5801559)         Your vitamin B12 level is normal.    Kidney function is normal.  Sodium is stable at 135.    If you have any questions or concerns, please call the clinic at the number listed above.       Sincerely,      Maik Levine MD

## 2022-03-14 NOTE — PROGRESS NOTES
Assessment & Plan     Primary hypertension  Blood pressure is not at goal but longstanding problem with whitecoat hypertension secondary to anxiety.  We will continue current doses of atenolol and losartan.  - Basic metabolic panel  (Ca, Cl, CO2, Creat, Gluc, K, Na, BUN)    Generalized anxiety disorder with panic attacks  Longstanding problem with generalized anxiety.  Has used lorazepam at bedtime for decades.  At her last visit, I was encouraging her to transition to mirtazapine.  I am concerned about increased risk for falls with continued use of benzodiazepine.  Unfortunately she never filled the prescription for mirtazapine and is not interested in taking anything other than lorazepam.  We again had a discussion that taking benzodiazepines at her age is not ideal and can increase her risk for falling and other problems.  However, she is unwilling to take anything other than this medication and insists on continuing.  With reluctance, I will continue 0.5 mg of lorazepam at bedtime.  Her anxiety would be unmanageable without the medication.    Hyponatremia  SIADH suspected.  Continue free water restriction.  - Basic metabolic panel  (Ca, Cl, CO2, Creat, Gluc, K, Na, BUN)    Aortic valve stenosis, etiology of cardiac valve disease unspecified  Moderate aortic stenosis found on echocardiogram in 2021.  Asymptomatic.    Gait instability  Encouraging use of walker at all times    Age-related osteoporosis without current pathological fracture  Previously treated with Fosamax/Boniva.  I had recommended DEXA at her wellness visit but she is not interested in any follow-up of osteoporosis nor she interested in taking any further medication    Macrocytosis without anemia  MCV noted to be elevated.  Will check vitamin B12 level.  - Vitamin B12              Return in about 4 months (around 7/14/2022) for Follow up.    Maik Levine MD  [facility]          Subjective       HPI  89-year-old woman here to follow-up chronic medical problems including generalized anxiety and hypertension.  See assessment and plan for details of visit    Current Outpatient Medications   Medication     atenolol (TENORMIN) 25 MG tablet     fluticasone (FLONASE) 50 MCG/ACT nasal spray     ibuprofen (ADVIL/MOTRIN) 200 MG tablet     loperamide (IMODIUM) 2 MG capsule     LORazepam (ATIVAN) 0.5 MG tablet     losartan (COZAAR) 50 MG tablet     meclizine (ANTIVERT) 25 MG tablet     simvastatin (ZOCOR) 20 MG tablet     VITAMIN D, CHOLECALCIFEROL, PO     No current facility-administered medications for this visit.        Review of Systems  No chest pain.  Recent increased leg edema which is now improved        Objective    Vitals:    03/14/22 1252 03/14/22 1314   BP: (!) 150/80 (!) 148/86   BP Location: Right arm    Patient Position: Sitting    Cuff Size: Adult Small    Pulse: 70    Weight: 49.4 kg (109 lb)         Physical Exam  Pleasant but anxious elderly woman  Lungs clear bilaterally  Heart regular with 3/6 systolic murmur  Trace edema bilaterally

## 2022-03-28 DIAGNOSIS — F41.9 CHRONIC ANXIETY: ICD-10-CM

## 2022-03-30 NOTE — TELEPHONE ENCOUNTER
Last OV 3/14/22    Generalized anxiety disorder with panic attacks  Longstanding problem with generalized anxiety.  Has used lorazepam at bedtime for decades.  At her last visit, I was encouraging her to transition to mirtazapine.  I am concerned about increased risk for falls with continued use of benzodiazepine.  Unfortunately she never filled the prescription for mirtazapine and is not interested in taking anything other than lorazepam.  We again had a discussion that taking benzodiazepines at her age is not ideal and can increase her risk for falling and other problems.  However, she is unwilling to take anything other than this medication and insists on continuing.  With reluctance, I will continue 0.5 mg of lorazepam at bedtime.  Her anxiety would be unmanageable without the medication.

## 2022-03-31 RX ORDER — LORAZEPAM 0.5 MG/1
TABLET ORAL
Qty: 30 TABLET | Refills: 0 | Status: SHIPPED | OUTPATIENT
Start: 2022-03-31 | End: 2022-04-28

## 2022-04-19 ENCOUNTER — TELEPHONE (OUTPATIENT)
Dept: INTERNAL MEDICINE | Facility: CLINIC | Age: 87
End: 2022-04-19

## 2022-04-19 NOTE — TELEPHONE ENCOUNTER
Reason for Call:  Other     Detailed comments: Patient called and has some questions/concerns regarding some circulation problems she has been having with her left foot. Please call patient to discuss her questions/concerns.    Phone Number Patient can be reached at: Home number on file [phone] (home)    Best Time: any    Can we leave a detailed message on this number? YES    Call taken on 4/19/2022 at 11:15 AM by Cely Fuentes

## 2022-04-19 NOTE — TELEPHONE ENCOUNTER
Patient states she has had discoloration in her middle toes for some time now and you have seen them. Foot care was in today to see her and there is a new purple coloring on left foot, Middle toes, 2 inches across the top of foot. She says her foot is cool to the touch and numb. Should she come in and be seen? Observe for worsening? Foot care said she should let you know. Next appt 7/18/22    Marisela Dixon CMA

## 2022-04-20 ENCOUNTER — OFFICE VISIT (OUTPATIENT)
Dept: INTERNAL MEDICINE | Facility: CLINIC | Age: 87
End: 2022-04-20
Payer: COMMERCIAL

## 2022-04-20 VITALS
HEART RATE: 72 BPM | BODY MASS INDEX: 23.72 KG/M2 | SYSTOLIC BLOOD PRESSURE: 139 MMHG | WEIGHT: 113 LBS | DIASTOLIC BLOOD PRESSURE: 80 MMHG | HEIGHT: 58 IN

## 2022-04-20 DIAGNOSIS — I35.0 AORTIC VALVE STENOSIS, ETIOLOGY OF CARDIAC VALVE DISEASE UNSPECIFIED: ICD-10-CM

## 2022-04-20 DIAGNOSIS — I10 PRIMARY HYPERTENSION: ICD-10-CM

## 2022-04-20 DIAGNOSIS — I87.2 VENOUS (PERIPHERAL) INSUFFICIENCY: Primary | ICD-10-CM

## 2022-04-20 PROCEDURE — 99214 OFFICE O/P EST MOD 30 MIN: CPT | Performed by: INTERNAL MEDICINE

## 2022-04-20 NOTE — PROGRESS NOTES
Assessment & Plan     Venous (peripheral) insufficiency  89-year-old woman here with concerns over discoloration in her left foot.  Purplish discoloration is present.  Pedal pulses are diminished but present.  There is no foot pain.  Feet are cool but not cold.  This appears to be venous insufficiency associated with some mild bilateral leg edema.  I am recommending sodium restriction, leg elevation and wearing elastic compression stockings.  I would avoid diuretics with her history of hyponatremia.  Mild compression in the 10-20 mmHg range it would be best.  However, she is resistant to the idea as she has great difficulty getting the stockings on and off.  Daughter is present and there has been talk of may be moving to assisted living from independent living which may make sense not only to help with elastic compression stockings but also to help with dressing and bathing.    Aortic valve stenosis, etiology of cardiac valve disease unspecified  Known moderate aortic stenosis with last echocardiogram September 2021.  She does report noticeable dyspnea with exertion.  Her lungs are clear.  We will continue to monitor.    Primary hypertension  Blood pressure initially elevated but within normal range when rechecked.  Continue current medications.  Continue atenolol and losartan              Return in about 3 months (around 7/20/2022) for Follow up.    Maik Levine MD  [facility]        Subjective       HPI 89-year-old woman with hypertension, anxiety and aortic stenosis here with discoloration in left foot with worsening bilateral leg edema and dyspnea.  See assessment and plan for details of visit    Current Outpatient Medications   Medication     atenolol (TENORMIN) 25 MG tablet     fluticasone (FLONASE) 50 MCG/ACT nasal spray     ibuprofen (ADVIL/MOTRIN) 200 MG tablet     loperamide (IMODIUM) 2 MG capsule     LORazepam (ATIVAN) 0.5 MG tablet     losartan (COZAAR) 50 MG  "tablet     meclizine (ANTIVERT) 25 MG tablet     simvastatin (ZOCOR) 20 MG tablet     VITAMIN D, CHOLECALCIFEROL, PO     No current facility-administered medications for this visit.        Review of Systems  No chest pain        Objective    Vitals:    04/20/22 1427 04/20/22 1506   BP: (!) 154/82 139/80   BP Location: Right arm    Patient Position: Sitting    Cuff Size: Adult Regular    Pulse: 72    Weight: 51.3 kg (113 lb)    Height: 1.461 m (4' 9.5\")         Physical Exam  Lungs clear bilaterally  Heart regular rate and rhythm with frequent ectopy with 3/6 systolic murmur  1+ bilateral lower extremity pitting edema  Pedal pulses are present but diminished.  Venous insufficiency with venous congestion causing discoloration      Answers for HPI/ROS submitted by the patient on 4/20/2022  How many servings of fruits and vegetables do you eat daily?: 2-3  On average, how many sweetened beverages do you drink each day (Examples: soda, juice, sweet tea, etc.  Do NOT count diet or artificially sweetened beverages)?: 1  How many minutes a day do you exercise enough to make your heart beat faster?: 9 or less  How many days a week do you exercise enough to make your heart beat faster?: 3 or less  How many days per week do you miss taking your medication?: 0  What is the reason for your visit today?: Foot issues  When did your symptoms begin?: 1-2 weeks ago  How would you describe these symptoms?: Mild  Are your symptoms:: Staying the same  Have you had these symptoms before?: No      "

## 2022-04-20 NOTE — PATIENT INSTRUCTIONS
Sodium restriction, leg elevation and wearing elastic compression stockings are recommended.  I would purchase knee-high compression stockings 10-20 mmHg providing mild compression.  I would put them on every morning and wear throughout the day.  You can take them off at bedtime.

## 2022-04-25 DIAGNOSIS — F41.9 CHRONIC ANXIETY: ICD-10-CM

## 2022-04-27 NOTE — TELEPHONE ENCOUNTER
Routing refill request to provider for review/approval because:  Controlled substance request    Last Written Prescription Date:  3/31/22  Last Fill Quantity: 30,  # refills: 0   Last office visit provider:  4/20/22     Requested Prescriptions   Pending Prescriptions Disp Refills     LORazepam (ATIVAN) 0.5 MG tablet [Pharmacy Med Name: lorazepam 0.5 mg tablet] 30 tablet 0     Sig: TAKE 1 TABLET BY MOUTH AT BEDTIME AS NEEDED FOR ANXIETY       There is no refill protocol information for this order          Samir Duff RN 04/27/22 2:40 PM

## 2022-04-28 RX ORDER — LORAZEPAM 0.5 MG/1
TABLET ORAL
Qty: 30 TABLET | Refills: 0 | Status: SHIPPED | OUTPATIENT
Start: 2022-04-28 | End: 2022-05-31

## 2022-05-28 DIAGNOSIS — F41.9 CHRONIC ANXIETY: ICD-10-CM

## 2022-05-30 NOTE — TELEPHONE ENCOUNTER
Routing refill request to provider for review/approval because:  Drug not on the [facility] refill protocol     Last Written Prescription Date:  4/28/22  Last Fill Quantity: 30,  # refills: 0   Last office visit provider:  4/20/22     Requested Prescriptions   Pending Prescriptions Disp Refills     LORazepam (ATIVAN) 0.5 MG tablet [Pharmacy Med Name: lorazepam 0.5 mg tablet] 30 tablet 0     Sig: TAKE 1 TABLET BY MOUTH AT BEDTIME AS NEEDED FOR ANXIETY       There is no refill protocol information for this order          Michelle Ivan, RN 05/29/22 7:15 PM

## 2022-05-31 RX ORDER — LORAZEPAM 0.5 MG/1
TABLET ORAL
Qty: 30 TABLET | Refills: 0 | Status: SHIPPED | OUTPATIENT
Start: 2022-05-31 | End: 2022-06-24

## 2022-06-23 DIAGNOSIS — F41.9 CHRONIC ANXIETY: ICD-10-CM

## 2022-06-24 RX ORDER — LORAZEPAM 0.5 MG/1
TABLET ORAL
Qty: 30 TABLET | Refills: 0 | Status: SHIPPED | OUTPATIENT
Start: 2022-06-24 | End: 2022-07-25

## 2022-06-24 NOTE — TELEPHONE ENCOUNTER
Routing refill request to provider for review/approval because:  Drug not on the [facility] refill protocol     Last Written Prescription Date:  5/31/22  Last Fill Quantity: 30,  # refills: 0   Last office visit provider:  4/20/22     Requested Prescriptions   Pending Prescriptions Disp Refills     LORazepam (ATIVAN) 0.5 MG tablet [Pharmacy Med Name: lorazepam 0.5 mg tablet] 30 tablet 0     Sig: TAKE 1 TABLET BY MOUTH AT BEDTIME AS NEEDED FOR ANXIETY       There is no refill protocol information for this order          Michelle Ivan, RN 06/23/22 8:09 PM

## 2022-07-18 ENCOUNTER — OFFICE VISIT (OUTPATIENT)
Dept: INTERNAL MEDICINE | Facility: CLINIC | Age: 87
End: 2022-07-18
Payer: COMMERCIAL

## 2022-07-18 VITALS
OXYGEN SATURATION: 99 % | BODY MASS INDEX: 23.75 KG/M2 | WEIGHT: 111.7 LBS | HEART RATE: 71 BPM | SYSTOLIC BLOOD PRESSURE: 160 MMHG | DIASTOLIC BLOOD PRESSURE: 84 MMHG

## 2022-07-18 DIAGNOSIS — F41.1 GENERALIZED ANXIETY DISORDER WITH PANIC ATTACKS: ICD-10-CM

## 2022-07-18 DIAGNOSIS — I87.2 VENOUS (PERIPHERAL) INSUFFICIENCY: ICD-10-CM

## 2022-07-18 DIAGNOSIS — I35.0 AORTIC VALVE STENOSIS, ETIOLOGY OF CARDIAC VALVE DISEASE UNSPECIFIED: ICD-10-CM

## 2022-07-18 DIAGNOSIS — I10 PRIMARY HYPERTENSION: Primary | ICD-10-CM

## 2022-07-18 DIAGNOSIS — Z23 NEED FOR VIRAL IMMUNIZATION: ICD-10-CM

## 2022-07-18 DIAGNOSIS — F41.0 GENERALIZED ANXIETY DISORDER WITH PANIC ATTACKS: ICD-10-CM

## 2022-07-18 DIAGNOSIS — E87.1 HYPONATREMIA: ICD-10-CM

## 2022-07-18 DIAGNOSIS — M15.0 PRIMARY OSTEOARTHRITIS INVOLVING MULTIPLE JOINTS: ICD-10-CM

## 2022-07-18 LAB
ANION GAP SERPL CALCULATED.3IONS-SCNC: 10 MMOL/L (ref 7–15)
BUN SERPL-MCNC: 15.9 MG/DL (ref 8–23)
CALCIUM SERPL-MCNC: 10.1 MG/DL (ref 8.2–9.6)
CHLORIDE SERPL-SCNC: 97 MMOL/L (ref 98–107)
CREAT SERPL-MCNC: 0.87 MG/DL (ref 0.51–0.95)
DEPRECATED HCO3 PLAS-SCNC: 26 MMOL/L (ref 22–29)
ERYTHROCYTE [DISTWIDTH] IN BLOOD BY AUTOMATED COUNT: 12.4 % (ref 10–15)
GFR SERPL CREATININE-BSD FRML MDRD: 63 ML/MIN/1.73M2
GLUCOSE SERPL-MCNC: 110 MG/DL (ref 70–99)
HCT VFR BLD AUTO: 41.4 % (ref 35–47)
HGB BLD-MCNC: 13.5 G/DL (ref 11.7–15.7)
MCH RBC QN AUTO: 32.1 PG (ref 26.5–33)
MCHC RBC AUTO-ENTMCNC: 32.6 G/DL (ref 31.5–36.5)
MCV RBC AUTO: 99 FL (ref 78–100)
PLATELET # BLD AUTO: 216 10E3/UL (ref 150–450)
POTASSIUM SERPL-SCNC: 4.4 MMOL/L (ref 3.4–5.3)
RBC # BLD AUTO: 4.2 10E6/UL (ref 3.8–5.2)
SODIUM SERPL-SCNC: 133 MMOL/L (ref 136–145)
WBC # BLD AUTO: 8.2 10E3/UL (ref 4–11)

## 2022-07-18 PROCEDURE — 80048 BASIC METABOLIC PNL TOTAL CA: CPT | Performed by: INTERNAL MEDICINE

## 2022-07-18 PROCEDURE — 0064A COVID-19,PF,MODERNA (18+ YRS BOOSTER .25ML): CPT | Performed by: INTERNAL MEDICINE

## 2022-07-18 PROCEDURE — 85027 COMPLETE CBC AUTOMATED: CPT | Performed by: INTERNAL MEDICINE

## 2022-07-18 PROCEDURE — 99214 OFFICE O/P EST MOD 30 MIN: CPT | Mod: 25 | Performed by: INTERNAL MEDICINE

## 2022-07-18 PROCEDURE — 91306 COVID-19,PF,MODERNA (18+ YRS BOOSTER .25ML): CPT | Performed by: INTERNAL MEDICINE

## 2022-07-18 PROCEDURE — 36415 COLL VENOUS BLD VENIPUNCTURE: CPT | Performed by: INTERNAL MEDICINE

## 2022-07-18 RX ORDER — ACETAMINOPHEN 500 MG
500-1000 TABLET ORAL EVERY 6 HOURS PRN
Status: ON HOLD | COMMUNITY
Start: 2022-07-18 | End: 2023-09-24

## 2022-07-18 RX ORDER — ATENOLOL 25 MG/1
TABLET ORAL
Qty: 270 TABLET | Refills: 3 | Status: SHIPPED | OUTPATIENT
Start: 2022-07-18 | End: 2022-11-18

## 2022-07-18 NOTE — LETTER
July 19, 2022      Mariluz Hagan[address]   [facility] [address]        Dear Lino Mcgraw   Basic metabolic panel  (Ca, Cl, CO2, Creat, Gluc, K, Na, BUN)   Result Value Ref Range    Creatinine 0.87 0.51 - 0.95 mg/dL    Sodium 133 (L) 136 - 145 mmol/L    Potassium 4.4 3.4 - 5.3 mmol/L    Urea Nitrogen 15.9 8.0 - 23.0 mg/dL    Chloride 97 (L) 98 - 107 mmol/L    Carbon Dioxide (CO2) 26 22 - 29 mmol/L    Anion Gap 10 7 - 15 mmol/L    Glucose 110 (H) 70 - 99 mg/dL    GFR Estimate 63 >60 mL/min/1.73m2      Comment:      Effective December 21, 2021 eGFRcr in adults is calculated using the 2021 CKD-EPI creatinine equation which includes age and gender (Kiran et al., NEJ, DOI: 10.1056/XXJKcv7974969)    Calcium 10.1 (H) 8.2 - 9.6 mg/dL   CBC with platelets   Result Value Ref Range    WBC Count 8.2 4.0 - 11.0 10e3/uL    RBC Count 4.20 3.80 - 5.20 10e6/uL    Hemoglobin 13.5 11.7 - 15.7 g/dL    Hematocrit 41.4 35.0 - 47.0 %    MCV 99 78 - 100 fL    MCH 32.1 26.5 - 33.0 pg    MCHC 32.6 31.5 - 36.5 g/dL    RDW 12.4 10.0 - 15.0 %    Platelet Count 216 150 - 450 10e3/uL     Kidney function remains normal.  Sodium is stable.  Slightly elevated calcium is okay.  CBC is normal.  No anemia.    If you have any questions or concerns, please call the clinic at the number listed above.       Sincerely,      Maik Levine MD

## 2022-07-18 NOTE — PATIENT INSTRUCTIONS
Recheck your blood pressure in 2 weeks and let me know if it is still running over 140/90    Avoid using ibuprofen    You may take over-the-counter acetaminophen/Tylenol 500 mg 2 tablets every 6-8 hours as needed with a maximum of 3000 mg during a 24-hour.

## 2022-07-18 NOTE — PROGRESS NOTES
Assessment & Plan     Primary hypertension  89-year-old woman here to follow-up several concerns.  Blood pressure is not adequately controlled.  Experiencing headache over the weekend.  160/84 despite losartan 50 mg twice daily and atenolol 25 mg twice daily.  She is not a candidate for diuretics with history of SIADH.  I am recommending increasing atenolol to 50 mg every morning and 25 mg in the evening.  She will recheck her blood pressure in 2 weeks at home and will let me know if it is still running over 140/90.  We will arrange for her to follow-up in 3 months.  - Basic metabolic panel  (Ca, Cl, CO2, Creat, Gluc, K, Na, BUN); Future  - CBC with platelets; Future    Aortic valve stenosis, etiology of cardiac valve disease unspecified  Experiencing some vague palpitations.  Loud systolic murmur.  Known moderate aortic stenosis.  We will arrange for follow-up echocardiogram to assess for any progression.  Despite her age, she may be a candidate for TAVR  - Echocardiogram Complete; Future    Venous (peripheral) insufficiency  Chronic peripheral edema.  Not a candidate for diuretics with history of hyponatremia.  We discussed leg elevation.  She is unable to get compression stockings on and off which is unfortunate as I think this would be the most helpful approach.  Moving to assisted living is a reasonable consideration as she is needing more more help taking care of usual activities of daily living including this.    Primary osteoarthritis involving multiple joints  Chronic pain involving multiple joints.  I would avoid ibuprofen.  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  Monitor electrolytes  - Basic metabolic panel  (Ca, Cl, CO2, Creat, Gluc, K, Na, BUN);  "Future    Generalized anxiety disorder with panic attacks  She declines taking any medication concerned about side effects    Need for viral immunization  Overdue for COVID booster being provided today  - COVID-19,PF,MODERNA (18+ YRS BOOSTER .25ML)      33 minutes spent on the date of the encounter doing chart review, history and exam, documentation and further activities per the note           Return in about 3 months (around 10/18/2022) for Follow up.    Maik Levine MD  [facility]    Alonzo Mcgraw is a 89 year old, presenting for the following health issues: We discussed hypertension along with headaches and palpitations and aortic stenosis and arthritis.  See assessment and plan for details.  Follow Up (Thinks her BP is running high, she has had headaches and had an episodes where her fingers and toes were pulsating)      History of Present Illness       Hypertension: She presents for follow up of hypertension.  She does not check blood pressure  regularly outside of the clinic. Outside blood pressures have been over 140/90. She does not follow a low salt diet.     Headaches:   Since the patient's last clinic visit, headaches are: worsened  The patient is getting headaches:  Recently  She is able to do normal daily activities when she has a migraine.  The patient is taking the following rescue/relief medications:  Tylenol   Patient states \"I get only a small amount of relief\" from the rescue/relief medications.   The patient is taking the following medications to prevent migraines:  No medications to prevent migraines  In the past 4 weeks, the patient has gone to an Urgent Care or Emergency Room 0 times times due to headaches.          Review of Systems   Denies increasing shortness of breath.  No exertional chest pain      Objective    BP (!) 160/84   Pulse 71   Wt 50.7 kg (111 lb 11.2 oz)   SpO2 99%   BMI 23.75 kg/m    Body mass index is 23.75 " kg/m .  Physical Exam   Well-appearing elderly woman  Lungs clear bilaterally no rales or wheezes  Heart regular rate and rhythm with frequent ectopy and 3/6 systolic murmur  1-2+ bilateral lower extremity edema        .  ..

## 2022-07-23 DIAGNOSIS — F41.9 CHRONIC ANXIETY: ICD-10-CM

## 2022-07-25 DIAGNOSIS — I10 HYPERTENSION, UNSPECIFIED TYPE: Primary | ICD-10-CM

## 2022-07-25 DIAGNOSIS — I10 ESSENTIAL (PRIMARY) HYPERTENSION: ICD-10-CM

## 2022-07-25 RX ORDER — SIMVASTATIN 20 MG
TABLET ORAL
Qty: 90 TABLET | Refills: 3 | Status: SHIPPED | OUTPATIENT
Start: 2022-07-25 | End: 2023-08-08

## 2022-07-25 RX ORDER — LORAZEPAM 0.5 MG/1
TABLET ORAL
Qty: 30 TABLET | Refills: 0 | Status: SHIPPED | OUTPATIENT
Start: 2022-07-25 | End: 2022-08-18

## 2022-07-25 RX ORDER — LOSARTAN POTASSIUM 50 MG/1
TABLET ORAL
Qty: 180 TABLET | Refills: 3 | Status: SHIPPED | OUTPATIENT
Start: 2022-07-25 | End: 2022-10-27

## 2022-07-25 NOTE — TELEPHONE ENCOUNTER
"Outpatient Medication Detail     Disp Refills Start End HAYLEY   simvastatin (ZOCOR) 20 MG tablet 90 tablet 3 6/5/2020  No   Sig: TAKE 1 TABLET BY MOUTH ONCE DAILY   Sent to pharmacy as: simvastatin 20 mg tablet (ZOCOR)   Notes to Pharmacy: pt has 75 tabs left on this rx, however, she is on a 90 day \"sync\" plan. could we get a new rx for a 90 day supply (which would take the place of any existing rx)? thanks, pharmacy   E-Prescribing Status: Receipt confirmed by pharmacy (6/5/2020  3:42 PM CDT)       simvastatin (ZOCOR) 20 MG tablet [[id number]]    Electronically signed by: Adriana Stevenson RN on 06/05/20 1541 Status: Active   Ordering user: Adriana Stevenson RN 06/05/20 1541 Ordering provider: Sang Owens MD   Authorized by: Sang Owens MD   Frequency:  06/05/20 - Until Discontinued Released by: Adriana Stevenson RN 06/05/20 1541   Diagnoses  Hypertension [I10]   Medication comments: pt has 75 tabs left on this rx, however, she is on a 90 day \"sync\" plan. could we get a new rx for a 90 day supply (which would take the place of any existing rx)? thanks, pharmacy     Routing refill request to provider for review/approval because:  A break in medication  BP not in range.    Last Written Prescription Date:  7/28/21  Last Fill Quantity: 180,  # refills: 3   Last office visit provider:  7/18/22     Requested Prescriptions   Pending Prescriptions Disp Refills     simvastatin (ZOCOR) 20 MG tablet [Pharmacy Med Name: simvastatin 20 mg tablet] 90 tablet 3     Sig: TAKE 1 TABLET BY MOUTH ONCE DAILY       Statins Protocol Passed - 7/25/2022  1:27 PM        Passed - LDL on file in past 12 months     Recent Labs   Lab Test 12/13/21  1557   LDL 55             Passed - No abnormal creatine kinase in past 12 months     No lab results found.             Passed - Recent (12 mo) or future (30 days) visit within the authorizing provider's specialty     Patient has had an office visit with the authorizing provider or a provider " "within the authorizing providers department within the previous 12 mos or has a future within next 30 days. See \"Patient Info\" tab in inbasket, or \"Choose Columns\" in Meds & Orders section of the refill encounter.              Passed - Medication is active on med list        Passed - Patient is age 18 or older        Passed - No active pregnancy on record        Passed - No positive pregnancy test in past 12 months           losartan (COZAAR) 50 MG tablet [Pharmacy Med Name: losartan 50 mg tablet] 180 tablet 3     Sig: Take 1 tablet (50 mg total) by mouth 2 (two) times a day.       Angiotensin-II Receptors Failed - 7/25/2022  1:27 PM        Failed - Last blood pressure under 140/90 in past 12 months     BP Readings from Last 3 Encounters:   07/18/22 (!) 160/84   04/20/22 139/80   03/14/22 (!) 148/86                 Passed - Recent (12 mo) or future (30 days) visit within the authorizing provider's specialty     Patient has had an office visit with the authorizing provider or a provider within the authorizing providers department within the previous 12 mos or has a future within next 30 days. See \"Patient Info\" tab in inCloudCaseet, or \"Choose Columns\" in Meds & Orders section of the refill encounter.              Passed - Medication is active on med list        Passed - Patient is age 18 or older        Passed - No active pregnancy on record        Passed - Normal serum creatinine on file in past 12 months     Recent Labs   Lab Test 07/18/22  1339   CR 0.87       Ok to refill medication if creatinine is low          Passed - Normal serum potassium on file in past 12 months     Recent Labs   Lab Test 07/18/22  1339   POTASSIUM 4.4                    Passed - No positive pregnancy test in past 12 months             Samir Duff RN 07/25/22 1:29 PM  "

## 2022-08-09 ENCOUNTER — TELEPHONE (OUTPATIENT)
Dept: INTERNAL MEDICINE | Facility: CLINIC | Age: 87
End: 2022-08-09

## 2022-08-09 DIAGNOSIS — I10 PRIMARY HYPERTENSION: Primary | ICD-10-CM

## 2022-08-09 RX ORDER — HYDRALAZINE HYDROCHLORIDE 25 MG/1
25 TABLET, FILM COATED ORAL 2 TIMES DAILY
Qty: 180 TABLET | Refills: 3 | Status: SHIPPED | OUTPATIENT
Start: 2022-08-09 | End: 2022-08-16 | Stop reason: SINTOL

## 2022-08-09 NOTE — TELEPHONE ENCOUNTER
I would recommend that she start taking additional blood pressure medication and would suggest hydralazine 25 mg twice daily.  I have sent the prescription to her pharmacy.  She should schedule a follow-up appointment with me in 3 to 4 weeks

## 2022-08-09 NOTE — TELEPHONE ENCOUNTER
General Call    Contacts       Type Contact Phone/Fax    08/09/2022 12:55 PM CDT Phone (Incoming) Abby Aranan S (Self) [phone] (H)        Reason for Call:  Patient called to follow up on Dr. Levine request for patient to call in 2 weeks with blood pressure readings.    What are your questions or concerns:  Patient wants to talk to Dr. Levine with BP readings.    Date of last appointment with provider: N/A    Okay to leave a detailed message?: No patient would like to have MD call at [phone].

## 2022-08-15 ENCOUNTER — TELEPHONE (OUTPATIENT)
Dept: INTERNAL MEDICINE | Facility: CLINIC | Age: 87
End: 2022-08-15

## 2022-08-15 NOTE — TELEPHONE ENCOUNTER
General Call    Contacts       Type Contact Phone/Fax    08/15/2022 02:49 PM CDT Phone (Incoming) Mariluz Arana (Self)         Reason for Call:  Medication reaction to hydrALAZINE (APRESOLINE) 25 MG tablet    What are your questions or concerns:  Pt called and wanted to talk to PCP about a medication reaction she had to the above medication. Pt states she had to go to the ED last night. Please call Pt to discuss her questions/concerns.    Date of last appointment with provider: 07/18/2022    Okay to leave a detailed message?: Yes at Home number on file [phone] (home)

## 2022-08-16 NOTE — TELEPHONE ENCOUNTER
Patient seen in ED on Sunday PM at [facility]. Patient had severe dizziness 1 hour after taking morning medications, dizziness lasted 3 hours then subsided. Sunday evening patient had severe dizziness and was lightheaded, patient unsure what BP was at that time. She went to ED for eval.     ED notes not viewable in Epic. Per patient, she was advised to discontinue hydralazine. Medication was started on 8/9/22. EKG WNL, BP on arrival 164/95. BP at discharge 133/61. ED did not suspect dehydration, no fluids given.     Patient had not taken hydralazine since ED visit and dizziness has not occurred again.     She has an appointment on 8/30 with pcp and EKG on 9/8    Routing to pcp to review. Hydralazine still on the medication list at this time.       Kim Denis RN

## 2022-08-17 DIAGNOSIS — F41.9 CHRONIC ANXIETY: ICD-10-CM

## 2022-08-18 RX ORDER — LORAZEPAM 0.5 MG/1
TABLET ORAL
Qty: 30 TABLET | Refills: 0 | Status: SHIPPED | OUTPATIENT
Start: 2022-08-18 | End: 2022-09-16

## 2022-08-30 ENCOUNTER — OFFICE VISIT (OUTPATIENT)
Dept: INTERNAL MEDICINE | Facility: CLINIC | Age: 87
End: 2022-08-30
Payer: COMMERCIAL

## 2022-08-30 VITALS
BODY MASS INDEX: 23.39 KG/M2 | WEIGHT: 110 LBS | OXYGEN SATURATION: 96 % | HEART RATE: 62 BPM | SYSTOLIC BLOOD PRESSURE: 148 MMHG | DIASTOLIC BLOOD PRESSURE: 92 MMHG

## 2022-08-30 DIAGNOSIS — I10 PRIMARY HYPERTENSION: Primary | ICD-10-CM

## 2022-08-30 DIAGNOSIS — I35.0 AORTIC VALVE STENOSIS, ETIOLOGY OF CARDIAC VALVE DISEASE UNSPECIFIED: ICD-10-CM

## 2022-08-30 PROCEDURE — 99214 OFFICE O/P EST MOD 30 MIN: CPT | Performed by: INTERNAL MEDICINE

## 2022-08-30 NOTE — PROGRESS NOTES
Assessment & Plan     Primary hypertension  High blood pressure readings at home and started on hydralazine but developed side effects almost immediately and ended up in the ER with dizziness.  Was found actually to have moderately elevated blood pressure and even before she left the ER, blood pressure was still 133/61.  Nevertheless, probably not a medication that she can continue.  I am asking that she have her blood pressure checked weekly at home by her daughter.  If any readings over 180/100, they should contact our office.  Otherwise, we may need to tolerate mild to moderately elevated blood pressure readings if she is otherwise asymptomatic as she does not tolerate medications and is already on maximum doses of beta-blocker and ARB.    Aortic valve stenosis, etiology of cardiac valve disease unspecified  Schedule for echocardiogram to reassess aortic valve stenosis.                   Return in about 2 months (around 10/30/2022) for Follow up.    Maik Levine MD  [facility]   Mariluz is a 89 year old, presenting for the following health issues: Recent ER evaluation with dizziness and management of hypertension and aortic stenosis.  See assessment and plan for details.  Follow Up (BP check)      HPI     Review of Systems   No recurrent dizziness      Objective    BP (!) 148/92   Pulse 62   Wt 49.9 kg (110 lb)   SpO2 96%   BMI 23.39 kg/m    Body mass index is 23.39 kg/m .  Physical Exam   Anxious appearing elderly woman        .  ..

## 2022-08-30 NOTE — PATIENT INSTRUCTIONS
Stay off of hydralazine    Try to check your blood pressure at home once weekly.  Goal is to be under 140/90.  If you find an elevated reading, recheck 5 to 10 minutes later.    Please notify me if you are finding any blood pressure readings over 180/100 otherwise we will review your readings at your follow-up appointment in 2 months

## 2022-09-08 ENCOUNTER — HOSPITAL ENCOUNTER (OUTPATIENT)
Dept: CARDIOLOGY | Facility: CLINIC | Age: 87
Discharge: HOME | End: 2022-09-08
Attending: INTERNAL MEDICINE | Admitting: INTERNAL MEDICINE
Payer: COMMERCIAL

## 2022-09-08 DIAGNOSIS — I35.0 AORTIC VALVE STENOSIS, ETIOLOGY OF CARDIAC VALVE DISEASE UNSPECIFIED: ICD-10-CM

## 2022-09-08 LAB — LVEF ECHO: NORMAL

## 2022-09-08 PROCEDURE — 93306 TTE W/DOPPLER COMPLETE: CPT

## 2022-09-08 PROCEDURE — 93306 TTE W/DOPPLER COMPLETE: CPT | Mod: 26 | Performed by: INTERNAL MEDICINE

## 2022-09-12 ENCOUNTER — TELEPHONE (OUTPATIENT)
Dept: INTERNAL MEDICINE | Facility: CLINIC | Age: 87
End: 2022-09-12

## 2022-09-12 ENCOUNTER — TELEPHONE (OUTPATIENT)
Dept: CARDIOLOGY | Facility: CLINIC | Age: 87
End: 2022-09-12

## 2022-09-12 DIAGNOSIS — I35.0 AORTIC VALVE STENOSIS, ETIOLOGY OF CARDIAC VALVE DISEASE UNSPECIFIED: Primary | ICD-10-CM

## 2022-09-12 NOTE — TELEPHONE ENCOUNTER
I spoke with Mariluz regarding echo showing severe aortic stenosis.  We will get an appointment with cardiology

## 2022-09-12 NOTE — TELEPHONE ENCOUNTER
[facility] Call Center    Phone Message    May a detailed message be left on voicemail: yes     Reason for Call: Appointment Intake    Referring Provider Name: Maik Levine MD   Diagnosis and/or Symptoms:  Aortic valve stenosis, etiology of cardiac valve disease unspecified [I35.0]    Per order -]Dr. Ron Franco, Dr. Laboy, Dr. Patterson, Dr Nunn, Dr Choi or Dr. Grier if available.    Per pt - req for Salvador at any of his location first if she can't get in 2 week then ok to schedule with the other provider listed Aortic valve stenosis please call her back to make appt does have to coordinate transportation.    Action Taken: Message routed to:  Other: Cardiology    Travel Screening: Not Applicable

## 2022-09-12 NOTE — TELEPHONE ENCOUNTER
From: Cher Estrada  Sent: 9/12/2022   4:22 PM CDT  To: Reyna Hurt RN    Patient scheduled 9/22 with Dr. Mcdowell!     Thanks,   Kay

## 2022-09-16 DIAGNOSIS — F41.9 CHRONIC ANXIETY: ICD-10-CM

## 2022-09-16 RX ORDER — LORAZEPAM 0.5 MG/1
TABLET ORAL
Qty: 30 TABLET | Refills: 0 | Status: SHIPPED | OUTPATIENT
Start: 2022-09-16 | End: 2022-10-14

## 2022-09-19 DIAGNOSIS — I35.0 AORTIC VALVE STENOSIS, ETIOLOGY OF CARDIAC VALVE DISEASE UNSPECIFIED: Primary | ICD-10-CM

## 2022-09-22 ENCOUNTER — ALLIED HEALTH/NURSE VISIT (OUTPATIENT)
Dept: CARDIOLOGY | Facility: CLINIC | Age: 87
End: 2022-09-22
Payer: COMMERCIAL

## 2022-09-22 ENCOUNTER — OFFICE VISIT (OUTPATIENT)
Dept: CARDIOLOGY | Facility: CLINIC | Age: 87
End: 2022-09-22
Payer: COMMERCIAL

## 2022-09-22 VITALS
RESPIRATION RATE: 16 BRPM | SYSTOLIC BLOOD PRESSURE: 162 MMHG | WEIGHT: 111.4 LBS | BODY MASS INDEX: 23.38 KG/M2 | DIASTOLIC BLOOD PRESSURE: 82 MMHG | HEART RATE: 67 BPM | HEIGHT: 58 IN

## 2022-09-22 DIAGNOSIS — I35.0 AORTIC VALVE STENOSIS, ETIOLOGY OF CARDIAC VALVE DISEASE UNSPECIFIED: ICD-10-CM

## 2022-09-22 LAB
ALBUMIN SERPL BCG-MCNC: 4.4 G/DL (ref 3.5–5.2)
ALP SERPL-CCNC: 94 U/L (ref 35–104)
ALT SERPL W P-5'-P-CCNC: 16 U/L (ref 10–35)
ANION GAP SERPL CALCULATED.3IONS-SCNC: 16 MMOL/L (ref 7–15)
AST SERPL W P-5'-P-CCNC: 32 U/L (ref 10–35)
ATRIAL RATE - MUSE: 67 BPM
BILIRUB SERPL-MCNC: 0.8 MG/DL
BUN SERPL-MCNC: 18.9 MG/DL (ref 8–23)
CALCIUM SERPL-MCNC: 9.6 MG/DL (ref 8.2–9.6)
CHLORIDE SERPL-SCNC: 96 MMOL/L (ref 98–107)
CREAT SERPL-MCNC: 0.82 MG/DL (ref 0.51–0.95)
DEPRECATED HCO3 PLAS-SCNC: 21 MMOL/L (ref 22–29)
DIASTOLIC BLOOD PRESSURE - MUSE: NORMAL MMHG
ERYTHROCYTE [DISTWIDTH] IN BLOOD BY AUTOMATED COUNT: 12.8 % (ref 10–15)
GFR SERPL CREATININE-BSD FRML MDRD: 67 ML/MIN/1.73M2
GLUCOSE SERPL-MCNC: 94 MG/DL (ref 70–99)
HCT VFR BLD AUTO: 41.9 % (ref 35–47)
HGB BLD-MCNC: 13.8 G/DL (ref 11.7–15.7)
INTERPRETATION ECG - MUSE: NORMAL
MCH RBC QN AUTO: 32.3 PG (ref 26.5–33)
MCHC RBC AUTO-ENTMCNC: 32.9 G/DL (ref 31.5–36.5)
MCV RBC AUTO: 98 FL (ref 78–100)
P AXIS - MUSE: 70 DEGREES
PLATELET # BLD AUTO: 177 10E3/UL (ref 150–450)
POTASSIUM SERPL-SCNC: 4.9 MMOL/L (ref 3.4–5.3)
PR INTERVAL - MUSE: 174 MS
PROT SERPL-MCNC: 7.4 G/DL (ref 6.4–8.3)
QRS DURATION - MUSE: 128 MS
QT - MUSE: 438 MS
QTC - MUSE: 462 MS
R AXIS - MUSE: -33 DEGREES
RBC # BLD AUTO: 4.27 10E6/UL (ref 3.8–5.2)
SODIUM SERPL-SCNC: 133 MMOL/L (ref 136–145)
SYSTOLIC BLOOD PRESSURE - MUSE: NORMAL MMHG
T AXIS - MUSE: 102 DEGREES
VENTRICULAR RATE- MUSE: 67 BPM
WBC # BLD AUTO: 7.6 10E3/UL (ref 4–11)

## 2022-09-22 PROCEDURE — 80053 COMPREHEN METABOLIC PANEL: CPT

## 2022-09-22 PROCEDURE — 36415 COLL VENOUS BLD VENIPUNCTURE: CPT

## 2022-09-22 PROCEDURE — 99207 PR NO CHARGE LOS: CPT

## 2022-09-22 PROCEDURE — 93000 ELECTROCARDIOGRAM COMPLETE: CPT | Performed by: INTERNAL MEDICINE

## 2022-09-22 PROCEDURE — 99207 PR NO CHARGE LOS: CPT | Performed by: INTERNAL MEDICINE

## 2022-09-22 PROCEDURE — 85027 COMPLETE CBC AUTOMATED: CPT

## 2022-09-22 NOTE — Clinical Note
9/22/2022    Maik Levine MD  [address] [facility] [address]    RE: Mariluz Arana       Dear Colleague,     I had the pleasure of seeing Mariluz Arana in the [facility].  No notes on file    Thank you for allowing me to participate in the care of your patient.      Sincerely,     Estela Mcdowell MD     [facility]  cc:   Maik Levine MD  [address] [place] [address]

## 2022-09-22 NOTE — PROGRESS NOTES
Pt seen in valve clinic for severe aortic stenosis.    Patients daughter Jaycee, son Parish, and daughter in law were all present at this appointment.    Pt states they have significant fatigue and decrease in activity tolerance especially later in the day.     Pt lives alone in an independent living facility.    Discussed TAVR procedure and the workup required.    Pt and family would like to further discuss the options. They will call once a decision is made.

## 2022-09-22 NOTE — LETTER
Date:October 14, 2022      Provider requested that no letter be sent. Do not send.       [facility]

## 2022-10-14 DIAGNOSIS — F41.9 CHRONIC ANXIETY: ICD-10-CM

## 2022-10-14 RX ORDER — LORAZEPAM 0.5 MG/1
TABLET ORAL
Qty: 30 TABLET | Refills: 0 | Status: SHIPPED | OUTPATIENT
Start: 2022-10-14 | End: 2022-11-11

## 2022-10-27 ENCOUNTER — OFFICE VISIT (OUTPATIENT)
Dept: INTERNAL MEDICINE | Facility: CLINIC | Age: 87
End: 2022-10-27
Payer: COMMERCIAL

## 2022-10-27 VITALS
DIASTOLIC BLOOD PRESSURE: 100 MMHG | HEIGHT: 58 IN | WEIGHT: 109 LBS | SYSTOLIC BLOOD PRESSURE: 192 MMHG | BODY MASS INDEX: 22.88 KG/M2 | HEART RATE: 64 BPM

## 2022-10-27 DIAGNOSIS — F41.9 CHRONIC ANXIETY: ICD-10-CM

## 2022-10-27 DIAGNOSIS — I10 PRIMARY HYPERTENSION: Primary | ICD-10-CM

## 2022-10-27 DIAGNOSIS — I35.0 AORTIC VALVE STENOSIS, ETIOLOGY OF CARDIAC VALVE DISEASE UNSPECIFIED: ICD-10-CM

## 2022-10-27 PROCEDURE — 0134A COVID-19,PF,MODERNA BIVALENT: CPT | Performed by: INTERNAL MEDICINE

## 2022-10-27 PROCEDURE — 99215 OFFICE O/P EST HI 40 MIN: CPT | Mod: 25 | Performed by: INTERNAL MEDICINE

## 2022-10-27 PROCEDURE — 91313 COVID-19,PF,MODERNA BIVALENT: CPT | Performed by: INTERNAL MEDICINE

## 2022-10-27 RX ORDER — VALSARTAN 160 MG/1
160 TABLET ORAL 2 TIMES DAILY
Qty: 60 TABLET | Refills: 0 | Status: SHIPPED | OUTPATIENT
Start: 2022-10-27 | End: 2022-10-28

## 2022-10-27 NOTE — PATIENT INSTRUCTIONS
Make sure you get your flu shot in the next 2 weeks    I would like you to start checking your blood pressure at home with a home blood pressure monitor every day.  Let me know if you are finding readings over 170/90

## 2022-10-27 NOTE — PROGRESS NOTES
Assessment & Plan     Primary hypertension  89-year-old woman with hypertension.  She additionally has component of situational hypertension provoked by anxiety.  Today her BP is 192/100.  She is clearly extremely anxious and this undoubtedly is contributing to the finding.  Nevertheless, better blood pressure control is needed.  She has not been able to tolerate several different medication options.  We will try switching her from losartan 50 mg twice daily to valsartan 160 mg twice daily.  Continue atenolol.  Diuretics unfortunately should be avoided with her history of hyponatremia.  Recently tried hydralazine and she could not tolerate.  Consider retrying calcium channel blocker such as felodipine if still not at goal when she returns in 3 weeks.  I am asking her to check her blood pressure daily at home.  - valsartan (DIOVAN) 160 MG tablet; Take 1 tablet (160 mg) by mouth 2 times daily    Aortic valve stenosis, etiology of cardiac valve disease unspecified  Recent echocardiogram showing severe aortic stenosis.  She met with Dr. Mcdowell and she is unhappy about the appointment.  I attempted to have a good conversation regarding her options of TAVR versus ongoing medical management.  Is difficult to have this conversation as her anxiety starts to worsen as we discussed any of this.  She will be meeting with Dr. Ron Franco in late November to further discuss management of this.  In the meantime, getting better control of blood pressure is certainly needed.  She has seen no progression of any symptoms in the last 6 months.  She has chronic dyspnea with exertion and gets easily tired.  Denies any chest pain.  We did discuss that no intervention of her aortic stenosis will eventually lead to severe problems including fatal arrhythmias.    Chronic anxiety  As above, anxiety is affecting management of her other medical problems.  She uses lorazepam and has been reluctant to take any other medication because of side  "effects but this may need to be further addressed        40 minutes spent on the date of the encounter doing chart review, history and exam, documentation and further activities per the note           Return in about 3 weeks (around 11/17/2022) for Follow up.    Maik Levine MD  [facility]    Alonzo Mcgraw is a 89 year old, presenting for the following health issues: Here to discuss management of hypertension and aortic stenosis.  See assessment and plan for details  Follow Up (3 month)      History of Present Illness       Hypertension: She presents for follow up of hypertension.  She does not check blood pressure  regularly outside of the clinic. Outside blood pressures have been over 140/90. She does not follow a low salt diet.           Review of Systems   Denies chest pain.  She does have a headache      Objective    BP (!) 192/100 (BP Location: Right arm, Patient Position: Sitting, Cuff Size: Adult Small)   Pulse 64   Ht 1.461 m (4' 9.5\")   Wt 49.4 kg (109 lb)   BMI 23.18 kg/m    Body mass index is 23.18 kg/m .  Physical Exam   Heart regular rate and rhythm with 3/6 systolic murmur        "

## 2022-11-01 ENCOUNTER — TRANSFERRED RECORDS (OUTPATIENT)
Dept: HEALTH INFORMATION MANAGEMENT | Facility: CLINIC | Age: 87
End: 2022-11-01

## 2022-11-02 ENCOUNTER — TELEPHONE (OUTPATIENT)
Dept: INTERNAL MEDICINE | Facility: CLINIC | Age: 87
End: 2022-11-02

## 2022-11-02 ENCOUNTER — NURSE TRIAGE (OUTPATIENT)
Dept: NURSING | Facility: CLINIC | Age: 87
End: 2022-11-02

## 2022-11-02 DIAGNOSIS — I10 ESSENTIAL HYPERTENSION: Primary | ICD-10-CM

## 2022-11-02 RX ORDER — FELODIPINE 5 MG/1
5 TABLET, EXTENDED RELEASE ORAL DAILY
Qty: 30 TABLET | Refills: 0 | Status: SHIPPED | OUTPATIENT
Start: 2022-11-02 | End: 2022-11-10

## 2022-11-02 NOTE — TELEPHONE ENCOUNTER
Patient is calling back to schedule appointment.  Patient is requesting to speak with [facility] direct to RN Josette Sutton.      Reason for Disposition    Information only question and nurse able to answer    Additional Information    Negative: Nursing judgment    Negative: Nursing judgment    Negative: Nursing judgment    Negative: Nursing judgment    Protocols used: INFORMATION ONLY CALL - NO TRIAGE-A-OH

## 2022-11-02 NOTE — TELEPHONE ENCOUNTER
"Patient transferred to RN team due to elevated BP.    She had OV on 10/27/22 with PCP, was started on valsartan due to elevated BP. She thought this prescription was helping, the first couple of days her BP was 135/67 and then it was 153/79.  Pt states yesterday it went back up to 185/105.  She had a horrible headache, ended up going to [facility] in [place].   Her BP in the ER was 195/101 and then 171/83.    Pt states she has been feeling itchy. She did have to take amoxicillin for a dental procedure. She thinks it started when she first started this med. She states \"my skin has been inflamed\". Denies fever, no rash, no angioedema, no SOB.   She states she took Ativan with no relief last night.   She was awake all night.     Pt is concerned about her BP and that it continues to be elevated.   She is also concerned that the itching is from the valsartan.     Routing to PCP and covering to please advise on next steps for her.     Josette Sutton RN, BSN  [facility]    "

## 2022-11-02 NOTE — TELEPHONE ENCOUNTER
Pt calling because she has been having trouble with high BP and wanted to talk with sofie and pt has tried new meds and has been experiencing side effects. Please have sofie call pt back [phone]

## 2022-11-02 NOTE — TELEPHONE ENCOUNTER
If blood pressure remains above 190/100, or if she continues to have a bad headache, go to the emergency room again.    Dr. Palomares and put in his October 27 notes that he would consider adding felodipine next.      I sent prescription for 14 to her pharmacy, she can start that now.  Side effects include lightheaded dizzy spells or weakness    Patient is a follow-up appointment with Dr. Palomares later this month, see if she can get an earlier appointment.

## 2022-11-02 NOTE — TELEPHONE ENCOUNTER
Called patient to discuss message below.  She verbalized understanding.     She plans to call back to set up an appointment, there are some openings with Dr. Levine next week. She is checking with her son to see when he can drive her next week and will call back to schedule.     Josette Sutton RN, BSN  [facility]

## 2022-11-02 NOTE — TELEPHONE ENCOUNTER
Patient calling back per message left by TAMARA Finch. She was very insistent she has to talk to TAMARA Finch to schedule the appointment and then hung up. Patient wants a call back to [phone].  LOREE Hernandez  11/2/2022 1:44 PM

## 2022-11-02 NOTE — TELEPHONE ENCOUNTER
I called patient back.     She was scheduled for a follow-up with Dr. Levine next Tuesday, Nov 8th.     She plans to follow-up with her dentist regarding amoxicillin.     Josette Sutton RN, BSN  [facility]

## 2022-11-08 ENCOUNTER — OFFICE VISIT (OUTPATIENT)
Dept: INTERNAL MEDICINE | Facility: CLINIC | Age: 87
End: 2022-11-08
Payer: COMMERCIAL

## 2022-11-08 VITALS
BODY MASS INDEX: 22.82 KG/M2 | HEART RATE: 52 BPM | SYSTOLIC BLOOD PRESSURE: 180 MMHG | WEIGHT: 107.3 LBS | DIASTOLIC BLOOD PRESSURE: 84 MMHG

## 2022-11-08 DIAGNOSIS — I35.0 AORTIC VALVE STENOSIS, ETIOLOGY OF CARDIAC VALVE DISEASE UNSPECIFIED: ICD-10-CM

## 2022-11-08 DIAGNOSIS — F41.9 CHRONIC ANXIETY: ICD-10-CM

## 2022-11-08 DIAGNOSIS — I10 PRIMARY HYPERTENSION: Primary | ICD-10-CM

## 2022-11-08 PROCEDURE — 90662 IIV NO PRSV INCREASED AG IM: CPT | Performed by: INTERNAL MEDICINE

## 2022-11-08 PROCEDURE — 99215 OFFICE O/P EST HI 40 MIN: CPT | Mod: 25 | Performed by: INTERNAL MEDICINE

## 2022-11-08 PROCEDURE — G0008 ADMIN INFLUENZA VIRUS VAC: HCPCS | Performed by: INTERNAL MEDICINE

## 2022-11-08 RX ORDER — AMOXICILLIN 875 MG
875 TABLET ORAL 2 TIMES DAILY
COMMUNITY
Start: 2022-10-28 | End: 2022-11-30

## 2022-11-08 NOTE — PATIENT INSTRUCTIONS
Please begin felodipine 5 mg daily in the morning.  I think this medication will be helpful and well-tolerated.

## 2022-11-08 NOTE — PROGRESS NOTES
Assessment & Plan     Primary hypertension  Long history of labile hypertension still having difficulty bringing her blood pressure is under control.  There is also component of anxiety causing situational hypertension.  At her last visit, discontinued losartan and started valsartan 160 mg twice daily.  She also continues on atenolol.  She is unable to take diuretics with her history of hyponatremia.  She tried hydralazine but she could not tolerate.  Unfortunately she ended up in the ER again with systolic readings 190-200.  I reviewed the remainder of her log.  Following this ER visit her blood pressure improved into the 150s systolic but is again elevated the past 48 hours including 180/84 today.  However, she is clearly very anxious and worked up during today's visit.  Is difficult to tell how much of this is anxiety versus true hypertension but I think she needs additional medication.  We will add felodipine 5 mg daily.  She is terrified by the prescriptions that she read to the point where she initially was unwilling to take the medication.  I hope I reassured her enough that the medication is generally well-tolerated although she will need to watch for any worsening ankle or foot edema and it can increase her risk for constipation.  Otherwise I suspect she should tolerate well and hopefully will prove helpful in combination with her ARB and beta-blocker.  We also discussed sodium restriction.  She normally eats a very low sodium diet but will go out to dinner once or twice a week and we discussed that she may be getting into too much salt on these occasions.  She is very resistant to this suggestion.  Given the difficulty bringing her blood pressure under control, I think would be worthwhile for her to meet with a nephrologist.  She was assessed for renal artery stenosis in 2018 and that work-up was normal.  - Adult Nephrology  Referral; Future    Chronic anxiety  Generalized anxiety is  complicating management of hypertension as this is undoubtedly causing transient elevations of her blood pressure.  I have recommended mirtazapine at bedtime in the past but she has been anxious about taking medication with potential side effects.  This will need to be readdressed at some point.    Aortic valve stenosis, etiology of cardiac valve disease unspecified  She is scheduled to meet with Dr. Franco later this month to discuss new diagnosis of severe aortic stenosis.        40 minutes spent on the date of the encounter doing chart review, history and exam, documentation and further activities per the note           Return in about 10 days (around 11/18/2022) for Follow up.    Maik Levine MD  [facility]    Alonzo Mcgraw is a 89 year old, presenting for the following health issues: Here to follow-up management of hypertension including recent ER visit with uncontrolled blood pressure.  See assessment plan for details.  Hypertension (Went to ER 11/1/22 for high readings)          Review of Systems   Intermittent headaches.      Objective    BP (!) 180/84   Pulse 52   Wt 48.7 kg (107 lb 4.8 oz)   BMI 22.82 kg/m    Body mass index is 22.82 kg/m .  Physical Exam   Anxious elderly woman

## 2022-11-10 ENCOUNTER — NURSE TRIAGE (OUTPATIENT)
Dept: NURSING | Facility: CLINIC | Age: 87
End: 2022-11-10

## 2022-11-10 DIAGNOSIS — I10 PRIMARY HYPERTENSION: Primary | ICD-10-CM

## 2022-11-10 RX ORDER — FELODIPINE 2.5 MG/1
2.5 TABLET, EXTENDED RELEASE ORAL DAILY
Qty: 90 TABLET | Refills: 3 | Status: SHIPPED | OUTPATIENT
Start: 2022-11-10 | End: 2022-12-20

## 2022-11-10 NOTE — TELEPHONE ENCOUNTER
Pt calling with concerns of low BP  Saw MD yesterday, started new Rx for BP, did not remember asking provider if there is a low point where pt should be concerned  BP Readings today from 3pm 138/67, 114/65, 113/61    Pt wondering if she should be concerned of low BP, and if she should take medications tonight-atenolol and valsartan  Feels dizzy and lightheaded on-going when waking this morning prior to taking BP meds, pt states symptoms come and goes, staying hydrated within fluid restriction parameters. Pt states currently she feels fine at the moment, wants to know low BP parameters    Pt states she is unable to check BP herself, has family that comes and check her BP. Advised pt to call for 911/EMS if feels symptoms worsen, will send message to PCP care team for advise on BP parameters. Pt verbalized understanding.    Jena Arzate RN, BSN  11/10/2022 at 4:19 PM  [facility]          Reason for Disposition    Fall in systolic BP > 20 mm Hg from normal and feeling dizzy, lightheaded, or weak    Additional Information    Negative: Systolic BP < 90 and feeling dizzy, lightheaded, or weak    Negative: Started suddenly after an allergic medicine, an allergic food, or bee sting    Negative: Shock suspected (e.g., cold/pale/clammy skin, too weak to stand, low BP, rapid pulse)    Negative: Difficult to awaken or acting confused (e.g., disoriented, slurred speech)    Negative: Fainted    Negative: Chest pain    Negative: Bleeding (e.g., vomiting blood, rectal bleeding or tarry stools, severe vaginal bleeding)    Negative: Extra heart beats or heart is beating fast (i.e., 'palpitations')    Negative: Sounds like a life-threatening emergency to the triager    Negative: Systolic BP < 80 and NOT dizzy, lightheaded or weak (feels normal)    Negative: Abdominal pain    Negative: Major surgery in the past month    Negative: Fever > 100.4 F (38.0 C)    Negative: Drinking very little and has signs of dehydration (e.g.,  no urine > 12 hours, very dry mouth, very lightheaded)    Protocols used: BLOOD PRESSURE - LOW-A-OH

## 2022-11-11 ENCOUNTER — NURSE TRIAGE (OUTPATIENT)
Dept: NURSING | Facility: CLINIC | Age: 87
End: 2022-11-11

## 2022-11-11 DIAGNOSIS — F41.9 CHRONIC ANXIETY: ICD-10-CM

## 2022-11-11 RX ORDER — LORAZEPAM 0.5 MG/1
TABLET ORAL
Qty: 30 TABLET | Refills: 0 | Status: SHIPPED | OUTPATIENT
Start: 2022-11-11 | End: 2022-12-09

## 2022-11-11 NOTE — TELEPHONE ENCOUNTER
"Pt calling with a medication question and concern for low pulse check this morning.    Pt states that she is unable to get her new prescription of the Felodipine 2.5mg tablets filled until Monday due to pharmacy shortage. They have called two different pharmacies and neither have it in stock. Wondering if it will be ok for her to go without it over the weekend until she can get it filled on Monday.    Also, wanting to let Dr. Levine know that her BP was 148/81 this morning and her pulse was 48. HR did come back up into he 50's and is currently 66, but she was concerned that it was in the 40's. She say's that she feels \"completely wiped out\" lately and is also feeling intermittently lightheaded. She denies any shortness of breath or respiratory difficulty. No chest pain and no changes in vision from baseline. Feels \"ok right now other than being tired.\"    Protocol recommends go to ED now. Will route message to PCP for recommendation and to confirm disposition.    Ermelinda Early, RN, BSN  [facility]   Triage Nurse Advisor          Reason for Disposition    Dizziness, lightheadedness, or weakness    Additional Information    Negative: Chest pain    Negative: Passed out (i.e., lost consciousness, collapsed and was not responding)    Negative: Shock suspected (e.g., cold/pale/clammy skin, too weak to stand, low BP, rapid pulse)    Negative: Difficult to awaken or acting confused (e.g., disoriented, slurred speech)    Negative: Visible sweat on face or sweat dripping down face    Negative: Unable to walk, or can only walk with assistance (e.g., requires support)    Negative: Received SHOCK from implantable cardiac defibrillator and has persisting symptoms (i.e., palpitations, lightheadedness)    Negative: Dizziness, lightheadedness, or weakness and heart beating very rapidly (e.g., > 140 / minute)    Negative: Dizziness, lightheadedness, or weakness and heart beating very slowly (e.g., < 50 / minute)    Negative: " Sounds like a life-threatening emergency to the triager    Negative: Difficulty breathing    Protocols used: HEART RATE AND HEARTBEAT [id number]

## 2022-11-11 NOTE — TELEPHONE ENCOUNTER
Outgoing Call:    Dr. Garcia message relayed to pt  I am not surprised by her heart rate as she does take atenolol.  If she is now in the 50s-60s, there is no need for ED evaluation.  In regards to her blood pressure, it would be okay to wait until she is able to obtain the 2.5 mg dose of felodipine.  However, if she finds her systolic blood pressure over 170, she should take the 5 mg dose that she has available.  Reassure her that felodipine does not affect her heart rate.   Pt verbalized understanding and had no further questions or concerns.     Malia WASSERMAN RN  [facility]

## 2022-11-11 NOTE — TELEPHONE ENCOUNTER
Maik Levine MD  [facility] Nurse Pool 2 minutes ago (5:57 PM)     CC  If she has not had felodipine yet today, she should hold her dose.  Starting tomorrow, I would recommend a lower dose of the medication.  I will send 2.5 mg to her pharmacy.      Recommendation relayed to patient. She had taken the new medication this morning but will start to take the new dose tomorrow.     Encouraged her to call back with any questions or concerns.     Reviewed red flag symptoms.       Kim Denis RN

## 2022-11-18 ENCOUNTER — OFFICE VISIT (OUTPATIENT)
Dept: INTERNAL MEDICINE | Facility: CLINIC | Age: 87
End: 2022-11-18
Payer: COMMERCIAL

## 2022-11-18 VITALS
RESPIRATION RATE: 18 BRPM | DIASTOLIC BLOOD PRESSURE: 96 MMHG | TEMPERATURE: 97.3 F | BODY MASS INDEX: 22.46 KG/M2 | SYSTOLIC BLOOD PRESSURE: 170 MMHG | HEIGHT: 58 IN | HEART RATE: 68 BPM | WEIGHT: 107 LBS

## 2022-11-18 DIAGNOSIS — I10 UNCONTROLLED HYPERTENSION: Primary | ICD-10-CM

## 2022-11-18 DIAGNOSIS — I35.0 AORTIC VALVE STENOSIS, ETIOLOGY OF CARDIAC VALVE DISEASE UNSPECIFIED: ICD-10-CM

## 2022-11-18 DIAGNOSIS — E87.1 HYPONATREMIA: ICD-10-CM

## 2022-11-18 PROCEDURE — 99214 OFFICE O/P EST MOD 30 MIN: CPT | Performed by: INTERNAL MEDICINE

## 2022-11-18 RX ORDER — VALSARTAN 160 MG/1
TABLET ORAL
Qty: 180 TABLET | Refills: 3 | Status: SHIPPED | OUTPATIENT
Start: 2022-11-18 | End: 2022-12-20

## 2022-11-18 RX ORDER — ATENOLOL 25 MG/1
25 TABLET ORAL 2 TIMES DAILY
Qty: 180 TABLET | Refills: 3 | Status: ON HOLD | OUTPATIENT
Start: 2022-11-18 | End: 2023-09-24

## 2022-11-18 NOTE — PROGRESS NOTES
Assessment & Plan     Uncontrolled hypertension  Longstanding history of labile hypertension.  Difficulty bringing blood pressure under control over the past several months.  Several changes including switching from losartan to valsartan 160 mg twice daily and increasing atenolol to 50 mg in the morning and 25 mg in the afternoon.  She was unable to tolerate hydralazine causing side effects.  Start felodipine 5 mg daily after her last visit and her blood pressure improved significantly but she started to not feel well when she found her systolic in the 110-120 range.  Her dose was to be switched to 2.5 mg but she never started the medication.  Blood pressure was moderately elevated earlier this week but has been severely elevated the past 48 hours.  Today 170/96 associated with left-sided headache.  She is extremely anxious which is contributing to some difficulty finding a medication that she is open to taking.  After long discussion, she is open to retrying felodipine at the lower dose of 2.5 mg.  She should begin immediately this afternoon and then continue every morning.  She can cut back on her atenolol to 25 mg twice daily as she is having some increased bradycardia with heart rate in the high 40s at times.  She will continue valsartan 160 mg twice daily.  We will arrange for follow-up in 4 weeks and she will be seeing cardiology sooner than.  - atenolol (TENORMIN) 25 MG tablet; Take 1 tablet (25 mg) by mouth 2 times daily    Aortic valve stenosis, etiology of cardiac valve disease unspecified  Severe aortic stenosis found on echocardiogram.  Scheduled to meet with Dr. Franco in 2 weeks.  She is hesitant to proceed with any intervention but needs to hear more about the procedure that can be offered and the risks of not addressing this problem    Hyponatremia  Chronic hyponatremia.  She is not a candidate for diuretics to control blood pressure.  Sodium has been stable when last checked earlier this  "month.               Return in about 4 weeks (around 12/16/2022) for Follow up.    Maik Levine MD  [facility]    Alonzo Mcgraw is a 89 year old, presenting for the following health issues: Here to discuss management of hypertension and aortic stenosis.  See assessment plan for details  Follow Up (Blood pressure)      History of Present Illness       Hypertension: She presents for follow up of hypertension.  She does check blood pressure  regularly outside of the clinic. Outside blood pressures have been over 140/90. She does not follow a low salt diet.     Headaches:   Since the patient's last clinic visit, headaches are: worsened  The patient is getting headaches:  2x per week  She is not able to do normal daily activities when she has a migraine.  The patient is taking the following rescue/relief medications:  Tylenol   Patient states \"I get some relief\" from the rescue/relief medications.   The patient is taking the following medications to prevent migraines:  No medications to prevent migraines  In the past 4 weeks, the patient has gone to an Urgent Care or Emergency Room 0 times times due to headaches.          Review of Systems   Headache      Objective    BP (!) 170/96   Pulse 68   Temp 97.3  F (36.3  C)   Resp 18   Ht 1.461 m (4' 9.52\")   Wt 48.5 kg (107 lb)   BMI 22.74 kg/m    Body mass index is 22.74 kg/m .  Physical Exam   Lungs clear bilaterally  Heart regular rate and rhythm with 3/6 systolic murmur  Neurologically intact  Anxious        "

## 2022-11-18 NOTE — PATIENT INSTRUCTIONS
Begin felodipine 2.5 mg daily starting this afternoon and continue every morning    Change atenolol to 25 mg twice daily

## 2022-11-21 ENCOUNTER — TELEPHONE (OUTPATIENT)
Dept: INTERNAL MEDICINE | Facility: CLINIC | Age: 87
End: 2022-11-21

## 2022-11-21 NOTE — TELEPHONE ENCOUNTER
Medication Question or Refill    Contacts       Type Contact Phone/Fax    11/21/2022 09:50 AM CST Phone (Incoming) Mariluz Arana (Self) [phone] (H)          What medication are you calling about (include dose and sig)?: Felodipine    Controlled Substance Agreement on file:   CSA -- Patient Level:    CSA: None found at the patient level.       Who prescribed the medication?: Dr. Levine      Do you have any questions or concerns?  Yes: Pt would like a return call from Dr. Levine to discuss concerns patient is having from taking the Rx        Okay to leave a detailed message?: Yes at Home number on file [phone] (home)

## 2022-11-21 NOTE — TELEPHONE ENCOUNTER
"Called patient back to discuss.     Relayed provider message below - patient was very upset that her doctor was not in clinic and advising her of what to do himself. Explained that her PCP is out for the next two weeks for the holiday.     Pt states \"well this is an urgent matter, I guess you just have to make note saying that I tried to handle this on my own with no luck\"    I discussed with the patient that she should stop this medication, continue to monitor her BP, and follow-up with clinic upon his return.     Pt states \"well that is no good.\"    I offered her an appointment with a colleague of Dr. Levine's this week or next and she refused. She states \"No one has the knowledge of Dr. Levine\".     She states she will stop the medication and \"hope for the best\"  I advised her to monitor her BP and let us know if she has any concerns that come up.     She requested a message sent to PCP to have him contact her when she returns.     Josette Sutton RN, BSN  [facility]    "

## 2022-11-21 NOTE — TELEPHONE ENCOUNTER
You could tell her that Dr. Palomares will not be in clinic this week.  She can stop the felodipine, but have her follow-up in clinic as soon as able to discuss further with Dr. Palomares

## 2022-11-21 NOTE — TELEPHONE ENCOUNTER
"Patient calling regarding side effects from her felodipine tablet.   She started the 2.5mg dose last Friday.     Pt reports that yesterday she had \"very disturbed nights rest\", reporting nightmares, feeling very weak, and also feeling very wobbly.   She did not have access to her BP monitory when this was happening.     She states her last reading was yesterday at 11am. It was 148/76, then 130/69.    Pt is very anxious and tearful on the phone. She states \"I really need to talk with Dr. Levine. This was a last resort pill for me and I need some guidance and reassurance\"    She has not taken this prescription this morning due to concerns about her feeling off again.     She reports she is feeling ok this morning - denies weakness, no sob, no chest pain, no lightheadedness.     Routing to covering to please advise.     Josette Sutton RN, BSN  [facility]    "

## 2022-11-30 ENCOUNTER — OFFICE VISIT (OUTPATIENT)
Dept: CARDIOLOGY | Facility: CLINIC | Age: 87
End: 2022-11-30
Payer: COMMERCIAL

## 2022-11-30 VITALS
SYSTOLIC BLOOD PRESSURE: 195 MMHG | DIASTOLIC BLOOD PRESSURE: 86 MMHG | BODY MASS INDEX: 22.95 KG/M2 | HEART RATE: 70 BPM | RESPIRATION RATE: 20 BRPM | WEIGHT: 108 LBS

## 2022-11-30 DIAGNOSIS — I10 PRIMARY HYPERTENSION: ICD-10-CM

## 2022-11-30 DIAGNOSIS — I35.0 NONRHEUMATIC AORTIC VALVE STENOSIS: Primary | ICD-10-CM

## 2022-11-30 PROCEDURE — 99204 OFFICE O/P NEW MOD 45 MIN: CPT | Performed by: INTERNAL MEDICINE

## 2022-11-30 RX ORDER — CLONIDINE HYDROCHLORIDE 0.1 MG/1
0.1 TABLET ORAL AT BEDTIME
Qty: 60 TABLET | Refills: 3 | Status: SHIPPED | OUTPATIENT
Start: 2022-11-30 | End: 2022-12-20

## 2022-11-30 NOTE — PROGRESS NOTES
[facility]  [phone]        Assessment/Recommendations   Patient with known recalcitrant and somewhat labile hypertension who has had hypotension with certain medications and most recently with felodipine.  Blood pressures not well controlled at this time.  She feels like she is sensitive to calcium channel blockers and has been on multiple medications in the past and we will try 0.1 mg of clonidine at at bedtime only and she will call us with some blood pressures and let us know if she has any side effects.    Patient also has severe aortic stenosis with some mild reduction in left ventricular systolic function.  She has met with the structural clinic physicians but is not eager and really would like to avoid procedures.  She feels like she has had poor experiences with surgical procedures in the past and this just gives her a lot of worry and concern.  She does not have evidence of heart failure, angina or syncope but she does have some mild reduction left ventricular systolic function.  We agreed to see her back in the clinic in 3 or 4 months to see how she is doing and we may want to repeat an echocardiogram in its possible that she would change her mind regarding an evaluation for potential transcutaneous aortic valve replacement.  40 minutes spent with chart review, patient visit, coordination of care with the structural heart team, documentation and .         History of Present Illness/Subjective    Ms. Mariluz Arana is a 89 year old female with known history of longstanding hypertension which has been challenging to control.  She has had multiple medical intolerances not all of which are documented in the chart because there is so long ago.  She recalls taking a calcium channel blocker many years ago and it plummeted her blood pressure.  This was likely in 2014 or 2015.  She and her blood pressure on her current medical regimen is not well controlled and felodipine  was added which dropped her blood pressure significantly so it was cut down to a smaller dose which seemed to be working but also got terribly lightheaded and dizzy and so she discontinued the medicine.    Patient also has aortic stenosis and more recent echocardiogram showed mild reduction left ventricular systolic function with severe aortic stenosis.  She denies chest discomfort, orthopnea, paroxysmal nocturnal dyspnea, unusual shortness of breath with activity although she is not particularly mobile she gets around on her own with a walker.  She has not had syncopal or near syncopal episodes and denies any palpitations.    She is a .         Physical Examination Review of Systems   BP (!) 195/86 (BP Location: Right arm, Patient Position: Sitting, Cuff Size: Adult Regular)   Pulse 70   Resp 20   Wt 49 kg (108 lb)   BMI 22.95 kg/m    Body mass index is 22.95 kg/m .  Wt Readings from Last 3 Encounters:   11/30/22 49 kg (108 lb)   11/18/22 48.5 kg (107 lb)   11/08/22 48.7 kg (107 lb 4.8 oz)     General Appearance:   Alert, cooperative and in no acute distress.   ENT/Mouth: Patient wearing a mask.      EYES:  no scleral icterus, normal conjunctivae   Neck: JVP normal. No Hepatojugular reflux. Thyroid not visualized.   Chest/Lungs:   Lungs are clear to auscultation, equal chest wall expansion.   Cardiovascular:   S1, S2 with 3/6 systolic murmur , no clicks or rubs. Brachial, radial and posterior tibial pulses are intact and symetric. No carotid bruits noted   Abdomen:  Nontender.    Extremities: No cyanosis, clubbing and minimal ankle edema   Skin: no xanthelasma, warm.    Neurologic: normal arm movement bilateral, no tremors     Psychiatric: Appropriate affect.      Enc Vitals  BP: (!) 195/86  Pulse: 70  Resp: 20  Weight: 49 kg (108 lb)                                           Medical History  Surgical History Family History Social History   Past Medical History:   Diagnosis Date     Age-related cataract of  left eye 9/21/2021     Aortic stenosis     Echocardiogram with moderate aortic stenosis September 2021     Arthritis     Osteoarthritis     Bilateral cataracts      Chronic anxiety      Dizziness and giddiness 9/17/2012     Gastroesophageal reflux disease      GERD (gastroesophageal reflux disease)      Heart murmur      Hypercholesteremia      Hypercholesteremia      Hypercholesterolemia 12/13/2021     Hypertension      Hyponatremia      Hyponatremia 07/08/2021     Irregular heart beat      Irritable bowel      Irritable bowel      Meniere syndrome      Meniere's disease, bilateral      Osteoarthritis of spine with radiculopathy, unspecified spinal region 07/08/2021     Osteoporosis      Primary osteoarthritis of both hands 12/13/2021     SOB (shortness of breath) 07/08/2021     Venous (peripheral) insufficiency 4/20/2022     Vertigo     Past Surgical History:   Procedure Laterality Date     CATARACT EXTRACTION Left     2021     DILATION AND CURETTAGE       DILATION AND CURETTAGE       ENDOLYMPHATIC SAC OPERATION  01/01/2012    enhancement     ENHANCE ENDOLYMPHATIC SAC, DEC SIGMOID SINUS, EXTND FACIAL RECESS APPRCH, MASTOIDECTOMY, BMT, COMBO  04/23/2012    Procedure:COMBINED ENHANCE ENDOLYMPHATIC SAC, DECOMPRESS SIGMOID SINUS, EXTENDED FACIAL RECESS APPROACH, COMPLETE MASTOIDECTOMY, MYRINGOTOMY, INSERT TUBE; Left Endolymphatic Sac Enhancement, Sigmoid Sinus Decom-  pression, Extended Facial Recess Approach, Myringotomy  , Complete Mastoidectomy; Surgeon:LINN MATHIS; [facility]     INNER EAR SURGERY      Per Pt     KERATOTOMY ARCUATE WITH FEMTOSECOND LASER/IMAGING FOR ATIOL Right 07/19/2018    Procedure: KERATOTOMY ARCUATE WITH FEMTOSECOND LASER/IMAGING FOR ATIOL;  RIGHT EYE KERATOTOMY ARCUATE WITH FEMTOSECOND LASER/IMAGING FOR ATIOL ,  CAPSULOTOMY, LENS FRAGMENTATION, ARCUATE INCISIONS;  Surgeon: Roberth Chambers MD;  [facility]     MASTOIDECTOMY  01/01/2012     PHACOEMULSIFICATION CLEAR  CORNEA WITH TORIC INTRAOCULAR LENS IMPLANT Right 07/19/2018    Procedure: PHACOEMULSIFICATION CLEAR CORNEA WITH TORIC INTRAOCULAR LENS IMPLANT;  RIGHT EYE PHACOEMULSIFICATION CLEAR CORNEA WITH TORIC INTRAOCULAR LENS IMPLANT ;  Surgeon: Roberth Chambers MD;  [place] [facility]     TONSILLECTOMY       TONSILLECTOMY      Family History   Problem Relation Age of Onset     Heart Disease Mother      Clotting Disorder Mother         Dies of a blood clot, per pt     Diabetes Father      Cerebrovascular Disease Father      Hypertension Son      Osteoporosis Daughter      Hypertension Daughter      Cancer Sister         Colon     Glaucoma Sister     Social History     Socioeconomic History     Marital status:      Spouse name: Not on file     Number of children: 7     Years of education: Not on file     Highest education level: Not on file   Occupational History     Not on file   Tobacco Use     Smoking status: Never     Smokeless tobacco: Never   Vaping Use     Vaping Use: Never used   Substance and Sexual Activity     Alcohol use: No     Alcohol/week: 0.0 standard drinks     Comment: 2-3 times weekly, glass of wine     Drug use: No     Sexual activity: Never     Partners: Male   Other Topics Concern     Not on file   Social History Narrative    Patient is , they are retired.  Lives in assisted care for past 2 years (11/2/2016).     Social Determinants of Health     Financial Resource Strain: Not on file   Food Insecurity: Not on file   Transportation Needs: Not on file   Physical Activity: Not on file   Stress: Not on file   Social Connections: Not on file   Intimate Partner Violence: Not on file   Housing Stability: Not on file          Medications  Allergies   Current Outpatient Medications   Medication Sig Dispense Refill     acetaminophen (TYLENOL) 500 MG tablet Take 1-2 tablets (500-1,000 mg) by mouth every 6 hours as needed for pain Maximum of 6 tablets daily       atenolol (TENORMIN) 25 MG tablet  "Take 1 tablet (25 mg) by mouth 2 times daily 180 tablet 3     cloNIDine (CATAPRES) 0.1 MG tablet Take 1 tablet (0.1 mg) by mouth At Bedtime 60 tablet 3     fluticasone (FLONASE) 50 MCG/ACT nasal spray        loperamide (IMODIUM) 2 MG capsule Take 2 mg by mouth 4 times daily as needed.       LORazepam (ATIVAN) 0.5 MG tablet TAKE 1 TABLET BY MOUTH AT BEDTIME AS NEEDED FOR ANXIETY 30 tablet 0     meclizine (ANTIVERT) 25 MG tablet Take 1 tablet (25 mg) by mouth 3 times daily as needed for dizziness       simvastatin (ZOCOR) 20 MG tablet TAKE 1 TABLET BY MOUTH ONCE DAILY 90 tablet 3     valsartan (DIOVAN) 160 MG tablet TAKE 1 TABLET(160 MG) BY MOUTH TWICE DAILY Strength: 160 mg 180 tablet 3     VITAMIN D, CHOLECALCIFEROL, PO Take 1,000 Units by mouth daily       felodipine ER (PLENDIL) 2.5 MG 24 hr tablet Take 1 tablet (2.5 mg) by mouth daily (Patient not taking: Reported on 11/18/2022) 90 tablet 3    Allergies   Allergen Reactions     Actonel [Risedronate]      GI upset     Clindamycin      Cortisone Other (See Comments)     \"heart symptoms\", low blood pressure     Fosamax Other (See Comments)     \"burning my lower esophagus\"     Kenalog [Triamcinolone]      Metronidazole      Minipress [Prazosin]      Hypotension     Penicillin G      Statin Drugs [Hmg-Coa-R Inhibitors]      Shortness of breath.  Tolerating Simvastatin (Sept 2016)     Tetanus Toxoid      Prednisone Rash     Zithromax [Azithromycin Dihydrate] Rash     States got rash after using z rubio         Lab Results    Chemistry/lipid CBC Cardiac Enzymes/BNP/TSH/INR   Lab Results   Component Value Date    CHOL 142 12/13/2021    HDL 76 12/13/2021    TRIG 54 12/13/2021    BUN 18.9 09/22/2022     (L) 09/22/2022    CO2 21 (L) 09/22/2022    Lab Results   Component Value Date    WBC 7.6 09/22/2022    HGB 13.8 09/22/2022    HCT 41.9 09/22/2022    MCV 98 09/22/2022     09/22/2022    Lab Results   Component Value Date    TROPONINI 0.01 06/14/2021     (H) " 06/14/2021    TSH 3.02 02/09/2021

## 2022-11-30 NOTE — PATIENT INSTRUCTIONS
[facility]  [phone]    Mariluz Arana,    It was a pleasure to see you today at the [facility].     My recommendations after this visit include:    1.  We will start clonidine 0.1 mg at bedtime.  Please check your blood pressures once a day or at least 3 or 4 times a week and call us in 1 week to let us know how you are responding to this medication.  If you have side effects, please stop it and call us.  Please call Erika Rocha RN at [phone].    2.  I would like to see you in follow-up in 3 months in [place].        Georgi Franco

## 2022-11-30 NOTE — LETTER
11/30/2022    Maik Levine MD  [address] [facility] [address]    RE: Mariluz Arana       Dear Colleague,     I had the pleasure of seeing Mariluz Arana in the [facility].      [facility]  [phone]        Assessment/Recommendations   Patient with known recalcitrant and somewhat labile hypertension who has had hypotension with certain medications and most recently with felodipine.  Blood pressures not well controlled at this time.  She feels like she is sensitive to calcium channel blockers and has been on multiple medications in the past and we will try 0.1 mg of clonidine at at bedtime only and she will call us with some blood pressures and let us know if she has any side effects.    Patient also has severe aortic stenosis with some mild reduction in left ventricular systolic function.  She has met with the structural clinic physicians but is not eager and really would like to avoid procedures.  She feels like she has had poor experiences with surgical procedures in the past and this just gives her a lot of worry and concern.  She does not have evidence of heart failure, angina or syncope but she does have some mild reduction left ventricular systolic function.  We agreed to see her back in the clinic in 3 or 4 months to see how she is doing and we may want to repeat an echocardiogram in its possible that she would change her mind regarding an evaluation for potential transcutaneous aortic valve replacement.  40 minutes spent with chart review, patient visit, coordination of care with the structural heart team, documentation and .         History of Present Illness/Subjective    Ms. Mariluz Arana is a 89 year old female with known history of longstanding hypertension which has been challenging to control.  She has had multiple medical intolerances not all of which are documented in the chart because there is so long ago.  She recalls  taking a calcium channel blocker many years ago and it plummeted her blood pressure.  This was likely in 2014 or 2015.  She and her blood pressure on her current medical regimen is not well controlled and felodipine was added which dropped her blood pressure significantly so it was cut down to a smaller dose which seemed to be working but also got terribly lightheaded and dizzy and so she discontinued the medicine.    Patient also has aortic stenosis and more recent echocardiogram showed mild reduction left ventricular systolic function with severe aortic stenosis.  She denies chest discomfort, orthopnea, paroxysmal nocturnal dyspnea, unusual shortness of breath with activity although she is not particularly mobile she gets around on her own with a walker.  She has not had syncopal or near syncopal episodes and denies any palpitations.    She is a .         Physical Examination Review of Systems   BP (!) 195/86 (BP Location: Right arm, Patient Position: Sitting, Cuff Size: Adult Regular)   Pulse 70   Resp 20   Wt 49 kg (108 lb)   BMI 22.95 kg/m    Body mass index is 22.95 kg/m .  Wt Readings from Last 3 Encounters:   11/30/22 49 kg (108 lb)   11/18/22 48.5 kg (107 lb)   11/08/22 48.7 kg (107 lb 4.8 oz)     General Appearance:   Alert, cooperative and in no acute distress.   ENT/Mouth: Patient wearing a mask.      EYES:  no scleral icterus, normal conjunctivae   Neck: JVP normal. No Hepatojugular reflux. Thyroid not visualized.   Chest/Lungs:   Lungs are clear to auscultation, equal chest wall expansion.   Cardiovascular:   S1, S2 with 3/6 systolic murmur , no clicks or rubs. Brachial, radial and posterior tibial pulses are intact and symetric. No carotid bruits noted   Abdomen:  Nontender.    Extremities: No cyanosis, clubbing and minimal ankle edema   Skin: no xanthelasma, warm.    Neurologic: normal arm movement bilateral, no tremors     Psychiatric: Appropriate affect.      Enc Vitals  BP: (!)  195/86  Pulse: 70  Resp: 20  Weight: 49 kg (108 lb)                                           Medical History  Surgical History Family History Social History   Past Medical History:   Diagnosis Date     Age-related cataract of left eye 9/21/2021     Aortic stenosis     Echocardiogram with moderate aortic stenosis September 2021     Arthritis     Osteoarthritis     Bilateral cataracts      Chronic anxiety      Dizziness and giddiness 9/17/2012     Gastroesophageal reflux disease      GERD (gastroesophageal reflux disease)      Heart murmur      Hypercholesteremia      Hypercholesteremia      Hypercholesterolemia 12/13/2021     Hypertension      Hyponatremia      Hyponatremia 07/08/2021     Irregular heart beat      Irritable bowel      Irritable bowel      Meniere syndrome      Meniere's disease, bilateral      Osteoarthritis of spine with radiculopathy, unspecified spinal region 07/08/2021     Osteoporosis      Primary osteoarthritis of both hands 12/13/2021     SOB (shortness of breath) 07/08/2021     Venous (peripheral) insufficiency 4/20/2022     Vertigo     Past Surgical History:   Procedure Laterality Date     CATARACT EXTRACTION Left     2021     DILATION AND CURETTAGE       DILATION AND CURETTAGE       ENDOLYMPHATIC SAC OPERATION  01/01/2012    enhancement     ENHANCE ENDOLYMPHATIC SAC, DEC SIGMOID SINUS, EXTND FACIAL RECESS APPRCH, MASTOIDECTOMY, BMT, COMBO  04/23/2012    Procedure:COMBINED ENHANCE ENDOLYMPHATIC SAC, DECOMPRESS SIGMOID SINUS, EXTENDED FACIAL RECESS APPROACH, COMPLETE MASTOIDECTOMY, MYRINGOTOMY, INSERT TUBE; Left Endolymphatic Sac Enhancement, Sigmoid Sinus Decom-  pression, Extended Facial Recess Approach, Myringotomy  , Complete Mastoidectomy; Surgeon:LINN MATHIS; [facility]     INNER EAR SURGERY      Per Pt     KERATOTOMY ARCUATE WITH FEMTOSECOND LASER/IMAGING FOR ATIOL Right 07/19/2018    Procedure: KERATOTOMY ARCUATE WITH FEMTOSECOND LASER/IMAGING FOR ATIOL;  RIGHT EYE  KERATOTOMY ARCUATE WITH FEMTOSECOND LASER/IMAGING FOR ATIOL ,  CAPSULOTOMY, LENS FRAGMENTATION, ARCUATE INCISIONS;  Surgeon: Roberth Chambers MD;  Location:  [facility]     MASTOIDECTOMY  01/01/2012     PHACOEMULSIFICATION CLEAR CORNEA WITH TORIC INTRAOCULAR LENS IMPLANT Right 07/19/2018    Procedure: PHACOEMULSIFICATION CLEAR CORNEA WITH TORIC INTRAOCULAR LENS IMPLANT;  RIGHT EYE PHACOEMULSIFICATION CLEAR CORNEA WITH TORIC INTRAOCULAR LENS IMPLANT ;  Surgeon: Roberth Chambers MD;  [facility]:  [facility]     TONSILLECTOMY       TONSILLECTOMY      Family History   Problem Relation Age of Onset     Heart Disease Mother      Clotting Disorder Mother         Dies of a blood clot, per pt     Diabetes Father      Cerebrovascular Disease Father      Hypertension Son      Osteoporosis Daughter      Hypertension Daughter      Cancer Sister         Colon     Glaucoma Sister     Social History     Socioeconomic History     Marital status:      Spouse name: Not on file     Number of children: 7     Years of education: Not on file     Highest education level: Not on file   Occupational History     Not on file   Tobacco Use     Smoking status: Never     Smokeless tobacco: Never   Vaping Use     Vaping Use: Never used   Substance and Sexual Activity     Alcohol use: No     Alcohol/week: 0.0 standard drinks     Comment: 2-3 times weekly, glass of wine     Drug use: No     Sexual activity: Never     Partners: Male   Other Topics Concern     Not on file   Social History Narrative    Patient is , they are retired.  Lives in assisted care for past 2 years (11/2/2016).     Social Determinants of Health     Financial Resource Strain: Not on file   Food Insecurity: Not on file   Transportation Needs: Not on file   Physical Activity: Not on file   Stress: Not on file   Social Connections: Not on file   Intimate Partner Violence: Not on file   Housing Stability: Not on file          Medications  Allergies   Current  "Outpatient Medications   Medication Sig Dispense Refill     acetaminophen (TYLENOL) 500 MG tablet Take 1-2 tablets (500-1,000 mg) by mouth every 6 hours as needed for pain Maximum of 6 tablets daily       atenolol (TENORMIN) 25 MG tablet Take 1 tablet (25 mg) by mouth 2 times daily 180 tablet 3     cloNIDine (CATAPRES) 0.1 MG tablet Take 1 tablet (0.1 mg) by mouth At Bedtime 60 tablet 3     fluticasone (FLONASE) 50 MCG/ACT nasal spray        loperamide (IMODIUM) 2 MG capsule Take 2 mg by mouth 4 times daily as needed.       LORazepam (ATIVAN) 0.5 MG tablet TAKE 1 TABLET BY MOUTH AT BEDTIME AS NEEDED FOR ANXIETY 30 tablet 0     meclizine (ANTIVERT) 25 MG tablet Take 1 tablet (25 mg) by mouth 3 times daily as needed for dizziness       simvastatin (ZOCOR) 20 MG tablet TAKE 1 TABLET BY MOUTH ONCE DAILY 90 tablet 3     valsartan (DIOVAN) 160 MG tablet TAKE 1 TABLET(160 MG) BY MOUTH TWICE DAILY Strength: 160 mg 180 tablet 3     VITAMIN D, CHOLECALCIFEROL, PO Take 1,000 Units by mouth daily       felodipine ER (PLENDIL) 2.5 MG 24 hr tablet Take 1 tablet (2.5 mg) by mouth daily (Patient not taking: Reported on 11/18/2022) 90 tablet 3    Allergies   Allergen Reactions     Actonel [Risedronate]      GI upset     Clindamycin      Cortisone Other (See Comments)     \"heart symptoms\", low blood pressure     Fosamax Other (See Comments)     \"burning my lower esophagus\"     Kenalog [Triamcinolone]      Metronidazole      Minipress [Prazosin]      Hypotension     Penicillin G      Statin Drugs [Hmg-Coa-R Inhibitors]      Shortness of breath.  Tolerating Simvastatin (Sept 2016)     Tetanus Toxoid      Prednisone Rash     Zithromax [Azithromycin Dihydrate] Rash     States got rash after using z rubio         Lab Results    Chemistry/lipid CBC Cardiac Enzymes/BNP/TSH/INR   Lab Results   Component Value Date    CHOL 142 12/13/2021    HDL 76 12/13/2021    TRIG 54 12/13/2021    BUN 18.9 09/22/2022     (L) 09/22/2022    CO2 21 (L) " 09/22/2022    Lab Results   Component Value Date    WBC 7.6 09/22/2022    HGB 13.8 09/22/2022    HCT 41.9 09/22/2022    MCV 98 09/22/2022     09/22/2022    Lab Results   Component Value Date    TROPONINI 0.01 06/14/2021     (H) 06/14/2021    TSH 3.02 02/09/2021                Thank you for allowing me to participate in the care of your patient.      Sincerely,     Georgi Franco MD     [facility]  cc:   Maik Levine MD  [address] [place] [address]

## 2022-12-02 ENCOUNTER — TELEPHONE (OUTPATIENT)
Dept: CARDIOLOGY | Facility: CLINIC | Age: 87
End: 2022-12-02

## 2022-12-02 DIAGNOSIS — I10 UNCONTROLLED HYPERTENSION: ICD-10-CM

## 2022-12-02 NOTE — TELEPHONE ENCOUNTER
----- Message -----  From: Georgi Franco MD  Sent: 12/2/2022   4:04 PM CST  To: TAMARA Alexander,    Not many options available.  I agree with pharmacist that she could potentially try half of a pill to see if she can tolerate that.  If she is unwilling to do that, we will review all her meds again and think about something different on Monday.    Thanks,    Georgi      ==  Patient would like to have alternate recommendations. She is aware that we will get back to her on Monday. -rosyb

## 2022-12-02 NOTE — TELEPHONE ENCOUNTER
"Dr. Franco, please review. Recommendations? -ejb      ==  Received phone call from patient. She took one dose of Clonidine 0.1 mg on Wednesday night and did not tolerate. Patient reports that she \"slept very hard\" and was groggy all day yesterday. She contacted her pharmacist and he suggested she may tolerate 1/2 dose, but she is very hesitant to even try that. She did not take dose of clonidine last night.  Patient states that she realizes that something needs to be done regarding her BP, but she is intolerant/sensitive to medications.   "

## 2022-12-04 ENCOUNTER — NURSE TRIAGE (OUTPATIENT)
Dept: NURSING | Facility: CLINIC | Age: 87
End: 2022-12-04

## 2022-12-04 NOTE — TELEPHONE ENCOUNTER
Nurse Triage SBAR    Is this a 2nd Level Triage? NO    Situation: Daughter-in-law, Shruthi, calling about patient having an elevated blood pressure, headache, and weakness.  Consent: obtained verbally from patient    Background: Patient was prescribed Clonidine 0.1mg last Wed, but did not tolerate medication well and stopped taking it. Provider is aware and wanted to follow-up with patient on Monday    Assessment:    183/112   P- 68  Dizzy  Weak  Headache    Protocol Recommended Disposition:   Go to ED Now    Recommendation: Advised patient to Go to ED now. Daughter-in-law was unsure if patient could make it to the car. Advised calling paramedics to transport patient to ED. Caller verbalized understanding and agreed with plan.     Amelia White RN [facility] 12/4/2022 1:56 PM    Reason for Disposition    [1] Systolic BP  >= 160 OR Diastolic >= 100 AND [2] cardiac or neurologic symptoms (e.g., chest pain, difficulty breathing, unsteady gait, blurred vision)    Additional Information    Negative: Difficult to awaken or acting confused (e.g., disoriented, slurred speech)    Negative: SEVERE difficulty breathing (e.g., struggling for each breath, speaks in single words)    Negative: [1] Weakness of the face, arm or leg on one side of the body AND [2] new-onset    Negative: [1] Numbness (i.e., loss of sensation) of the face, arm or leg on one side of the body AND [2] new-onset    Negative: [1] Chest pain lasts > 5 minutes AND [2] history of heart disease (i.e., heart attack, bypass surgery, angina, angioplasty, CHF)    Negative: [1] Chest pain AND [2] took nitrogylcerin AND [3] pain was not relieved    Negative: Sounds like a life-threatening emergency to the triager    Negative: Symptom is main concern (e.g., headache, chest pain)    Negative: Low blood pressure is main concern    Protocols used: BLOOD PRESSURE - HIGH-A-

## 2022-12-07 NOTE — TELEPHONE ENCOUNTER
----- Message -----  From: Georgi Franco MD  Sent: 12/7/2022   3:03 PM CST  To: TAMARA Alexander,    Would she try 37.5 mg of atenolol in the morning and if that is not working can also increase her afternoon dose to 37.5 mg.  Not sure if she has done this before?    Thanks,    Georgi    ==  Reviewed with patient son, understanding and agreement verbalized. -rosyb

## 2022-12-09 DIAGNOSIS — F41.9 CHRONIC ANXIETY: ICD-10-CM

## 2022-12-09 RX ORDER — LORAZEPAM 0.5 MG/1
TABLET ORAL
Qty: 30 TABLET | Refills: 0 | Status: SHIPPED | OUTPATIENT
Start: 2022-12-09 | End: 2023-01-07

## 2022-12-20 ENCOUNTER — OFFICE VISIT (OUTPATIENT)
Dept: INTERNAL MEDICINE | Facility: CLINIC | Age: 87
End: 2022-12-20
Payer: COMMERCIAL

## 2022-12-20 VITALS
HEART RATE: 64 BPM | WEIGHT: 108 LBS | BODY MASS INDEX: 22.67 KG/M2 | HEIGHT: 58 IN | TEMPERATURE: 98.2 F | RESPIRATION RATE: 16 BRPM | DIASTOLIC BLOOD PRESSURE: 80 MMHG | SYSTOLIC BLOOD PRESSURE: 170 MMHG

## 2022-12-20 DIAGNOSIS — I10 PRIMARY HYPERTENSION: Primary | ICD-10-CM

## 2022-12-20 DIAGNOSIS — I35.0 AORTIC VALVE STENOSIS, ETIOLOGY OF CARDIAC VALVE DISEASE UNSPECIFIED: ICD-10-CM

## 2022-12-20 PROCEDURE — 99215 OFFICE O/P EST HI 40 MIN: CPT | Performed by: INTERNAL MEDICINE

## 2022-12-20 RX ORDER — FELODIPINE 2.5 MG/1
2.5 TABLET, EXTENDED RELEASE ORAL EVERY MORNING
Qty: 90 TABLET | Refills: 3
Start: 2022-12-20 | End: 2023-02-01

## 2022-12-20 RX ORDER — VALSARTAN 160 MG/1
160 TABLET ORAL EVERY EVENING
Qty: 180 TABLET | Refills: 3
Start: 2022-12-20 | End: 2023-01-31

## 2022-12-20 NOTE — PROGRESS NOTES
Assessment & Plan     Primary hypertension  89-year-old woman with longstanding history of labile hypertension.  We have been having difficulty finding any medication that she can tolerate without side effects.  She was again in the ER in early December with blood pressure of 215/110.  When she was last seen, felodipine 2.5 mg was added to her current regimen of valsartan 160 mg twice daily and atenolol 25 mg twice daily.  She was tolerating the medication for the first 2 days feeling generally okay except for some slight weakness and lightheadedness.  However on the evening of the third day of taking the medication, she reports feeling very sleepy and she had a terrible night filled with nightmares.  She was subsequently instructed by my covering colleague to stop the medication.  She was started on clonidine 0.5 mg by Dr. Franco at cardiology but she also discontinued this medication as it was causing her side effects.  Not long after this was her ER visit.  By the time she left the ER her systolic blood pressure was down to 160 without being given any additional medication.  A significant portion of her difficulties with blood pressure control can be attributed to underlying anxiety but she is still needing adjustment of medications to keep her out of the dangerous blood pressure elevations that have been seen.  I am proposing cutting back on valsartan to only 160 mg in the evening, continuing atenolol at the current dose of 25 mg twice daily but restarting felodipine 2.5 mg in the morning.  Hopefully cutting back on the ARB will avoid any blood pressure is getting too low which clearly causes her symptoms.  The initial goal is probably to keep her systolic in the 160-170 range.  She is reluctant but is willing to retry felodipine and I am hopeful that this adjustment will prove successful.  She will return in 4 weeks and will continue to monitor blood pressure at home.  - felodipine ER (PLENDIL) 2.5 MG 24 hr  "tablet; Take 1 tablet (2.5 mg) by mouth every morning  - valsartan (DIOVAN) 160 MG tablet; Take 1 tablet (160 mg) by mouth every evening    Aortic valve stenosis, etiology of cardiac valve disease unspecified  She met with cardiology to discuss severe aortic valve stenosis found on echocardiogram.  She does have some mild decreased left ventricular systolic function but is otherwise asymptomatic.  She is reluctant to pursue any interventions at this time but she will be reassessed in 3 months when she follows up at the cardiology clinic.        40 minutes spent on the date of the encounter doing chart review, history and exam, documentation and further activities per the note           Return in about 4 weeks (around 1/17/2023) for Follow up.    Maik Levine MD  [facility]    Alonzo Mcgraw is a 89 year old, presenting for the following health issues: Here to discuss management of hypertension and aortic stenosis along with recent ER evaluation.  See assessment and plan for details  Follow Up        Review of Systems   Denies chest pain      Objective    BP (!) 170/80   Pulse 64   Temp 98.2  F (36.8  C) (Oral)   Resp 16   Ht 1.461 m (4' 9.5\")   Wt 49 kg (108 lb)   BMI 22.97 kg/m    Body mass index is 22.97 kg/m .  Physical Exam   Anxious but otherwise well-appearing elderly woman        "

## 2023-01-07 DIAGNOSIS — F41.9 CHRONIC ANXIETY: ICD-10-CM

## 2023-01-07 RX ORDER — LORAZEPAM 0.5 MG/1
TABLET ORAL
Qty: 30 TABLET | Refills: 0 | Status: SHIPPED | OUTPATIENT
Start: 2023-01-07 | End: 2023-01-31

## 2023-01-17 ENCOUNTER — MEDICAL CORRESPONDENCE (OUTPATIENT)
Dept: HEALTH INFORMATION MANAGEMENT | Facility: CLINIC | Age: 88
End: 2023-01-17

## 2023-01-17 ENCOUNTER — OFFICE VISIT (OUTPATIENT)
Dept: INTERNAL MEDICINE | Facility: CLINIC | Age: 88
End: 2023-01-17
Payer: COMMERCIAL

## 2023-01-17 VITALS
HEIGHT: 58 IN | WEIGHT: 111 LBS | TEMPERATURE: 97.8 F | HEART RATE: 60 BPM | RESPIRATION RATE: 18 BRPM | SYSTOLIC BLOOD PRESSURE: 162 MMHG | BODY MASS INDEX: 23.3 KG/M2 | DIASTOLIC BLOOD PRESSURE: 90 MMHG

## 2023-01-17 DIAGNOSIS — I10 PRIMARY HYPERTENSION: Primary | ICD-10-CM

## 2023-01-17 DIAGNOSIS — I35.0 AORTIC VALVE STENOSIS, ETIOLOGY OF CARDIAC VALVE DISEASE UNSPECIFIED: ICD-10-CM

## 2023-01-17 DIAGNOSIS — R26.81 GAIT INSTABILITY: ICD-10-CM

## 2023-01-17 DIAGNOSIS — I87.2 VENOUS (PERIPHERAL) INSUFFICIENCY: ICD-10-CM

## 2023-01-17 LAB
ANION GAP SERPL CALCULATED.3IONS-SCNC: 14 MMOL/L (ref 7–15)
BUN SERPL-MCNC: 23.9 MG/DL (ref 8–23)
CALCIUM SERPL-MCNC: 10.3 MG/DL (ref 8.2–9.6)
CHLORIDE SERPL-SCNC: 98 MMOL/L (ref 98–107)
CREAT SERPL-MCNC: 0.89 MG/DL (ref 0.51–0.95)
DEPRECATED HCO3 PLAS-SCNC: 22 MMOL/L (ref 22–29)
GFR SERPL CREATININE-BSD FRML MDRD: 61 ML/MIN/1.73M2
GLUCOSE SERPL-MCNC: 99 MG/DL (ref 70–99)
POTASSIUM SERPL-SCNC: 4.4 MMOL/L (ref 3.4–5.3)
SODIUM SERPL-SCNC: 134 MMOL/L (ref 136–145)

## 2023-01-17 PROCEDURE — 36415 COLL VENOUS BLD VENIPUNCTURE: CPT | Performed by: INTERNAL MEDICINE

## 2023-01-17 PROCEDURE — 99215 OFFICE O/P EST HI 40 MIN: CPT | Performed by: INTERNAL MEDICINE

## 2023-01-17 PROCEDURE — 80048 BASIC METABOLIC PNL TOTAL CA: CPT | Performed by: INTERNAL MEDICINE

## 2023-01-17 NOTE — PATIENT INSTRUCTIONS
Continue to avoid using a saltshaker    I would recommend wearing knee-high elastic compression stockings every day putting them on in the morning and taking them off at bedtime.  10-20 mmHg compression is recommended.

## 2023-01-17 NOTE — LETTER
January 17, 2023      Mariluz GENA Hagan[address]   [facility] [address]        Dear Lino Mcgraw   Basic metabolic panel  (Ca, Cl, CO2, Creat, Gluc, K, Na, BUN)   Result Value Ref Range    Sodium 134 (L) 136 - 145 mmol/L    Potassium 4.4 3.4 - 5.3 mmol/L    Chloride 98 98 - 107 mmol/L    Carbon Dioxide (CO2) 22 22 - 29 mmol/L    Anion Gap 14 7 - 15 mmol/L    Urea Nitrogen 23.9 (H) 8.0 - 23.0 mg/dL    Creatinine 0.89 0.51 - 0.95 mg/dL    Calcium 10.3 (H) 8.2 - 9.6 mg/dL    Glucose 99 70 - 99 mg/dL    GFR Estimate 61 >60 mL/min/1.73m2      Comment:      Effective December 21, 2021 eGFRcr in adults is calculated using the 2021 CKD-EPI creatinine equation which includes age and gender (Kiran et al., NEJM, DOI: 10.1056/BSULxp3025686)     Your kidney function remains normal and your sodium level is looking okay.  No other concerning findings.    If you have any questions or concerns, please call the clinic at the number listed above.       Sincerely,      Maik Levine MD

## 2023-01-17 NOTE — PROGRESS NOTES
Assessment & Plan     Primary hypertension  89-year-old woman with longstanding history of labile hypertension which has been very difficult to bring under control.  She has been unable to tolerate several different blood pressure medications.  At her last visit, felodipine was restarted at a dose of 2.5 mg every morning.  However, to avoid any any low blood pressure that she was experiencing in the midday, valsartan was decreased and changed to 260 mg only once daily with a dose given in the evening.  She continues atenolol 25 mg twice daily.  This combination seems to be working well for her.  I have reviewed her home readings and her blood pressure is generally well controlled with systolic in the 130s-140s although occasionally up to 160.  162/90 today at the office.  However this represents significant improvement compared to previous readings and I am inclined to have her stay at her current doses.  - Basic metabolic panel  (Ca, Cl, CO2, Creat, Gluc, K, Na, BUN); Future  - Basic metabolic panel  (Ca, Cl, CO2, Creat, Gluc, K, Na, BUN)    Aortic valve stenosis, etiology of cardiac valve disease unspecified  Severe aortic stenosis.  Relatively asymptomatic without any chest pain or increasing dyspnea although she does complain of feeling tired.  She will be following up with cardiology shortly    Venous (peripheral) insufficiency  She has developed worsening edema in both lower extremities.  She has known venous insufficiency but I suspect the addition of the calcium channel blocker felodipine is contributing to this development.  His may prevent us from titrating up her dose.  I would like her to continue the 2.5 mg dose but I am suggesting that she start using elastic compression stockings.  Knee-high with mild compression 10-20 mmHg should be adequate.  Her daughter-in-law will help her to obtain.  She is moving to assisted living and I will ask the caregivers at the facility to help her to get on and off  "her compression stockings as she has not been able to do this in the past.    Gait instability  Her gait remains unsteady and she uses a walker at all times.  Handicap parking form completed for her today.    She is also moving into assisted living which I think is an excellent idea.  She needs forms completed today and we also discussed her POL[facility] preferences.        50 minutes spent on the date of the encounter doing chart review, history and exam, documentation and further activities per the note           Return in about 3 months (around 4/17/2023) for Annual wellness visit.    Maik Levine MD  [facility]    Alonzo Mcgraw is a 89 year old, presenting for the following health issues: Multiple issues discussed at today's visit including management of hypertension and aortic stenosis and plans to move to assisted living.  She has developed increasing edema and has problems with balance.  See assessment and plan for details.  Follow Up (HTN)      History of Present Illness       Hypertension: She presents for follow up of hypertension.  She does check blood pressure  regularly outside of the clinic. Outside blood pressures have been over 140/90. She does not follow a low salt diet.         Review of Systems   She denies any chest pain      Objective    BP (!) 162/90 (BP Location: Right arm, Patient Position: Sitting, Cuff Size: Adult Regular)   Pulse 60   Temp 97.8  F (36.6  C) (Oral)   Resp 18   Ht 1.461 m (4' 9.5\")   Wt 50.3 kg (111 lb)   BMI 23.60 kg/m    Body mass index is 23.6 kg/m .  Physical Exam   Anxious appearing elderly woman  Lungs clear bilaterally no rales or wheezes  Heart regular rate and rhythm.  3/6 systolic murmur  1+ bilateral lower extremity edema        "

## 2023-01-19 ENCOUNTER — TELEPHONE (OUTPATIENT)
Dept: INTERNAL MEDICINE | Facility: CLINIC | Age: 88
End: 2023-01-19
Payer: COMMERCIAL

## 2023-01-19 NOTE — TELEPHONE ENCOUNTER
Received completed assisted living admission paperwork from Dr Levine.    Spoke with patient to see how she wanted us to get it back to her. She did not have a fax number for the ASF and requested it be mailed back to her. I took a copy of the paperwork for our records and mailed the original forms back to Mariluz.     She will let us know if she doesn't receive them or if she needs anything else from us.

## 2023-01-31 DIAGNOSIS — F41.9 CHRONIC ANXIETY: ICD-10-CM

## 2023-01-31 DIAGNOSIS — I10 PRIMARY HYPERTENSION: ICD-10-CM

## 2023-01-31 RX ORDER — LORAZEPAM 0.5 MG/1
TABLET ORAL
Qty: 30 TABLET | Refills: 0 | Status: SHIPPED | OUTPATIENT
Start: 2023-01-31 | End: 2023-03-30

## 2023-01-31 RX ORDER — VALSARTAN 160 MG/1
TABLET ORAL
Qty: 180 TABLET | Refills: 3 | Status: SHIPPED | OUTPATIENT
Start: 2023-01-31 | End: 2023-02-05

## 2023-01-31 NOTE — TELEPHONE ENCOUNTER
Patient has new insurance 1/1/2023.    Needs to switch Pharmacy.    Zari Sutton on 1/31/2023 at 3:56 PM

## 2023-02-01 RX ORDER — FELODIPINE 2.5 MG/1
2.5 TABLET, EXTENDED RELEASE ORAL EVERY MORNING
Qty: 90 TABLET | Refills: 3 | Status: SHIPPED | OUTPATIENT
Start: 2023-02-01 | End: 2023-09-22

## 2023-02-01 RX ORDER — VALSARTAN 160 MG/1
TABLET ORAL
Qty: 180 TABLET | Refills: 3 | OUTPATIENT
Start: 2023-02-01

## 2023-02-01 NOTE — TELEPHONE ENCOUNTER
"Routing refill request to provider for review/approval because:  Blood pressure readings out of range    Last Written Prescription Date:  1/31/2023  Last Fill Quantity: 180,  # refills: 3   Last office visit provider:  Darrius Barriga MD     Requested Prescriptions   Pending Prescriptions Disp Refills     valsartan (DIOVAN) 160 MG tablet [Pharmacy Med Name: valsartan 160 mg tablet] 180 tablet 3     Sig: TAKE 1 TABLET(160 MG) BY MOUTH TWICE DAILY       Angiotensin-II Receptors Failed - 1/31/2023  1:45 PM        Failed - Last blood pressure under 140/90 in past 12 months     BP Readings from Last 3 Encounters:   01/17/23 (!) 162/90   12/20/22 (!) 170/80   11/30/22 (!) 195/86                 Passed - Recent (12 mo) or future (30 days) visit within the authorizing provider's specialty     Patient has had an office visit with the authorizing provider or a provider within the authorizing providers department within the previous 12 mos or has a future within next 30 days. See \"Patient Info\" tab in inbasket, or \"Choose Columns\" in Meds & Orders section of the refill encounter.              Passed - Medication is active on med list        Passed - Patient is age 18 or older        Passed - No active pregnancy on record        Passed - Normal serum creatinine on file in past 12 months     Recent Labs   Lab Test 01/17/23  1225   CR 0.89       Ok to refill medication if creatinine is low          Passed - Normal serum potassium on file in past 12 months     Recent Labs   Lab Test 01/17/23  1225   POTASSIUM 4.4                    Passed - No positive pregnancy test in past 12 months             KENISHA FISHER RN 02/01/23 2:34 PM  "

## 2023-02-01 NOTE — TELEPHONE ENCOUNTER
"Routing refill request to provider for review/approval because:  bp out of range    Last Written Prescription Date:  12/20/22 no print out  Last Fill Quantity: ,  # refills:    Last office visit provider:  1/17/23     Requested Prescriptions   Pending Prescriptions Disp Refills     felodipine ER (PLENDIL) 2.5 MG 24 hr tablet 90 tablet 3     Sig: Take 1 tablet (2.5 mg) by mouth every morning       Calcium Channel Blockers Protocol  Failed - 1/31/2023  3:59 PM        Failed - Blood pressure under 140/90 in past 12 months     BP Readings from Last 3 Encounters:   01/17/23 (!) 162/90   12/20/22 (!) 170/80   11/30/22 (!) 195/86                 Passed - Recent (12 mo) or future (30 days) visit within the authorizing provider's specialty     Patient has had an office visit with the authorizing provider or a provider within the authorizing providers department within the previous 12 mos or has a future within next 30 days. See \"Patient Info\" tab in inbasket, or \"Choose Columns\" in Meds & Orders section of the refill encounter.              Passed - Medication is active on med list        Passed - Patient is age 18 or older        Passed - No active pregnancy on record        Passed - Normal serum creatinine on file in past 12 months     Recent Labs   Lab Test 01/17/23  1225   CR 0.89       Ok to refill medication if creatinine is low          Passed - No positive pregnancy test in past 12 months             Michelle Ivan, RN 02/01/23 2:41 PM  "

## 2023-02-02 ENCOUNTER — OFFICE VISIT (OUTPATIENT)
Dept: CARDIOLOGY | Facility: CLINIC | Age: 88
End: 2023-02-02
Attending: INTERNAL MEDICINE
Payer: COMMERCIAL

## 2023-02-02 VITALS
RESPIRATION RATE: 28 BRPM | WEIGHT: 113.2 LBS | SYSTOLIC BLOOD PRESSURE: 144 MMHG | BODY MASS INDEX: 23.76 KG/M2 | HEIGHT: 58 IN | DIASTOLIC BLOOD PRESSURE: 84 MMHG | HEART RATE: 82 BPM

## 2023-02-02 DIAGNOSIS — R53.83 OTHER FATIGUE: Primary | ICD-10-CM

## 2023-02-02 DIAGNOSIS — I10 PRIMARY HYPERTENSION: ICD-10-CM

## 2023-02-02 DIAGNOSIS — I35.0 NONRHEUMATIC AORTIC VALVE STENOSIS: ICD-10-CM

## 2023-02-02 LAB — TSH SERPL DL<=0.005 MIU/L-ACNC: 3.14 UIU/ML (ref 0.3–4.2)

## 2023-02-02 PROCEDURE — 84443 ASSAY THYROID STIM HORMONE: CPT | Performed by: INTERNAL MEDICINE

## 2023-02-02 PROCEDURE — 36415 COLL VENOUS BLD VENIPUNCTURE: CPT | Performed by: INTERNAL MEDICINE

## 2023-02-02 PROCEDURE — 99215 OFFICE O/P EST HI 40 MIN: CPT | Mod: 25 | Performed by: INTERNAL MEDICINE

## 2023-02-02 NOTE — LETTER
2/2/2023    Maik Levine MD  [address] [facility] [address]    RE: Mariluz Arana       Dear Colleague,     I [facility] the [facility] Mar[facility]uz [facility].      [facility]  [phone]        Assessment/Recommendations   Patient with known history of aortic stenosis who has not been interested in any procedures.  Aortic stenosis is severe and she has had documentation of reduced left ventricular systolic function which undoubtedly is related to severe aortic stenosis.  She has reduced energy and we will check a TSH as that has not been checked for about 3 years.  She does not have evidence of pulmonary vascular congestion at this time so I do not think diuretics are in order but would have a low threshold for adding a low-dose of diuretics if she gets more shortness of breath.  She has more fatigue and shortness of breath.    She does have some new peripheral edema which is likely related to her calcium channel blocker.  It started after starting this medication.  The medication has worked a control her blood pressure and she is willing to continue with and will be able to put support stockings on daily when she gets to assisted living.  I think this will help significantly.  If the swelling worsens or becomes more bothersome to her, the felodipine could be stopped with consideration of slight adjustment of one of her other medications.  Medication choice has been challenging because of multiple allergies and intolerances.    We will plan on seeing her this summer but of course to be happy to see her sooner if questions or problems arise.    40 minutes spent with patient visit, chart review, and documentation.       History of Present Illness/Subjective    Ms. Mariluz Arana is a 89 year old with known aortic stenosis.  We have talked to her and she is at the valve clinic team but is not interested in any procedures.  She did have  "documentation of reduced left ventricular systolic function on her last echocardiogram with severe aortic stenosis.  Her primary complaint now is fatigue.  She just does not have much energy.  She is moving from a independent apartment to assisted living and going through all of her things and needing to downsize which is causing her both anxiety and fatigue.  Its physical and emotional.  She denies orthopnea or paroxysmal nocturnal dyspnea although once in a while when she comes back from the bathroom in the middle of the night she feels a little short of breath when she lies down but she does not have to sit up to catch her breath.  She has not had any chest pain.  She has 1 lightheaded episode but no syncopal episodes.         Physical Examination Review of Systems   BP (!) 144/84 (BP Location: Right arm, Patient Position: Sitting, Cuff Size: Adult Regular)   Pulse 82   Resp 28   Ht 1.461 m (4' 9.5\")   Wt 51.3 kg (113 lb 3.2 oz)   BMI 24.07 kg/m    Body mass index is 24.07 kg/m .  Wt Readings from Last 3 Encounters:   02/02/23 51.3 kg (113 lb 3.2 oz)   01/17/23 50.3 kg (111 lb)   12/20/22 49 kg (108 lb)     General Appearance:   Alert, cooperative and in no acute distress.   ENT/Mouth: Patient wearing a mask.      EYES:  no scleral icterus, normal conjunctivae   Neck: JVP normal. No Hepatojugular reflux. Thyroid not visualized.   Chest/Lungs:   Lungs are clear to auscultation, equal chest wall expansion.   Cardiovascular:   S1, S2 with 4/6 systolic murmur ,no clicks or rubs. Brachial,  pulses are intact and symetric.    Abdomen:     Extremities: No cyanosis, clubbing and bilateral pretibial edema edema   Skin: no xanthelasma, warm.    Neurologic: normal arm movement bilateral, no tremors     Psychiatric: Appropriate affect.      Enc Vitals  BP: (!) 144/84  Pulse: 82  Resp: 28  Weight: 51.3 kg (113 lb 3.2 oz)  Height: 146.1 cm (4' 9.5\")                                           Medical History  Surgical " History Family History Social History   Past Medical History:   Diagnosis Date     Age-related cataract of left eye 9/21/2021     Aortic stenosis     Echocardiogram with moderate aortic stenosis September 2021     Arthritis     Osteoarthritis     Bilateral cataracts      Chronic anxiety      Dizziness and giddiness 9/17/2012     Gastroesophageal reflux disease      GERD (gastroesophageal reflux disease)      Heart murmur      Hypercholesteremia      Hypercholesteremia      Hypercholesterolemia 12/13/2021     Hypertension      Hyponatremia      Hyponatremia 07/08/2021     Irregular heart beat      Irritable bowel      Irritable bowel      Meniere syndrome      Meniere's disease, bilateral      Osteoarthritis of spine with radiculopathy, unspecified spinal region 07/08/2021     Osteoporosis      Primary osteoarthritis of both hands 12/13/2021     SOB (shortness of breath) 07/08/2021     Venous (peripheral) insufficiency 4/20/2022     Vertigo     Past Surgical History:   Procedure Laterality Date     CATARACT EXTRACTION Left     2021     DILATION AND CURETTAGE       DILATION AND CURETTAGE       ENDOLYMPHATIC SAC OPERATION  01/01/2012    enhancement     ENHANCE ENDOLYMPHATIC SAC, DEC SIGMOID SINUS, EXTND FACIAL RECESS APPRCH, MASTOIDECTOMY, BMT, COMBO  04/23/2012    Procedure:COMBINED ENHANCE ENDOLYMPHATIC SAC, DECOMPRESS SIGMOID SINUS, EXTENDED FACIAL RECESS APPROACH, COMPLETE MASTOIDECTOMY, MYRINGOTOMY, INSERT TUBE; Left Endolymphatic Sac Enhancement, Sigmoid Sinus Decom-  pression, Extended Facial Recess Approach, Myringotomy  , Complete Mastoidectomy; Surgeon:LINN MATHIS; Location:[facility]     INNER EAR SURGERY      Per Pt     KERATOTOMY ARCUATE WITH FEMTOSECOND LASER/IMAGING FOR ATIOL Right 07/19/2018    Procedure: KERATOTOMY ARCUATE WITH FEMTOSECOND LASER/IMAGING FOR ATIOL;  RIGHT EYE KERATOTOMY ARCUATE WITH FEMTOSECOND LASER/IMAGING FOR ATIOL ,  CAPSULOTOMY, LENS FRAGMENTATION, ARCUATE INCISIONS;  Surgeon:  Roberth Chambers MD;  Location:  [facility]     MASTOIDECTOMY  01/01/2012     PHACOEMULSIFICATION CLEAR CORNEA WITH TORIC INTRAOCULAR LENS IMPLANT Right 07/19/2018    Procedure: PHACOEMULSIFICATION CLEAR CORNEA WITH TORIC INTRAOCULAR LENS IMPLANT;  RIGHT EYE PHACOEMULSIFICATION CLEAR CORNEA WITH TORIC INTRAOCULAR LENS IMPLANT ;  Surgeon: Roberth Chambers MD;  Location:  [facility]     TONSILLECTOMY       TONSILLECTOMY      Family History   Problem Relation Age of Onset     Heart Disease Mother      Clotting Disorder Mother         Dies of a blood clot, per pt     Diabetes Father      Cerebrovascular Disease Father      Hypertension Son      Osteoporosis Daughter      Hypertension Daughter      Cancer Sister         Colon     Glaucoma Sister     Social History     Socioeconomic History     Marital status:      Spouse name: Not on file     Number of children: 7     Years of education: Not on file     Highest education level: Not on file   Occupational History     Not on file   Tobacco Use     Smoking status: Never     Smokeless tobacco: Never   Vaping Use     Vaping Use: Never used   Substance and Sexual Activity     Alcohol use: No     Alcohol/week: 0.0 standard drinks     Comment: 2-3 times weekly, glass of wine     Drug use: No     Sexual activity: Never     Partners: Male   Other Topics Concern     Not on file   Social History Narrative    Patient is , they are retired.  Lives in assisted care for past 2 years (11/2/2016).     Social Determinants of Health     Financial Resource Strain: Not on file   Food Insecurity: Not on file   Transportation Needs: Not on file   Physical Activity: Not on file   Stress: Not on file   Social Connections: Not on file   Intimate Partner Violence: Not on file   Housing Stability: Not on file          Medications  Allergies   Current Outpatient Medications   Medication Sig Dispense Refill     acetaminophen (TYLENOL) 500 MG tablet Take 1-2 tablets (500-1,000 mg) by  "mouth every 6 hours as needed for pain Maximum of 6 tablets daily       atenolol (TENORMIN) 25 MG tablet Take 1 tablet (25 mg) by mouth 2 times daily 180 tablet 3     felodipine ER (PLENDIL) 2.5 MG 24 hr tablet Take 1 tablet (2.5 mg) by mouth every morning 90 tablet 3     fluticasone (FLONASE) 50 MCG/ACT nasal spray        loperamide (IMODIUM) 2 MG capsule Take 2 mg by mouth 4 times daily as needed.       LORazepam (ATIVAN) 0.5 MG tablet TAKE 1 TABLET BY MOUTH AT BEDTIME AS NEEDED FOR ANXIETY 30 tablet 0     meclizine (ANTIVERT) 25 MG tablet Take 1 tablet (25 mg) by mouth 3 times daily as needed for dizziness       simvastatin (ZOCOR) 20 MG tablet TAKE 1 TABLET BY MOUTH ONCE DAILY 90 tablet 3     valsartan (DIOVAN) 160 MG tablet TAKE 1 TABLET(160 MG) BY MOUTH TWICE DAILY 180 tablet 3     VITAMIN D, CHOLECALCIFEROL, PO Take 1,000 Units by mouth daily      Allergies   Allergen Reactions     Actonel [Risedronate]      GI upset     Clindamycin      Cortisone Other (See Comments)     \"heart symptoms\", low blood pressure     Fosamax Other (See Comments)     \"burning my lower esophagus\"     Kenalog [Triamcinolone]      Metronidazole      Minipress [Prazosin]      Hypotension     Penicillin G      Statin Drugs [Hmg-Coa-R Inhibitors]      Shortness of breath.  Tolerating Simvastatin (Sept 2016)     Tetanus Toxoid      Prednisone Rash     Zithromax [Azithromycin Dihydrate] Rash     States got rash after using z rubio         Lab Results    Chemistry/lipid CBC Cardiac Enzymes/BNP/TSH/INR   Lab Results   Component Value Date    CHOL 142 12/13/2021    HDL 76 12/13/2021    TRIG 54 12/13/2021    BUN 23.9 (H) 01/17/2023     (L) 01/17/2023    CO2 22 01/17/2023    Lab Results   Component Value Date    WBC 7.6 09/22/2022    HGB 13.8 09/22/2022    HCT 41.9 09/22/2022    MCV 98 09/22/2022     09/22/2022    Lab Results   Component Value Date    TROPONINI 0.01 06/14/2021     (H) 06/14/2021    TSH 3.02 02/09/2021    "           Thank you for allowing me to participate in the care of your patient.      Sincerely,     Georgi Franco MD     [facility]  cc:   Georgi Franco MD  [address]  [place] [address]

## 2023-02-02 NOTE — PROGRESS NOTES
[facility]  [phone]        Assessment/Recommendations   Patient with known history of aortic stenosis who has not been interested in any procedures.  Aortic stenosis is severe and she has had documentation of reduced left ventricular systolic function which undoubtedly is related to severe aortic stenosis.  She has reduced energy and we will check a TSH as that has not been checked for about 3 years.  She does not have evidence of pulmonary vascular congestion at this time so I do not think diuretics are in order but would have a low threshold for adding a low-dose of diuretics if she gets more shortness of breath.  She has more fatigue and shortness of breath.    She does have some new peripheral edema which is likely related to her calcium channel blocker.  It started after starting this medication.  The medication has worked a control her blood pressure and she is willing to continue with and will be able to put support stockings on daily when she gets to assisted living.  I think this will help significantly.  If the swelling worsens or becomes more bothersome to her, the felodipine could be stopped with consideration of slight adjustment of one of her other medications.  Medication choice has been challenging because of multiple allergies and intolerances.    We will plan on seeing her this summer but of course to be happy to see her sooner if questions or problems arise.    40 minutes spent with patient visit, chart review, and documentation.       History of Present Illness/Subjective    Ms. Mariluz Arana is a 89 year old with known aortic stenosis.  We have talked to her and she is at the valve clinic team but is not interested in any procedures.  She did have documentation of reduced left ventricular systolic function on her last echocardiogram with severe aortic stenosis.  Her primary complaint now is fatigue.  She just does not have much energy.  She is moving from a  "independent apartment to assisted living and going through all of her things and needing to downsize which is causing her both anxiety and fatigue.  Its physical and emotional.  She denies orthopnea or paroxysmal nocturnal dyspnea although once in a while when she comes back from the bathroom in the middle of the night she feels a little short of breath when she lies down but she does not have to sit up to catch her breath.  She has not had any chest pain.  She has 1 lightheaded episode but no syncopal episodes.         Physical Examination Review of Systems   BP (!) 144/84 (BP Location: Right arm, Patient Position: Sitting, Cuff Size: Adult Regular)   Pulse 82   Resp 28   Ht 1.461 m (4' 9.5\")   Wt 51.3 kg (113 lb 3.2 oz)   BMI 24.07 kg/m    Body mass index is 24.07 kg/m .  Wt Readings from Last 3 Encounters:   02/02/23 51.3 kg (113 lb 3.2 oz)   01/17/23 50.3 kg (111 lb)   12/20/22 49 kg (108 lb)     General Appearance:   Alert, cooperative and in no acute distress.   ENT/Mouth: Patient wearing a mask.      EYES:  no scleral icterus, normal conjunctivae   Neck: JVP normal. No Hepatojugular reflux. Thyroid not visualized.   Chest/Lungs:   Lungs are clear to auscultation, equal chest wall expansion.   Cardiovascular:   S1, S2 with 4/6 systolic murmur ,no clicks or rubs. Brachial,  pulses are intact and symetric.    Abdomen:     Extremities: No cyanosis, clubbing and bilateral pretibial edema edema   Skin: no xanthelasma, warm.    Neurologic: normal arm movement bilateral, no tremors     Psychiatric: Appropriate affect.      Enc Vitals  BP: (!) 144/84  Pulse: 82  Resp: 28  Weight: 51.3 kg (113 lb 3.2 oz)  Height: 146.1 cm (4' 9.5\")                                           Medical History  Surgical History Family History Social History   Past Medical History:   Diagnosis Date     Age-related cataract of left eye 9/21/2021     Aortic stenosis     Echocardiogram with moderate aortic stenosis September 2021     " Arthritis     Osteoarthritis     Bilateral cataracts      Chronic anxiety      Dizziness and giddiness 9/17/2012     Gastroesophageal reflux disease      GERD (gastroesophageal reflux disease)      Heart murmur      Hypercholesteremia      Hypercholesteremia      Hypercholesterolemia 12/13/2021     Hypertension      Hyponatremia      Hyponatremia 07/08/2021     Irregular heart beat      Irritable bowel      Irritable bowel      Meniere syndrome      Meniere's disease, bilateral      Osteoarthritis of spine with radiculopathy, unspecified spinal region 07/08/2021     Osteoporosis      Primary osteoarthritis of both hands 12/13/2021     SOB (shortness of breath) 07/08/2021     Venous (peripheral) insufficiency 4/20/2022     Vertigo     Past Surgical History:   Procedure Laterality Date     CATARACT EXTRACTION Left     2021     DILATION AND CURETTAGE       DILATION AND CURETTAGE       ENDOLYMPHATIC SAC OPERATION  01/01/2012    enhancement     ENHANCE ENDOLYMPHATIC SAC, DEC SIGMOID SINUS, EXTND FACIAL RECESS APPRCH, MASTOIDECTOMY, BMT, COMBO  04/23/2012    Procedure:COMBINED ENHANCE ENDOLYMPHATIC SAC, DECOMPRESS SIGMOID SINUS, EXTENDED FACIAL RECESS APPROACH, COMPLETE MASTOIDECTOMY, MYRINGOTOMY, INSERT TUBE; Left Endolymphatic Sac Enhancement, Sigmoid Sinus Decom-  pression, Extended Facial Recess Approach, Myringotomy  , Complete Mastoidectomy; Surgeon:LINN MATHIS; Location:[facility]     INNER EAR SURGERY      Per Pt     KERATOTOMY ARCUATE WITH FEMTOSECOND LASER/IMAGING FOR ATIOL Right 07/19/2018    Procedure: KERATOTOMY ARCUATE WITH FEMTOSECOND LASER/IMAGING FOR ATIOL;  RIGHT EYE KERATOTOMY ARCUATE WITH FEMTOSECOND LASER/IMAGING FOR ATIOL ,  CAPSULOTOMY, LENS FRAGMENTATION, ARCUATE INCISIONS;  Surgeon: Roberth Chambers MD;  Location: [facility]     MASTOIDECTOMY  01/01/2012     PHACOEMULSIFICATION CLEAR CORNEA WITH TORIC INTRAOCULAR LENS IMPLANT Right 07/19/2018    Procedure: PHACOEMULSIFICATION CLEAR CORNEA  WITH TORIC INTRAOCULAR LENS IMPLANT;  RIGHT EYE PHACOEMULSIFICATION CLEAR CORNEA WITH TORIC INTRAOCULAR LENS IMPLANT ;  Surgeon: Roberth Chambers MD;  Location:  [facility]     TONSILLECTOMY       TONSILLECTOMY      Family History   Problem Relation Age of Onset     Heart Disease Mother      Clotting Disorder Mother         Dies of a blood clot, per pt     Diabetes Father      Cerebrovascular Disease Father      Hypertension Son      Osteoporosis Daughter      Hypertension Daughter      Cancer Sister         Colon     Glaucoma Sister     Social History     Socioeconomic History     Marital status:      Spouse name: Not on file     Number of children: 7     Years of education: Not on file     Highest education level: Not on file   Occupational History     Not on file   Tobacco Use     Smoking status: Never     Smokeless tobacco: Never   Vaping Use     Vaping Use: Never used   Substance and Sexual Activity     Alcohol use: No     Alcohol/week: 0.0 standard drinks     Comment: 2-3 times weekly, glass of wine     Drug use: No     Sexual activity: Never     Partners: Male   Other Topics Concern     Not on file   Social History Narrative    Patient is , they are retired.  Lives in assisted care for past 2 years (11/2/2016).     Social Determinants of Health     Financial Resource Strain: Not on file   Food Insecurity: Not on file   Transportation Needs: Not on file   Physical Activity: Not on file   Stress: Not on file   Social Connections: Not on file   Intimate Partner Violence: Not on file   Housing Stability: Not on file          Medications  Allergies   Current Outpatient Medications   Medication Sig Dispense Refill     acetaminophen (TYLENOL) 500 MG tablet Take 1-2 tablets (500-1,000 mg) by mouth every 6 hours as needed for pain Maximum of 6 tablets daily       atenolol (TENORMIN) 25 MG tablet Take 1 tablet (25 mg) by mouth 2 times daily 180 tablet 3     felodipine ER (PLENDIL) 2.5 MG 24 hr tablet  "Take 1 tablet (2.5 mg) by mouth every morning 90 tablet 3     fluticasone (FLONASE) 50 MCG/ACT nasal spray        loperamide (IMODIUM) 2 MG capsule Take 2 mg by mouth 4 times daily as needed.       LORazepam (ATIVAN) 0.5 MG tablet TAKE 1 TABLET BY MOUTH AT BEDTIME AS NEEDED FOR ANXIETY 30 tablet 0     meclizine (ANTIVERT) 25 MG tablet Take 1 tablet (25 mg) by mouth 3 times daily as needed for dizziness       simvastatin (ZOCOR) 20 MG tablet TAKE 1 TABLET BY MOUTH ONCE DAILY 90 tablet 3     valsartan (DIOVAN) 160 MG tablet TAKE 1 TABLET(160 MG) BY MOUTH TWICE DAILY 180 tablet 3     VITAMIN D, CHOLECALCIFEROL, PO Take 1,000 Units by mouth daily      Allergies   Allergen Reactions     Actonel [Risedronate]      GI upset     Clindamycin      Cortisone Other (See Comments)     \"heart symptoms\", low blood pressure     Fosamax Other (See Comments)     \"burning my lower esophagus\"     Kenalog [Triamcinolone]      Metronidazole      Minipress [Prazosin]      Hypotension     Penicillin G      Statin Drugs [Hmg-Coa-R Inhibitors]      Shortness of breath.  Tolerating Simvastatin (Sept 2016)     Tetanus Toxoid      Prednisone Rash     Zithromax [Azithromycin Dihydrate] Rash     States got rash after using z rubio         Lab Results    Chemistry/lipid CBC Cardiac Enzymes/BNP/TSH/INR   Lab Results   Component Value Date    CHOL 142 12/13/2021    HDL 76 12/13/2021    TRIG 54 12/13/2021    BUN 23.9 (H) 01/17/2023     (L) 01/17/2023    CO2 22 01/17/2023    Lab Results   Component Value Date    WBC 7.6 09/22/2022    HGB 13.8 09/22/2022    HCT 41.9 09/22/2022    MCV 98 09/22/2022     09/22/2022    Lab Results   Component Value Date    TROPONINI 0.01 06/14/2021     (H) 06/14/2021    TSH 3.02 02/09/2021                                           "

## 2023-02-05 RX ORDER — VALSARTAN 160 MG/1
160 TABLET ORAL DAILY
Qty: 90 TABLET | Refills: 3 | Status: SHIPPED | OUTPATIENT
Start: 2023-02-05 | End: 2023-09-22

## 2023-02-07 ENCOUNTER — MEDICAL CORRESPONDENCE (OUTPATIENT)
Dept: HEALTH INFORMATION MANAGEMENT | Facility: CLINIC | Age: 88
End: 2023-02-07

## 2023-02-13 ENCOUNTER — NURSE TRIAGE (OUTPATIENT)
Dept: INTERNAL MEDICINE | Facility: CLINIC | Age: 88
End: 2023-02-13
Payer: COMMERCIAL

## 2023-02-13 NOTE — TELEPHONE ENCOUNTER
Outgoing Call:    Dr. Levine's message relayed to pt  It has been extremely challenging to get her blood pressure under control and she needs to continue her current dose of felodipine.  I suspect it is contributing to her leg edema.  Let her know that it will take at least 7 to 10 days of wearing the elastic compression stockings to see improvement in her leg swelling.  I would suggest scheduling her a 40-minute appointment with me in 2 weeks to assess how she is doing after using the compression stockings for that length of time.  If her shortness of breath is worsening this week, she will need to be seen sooner either by me or available provider or at walk-in clinic.   Pt reporting taking her medications as prescribed.   Pt scheduled to see DR. Levine on 2.28.23 @ 2 pm  Per pt, If it continues I will go in to [facility]   Pt verbalized understanding and had no further questions or concerns    Malia WASSERMAN RN  [facility]

## 2023-02-13 NOTE — TELEPHONE ENCOUNTER
"    Nurse Triage SBAR    Is this a 2nd Level Triage? YES, LICENSED PRACTITIONER REVIEW IS REQUIRED    Situation: Swelling in feet and ankles (since starting felodipine December 2022).  Today noticed legs are swollen and uncomfortable since Friday.    Background: Moved on Tuesday to assisted living.  Compression stockings were added on Wednesday but she reports that it worse.    Assessment: Patient has noticed increase in SOB since Friday.  Reports knees are swollen.  No redness  Legs are \"uncomfortable\" not painful.    Protocol Recommended Disposition:   See in Office Today    Recommendation: PATIENT is requesting a phone call from her provider to advise on Medication changes and what to do.   PHONE:  [phone]    Routed to provider    Does the patient meet one of the following criteria for ADS visit consideration? No    Reason for Disposition    MODERATE swelling of both ankles (e.g., swelling extends up to the knees) AND new-onset or worsening    Answer Assessment - Initial Assessment Questions  1. ONSET: \"When did the swelling start?\" (e.g., minutes, hours, days)      December 2022, Worse since Friday  2. LOCATION: \"What part of the leg is swollen?\"  \"Are both legs swollen or just one leg?\"      bilat legs  3. SEVERITY: \"How bad is the swelling?\" (e.g., localized; mild, moderate, severe)   - SEVERE edema - swelling extends above knee, facial or hand swelling present       Patient reports swelling (including knees) bilat.  4. REDNESS: \"Does the swelling look red or infected?\"      NO  5. PAIN: \"Is the swelling painful to touch?\" If Yes, ask: \"How painful is it?\"   (Scale 1-10; mild, moderate or severe)      UNCOMFORTABLE.  6. FEVER: \"Do you have a fever?\" If Yes, ask: \"What is it, how was it measured, and when did it start?\"       NO  7. CAUSE: \"What do you think is causing the leg swelling?\"      Since starting felodipine and adding compression stockings  8. MEDICAL HISTORY: \"Do you have a history of heart " "failure, kidney disease, liver failure, or cancer?\"      no  9. RECURRENT SYMPTOM: \"Have you had leg swelling before?\" If Yes, ask: \"When was the last time?\" \"What happened that time?\"      Yes, has been since December and now worse.  10. OTHER SYMPTOMS: \"Do you have any other symptoms?\" (e.g., chest pain, difficulty breathing)        SOB ( a little more than usual).    Protocols used: LEG SWELLING AND EDEMA-A-OH      "

## 2023-02-28 ENCOUNTER — OFFICE VISIT (OUTPATIENT)
Dept: INTERNAL MEDICINE | Facility: CLINIC | Age: 88
End: 2023-02-28
Payer: COMMERCIAL

## 2023-02-28 VITALS
DIASTOLIC BLOOD PRESSURE: 80 MMHG | WEIGHT: 109 LBS | SYSTOLIC BLOOD PRESSURE: 148 MMHG | TEMPERATURE: 98.1 F | BODY MASS INDEX: 22.88 KG/M2 | RESPIRATION RATE: 16 BRPM | HEIGHT: 58 IN

## 2023-02-28 DIAGNOSIS — I10 PRIMARY HYPERTENSION: ICD-10-CM

## 2023-02-28 DIAGNOSIS — I87.2 VENOUS (PERIPHERAL) INSUFFICIENCY: ICD-10-CM

## 2023-02-28 DIAGNOSIS — I35.0 AORTIC VALVE STENOSIS, ETIOLOGY OF CARDIAC VALVE DISEASE UNSPECIFIED: ICD-10-CM

## 2023-02-28 DIAGNOSIS — R35.1 NOCTURIA: ICD-10-CM

## 2023-02-28 DIAGNOSIS — R60.0 PERIPHERAL EDEMA: Primary | ICD-10-CM

## 2023-02-28 LAB
ALBUMIN UR-MCNC: NEGATIVE MG/DL
APPEARANCE UR: CLEAR
BACTERIA #/AREA URNS HPF: ABNORMAL /HPF
BILIRUB UR QL STRIP: NEGATIVE
COLOR UR AUTO: YELLOW
GLUCOSE UR STRIP-MCNC: NEGATIVE MG/DL
HGB UR QL STRIP: NEGATIVE
KETONES UR STRIP-MCNC: NEGATIVE MG/DL
LEUKOCYTE ESTERASE UR QL STRIP: ABNORMAL
NITRATE UR QL: NEGATIVE
PH UR STRIP: 7 [PH] (ref 5–8)
RBC #/AREA URNS AUTO: ABNORMAL /HPF
SP GR UR STRIP: 1.01 (ref 1–1.03)
SQUAMOUS #/AREA URNS AUTO: ABNORMAL /LPF
TRANS CELLS #/AREA URNS HPF: ABNORMAL /HPF
UROBILINOGEN UR STRIP-ACNC: 0.2 E.U./DL
WBC #/AREA URNS AUTO: ABNORMAL /HPF

## 2023-02-28 PROCEDURE — 99214 OFFICE O/P EST MOD 30 MIN: CPT | Performed by: INTERNAL MEDICINE

## 2023-02-28 PROCEDURE — 81001 URINALYSIS AUTO W/SCOPE: CPT | Performed by: INTERNAL MEDICINE

## 2023-02-28 NOTE — PATIENT INSTRUCTIONS
I would suggest changing to thigh-high elastic compression stockings with 20-30 mmHg applying every morning and off at bedtime.    Try to get your legs elevated for 1 hour every afternoon keeping your feet ideally at the level of your heart.

## 2023-02-28 NOTE — PROGRESS NOTES
Assessment & Plan     Peripheral edema  Worsening peripheral edema from combination of venous insufficiency and use of calcium channel blocker, felodipine.  I do not believe this represents CHF as she does not have any increasing shortness of breath and her weight is stable is actually down a few pounds.  Unfortunately, she needs to stay on her calcium channel blocker as it has been the only thing in combination with valsartan that has been able to control her blood pressure.  She is using a knee-high mild compression stockings at this time and now that she is at assisted living and is able to get help getting them on and off, I am recommending changing to thigh-high compression stockings with moderate compression 20/30 mmHg.  We also discussed leg elevation for 1 hour every afternoon.  She is not a candidate for diuretics with her history of hyponatremia and previous side effects.    Venous (peripheral) insufficiency  As above    Primary hypertension  Blood pressure is now looking much better controlled with the combination of valsartan in the evening, felodipine 2.5 mg in the morning and atenolol 25 mg twice daily.    Aortic valve stenosis, etiology of cardiac valve disease unspecified  Severe aortic stenosis.  She is seeing cardiology.  She is not interested in any interventions.  She is minimally symptomatic.  We will continue to monitor.  Increasing edema but weight is down 4 pounds and otherwise is asymptomatic with no increasing dyspnea or chest pain or palpitations.    Nocturia  She awoke 4 times last night to urinate which is unusual for her.  Obtain UA UC.  - UA with Microscopic reflex to Culture - lab collect; Future               Return in about 2 months (around 4/28/2023) for Annual wellness visit.    Maik Levine MD  [facility]    Alonzo Mcgraw is a 89 year old, presenting for the following health issues: Here to follow-up hypertension and worsening  "peripheral edema.  See assessment and plan for details.  Edema (Lower extremities X 3 weeks)      History of Present Illness       Hypertension: She presents for follow up of hypertension.  She does check blood pressure  regularly outside of the clinic. Outpatient blood pressures have not been over 140/90. She does not follow a low salt diet.     Vascular Disease:  She presents for follow up of vascular disease.  She never takes nitroglycerin. She is not taking daily aspirin.    She eats 2-3 servings of fruits and vegetables daily.She consumes 1 sweetened beverage(s) daily.She exercises with enough effort to increase her heart rate 9 or less minutes per day.  She exercises with enough effort to increase her heart rate 3 or less days per week.   She is taking medications regularly.       Review of Systems   Denies chest pain or increasing shortness of breath.  Weight is down 4 pounds.      Objective    BP (!) 148/80 (BP Location: Right arm, Patient Position: Sitting, Cuff Size: Adult Regular)   Temp 98.1  F (36.7  C) (Oral)   Resp 16   Ht 1.461 m (4' 9.5\")   Wt 49.4 kg (109 lb)   BMI 23.18 kg/m    Body mass index is 23.18 kg/m .  Physical Exam       Lungs clear bilaterally without rales or wheezes  Heart regular rate and rhythm with 3/6 systolic murmur  2+ bilateral lower extremity edema up to both knees.      "

## 2023-03-28 ENCOUNTER — DOCUMENTATION ONLY (OUTPATIENT)
Dept: OTHER | Facility: CLINIC | Age: 88
End: 2023-03-28
Payer: COMMERCIAL

## 2023-03-29 ENCOUNTER — TELEPHONE (OUTPATIENT)
Dept: INTERNAL MEDICINE | Facility: CLINIC | Age: 88
End: 2023-03-29
Payer: COMMERCIAL

## 2023-03-29 DIAGNOSIS — R21 RASH: Primary | ICD-10-CM

## 2023-03-29 NOTE — TELEPHONE ENCOUNTER
"  Order/Referral Request    Who is requesting: Parish August     Orders being requested: referral for dermatology for rash on face. Son states Pt can get in to see a dermatology for \"months\". Parish is request PCP to call around and see if Pt can get in to be seen sooner.    Reason service is needed/diagnosis: rash on face    When are orders needed by: as soon as possible.    Has this been discussed with Provider: Unknown    Does patient have a preference on a Group/Provider/Facility? [facility]    Does patient have an appointment scheduled?: No    Where to send orders: Place orders within Epic    Okay to leave a detailed message?: Yes at Cell number on file:    henri Lugo [phone]     Cely Fuentes"

## 2023-03-30 DIAGNOSIS — F41.9 CHRONIC ANXIETY: ICD-10-CM

## 2023-03-30 RX ORDER — LORAZEPAM 0.5 MG/1
TABLET ORAL
Qty: 30 TABLET | Refills: 0 | Status: SHIPPED | OUTPATIENT
Start: 2023-03-30 | End: 2023-04-17

## 2023-03-30 NOTE — TELEPHONE ENCOUNTER
I can place referral to dermatology but I cannot control the scheduling.  She may want to schedule an appointment with me or another provider here at the office to take a look at this rash if there is a delay in getting in with a dermatologist.

## 2023-03-30 NOTE — TELEPHONE ENCOUNTER
Called and left detailed message on Pt's son, Parish, voicemail.   If patient calls back, please relay PCP message below.     Josette Sutton RN, BSN  [facility]

## 2023-03-30 NOTE — TELEPHONE ENCOUNTER
Outgoing Call: pt not with Pt's son (Parish)  Pt has upcoming appt for [facility] on 4.17.23    Raised skin area on the hairline in the middle of the forehead  It's dark, she's hit it with a comb a lot.   It looks dark but not like a scab.     First noticed 2-3 weeks    Order pended for review/completion    Malia WASSERMAN RN  [facility]

## 2023-04-17 ENCOUNTER — OFFICE VISIT (OUTPATIENT)
Dept: INTERNAL MEDICINE | Facility: CLINIC | Age: 88
End: 2023-04-17
Payer: COMMERCIAL

## 2023-04-17 VITALS
SYSTOLIC BLOOD PRESSURE: 162 MMHG | WEIGHT: 108.6 LBS | HEART RATE: 67 BPM | RESPIRATION RATE: 15 BRPM | TEMPERATURE: 97.9 F | HEIGHT: 58 IN | BODY MASS INDEX: 22.8 KG/M2 | OXYGEN SATURATION: 95 % | DIASTOLIC BLOOD PRESSURE: 78 MMHG

## 2023-04-17 DIAGNOSIS — I10 PRIMARY HYPERTENSION: ICD-10-CM

## 2023-04-17 DIAGNOSIS — I35.0 AORTIC VALVE STENOSIS, ETIOLOGY OF CARDIAC VALVE DISEASE UNSPECIFIED: ICD-10-CM

## 2023-04-17 DIAGNOSIS — R20.0 BILATERAL HAND NUMBNESS: ICD-10-CM

## 2023-04-17 DIAGNOSIS — E78.00 HYPERCHOLESTEROLEMIA: ICD-10-CM

## 2023-04-17 DIAGNOSIS — L98.9 SKIN LESION: ICD-10-CM

## 2023-04-17 DIAGNOSIS — Z00.00 ENCOUNTER FOR MEDICARE ANNUAL WELLNESS EXAM: Primary | ICD-10-CM

## 2023-04-17 DIAGNOSIS — M81.0 AGE-RELATED OSTEOPOROSIS WITHOUT CURRENT PATHOLOGICAL FRACTURE: ICD-10-CM

## 2023-04-17 DIAGNOSIS — R60.0 PERIPHERAL EDEMA: ICD-10-CM

## 2023-04-17 DIAGNOSIS — F41.9 CHRONIC ANXIETY: ICD-10-CM

## 2023-04-17 DIAGNOSIS — R26.81 GAIT INSTABILITY: ICD-10-CM

## 2023-04-17 LAB
ALBUMIN SERPL BCG-MCNC: 4.4 G/DL (ref 3.5–5.2)
ALP SERPL-CCNC: 80 U/L (ref 35–104)
ALT SERPL W P-5'-P-CCNC: 8 U/L (ref 10–35)
ANION GAP SERPL CALCULATED.3IONS-SCNC: 16 MMOL/L (ref 7–15)
AST SERPL W P-5'-P-CCNC: 30 U/L (ref 10–35)
BILIRUB SERPL-MCNC: 0.7 MG/DL
BUN SERPL-MCNC: 22.9 MG/DL (ref 8–23)
CALCIUM SERPL-MCNC: 10.6 MG/DL (ref 8.2–9.6)
CHLORIDE SERPL-SCNC: 97 MMOL/L (ref 98–107)
CHOLEST SERPL-MCNC: 163 MG/DL
CREAT SERPL-MCNC: 0.91 MG/DL (ref 0.51–0.95)
DEPRECATED HCO3 PLAS-SCNC: 22 MMOL/L (ref 22–29)
ERYTHROCYTE [DISTWIDTH] IN BLOOD BY AUTOMATED COUNT: 12.1 % (ref 10–15)
GFR SERPL CREATININE-BSD FRML MDRD: 59 ML/MIN/1.73M2
GLUCOSE SERPL-MCNC: 99 MG/DL (ref 70–99)
HCT VFR BLD AUTO: 42.1 % (ref 35–47)
HDLC SERPL-MCNC: 90 MG/DL
HGB BLD-MCNC: 13.8 G/DL (ref 11.7–15.7)
LDLC SERPL CALC-MCNC: 59 MG/DL
MCH RBC QN AUTO: 32.1 PG (ref 26.5–33)
MCHC RBC AUTO-ENTMCNC: 32.8 G/DL (ref 31.5–36.5)
MCV RBC AUTO: 98 FL (ref 78–100)
NONHDLC SERPL-MCNC: 73 MG/DL
PLATELET # BLD AUTO: 212 10E3/UL (ref 150–450)
POTASSIUM SERPL-SCNC: 4.7 MMOL/L (ref 3.4–5.3)
PROT SERPL-MCNC: 7.4 G/DL (ref 6.4–8.3)
RBC # BLD AUTO: 4.3 10E6/UL (ref 3.8–5.2)
SODIUM SERPL-SCNC: 135 MMOL/L (ref 136–145)
TRIGL SERPL-MCNC: 71 MG/DL
WBC # BLD AUTO: 7.8 10E3/UL (ref 4–11)

## 2023-04-17 PROCEDURE — G0439 PPPS, SUBSEQ VISIT: HCPCS | Performed by: INTERNAL MEDICINE

## 2023-04-17 PROCEDURE — 82306 VITAMIN D 25 HYDROXY: CPT | Performed by: INTERNAL MEDICINE

## 2023-04-17 PROCEDURE — 85027 COMPLETE CBC AUTOMATED: CPT | Performed by: INTERNAL MEDICINE

## 2023-04-17 PROCEDURE — 99215 OFFICE O/P EST HI 40 MIN: CPT | Mod: 25 | Performed by: INTERNAL MEDICINE

## 2023-04-17 PROCEDURE — 80053 COMPREHEN METABOLIC PANEL: CPT | Performed by: INTERNAL MEDICINE

## 2023-04-17 PROCEDURE — 36415 COLL VENOUS BLD VENIPUNCTURE: CPT | Performed by: INTERNAL MEDICINE

## 2023-04-17 PROCEDURE — 80061 LIPID PANEL: CPT | Performed by: INTERNAL MEDICINE

## 2023-04-17 RX ORDER — LORAZEPAM 0.5 MG/1
0.5 TABLET ORAL
Qty: 30 TABLET | Refills: 1 | Status: SHIPPED | OUTPATIENT
Start: 2023-04-17 | End: 2023-06-15

## 2023-04-17 ASSESSMENT — ENCOUNTER SYMPTOMS
HEMATOCHEZIA: 0
HEARTBURN: 1
DIARRHEA: 0
FREQUENCY: 0
PARESTHESIAS: 0
MYALGIAS: 0
COUGH: 0
SHORTNESS OF BREATH: 1
ARTHRALGIAS: 0
SORE THROAT: 0
FEVER: 0
NERVOUS/ANXIOUS: 1
EYE PAIN: 1
BREAST MASS: 0
CONSTIPATION: 0
HEMATURIA: 0
ABDOMINAL PAIN: 0
NAUSEA: 0
HEADACHES: 0
WEAKNESS: 1
PALPITATIONS: 0
JOINT SWELLING: 1
CHILLS: 1
DIZZINESS: 1
DYSURIA: 0

## 2023-04-17 ASSESSMENT — ACTIVITIES OF DAILY LIVING (ADL)
CURRENT_FUNCTION: LAUNDRY REQUIRES ASSISTANCE
CURRENT_FUNCTION: BATHING REQUIRES ASSISTANCE
CURRENT_FUNCTION: HOUSEWORK REQUIRES ASSISTANCE
CURRENT_FUNCTION: TRANSPORTATION REQUIRES ASSISTANCE
CURRENT_FUNCTION: SHOPPING REQUIRES ASSISTANCE
CURRENT_FUNCTION: PREPARING MEALS REQUIRES ASSISTANCE

## 2023-04-17 NOTE — PATIENT INSTRUCTIONS
Annual flu shot every fall    I would recommend getting the shingles vaccine, Shingrix.  This can also be obtained at your pharmacy    Continue to see your eye doctor every year    Schedule an appointment with dermatology to have the lesion on your forehead evaluated    DEXA is recommended for osteoporosis screening      Patient Education   Personalized Prevention Plan  You are due for the preventive services outlined below.  Your care team is available to assist you in scheduling these services.  If you have already completed any of these items, please share that information with your care team to update in your medical record.  Health Maintenance Due   Topic Date Due    Zoster (Shingles) Vaccine (1 of 2) Never done       Exercise for a Healthier Heart  You may wonder how you can improve the health of your heart. If you re thinking about exercise, you re on the right track. You don t need to become an athlete. But you do need a certain amount of brisk exercise to help strengthen your heart. If you have been diagnosed with a heart condition, your healthcare provider may advise exercise to help your condition. To help make exercise a habit, choose safe, fun activities.      Exercise with a friend. When activity is fun, you're more likely to stick with it.     Before you start  Check with your healthcare provider before starting an exercise program. This is especially important if you haven't been active for a while. It's also important if you have a long-term (chronic) health problem such as heart disease, diabetes, or obesity. Also check with your provider if you're at high risk for having these problems.   Why exercise?  Exercising regularly offers many healthy rewards. It can help you do all of these:   Improve your blood cholesterol level to help prevent further heart trouble.  Lower your blood pressure to help prevent a stroke or heart attack.  Control diabetes or reduce your risk of getting this  disease.  Improve your heart and lung function.  Reach and stay at a healthy weight.  Make your muscles stronger so you can stay active.  Prevent falls and fractures by slowing the loss of bone mass (osteoporosis).  Manage stress better.  Improve your sense of self and your body image.  Exercise tips    Ease into your routine. Set small goals. Then build on them. Talk with your healthcare provider first before starting an exercise routine if you're not sure what your activity level should be.  Exercise on most days. Aim for a total of at least 150 minutes (2 hours and 30 minutes) or more of moderate-intensity aerobic activity each week. You could also do 75 minutes (1 hour and 15 minutes) or more of vigorous-intensity aerobic activity each week. Or try for a combination of both. Moderate activity means that you breathe heavier and your heart rate increases, but you can still talk. Think about doing at least 30 minutes of moderate exercise, 5 times a week. It's OK to work up to the 30-minute period over time. Examples of moderate-intensity activity are brisk walking, gardening, and water aerobics.  Step up your daily activity level.  Along with your exercise program, try being more active the whole day. Walk instead of drive. Or park further away so that you take more steps each day. Do more household tasks or yard work. You may not be able to meet the advised amount of physical activity. But doing some moderate- or vigorous-intensity aerobic activity can help reduce your risk for heart disease. Your healthcare provider can help you figure out what is best for you.  Choose 1 or more activities you enjoy.  Walking is one of the easiest things you can do. You can also try swimming, riding a bike, dancing, or taking an exercise class.    Call 911  Call 911 right away if any of these occur:   Chest pain that doesn't go away quickly with rest  New burning, tightness, pressure, or heaviness in your chest, neck, shoulders,  back, or arms  Abnormal or severe shortness of breath  A very fast or irregular heartbeat (palpitations)  Fainting  When to call your healthcare provider  Call your healthcare provider if you have any of these:   Dizziness or lightheadedness  Mild shortness of breath or chest pain  Increased or new joint or muscle pain    [employer] last reviewed this educational content on 7/1/2022 2000-2022 [employer]. All rights reserved. This information is not intended as a substitute for professional medical care. Always follow your healthcare professional's instructions.          Understanding USDA MyPlate  The USDA has guidelines to help you make healthy food choices. These are called MyPlate. MyPlate shows the food groups that make up healthy meals using the image of a place setting. Before you eat, think about the healthiest choices for what to put on your plate or in your cup or bowl. To learn more about building a healthy plate, visit www.choosemyplate.gov.     The food groups  Fruits. Any fruit or 100% fruit juice counts as part of the Fruit Group. Fruits may be fresh, canned, frozen, or dried, and may be whole, cut-up, or pureed. Make 1/2 of your plate fruits and vegetables.  Vegetables. Any vegetable or 100% vegetable juice counts as a member of the Vegetable Group. Vegetables may be fresh, frozen, canned, or dried. They can be served raw or cooked and may be whole, cut-up, or mashed. Make 1/2 of your plate fruits and vegetables.  Grains. All foods made from grains are part of the Grains Group. These include wheat, rice, oats, cornmeal, and barley. Grains are often used to make foods such as bread, pasta, oatmeal, cereal, tortillas, and grits. Grains should be no more than 1/4 of your plate. At least half of your grains should be whole grains.  Protein. This group includes meat, poultry, seafood, beans and peas, eggs, processed soy products (such as tofu), nuts (including nut butters), and seeds. Make  protein choices no more than 1/4 of your plate. Meat and poultry choices should be lean or low fat.  Dairy. The Dairy Group includes all fluid milk products and foods made from milk that contain calcium, such as yogurt and cheese. (Foods that have little calcium, such as cream, butter, and cream cheese, are not part of this group.) Most dairy choices should be low-fat or fat-free.  Oils. Oils aren't a food group, but they do contain essential nutrients. However it's important to watch your intake of oils. These are fats that are liquid at room temperature. They include canola, corn, olive, soybean, vegetable, and sunflower oil. Foods that are mainly oil include mayonnaise, certain salad dressings, and soft margarines. You likely already get your daily oil allowance from the foods you eat.  Things to limit  Eating healthy also means limiting these things in your diet:  Salt (sodium). Many processed foods have a lot of sodium. To keep sodium intake down, eat fresh vegetables, meats, poultry, and seafood when possible. Purchase low-sodium, reduced-sodium, or no-salt-added food products at the store. And don't add salt to your meals at home. Instead, season them with herbs and spices such as dill, oregano, cumin, and paprika. Or try adding flavor with lemon or lime zest and juice.  Saturated fat. Saturated fats are most often found in animal products such as beef, pork, and chicken. They are often solid at room temperature, such as butter. To reduce your saturated fat intake, choose leaner cuts of meat and poultry. And try healthier cooking methods such as grilling, broiling, roasting, or baking. For a simple lower-fat swap, use plain nonfat yogurt instead of mayonnaise when making potato salad or macaroni salad.  Added sugars. These are sugars added to foods. They are in foods such as ice cream, candy, soda, fruit drinks, sports drinks, energy drinks, cookies, pastries, jams, and syrups. Cut down on added sugars by  sharing sweet treats with a family member or friend. You can also choose fruit for dessert, and drink water or other unsweetened beverages.  [employer] last reviewed this educational content on 6/1/2020 2000-2022 [employer]. All rights reserved. This information is not intended as a substitute for professional medical care. Always follow your healthcare professional's instructions.        Activities of Daily Living    Your Health Risk Assessment indicates you have difficulties with activities of daily living such as housework, bathing, preparing meals, taking medication, etc. Your provider addressed this with you at today's appointment.    Urinary Incontinence, Female (Adult)   Urinary incontinence means loss of bladder control. This problem affects many women, especially as they get older. If you have incontinence, you may be embarrassed to ask for help. But know that this problem can be treated.   Types of Incontinence  There are different types of incontinence. Two of the main types are described here. You can have more than one type.   Stress incontinence. With this type, urine leaks when pressure (stress) is put on the bladder. This may happen when you cough, sneeze, or laugh. Stress incontinence most often occurs because the pelvic floor muscles that support the bladder and urethra are weak. This can happen after pregnancy and vaginal childbirth or a hysterectomy. It can also be due to excess body weight or hormone changes.  Urge incontinence (also called overactive bladder). With this type, a sudden urge to urinate is felt often. This may happen even though there may not be much urine in the bladder. The need to urinate often during the night is common. Urge incontinence most often occurs because of bladder spasms. This may be due to bladder irritation or infection. Damage to bladder nerves or pelvic muscles, constipation, and certain medicines can also lead to urge incontinence.  Treatment  depends on the cause. Further evaluation is needed to find the type you have. This will likely include an exam and certain tests. Based on the results, you and your healthcare provider can then plan treatment. Until a diagnosis is made, the home care tips below can help ease symptoms.   Home care  Do pelvic floor muscle exercises, if they are prescribed. The pelvic floor muscles help support the bladder and urethra. Many women find that their symptoms improve when doing special exercises that strengthen these muscles. To do the exercises, contract the muscles you would use to stop your stream of urine. But do this when you re not urinating. Hold for 10 seconds, then relax. Repeat 10 to 20 times in a row, at least 3 times a day. Your healthcare provider may give you other instructions for how to do the exercises and how often.  Keep a bladder diary. This helps track how often and how much you urinate over a set period of time. Bring this diary with you to your next visit with the provider. The information can help your provider learn more about your bladder problem.  Lose weight, if advised to by your provider. Extra weight puts pressure on the bladder. Your provider can help you create a weight-loss plan that s right for you. This may include exercising more and making certain diet changes.  Don't have foods and drinks that may irritate the bladder. These can include alcohol and caffeinated drinks.  Quit smoking. Smoking and other tobacco use can lead to a long-term (chronic) cough that strains the pelvic floor muscles. Smoking may also damage the bladder and urethra. Talk with your provider about treatments or methods you can use to quit smoking.  If drinking large amounts of fluid makes you have symptoms, you may be advised to limit your fluid intake. You may also be advised to drink most of your fluids during the day and to limit fluids at night.  If you re worried about urine leakage or accidents, you may wear  absorbent pads to catch urine. Change the pads often. This helps reduce discomfort. It may also reduce the risk of skin or bladder infections.    Follow-up care  Follow up with your healthcare provider, or as directed. It may take some to find the right treatment for your problem. But healthy lifestyle changes can be made right away. These include such things as exercising on a regular basis, eating a healthy diet, losing weight (if needed), and quitting smoking. Your treatment plan may include special therapies or medicines. Certain procedures or surgery may also be options. Talk about any questions you have with your provider.   When to seek medical advice  Call the healthcare provider right away if any of these occur:  Fever of 100.4 F (38 C) or higher, or as directed by your provider  Bladder pain or fullness  Belly swelling  Nausea or vomiting  Back pain  Weakness, dizziness, or fainting  [employer] last reviewed this educational content on 1/1/2020 2000-2022 [employer]. All rights reserved. This information is not intended as a substitute for professional medical care. Always follow your healthcare professional's instructions.          Kegel Exercises  Kegel exercises are done to help strengthen the muscles in your pelvic floor. They re easy to learn and simple to do. And if you do them right, no one can tell you re doing them. You can do them almost anywhere. Your healthcare provider, nurse, or physical therapist can answer any questions you have and help you get started.   A weak pelvic floor  The pelvic floor muscles may weaken. This can happen due to any of these:   Aging  Pregnancy  Vaginal childbirth  Injury  Surgery  Chronic cough  Lack of exercise  If the pelvic floor is weak, your bladder and other pelvic organs may sag out of place. The urethra may open too easily. This can allow urine to leak out. Kegel exercises can help you strengthen your pelvic floor muscles. They can better  support your pelvic organs and control your urine flow.     How to do Kegel exercises  Try each of the Kegel exercises below. When you re doing them, try not to move your leg, buttock, or stomach muscles:   Squeeze as if you are stopping your urine stream. But do it when you re not urinating.  Tighten your rectum as if trying not to pass gas. Squeeze your anus, but don t move your buttocks.  Tip: Place 1 or 2 fingers in the vagina. Squeeze your finger with your vagina. This will help you to learn which muscles to tighten.   Try to hold each Kegel for a slow count to 5. You probably won t be able to hold it for that long at first. But keep practicing. It will get easier as your pelvic floor gets stronger. At some point, your healthcare provider may advise you to use special weights. You place these in your vagina before you do the Kegels. This can help make the Kegels even more effective. Talk to your healthcare provider if you have trouble doing Kegel exercises.   Helpful tips  Here are some tips to follow:  Do Kegels as often as you can. The more you do them, the faster you ll feel the results.  Pick an activity you do often as a reminder. For instance, do your Kegels every time you sit down.  Tighten your pelvic floor before you sneeze, get up from a chair, cough, laugh, or lift. This can help prevent urine, gas, or stool leakage.  [employer] last reviewed this educational content on 8/1/2020 2000-2022 [employer]. All rights reserved. This information is not intended as a substitute for professional medical care. Always follow your healthcare professional's instructions.        Your Health Risk Assessment indicates you feel you are not in good emotional health.    Recreation   Recreation is not limited to sports and team events. It includes any activity that provides relaxation, interest, enjoyment, and exercise. Recreation provides an outlet for physical, mental, and social energy. It can give a  sense of worth and achievement. It can help you stay healthy.    Mental Exercise and Social Involvement  Mental and emotional health is as important as physical health. Keep in touch with friends and family. Stay as active as possible. Continue to learn and challenge yourself.   Things you can do to stay mentally active are:  Learn something new, like a foreign language or musical instrument.   Play SCRABBLE or do crossword puzzles. If you cannot find people to play these games with you at home, you can play them with others on your computer through the Internet.   Join a games club--anything from card games to chess or checkers or lawn bowling.   Start a new hobby.   Go back to school.   Volunteer.   Read.   Keep up with world events.       Preventing Falls at Home  A person can fall for many reasons. Older adults may fall because reaction time slows down as we age. Your muscles and joints may get stiff, weak, or less flexible because of illness, medicines, or a physical condition.   Other health problems that make falls more likely include:   Arthritis  Dizziness or lightheadedness when you stand up (orthostatic hypotension)  History of a stroke  Dizziness  Anemia  Certain medicines taken for mental illness or to control blood pressure.  Problems with balance or gait  Bladder or urinary problems  History of falling  Changes in vision (vision impairment)  Changes in thinking skills and memory (cognitive impairment)  Injuries from a fall can include serious injuries such as broken bones, dislocated joints, internal bleeding and cuts. Injuries like these can limit your independence.   Prevention tips  To help prevent falls and fall-related injuries, follow the tips below.    Floors  To make floors safer:   Put nonskid pads under area rugs.  Remove small rugs.  Replace worn floor coverings.  Tack carpets firmly to each step on carpeted stairs. Put nonskid strips on the edges of uncarpeted stairs.  Keep floors and  stairs free of clutter and cords.  Arrange furniture so there are clear pathways.  Clean up any spills right away.  Bathrooms    To make bathrooms safer:   Install grab bars in the tub or shower.  Apply nonskid strips or put a nonskid rubber mat in the tub or shower.  Sit on a bath chair to bathe.  Use bathmats with nonskid backing.  Lighting  To improve visibility in your home:    Keep a flashlight in each room. Or put a lamp next to the bed within easy reach.  Put nightlights in the bedrooms, hallways, kitchen, and bathrooms.  Make sure all stairways have good lighting.  Take your time when going up and down stairs.  Put handrails on both sides of stairs and in walkways for more support. To prevent injury to your wrist or arm, don t use handrails to pull yourself up.  Install grab bars to pull yourself up.  Move or rearrange items that you use often. This will make them easier to find or reach.  Look at your home to find any safety hazards. Especially look at doorways, walkways, and the driveway. Remove or repair any safety problems that you find.  Other changes to make  Look around to find any safety hazards. Look closely at doorways, walkways, and the driveway. Remove or repair any safety problems that you find.  Wear shoes that fit well.  Take your time when going up and down stairs.  Put handrails on both sides of stairs and in walkways for more support. To prevent injury to your wrist or arm, don t use handrails to pull yourself up.  Install grab bars wherever needed to pull yourself up.  Arrange items that you use often. This will make them easier to find or reach.    [employer] last reviewed this educational content on 3/1/2020    2295-9161 [employer]. All rights reserved. This information is not intended as a substitute for professional medical care. Always follow your healthcare professional's instructions.

## 2023-04-17 NOTE — PROGRESS NOTES
"SUBJECTIVE:   Mariluz is a 89 year old who presents for Preventive Visit.      DEY-33-ooow-old woman here for annual wellness visit and follow-up multiple chronic medical problems including aortic stenosis, hypertension, peripheral edema, anxiety and osteoporosis.  See assessment and plan for details          2/28/2023     1:56 PM   Additional Questions   Roomed by    Patient has been advised of split billing requirements and indicates understanding: Yes  Are you in the first 12 months of your Medicare coverage?  No      67 minutes spent in the management of this patient including reviewing records, obtaining history, performing exam, ordering appropriate tests and documenting visit.    20 of those minutes spent addressing wellness and 47 minutes spent addressing chronic and new medical problems.  See assessment plan for details.    Healthy Habits:     In general, how would you rate your overall health?  Good    Frequency of exercise:  None    Do you usually eat at least 4 servings of fruit and vegetables a day, include whole grains    & fiber and avoid regularly eating high fat or \"junk\" foods?  No    Taking medications regularly:  Yes    Medication side effects:  None    Ability to successfully perform activities of daily living:  Transportation requires assistance, shopping requires assistance, preparing meals requires assistance, housework requires assistance, bathing requires assistance and laundry requires assistance    Home Safety:  No safety concerns identified    Hearing Impairment:  Difficulty following a conversation in a noisy restaurant or crowded room, feel that people are mumbling or not speaking clearly, difficulty following dialogue in the theater, difficult to understand a speaker at a public meeting or [employer] service, need to ask people to speak up or repeat themselves and difficulty understanding soft or whispered speech    In the past 6 months, have you been bothered by leaking of urine? " Yes    In general, how would you rate your overall mental or emotional health?  Fair      PHQ-2 Total Score: 2    Additional concerns today:  No      Have you ever done Advance Care Planning? (For example, a Health Directive, POLST, or a discussion with a medical provider or your loved ones about your wishes): Yes, patient states has an Advance Care Planning document and will bring a copy to the clinic.       Fall risk  Fallen 2 or more times in the past year?: No  Any fall with injury in the past year?: No  click delete button to remove this line now  Cognitive Screening   1) Repeat 3 items (Leader, Season, Table)    2) Clock draw: ABNORMAL Put minute hand at 10 on clock on not at 2 to signify 10 minutes have passed; pt did recall after exam that minute hand was meant to be at the number 2 to signify 10 minutes  3) 3 item recall: Recalls 2 objects   Results: ABNORMAL clock, 1-2 items recalled: PROBABLE COGNITIVE IMPAIRMENT, **INFORM PROVIDER**    Mini-CogTM Copyright GENA Bojorquez. Licensed by the author for use in [facility]; reprinted with permission (jeanette@[facility]). All rights reserved.          Reviewed and updated as needed this visit by clinical staff   Tobacco  Allergies  Meds  Problems  Med Hx  Surg Hx  Fam Hx          Reviewed and updated as needed this visit by Provider   Tobacco  Allergies  Meds  Problems  Med Hx  Surg Hx  Fam Hx         Social History     Tobacco Use     Smoking status: Never     Passive exposure: Never     Smokeless tobacco: Never   Vaping Use     Vaping status: Never Used     Passive vaping exposure: Yes   Substance Use Topics     Alcohol use: Yes     Comment: rare             4/17/2023    10:54 AM   Alcohol Use   Prescreen: >3 drinks/day or >7 drinks/week? No     Do you have a current opioid prescription? No  Do you use any other controlled substances or medications that are not prescribed by a provider? None              Current providers sharing in care for  this patient include:   Patient Care Team:  Maik Levine MD as PCP - General (Internal Medicine)  Maik Levine MD as Assigned PCP  Georgi Franco MD as MD (Cardiovascular Disease)  Georgi Franco MD as Assigned Heart and Vascular Provider    The following health maintenance items are reviewed in Epic and correct as of today:  Health Maintenance   Topic Date Due     ZOSTER IMMUNIZATION (1 of 2) Never done     MEDICARE ANNUAL WELLNESS VISIT  04/17/2024     ANNUAL REVIEW OF HM ORDERS  04/17/2024     FALL RISK ASSESSMENT  04/17/2024     DTAP/TDAP/TD IMMUNIZATION (2 - Td or Tdap) 11/11/2025     ADVANCE CARE PLANNING  03/28/2028     PHQ-2 (once per calendar year)  Completed     INFLUENZA VACCINE  Completed     Pneumococcal Vaccine: 65+ Years  Completed     COVID-19 Vaccine  Completed     IPV IMMUNIZATION  Aged Out     MENINGITIS IMMUNIZATION  Aged Out     Lab work is in process        Pertinent mammograms are reviewed under the imaging tab.    Review of Systems   Constitutional: Positive for chills. Negative for fever.   HENT: Positive for hearing loss. Negative for congestion, ear pain and sore throat.    Eyes: Positive for pain and visual disturbance.   Respiratory: Positive for shortness of breath. Negative for cough.    Cardiovascular: Positive for peripheral edema. Negative for chest pain and palpitations.   Gastrointestinal: Positive for heartburn. Negative for abdominal pain, constipation, diarrhea, hematochezia and nausea.   Breasts:  Negative for tenderness, breast mass and discharge.   Genitourinary: Negative for dysuria, frequency, genital sores, hematuria, pelvic pain, urgency, vaginal bleeding and vaginal discharge.   Musculoskeletal: Positive for joint swelling. Negative for arthralgias and myalgias.   Skin: Negative for rash.   Neurological: Positive for dizziness and weakness. Negative for headaches and paresthesias.   Psychiatric/Behavioral: Positive for mood changes. The patient is  "nervous/anxious.          OBJECTIVE:   BP (!) 162/78 (BP Location: Right arm, Patient Position: Sitting, Cuff Size: Adult Regular)   Pulse 67   Temp 97.9  F (36.6  C) (Oral)   Resp 15   Ht 1.461 m (4' 9.52\")   Wt 49.3 kg (108 lb 9.6 oz)   SpO2 95%   BMI 23.08 kg/m   Estimated body mass index is 23.08 kg/m  as calculated from the following:    Height as of this encounter: 1.461 m (4' 9.52\").    Weight as of this encounter: 49.3 kg (108 lb 9.6 oz).  Physical Exam  EYES: Eyelids, conjunctiva, and sclera were normal. Pupils were normal. Cornea, iris, and lens were normal bilaterally.  HEAD, EARS, NOSE, MOUTH, AND THROAT: Head and face were normal. TMs and external auditory canals are normal  NECK: Neck appearance was normal. There were no neck masses and the thyroid was not enlarged and no nodules are felt.  No lymphadenopathy.  RESPIRATORY: Breathing pattern was normal and the chest moved symmetrically.   Lung sounds were normal and there were no rales or wheezes.  CARDIOVASCULAR: Heart rate and rhythm were normal.  3/6 systolic murmur radiating into both carotids.  1+ bilateral lower extremity edema.  Good radial and pedal pulses.  GASTROINTESTINAL:  Bowel sounds were present.   Palpation detected no tenderness, mass, or enlarged organs.   MUSCULOSKELETAL: Skeletal configuration was normal and muscle mass was normal for age. Joint appearance was overall normal.  LYMPHATIC: There were no enlarged nodes.  SKIN/HAIR/NAILS: Skin color was normal.  Seborrheic keratosis on forehead with adjacent lesion, basal cell cannot be excluded  NEUROLOGIC: The patient was alert and oriented to person, place, time, and circumstance. Speech was normal. Cranial nerves were normal. Motor strength was normal for age.  Impaired gait.  PSYCHIATRIC: Anxious.        ASSESSMENT / PLAN:   1. Encounter for Medicare annual wellness exam  Immunizations are reviewed and I am recommending Shingrix but she declines.  Everything else is " up-to-date. Living will discussed.  She will bring a copy at her next visit.  Non-smoker.  Uses alcohol in moderation.  Regular exercise and good diet habits discussed.  No further colonoscopies are recommended at her age and with her comorbidities as risks outweigh benefits.  She declines further breast cancer screening with annual mammogram.   Last DEXA was more than 2 years ago and I am recommending repeating to reassess osteoporosis. Dementia and depression screening completed.  Recommending annual eye exam.  Recommending seeing a dentist every 6 months.  Skin exam performed and recommending regular use of sunblock.  Will screen for diabetes with fasting glucose.      2. Primary hypertension  Blood pressure not at goal during today's visit but significant component of whitecoat hypertension which has been a longstanding problem.  Blood pressure has been challenging to control but readings at home are looking good of late since felodipine was added.  She continues 2.5 mg daily along with atenolol 25 mg twice daily and valsartan 160 mg every morning.  No changes in medication recommended at this time.  - CBC with platelets; Future  - Comprehensive metabolic panel (BMP + Alb, Alk Phos, ALT, AST, Total. Bili, TP); Future  - CBC with platelets  - Comprehensive metabolic panel (BMP + Alb, Alk Phos, ALT, AST, Total. Bili, TP)    3. Aortic valve stenosis, etiology of cardiac valve disease unspecified  Severe aortic stenosis with valve area 0.6 cm.  No worsening symptoms.  Denies any exertional chest pain or increasing shortness of breath.  She is not interested in any intervention including TAVR and will follow-up with cardiology.  Continue current medical management including good blood pressure control.    4. Chronic anxiety  She has not been able to tolerate other medications that have been tried to manage her anxiety.  This includes mirtazapine.  She will still use lorazepam 0.5 mg most mornings for both insomnia  and anxiety and understands risks of medication in her age group but other options have not worked or have not been tolerated.  - LORazepam (ATIVAN) 0.5 MG tablet; Take 1 tablet (0.5 mg) by mouth nightly as needed for anxiety  Dispense: 30 tablet; Refill: 1    5. Peripheral edema  Worsening edema since starting calcium channel blocker but this is being helped by use of compression stockings.  However, the stockings that she has found over-the-counter are not fitting adequately and she would benefit from custom fitted thigh-high elastic compression stockings with 20-30 mmHg.  She is unable to take diuretics with history of hyponatremia.  - Compression Sleeve/Stocking Order for DME - ONLY FOR DME    6. Age-related osteoporosis without current pathological fracture  Previously treated with both alendronate and subsequently Boniva, presumably intravenous.  I do not see a DEXA completed in the last 5 years and we will make arrangements for this to confirm ongoing stability following previous treatment.  - Vitamin D deficiency screening; Future  - DX Hip/Pelvis/Spine; Future  - Vitamin D deficiency screening    7. Gait instability  Using walker when up ambulating    8. Hypercholesterolemia  Recheck lipid profile taking simvastatin.  Tolerating medication without side effects.  - Lipid Profile (Chol, Trig, HDL, LDL calc); Future  - Lipid Profile (Chol, Trig, HDL, LDL calc)        10. Skin lesion  Seborrheic keratosis on forehead with adjacent papular lesion, cannot exclude basal cell skin cancer and I am recommending evaluation by dermatology with referral placed  - Adult Dermatology Referral; Future    11. Bilateral hand numbness  Chronic problem with numbness and tingling in both hands with previous carpal tunnel release.  She does not have neuropathy symptoms involving feet and I assume these current symptoms represent recurrent carpal tunnel syndrome.  She is already using wrist splints at night and is not interested  in any surgical intervention.  We discussed having EMG completed but I do not know if this would alter any current treatment strategy.    Patient has been advised of split billing requirements and indicates understanding: Yes              She reports that she has never smoked. She has never been exposed to tobacco smoke. She has never used smokeless tobacco.      Appropriate preventive services were discussed with this patient, including applicable screening as appropriate for cardiovascular disease, diabetes, osteopenia/osteoporosis, and glaucoma.  As appropriate for age/gender, discussed screening for colorectal cancer, prostate cancer, breast cancer, and cervical cancer. Checklist reviewing preventive services available has been given to the patient.    Reviewed patients plan of care and provided an AVS. The Basic Care Plan (routine screening as documented in Health Maintenance) for Mariluz meets the Care Plan requirement. This Care Plan has been established and reviewed with the Patient.      Maik Levine MD  [facility]    Identified Health Risks:      She is at risk for lack of exercise and has been provided with information to increase physical activity for the benefit of her well-being.  The patient was counseled and encouraged to consider modifying their diet and eating habits. She was provided with information on recommended healthy diet options.  The patient reports that she has difficulty with activities of daily living. This issue was addressed at today's appointment.   Information on urinary incontinence and treatment options given to patient.  The patient was provided with suggestions to help her develop a healthy emotional lifestyle.  She is at risk for falling and has been provided with information to reduce the risk of falling at home.

## 2023-04-17 NOTE — LETTER
April 18, 2023      Mariluz Hagan[address]   [facility] [address]        Dear Mariluz,    Cholesterol is well controlled with simvastatin.  Kidney function is stable.  No anemia.  Vitamin D level looks good.    Resulted Orders   Lipid Profile (Chol, Trig, HDL, LDL calc)   Result Value Ref Range    Cholesterol 163 <200 mg/dL    Triglycerides 71 <150 mg/dL    Direct Measure HDL 90 >=50 mg/dL    LDL Cholesterol Calculated 59 <=100 mg/dL    Non HDL Cholesterol 73 <130 mg/dL    Narrative    Cholesterol  Desirable:  <200 mg/dL    Triglycerides  Normal:  Less than 150 mg/dL  Borderline High:  150-199 mg/dL  High:  200-499 mg/dL  Very High:  Greater than or equal to 500 mg/dL    Direct Measure HDL  Female:  Greater than or equal to 50 mg/dL   Male:  Greater than or equal to 40 mg/dL    LDL Cholesterol  Desirable:  <100mg/dL  Above Desirable:  100-129 mg/dL   Borderline High:  130-159 mg/dL   High:  160-189 mg/dL   Very High:  >= 190 mg/dL    Non HDL Cholesterol  Desirable:  130 mg/dL  Above Desirable:  130-159 mg/dL  Borderline High:  160-189 mg/dL  High:  190-219 mg/dL  Very High:  Greater than or equal to 220 mg/dL   CBC with platelets   Result Value Ref Range    WBC Count 7.8 4.0 - 11.0 10e3/uL    RBC Count 4.30 3.80 - 5.20 10e6/uL    Hemoglobin 13.8 11.7 - 15.7 g/dL    Hematocrit 42.1 35.0 - 47.0 %    MCV 98 78 - 100 fL    MCH 32.1 26.5 - 33.0 pg    MCHC 32.8 31.5 - 36.5 g/dL    RDW 12.1 10.0 - 15.0 %    Platelet Count 212 150 - 450 10e3/uL   Comprehensive metabolic panel (BMP + Alb, Alk Phos, ALT, AST, Total. Bili, TP)   Result Value Ref Range    Sodium 135 (L) 136 - 145 mmol/L    Potassium 4.7 3.4 - 5.3 mmol/L    Chloride 97 (L) 98 - 107 mmol/L    Carbon Dioxide (CO2) 22 22 - 29 mmol/L    Anion Gap 16 (H) 7 - 15 mmol/L    Urea Nitrogen 22.9 8.0 - 23.0 mg/dL    Creatinine 0.91 0.51 - 0.95 mg/dL    Calcium 10.6 (H) 8.2 - 9.6 mg/dL    Glucose 99 70 - 99 mg/dL    Alkaline Phosphatase 80 35 - 104 U/L    AST  30 10 - 35 U/L    ALT 8 (L) 10 - 35 U/L    Protein Total 7.4 6.4 - 8.3 g/dL    Albumin 4.4 3.5 - 5.2 g/dL    Bilirubin Total 0.7 <=1.2 mg/dL    GFR Estimate 59 (L) >60 mL/min/1.73m2      Comment:      eGFR calculated using 2021 CKD-EPI equation.   Vitamin D deficiency screening   Result Value Ref Range    Vitamin D, Total (25-Hydroxy) 45 20 - 75 ug/L    Narrative    Season, race, dietary intake, and treatment affect the concentration of 25-hydroxy-Vitamin D. Values may decrease during winter months and increase during summer months. Values 20-29 ug/L may indicate Vitamin D insufficiency and values <20 ug/L may indicate Vitamin D deficiency.    Vitamin D determination is routinely performed by an immunoassay specific for 25 hydroxyvitamin D3.  If an individual is on vitamin D2(ergocalciferol) supplementation, please specify 25 OH vitamin D2 and D3 level determination by LCMSMS test [id number].         If you have any questions or concerns, please call the clinic at the number listed above.       Sincerely,      Maik Levine MD

## 2023-04-18 LAB — DEPRECATED CALCIDIOL+CALCIFEROL SERPL-MC: 45 UG/L (ref 20–75)

## 2023-07-18 ENCOUNTER — OFFICE VISIT (OUTPATIENT)
Dept: INTERNAL MEDICINE | Facility: CLINIC | Age: 88
End: 2023-07-18
Payer: COMMERCIAL

## 2023-07-18 VITALS
DIASTOLIC BLOOD PRESSURE: 80 MMHG | WEIGHT: 108 LBS | TEMPERATURE: 97.7 F | SYSTOLIC BLOOD PRESSURE: 154 MMHG | HEIGHT: 58 IN | RESPIRATION RATE: 16 BRPM | BODY MASS INDEX: 22.67 KG/M2 | HEART RATE: 62 BPM

## 2023-07-18 DIAGNOSIS — I35.0 AORTIC VALVE STENOSIS, ETIOLOGY OF CARDIAC VALVE DISEASE UNSPECIFIED: ICD-10-CM

## 2023-07-18 DIAGNOSIS — R60.0 PERIPHERAL EDEMA: ICD-10-CM

## 2023-07-18 DIAGNOSIS — I10 PRIMARY HYPERTENSION: Primary | ICD-10-CM

## 2023-07-18 DIAGNOSIS — C44.320 SQUAMOUS CELL CARCINOMA OF SKIN OF FACE: ICD-10-CM

## 2023-07-18 DIAGNOSIS — H81.03 MENIERE'S DISEASE, BILATERAL: ICD-10-CM

## 2023-07-18 PROCEDURE — 99214 OFFICE O/P EST MOD 30 MIN: CPT | Performed by: INTERNAL MEDICINE

## 2023-07-18 RX ORDER — MECLIZINE HYDROCHLORIDE 25 MG/1
25 TABLET ORAL 3 TIMES DAILY PRN
Qty: 30 TABLET | Refills: 3 | Status: SHIPPED | OUTPATIENT
Start: 2023-07-18 | End: 2024-09-06

## 2023-07-18 NOTE — PROGRESS NOTES
Assessment & Plan     Primary hypertension  89-year-old woman here to follow-up medical problems including hypertension which has been challenging to control but has been looking quite well controlled at her assisted living since starting the combination of valsartan 160 mg daily, atenolol 25 mg twice daily and felodipine 2.5 mg daily.  Blood pressure is moderately elevated during today's visit but is being rechecked.  Usually running under 140 systolic and under 80 diastolic.  Tolerating the above medications without significant side effects.  She has had no significant hypotension.  Peripheral edema is better controlled wearing elastic compression stockings    Aortic valve stenosis, etiology of cardiac valve disease unspecified  Known severe aortic valve stenosis, followed by cardiology and remains relatively asymptomatic with no chest pain.  Nonspecific complaints of feeling low energy and getting tired easily but everything looks stable.  She is not interested in any aggressive interventions including TAVR.  Continue to maintain good blood pressure control.    Peripheral edema  Edema is looking better controlled wearing thigh-high elastic compression stockings.  She needs to get a second pair has sounds like her daughter is helping with this    Squamous cell carcinoma of skin of face  Found to have squamous cell cancer involving forehead with plans for Mohs surgery in August    Meniere's disease, bilateral  She needs meclizine refilled  - meclizine (ANTIVERT) 25 MG tablet; Take 1 tablet (25 mg) by mouth 3 times daily as needed for dizziness    Decreased hearing with impacted cerumen in her left external auditory canal.  I am recommending scheduling appoint with ENT for cerumen removal             See Patient Instructions    Maik Levine MD  [facility]    Alonzo Mcgrwa is a 89 year old, presenting for the following health issues:  Follow Up        7/18/2023      Seen 8/23/19, follow up in 3 months, scheduled 12/18/19.   CMN routed to Dr. Roy to review and sign.   "2:11 PM   Additional Questions   Roomed by      History of Present Illness       Hypertension: She presents for follow up of hypertension.  She does check blood pressure  regularly outside of the clinic. Outside blood pressures have been over 140/90. She follows a low salt diet.     She eats 2-3 servings of fruits and vegetables daily.She consumes 1 sweetened beverage(s) daily.She exercises with enough effort to increase her heart rate 9 or less minutes per day.  She exercises with enough effort to increase her heart rate 3 or less days per week.   She is taking medications regularly.               Review of Systems   No chest pain      Objective    BP (!) 154/80   Pulse 62   Temp 97.7  F (36.5  C) (Oral)   Resp 16   Ht 1.461 m (4' 9.5\")   Wt 49 kg (108 lb)   BMI 22.97 kg/m    Body mass index is 22.97 kg/m .  Physical Exam   Anxious appearing elderly woman  Heart regular rate and rhythm with 3/6 systolic murmur  Trace-1+ bilateral lower extremity edema looking better controlled than previously  Impacted cerumen left external auditory canal.  Right TM normal              "

## 2023-07-20 ENCOUNTER — OFFICE VISIT (OUTPATIENT)
Dept: CARDIOLOGY | Facility: CLINIC | Age: 88
End: 2023-07-20
Attending: INTERNAL MEDICINE
Payer: COMMERCIAL

## 2023-07-20 VITALS
WEIGHT: 109.3 LBS | BODY MASS INDEX: 22.94 KG/M2 | HEART RATE: 67 BPM | HEIGHT: 58 IN | OXYGEN SATURATION: 96 % | DIASTOLIC BLOOD PRESSURE: 80 MMHG | RESPIRATION RATE: 14 BRPM | SYSTOLIC BLOOD PRESSURE: 138 MMHG

## 2023-07-20 DIAGNOSIS — R53.83 OTHER FATIGUE: ICD-10-CM

## 2023-07-20 DIAGNOSIS — I10 PRIMARY HYPERTENSION: ICD-10-CM

## 2023-07-20 DIAGNOSIS — I35.0 NONRHEUMATIC AORTIC VALVE STENOSIS: ICD-10-CM

## 2023-07-20 PROCEDURE — 99214 OFFICE O/P EST MOD 30 MIN: CPT | Performed by: INTERNAL MEDICINE

## 2023-07-20 NOTE — PROGRESS NOTES
"    [facility]  [phone]          Assessment/Recommendations   Patient with known severe aortic stenosis with some reduction in left ventricular systolic function.  She has not been interested in any procedures such as TAVR but would like to continue medical therapy.  She continues with that feeling today.  Breathing has been stable, there is no pulmonary vascular congestion.  She has some intermittent light headedness but no syncopal episodes and no anginal symptoms.    We will continue her current medications and follow-up in 6 months.  She certainly could change her mind regarding TAVR and we would certainly entertain that possibility in the future if she did change her mind.    Thank you for allowing us to participate in her care.    30 minutes spent with chart review, patient visit, and documentation.       History of Present Illness/Subjective    Ms. Mariluz Arana is a 89 year old female with known severe aortic stenosis, mild reduction in left ventricular systolic function likely related to aortic stenosis, and peripheral edema.  She has been feeling about the same.  Breathing has been about the same.  She has not had orthopnea, paroxysmal nocturnal dyspnea and has some chronic peripheral edema which is well treated with compression stockings.  She has not had syncopal episodes or chest discomfort.  She does get a little lightheaded at times and has had some blood pressures which have been diastolics in the 60s which worries her.  Systolic blood pressures were all about 110.        ECG: Personally reviewed.        Physical Examination Review of Systems   /80 (BP Location: Right arm, Patient Position: Sitting, Cuff Size: Adult Regular)   Pulse 67   Resp 14   Ht 1.461 m (4' 9.5\")   Wt 49.6 kg (109 lb 4.8 oz)   SpO2 96%   BMI 23.24 kg/m    Body mass index is 23.24 kg/m .  Wt Readings from Last 3 Encounters:   07/20/23 49.6 kg (109 lb 4.8 oz)   07/18/23 49 kg (108 lb) " "  04/17/23 49.3 kg (108 lb 9.6 oz)     General Appearance:   Alert, cooperative and in no acute distress.   ENT/Mouth: Pink/moist oral mucosa   EYES:  no scleral icterus, normal conjunctivae   Neck: JVP normal. No Hepatojugular reflux. Thyroid not visualized.   Chest/Lungs:   Lungs are clear to auscultation, equal chest wall expansion.   Cardiovascular:   S1, S2 with 3/6 systolic murmur , no clicks or rubs. Brachial, radial  pulses are intact and symetric.  Carotid pulses palpable   Abdomen:  Nontender.    Extremities: No cyanosis, clubbing or edema with compression stockings on.   Skin: no xanthelasma, warm.    Neurologic: normal arm movement bilateral, no tremors     Psychiatric: Appropriate affect.      Enc Vitals  BP: 138/80  Pulse: 67  Resp: 14  SpO2: 96 %  Weight: 49.6 kg (109 lb 4.8 oz)  Height: 146.1 cm (4' 9.5\")                                           Medical History  Surgical History Family History Social History   Past Medical History:   Diagnosis Date     Age-related cataract of left eye 9/21/2021     Aortic stenosis     Echocardiogram with moderate aortic stenosis September 2021     Arthritis     Osteoarthritis     Bilateral cataracts      Bilateral hand numbness 4/17/2023    Presumably carpal tunnel syndrome, previous surgery and already using wrist splints, not interested in further surgery     Chronic anxiety      Dizziness and giddiness 9/17/2012     Gastroesophageal reflux disease      GERD (gastroesophageal reflux disease)      Heart murmur      Hypercholesteremia      Hypercholesteremia      Hypercholesterolemia 12/13/2021     Hypertension      Hyponatremia      Hyponatremia 07/08/2021     Irregular heart beat      Irritable bowel      Irritable bowel      Meniere syndrome      Meniere's disease, bilateral      Osteoarthritis of spine with radiculopathy, unspecified spinal region 07/08/2021     Osteoporosis      Peripheral edema 2/28/2023     Primary osteoarthritis of both hands 12/13/2021     " SOB (shortness of breath) 07/08/2021     Squamous cell carcinoma of skin of face 7/18/2023     Venous (peripheral) insufficiency 4/20/2022     Vertigo     Past Surgical History:   Procedure Laterality Date     CATARACT EXTRACTION Left     2021     DILATION AND CURETTAGE       DILATION AND CURETTAGE       ENDOLYMPHATIC SAC OPERATION  01/01/2012    enhancement     ENHANCE ENDOLYMPHATIC SAC, DEC SIGMOID SINUS, EXTND FACIAL RECESS APPRCH, MASTOIDECTOMY, BMT, COMBO  04/23/2012    Procedure:COMBINED ENHANCE ENDOLYMPHATIC SAC, DECOMPRESS SIGMOID SINUS, EXTENDED FACIAL RECESS APPROACH, COMPLETE MASTOIDECTOMY, MYRINGOTOMY, INSERT TUBE; Left Endolymphatic Sac Enhancement, Sigmoid Sinus Decom-  pression, Extended Facial Recess Approach, Myringotomy  , Complete Mastoidectomy; Surgeon:LINN MATHIS; [facility]     INNER EAR SURGERY      Per Pt     KERATOTOMY ARCUATE WITH FEMTOSECOND LASER/IMAGING FOR ATIOL Right 07/19/2018    Procedure: KERATOTOMY ARCUATE WITH FEMTOSECOND LASER/IMAGING FOR ATIOL;  RIGHT EYE KERATOTOMY ARCUATE WITH FEMTOSECOND LASER/IMAGING FOR ATIOL ,  CAPSULOTOMY, LENS FRAGMENTATION, ARCUATE INCISIONS;  Surgeon: Roberth Chambers MD;  [facility]     MASTOIDECTOMY  01/01/2012     PHACOEMULSIFICATION CLEAR CORNEA WITH TORIC INTRAOCULAR LENS IMPLANT Right 07/19/2018    Procedure: PHACOEMULSIFICATION CLEAR CORNEA WITH TORIC INTRAOCULAR LENS IMPLANT;  RIGHT EYE PHACOEMULSIFICATION CLEAR CORNEA WITH TORIC INTRAOCULAR LENS IMPLANT ;  Surgeon: Roberth Chambers MD;  [facility]:  [facility]     TONSILLECTOMY       TONSILLECTOMY      Family History   Problem Relation Age of Onset     Heart Disease Mother      Clotting Disorder Mother         Dies of a blood clot, per pt     Diabetes Father      Cerebrovascular Disease Father      Hypertension Son      Osteoporosis Daughter      Hypertension Daughter      Cancer Sister         Colon     Glaucoma Sister     Social History     Socioeconomic History      Marital status:      Spouse name: Not on file     Number of children: 7     Years of education: Not on file     Highest education level: Not on file   Occupational History     Not on file   Tobacco Use     Smoking status: Never     Passive exposure: Never     Smokeless tobacco: Never   Vaping Use     Vaping Use: Never used   Substance and Sexual Activity     Alcohol use: Yes     Comment: rare     Drug use: No     Sexual activity: Never     Partners: Male   Other Topics Concern     Not on file   Social History Narrative    Patient is , they are retired.  Lives in assisted care for past 2 years (11/2/2016).     Social Determinants of Health     Financial Resource Strain: Not on file   Food Insecurity: Not on file   Transportation Needs: Not on file   Physical Activity: Not on file   Stress: Not on file   Social Connections: Not on file   Intimate Partner Violence: Not on file   Housing Stability: Not on file          Medications  Allergies   Current Outpatient Medications   Medication Sig Dispense Refill     acetaminophen (TYLENOL) 500 MG tablet Take 1-2 tablets (500-1,000 mg) by mouth every 6 hours as needed for pain Maximum of 6 tablets daily       atenolol (TENORMIN) 25 MG tablet Take 1 tablet (25 mg) by mouth 2 times daily 180 tablet 3     felodipine ER (PLENDIL) 2.5 MG 24 hr tablet Take 1 tablet (2.5 mg) by mouth every morning 90 tablet 3     fluticasone (FLONASE) 50 MCG/ACT nasal spray        loperamide (IMODIUM) 2 MG capsule Take 2 mg by mouth 4 times daily as needed.       LORazepam (ATIVAN) 0.5 MG tablet Take 1 tablet (0.5 mg) by mouth nightly as needed for anxiety 30 tablet 0     meclizine (ANTIVERT) 25 MG tablet Take 1 tablet (25 mg) by mouth 3 times daily as needed for dizziness 30 tablet 3     simvastatin (ZOCOR) 20 MG tablet TAKE 1 TABLET BY MOUTH ONCE DAILY 90 tablet 3     valsartan (DIOVAN) 160 MG tablet Take 1 tablet (160 mg) by mouth daily 90 tablet 3     VITAMIN D, CHOLECALCIFEROL, PO  "Take 1,000 Units by mouth daily      Allergies   Allergen Reactions     Actonel [Risedronate]      GI upset     Clindamycin      Cortisone Other (See Comments)     \"heart symptoms\", low blood pressure     Fosamax Other (See Comments)     \"burning my lower esophagus\"     Kenalog [Triamcinolone]      Metronidazole      Minipress [Prazosin]      Hypotension     Penicillin G      Statin Drugs [Statins]      Shortness of breath.  Tolerating Simvastatin (Sept 2016)     Tetanus Toxoid      Prednisone Rash     Zithromax [Azithromycin Dihydrate] Rash     States got rash after using z rubio         Lab Results    Chemistry/lipid CBC Cardiac Enzymes/BNP/TSH/INR   Lab Results   Component Value Date    CHOL 163 04/17/2023    HDL 90 04/17/2023    TRIG 71 04/17/2023    BUN 22.9 04/17/2023     (L) 04/17/2023    CO2 22 04/17/2023    Lab Results   Component Value Date    WBC 7.8 04/17/2023    HGB 13.8 04/17/2023    HCT 42.1 04/17/2023    MCV 98 04/17/2023     04/17/2023    Lab Results   Component Value Date    TROPONINI 0.01 06/14/2021     (H) 06/14/2021    TSH 3.14 02/02/2023                                           "

## 2023-07-20 NOTE — LETTER
7/20/2023    Maik Levine MD  [address] [address] [facility] [address]    RE: Mariluz Arana       Dear Colleague,     I had the pleasure of seeing Mariluz Arana in the [facility].      [facility]  [phone]          Assessment/Recommendations   Patient with known severe aortic stenosis with some reduction in left ventricular systolic function.  She has not been interested in any procedures such as TAVR but would like to continue medical therapy.  She continues with that feeling today.  Breathing has been stable, there is no pulmonary vascular congestion.  She has some intermittent light headedness but no syncopal episodes and no anginal symptoms.    We will continue her current medications and follow-up in 6 months.  She certainly could change her mind regarding TAVR and we would certainly entertain that possibility in the future if she did change her mind.    Thank you for allowing us to participate in her care.    30 minutes spent with chart review, patient visit, and documentation.       History of Present Illness/Subjective    Ms. Mariluz Arana is a 89 year old female with known severe aortic stenosis, mild reduction in left ventricular systolic function likely related to aortic stenosis, and peripheral edema.  She has been feeling about the same.  Breathing has been about the same.  She has not had orthopnea, paroxysmal nocturnal dyspnea and has some chronic peripheral edema which is well treated with compression stockings.  She has not had syncopal episodes or chest discomfort.  She does get a little lightheaded at times and has had some blood pressures which have been diastolics in the 60s which worries her.  Systolic blood pressures were all about 110.        ECG: Personally reviewed.        Physical Examination Review of Systems   /80 (BP Location: Right arm, Patient Position: Sitting, Cuff Size: Adult Regular)   Pulse 67   Resp 14   Ht 1.461  "m (4' 9.5\")   Wt 49.6 kg (109 lb 4.8 oz)   SpO2 96%   BMI 23.24 kg/m    Body mass index is 23.24 kg/m .  Wt Readings from Last 3 Encounters:   07/20/23 49.6 kg (109 lb 4.8 oz)   07/18/23 49 kg (108 lb)   04/17/23 49.3 kg (108 lb 9.6 oz)     General Appearance:   Alert, cooperative and in no acute distress.   ENT/Mouth: Pink/moist oral mucosa   EYES:  no scleral icterus, normal conjunctivae   Neck: JVP normal. No Hepatojugular reflux. Thyroid not visualized.   Chest/Lungs:   Lungs are clear to auscultation, equal chest wall expansion.   Cardiovascular:   S1, S2 with 3/6 systolic murmur , no clicks or rubs. Brachial, radial  pulses are intact and symetric.  Carotid pulses palpable   Abdomen:  Nontender.    Extremities: No cyanosis, clubbing or edema with compression stockings on.   Skin: no xanthelasma, warm.    Neurologic: normal arm movement bilateral, no tremors     Psychiatric: Appropriate affect.      Enc Vitals  BP: 138/80  Pulse: 67  Resp: 14  SpO2: 96 %  Weight: 49.6 kg (109 lb 4.8 oz)  Height: 146.1 cm (4' 9.5\")                                           Medical History  Surgical History Family History Social History   Past Medical History:   Diagnosis Date    Age-related cataract of left eye 9/21/2021    Aortic stenosis     Echocardiogram with moderate aortic stenosis September 2021    Arthritis     Osteoarthritis    Bilateral cataracts     Bilateral hand numbness 4/17/2023    Presumably carpal tunnel syndrome, previous surgery and already using wrist splints, not interested in further surgery    Chronic anxiety     Dizziness and giddiness 9/17/2012    Gastroesophageal reflux disease     GERD (gastroesophageal reflux disease)     Heart murmur     Hypercholesteremia     Hypercholesteremia     Hypercholesterolemia 12/13/2021    Hypertension     Hyponatremia     Hyponatremia 07/08/2021    Irregular heart beat     Irritable bowel     Irritable bowel     Meniere syndrome     Meniere's disease, bilateral     " Osteoarthritis of spine with radiculopathy, unspecified spinal region 07/08/2021    Osteoporosis     Peripheral edema 2/28/2023    Primary osteoarthritis of both hands 12/13/2021    SOB (shortness of breath) 07/08/2021    Squamous cell carcinoma of skin of face 7/18/2023    Venous (peripheral) insufficiency 4/20/2022    Vertigo     Past Surgical History:   Procedure Laterality Date    CATARACT EXTRACTION Left     2021    DILATION AND CURETTAGE      DILATION AND CURETTAGE      ENDOLYMPHATIC SAC OPERATION  01/01/2012    enhancement    ENHANCE ENDOLYMPHATIC SAC, DEC SIGMOID SINUS, EXTND FACIAL RECESS APPRCH, MASTOIDECTOMY, BMT, COMBO  04/23/2012    Procedure:COMBINED ENHANCE ENDOLYMPHATIC SAC, DECOMPRESS SIGMOID SINUS, EXTENDED FACIAL RECESS APPROACH, COMPLETE MASTOIDECTOMY, MYRINGOTOMY, INSERT TUBE; Left Endolymphatic Sac Enhancement, Sigmoid Sinus Decom-  pression, Extended Facial Recess Approach, Myringotomy  , Complete Mastoidectomy; Surgeon:LINN MATHIS; [facility]    INNER EAR SURGERY      Per Pt    KERATOTOMY ARCUATE WITH FEMTOSECOND LASER/IMAGING FOR ATIOL Right 07/19/2018    Procedure: KERATOTOMY ARCUATE WITH FEMTOSECOND LASER/IMAGING FOR ATIOL;  RIGHT EYE KERATOTOMY ARCUATE WITH FEMTOSECOND LASER/IMAGING FOR ATIOL ,  CAPSULOTOMY, LENS FRAGMENTATION, ARCUATE INCISIONS;  Surgeon: Roberth Chambers MD;  Location:  [facility]    MASTOIDECTOMY  01/01/2012    PHACOEMULSIFICATION CLEAR CORNEA WITH TORIC INTRAOCULAR LENS IMPLANT Right 07/19/2018    Procedure: PHACOEMULSIFICATION CLEAR CORNEA WITH TORIC INTRAOCULAR LENS IMPLANT;  RIGHT EYE PHACOEMULSIFICATION CLEAR CORNEA WITH TORIC INTRAOCULAR LENS IMPLANT ;  Surgeon: Roberth Chambers MD;  [facility]:  [facility]    TONSILLECTOMY      TONSILLECTOMY      Family History   Problem Relation Age of Onset    Heart Disease Mother     Clotting Disorder Mother         Dies of a blood clot, per pt    Diabetes Father     Cerebrovascular Disease Father      Hypertension Son     Osteoporosis Daughter     Hypertension Daughter     Cancer Sister         Colon    Glaucoma Sister     Social History     Socioeconomic History    Marital status:      Spouse name: Not on file    Number of children: 7    Years of education: Not on file    Highest education level: Not on file   Occupational History    Not on file   Tobacco Use    Smoking status: Never     Passive exposure: Never    Smokeless tobacco: Never   Vaping Use    Vaping Use: Never used   Substance and Sexual Activity    Alcohol use: Yes     Comment: rare    Drug use: No    Sexual activity: Never     Partners: Male   Other Topics Concern    Not on file   Social History Narrative    Patient is , they are retired.  Lives in assisted care for past 2 years (11/2/2016).     Social Determinants of Health     Financial Resource Strain: Not on file   Food Insecurity: Not on file   Transportation Needs: Not on file   Physical Activity: Not on file   Stress: Not on file   Social Connections: Not on file   Intimate Partner Violence: Not on file   Housing Stability: Not on file          Medications  Allergies   Current Outpatient Medications   Medication Sig Dispense Refill    acetaminophen (TYLENOL) 500 MG tablet Take 1-2 tablets (500-1,000 mg) by mouth every 6 hours as needed for pain Maximum of 6 tablets daily      atenolol (TENORMIN) 25 MG tablet Take 1 tablet (25 mg) by mouth 2 times daily 180 tablet 3    felodipine ER (PLENDIL) 2.5 MG 24 hr tablet Take 1 tablet (2.5 mg) by mouth every morning 90 tablet 3    fluticasone (FLONASE) 50 MCG/ACT nasal spray       loperamide (IMODIUM) 2 MG capsule Take 2 mg by mouth 4 times daily as needed.      LORazepam (ATIVAN) 0.5 MG tablet Take 1 tablet (0.5 mg) by mouth nightly as needed for anxiety 30 tablet 0    meclizine (ANTIVERT) 25 MG tablet Take 1 tablet (25 mg) by mouth 3 times daily as needed for dizziness 30 tablet 3    simvastatin (ZOCOR) 20 MG tablet TAKE 1 TABLET BY  "MOUTH ONCE DAILY 90 tablet 3    valsartan (DIOVAN) 160 MG tablet Take 1 tablet (160 mg) by mouth daily 90 tablet 3    VITAMIN D, CHOLECALCIFEROL, PO Take 1,000 Units by mouth daily      Allergies   Allergen Reactions    Actonel [Risedronate]      GI upset    Clindamycin     Cortisone Other (See Comments)     \"heart symptoms\", low blood pressure    Fosamax Other (See Comments)     \"burning my lower esophagus\"    Kenalog [Triamcinolone]     Metronidazole     Minipress [Prazosin]      Hypotension    Penicillin G     Statin Drugs [Statins]      Shortness of breath.  Tolerating Simvastatin (Sept 2016)    Tetanus Toxoid     Prednisone Rash    Zithromax [Azithromycin Dihydrate] Rash     States got rash after using z rubio         Lab Results    Chemistry/lipid CBC Cardiac Enzymes/BNP/TSH/INR   Lab Results   Component Value Date    CHOL 163 04/17/2023    HDL 90 04/17/2023    TRIG 71 04/17/2023    BUN 22.9 04/17/2023     (L) 04/17/2023    CO2 22 04/17/2023    Lab Results   Component Value Date    WBC 7.8 04/17/2023    HGB 13.8 04/17/2023    HCT 42.1 04/17/2023    MCV 98 04/17/2023     04/17/2023    Lab Results   Component Value Date    TROPONINI 0.01 06/14/2021     (H) 06/14/2021    TSH 3.14 02/02/2023                      Thank you for allowing me to participate in the care of your patient.      Sincerely,     Georgi Franco MD     [facility]  cc:   Georgi Franco MD  [address]  [place] [address]        "

## 2023-08-04 DIAGNOSIS — F41.9 CHRONIC ANXIETY: ICD-10-CM

## 2023-08-04 RX ORDER — LORAZEPAM 0.5 MG/1
0.5 TABLET ORAL
Qty: 30 TABLET | Refills: 0 | Status: SHIPPED | OUTPATIENT
Start: 2023-08-04 | End: 2023-09-01

## 2023-08-08 DIAGNOSIS — I10 HYPERTENSION, UNSPECIFIED TYPE: ICD-10-CM

## 2023-08-08 RX ORDER — SIMVASTATIN 20 MG
20 TABLET ORAL DAILY
Qty: 90 TABLET | Refills: 2 | Status: SHIPPED | OUTPATIENT
Start: 2023-08-08 | End: 2023-09-22

## 2023-08-08 NOTE — TELEPHONE ENCOUNTER
"Last Written Prescription Date:  07/25/2022  Last Fill Quantity: 90,  # refills: 3   Last office visit provider:  07/18/2023     Requested Prescriptions   Pending Prescriptions Disp Refills    simvastatin (ZOCOR) 20 MG tablet 90 tablet 3     Sig: Take 1 tablet (20 mg) by mouth daily       Statins Protocol Passed - 8/8/2023  3:00 PM        Passed - LDL on file in past 12 months     Recent Labs   Lab Test 04/17/23  1232   LDL 59             Passed - No abnormal creatine kinase in past 12 months     No lab results found.             Passed - Recent (12 mo) or future (30 days) visit within the authorizing provider's specialty     Patient has had an office visit with the authorizing provider or a provider within the authorizing providers department within the previous 12 mos or has a future within next 30 days. See \"Patient Info\" tab in inbasket, or \"Choose Columns\" in Meds & Orders section of the refill encounter.              Passed - Medication is active on med list        Passed - Patient is age 18 or older        Passed - No active pregnancy on record        Passed - No positive pregnancy test in past 12 months             Lupis Khan RN 08/08/23 3:00 PM  "

## 2023-08-29 ENCOUNTER — MEDICAL CORRESPONDENCE (OUTPATIENT)
Dept: HEALTH INFORMATION MANAGEMENT | Facility: CLINIC | Age: 88
End: 2023-08-29

## 2023-09-01 DIAGNOSIS — F41.9 CHRONIC ANXIETY: ICD-10-CM

## 2023-09-01 RX ORDER — LORAZEPAM 0.5 MG/1
0.5 TABLET ORAL
Qty: 30 TABLET | Refills: 0 | Status: SHIPPED | OUTPATIENT
Start: 2023-09-01 | End: 2023-09-26

## 2023-09-19 ENCOUNTER — TELEPHONE (OUTPATIENT)
Dept: INTERNAL MEDICINE | Facility: CLINIC | Age: 88
End: 2023-09-19
Payer: COMMERCIAL

## 2023-09-19 NOTE — TELEPHONE ENCOUNTER
I would recommend discontinuing felodipine and continuing to monitor blood pressure daily.  Let us know if continues to run low or if it starts running high again which is usually the issue.

## 2023-09-19 NOTE — TELEPHONE ENCOUNTER
S-(situation): Call back to inform on provider instructions    B-(background): See previous    A-(assessment): Call to patient's son to inform of message from provider re: low Bps (below 90/60) or high bps (advised for her, above 140/100)     R-(recommendations): Call back if high or low; if unusual symptoms no matter what BP reading.

## 2023-09-19 NOTE — TELEPHONE ENCOUNTER
Erin Danyarobin - calling for mother in law    Patient is not there her number is [phone]    Contacts are not available right now (Parish is in meeting)  Parish - is best contact to call for further directions [phone]    Bps today are log 98/58    102/58 P 52    Last night when she went to bed her legs were weak.    Patient is looking for guidance on what to do.    Patient has Hx of Mineres dx, last week was having issues with this being dizzy.    TAMARA Mcintyre  [employer]

## 2023-09-20 NOTE — TELEPHONE ENCOUNTER
"Relayed Dr. Quispe message.    Pt states she stopped the felodipine.     Yesterday her BP was 102/58 and 98/58.     She states she is lightheaded and dizzy \"but I always am\"    Pt states is in assisted living and only checks BP once a week.     Educated to check it daily at same time every day.     Educated to check today and to call back if under 100/60 and dizzy, light headed, chest pain, or having SOB.     Pt states she will monitor for all of this.   "

## 2023-09-20 NOTE — TELEPHONE ENCOUNTER
Pt is calling asking if she should switch taking her Valsartan to a morning dose instead a evening    Please Return call to her. .[phone] Mariluz

## 2023-09-20 NOTE — TELEPHONE ENCOUNTER
Dr. Palomares is out of the clinic today but will be back tomorrow.  Recent order to stop the felodipine.  I would not make any recommendations for further medication changes at this time, but she can check with Dr. Palomares tomorrow

## 2023-09-21 ENCOUNTER — APPOINTMENT (OUTPATIENT)
Dept: CT IMAGING | Facility: CLINIC | Age: 88
End: 2023-09-21
Attending: EMERGENCY MEDICINE
Payer: COMMERCIAL

## 2023-09-21 ENCOUNTER — HOSPITAL ENCOUNTER (OUTPATIENT)
Facility: CLINIC | Age: 88
Setting detail: OBSERVATION
Discharge: HOME HEALTH | End: 2023-09-24
Attending: EMERGENCY MEDICINE | Admitting: INTERNAL MEDICINE
Payer: COMMERCIAL

## 2023-09-21 DIAGNOSIS — M15.0 PRIMARY OSTEOARTHRITIS INVOLVING MULTIPLE JOINTS: ICD-10-CM

## 2023-09-21 DIAGNOSIS — I10 PRIMARY HYPERTENSION: Primary | ICD-10-CM

## 2023-09-21 DIAGNOSIS — R42 LIGHTHEADEDNESS: ICD-10-CM

## 2023-09-21 DIAGNOSIS — R26.81 GAIT INSTABILITY: ICD-10-CM

## 2023-09-21 DIAGNOSIS — I10 UNCONTROLLED HYPERTENSION: ICD-10-CM

## 2023-09-21 DIAGNOSIS — F41.0 GENERALIZED ANXIETY DISORDER WITH PANIC ATTACKS: ICD-10-CM

## 2023-09-21 DIAGNOSIS — R07.9 ACUTE CHEST PAIN: ICD-10-CM

## 2023-09-21 DIAGNOSIS — F41.1 GENERALIZED ANXIETY DISORDER WITH PANIC ATTACKS: ICD-10-CM

## 2023-09-21 LAB
ANION GAP SERPL CALCULATED.3IONS-SCNC: 9 MMOL/L (ref 7–15)
ATRIAL RATE - MUSE: 74 BPM
BASOPHILS # BLD AUTO: 0 10E3/UL (ref 0–0.2)
BASOPHILS NFR BLD AUTO: 0 %
BUN SERPL-MCNC: 25 MG/DL (ref 8–23)
CALCIUM SERPL-MCNC: 9.7 MG/DL (ref 8.2–9.6)
CHLORIDE SERPL-SCNC: 96 MMOL/L (ref 98–107)
CREAT SERPL-MCNC: 0.9 MG/DL (ref 0.51–0.95)
DEPRECATED HCO3 PLAS-SCNC: 25 MMOL/L (ref 22–29)
DIASTOLIC BLOOD PRESSURE - MUSE: 89 MMHG
EGFRCR SERPLBLD CKD-EPI 2021: 60 ML/MIN/1.73M2
EOSINOPHIL # BLD AUTO: 0.2 10E3/UL (ref 0–0.7)
EOSINOPHIL NFR BLD AUTO: 2 %
ERYTHROCYTE [DISTWIDTH] IN BLOOD BY AUTOMATED COUNT: 12.6 % (ref 10–15)
GLUCOSE SERPL-MCNC: 96 MG/DL (ref 70–99)
HCT VFR BLD AUTO: 39.5 % (ref 35–47)
HGB BLD-MCNC: 12.9 G/DL (ref 11.7–15.7)
IMM GRANULOCYTES # BLD: 0 10E3/UL
IMM GRANULOCYTES NFR BLD: 0 %
INTERPRETATION ECG - MUSE: NORMAL
LYMPHOCYTES # BLD AUTO: 1.1 10E3/UL (ref 0.8–5.3)
LYMPHOCYTES NFR BLD AUTO: 14 %
MCH RBC QN AUTO: 31.9 PG (ref 26.5–33)
MCHC RBC AUTO-ENTMCNC: 32.7 G/DL (ref 31.5–36.5)
MCV RBC AUTO: 98 FL (ref 78–100)
MONOCYTES # BLD AUTO: 0.6 10E3/UL (ref 0–1.3)
MONOCYTES NFR BLD AUTO: 8 %
NEUTROPHILS # BLD AUTO: 5.8 10E3/UL (ref 1.6–8.3)
NEUTROPHILS NFR BLD AUTO: 76 %
NRBC # BLD AUTO: 0 10E3/UL
NRBC BLD AUTO-RTO: 0 /100
P AXIS - MUSE: 70 DEGREES
PLATELET # BLD AUTO: 222 10E3/UL (ref 150–450)
POTASSIUM SERPL-SCNC: 4.7 MMOL/L (ref 3.4–5.3)
PR INTERVAL - MUSE: 166 MS
QRS DURATION - MUSE: 124 MS
QT - MUSE: 406 MS
QTC - MUSE: 450 MS
R AXIS - MUSE: -34 DEGREES
RBC # BLD AUTO: 4.05 10E6/UL (ref 3.8–5.2)
SODIUM SERPL-SCNC: 130 MMOL/L (ref 136–145)
SYSTOLIC BLOOD PRESSURE - MUSE: 204 MMHG
T AXIS - MUSE: 108 DEGREES
TROPONIN T SERPL HS-MCNC: 23 NG/L
TROPONIN T SERPL HS-MCNC: 30 NG/L
TROPONIN T SERPL HS-MCNC: 33 NG/L
VENTRICULAR RATE- MUSE: 74 BPM
WBC # BLD AUTO: 7.7 10E3/UL (ref 4–11)

## 2023-09-21 PROCEDURE — 85025 COMPLETE CBC W/AUTO DIFF WBC: CPT | Performed by: EMERGENCY MEDICINE

## 2023-09-21 PROCEDURE — 99285 EMERGENCY DEPT VISIT HI MDM: CPT | Mod: 25

## 2023-09-21 PROCEDURE — 84443 ASSAY THYROID STIM HORMONE: CPT | Performed by: INTERNAL MEDICINE

## 2023-09-21 PROCEDURE — 83735 ASSAY OF MAGNESIUM: CPT | Performed by: INTERNAL MEDICINE

## 2023-09-21 PROCEDURE — 80053 COMPREHEN METABOLIC PANEL: CPT | Performed by: EMERGENCY MEDICINE

## 2023-09-21 PROCEDURE — 84484 ASSAY OF TROPONIN QUANT: CPT | Performed by: EMERGENCY MEDICINE

## 2023-09-21 PROCEDURE — 82248 BILIRUBIN DIRECT: CPT | Performed by: INTERNAL MEDICINE

## 2023-09-21 PROCEDURE — 83930 ASSAY OF BLOOD OSMOLALITY: CPT | Performed by: INTERNAL MEDICINE

## 2023-09-21 PROCEDURE — 250N000013 HC RX MED GY IP 250 OP 250 PS 637: Performed by: EMERGENCY MEDICINE

## 2023-09-21 PROCEDURE — 83880 ASSAY OF NATRIURETIC PEPTIDE: CPT | Performed by: INTERNAL MEDICINE

## 2023-09-21 PROCEDURE — 93005 ELECTROCARDIOGRAM TRACING: CPT | Performed by: EMERGENCY MEDICINE

## 2023-09-21 PROCEDURE — 70450 CT HEAD/BRAIN W/O DYE: CPT

## 2023-09-21 PROCEDURE — 36415 COLL VENOUS BLD VENIPUNCTURE: CPT | Performed by: EMERGENCY MEDICINE

## 2023-09-21 RX ORDER — ACETAMINOPHEN 325 MG/1
650 TABLET ORAL ONCE
Status: COMPLETED | OUTPATIENT
Start: 2023-09-21 | End: 2023-09-21

## 2023-09-21 RX ADMIN — ACETAMINOPHEN 650 MG: 325 TABLET ORAL at 19:51

## 2023-09-21 ASSESSMENT — ACTIVITIES OF DAILY LIVING (ADL)
ADLS_ACUITY_SCORE: 38
ADLS_ACUITY_SCORE: 35
ADLS_ACUITY_SCORE: 38

## 2023-09-21 ASSESSMENT — ENCOUNTER SYMPTOMS
DIZZINESS: 1
WEAKNESS: 1
HEADACHES: 1
FATIGUE: 1

## 2023-09-21 NOTE — ED PROVIDER NOTES
EMERGENCY DEPARTMENT ENCOUNTER     NAME: Mariluz Arana   AGE: 89 year old female   YOB: 1931   MRN: [id number]   EVALUATION DATE & TIME: 9/21/2023  6:25 PM   PCP: Maik Levine     Chief Complaint   Patient presents with    Hypertension   :    FINAL IMPRESSION       1. Lightheadedness           ED COURSE & MEDICAL DECISION MAKING      Pertinent Labs & Imaging studies reviewed. (See chart for details)   89 year old female  presents to the Emergency Department for evaluation of feeling lightheaded, being concerned about her blood pressure.  On chart review, she actually had some lower blood pressures of in the 90s and around 100 systolic just recently and her doctor took her off of her extended release calcium channel blocker. Initial Vitals Reviewed. Initial exam notable for patient who initially had a blood pressure of over 200 systolic, but with no intervention other than resting in the room and some Tylenol and her blood pressure is 134/67.  She has no chest pain.  She did have some slight headache so I did a CT scan to rule out intracranial hemorrhage and this is negative.  I did labs to look for signs of hypertensive emergency, even though clinically I have a very low suspicion for this with the complete normalization of her pressures.  Her creatinine is at baseline, and her initial troponin on the high-sensitivity troponin was 23.  We repeated a delta 2 hours later and unfortunately it is raised by exactly 7 and is now 30.  Based on the protocol, with me still having a very low suspicion that this is an acute ischemic coronary event, we will repeat the troponin at 11 PM with the planned that if it does not increase 20% she can be discharged with follow-up with her PCP to discuss blood pressure medicine.        6:31 PM  ELAINA Greco student, met the patient and performed her initial interview and exam.        At the conclusion of the encounter I discussed the results of all of the tests  and the disposition. The questions were answered. The patient or family acknowledged understanding and was agreeable with the care plan.     0 minutes critical care time, see procedure note below for details if relevant    Medical Decision Making    History:  Supplemental history from: EMS  External Record(s) reviewed: Documented in chart, if applicable.    Work Up:  Chart documentation includes differential considered and any EKGs or imaging independently interpreted by provider, where specified.  In additional to work up documented, I considered the following work up: Documented in chart, if applicable.    External consultation:  Discussion of management with another provider: Documented in chart, if applicable    Complicating factors:  Care impacted by chronic illness: Hypertension and Other: hyponatremia, osteoporosis, hypomagnesemia  Care affected by social determinants of health: Access to Medical Care    Disposition considerations: Admission considered. Patient was signed out to the oncoming physician, disposition pending.        MEDICATIONS GIVEN IN THE EMERGENCY:   Medications   acetaminophen (TYLENOL) tablet 650 mg (has no administration in time range)      NEW PRESCRIPTIONS STARTED AT TODAY'S ER VISIT   New Prescriptions    No medications on file     ================================================================   HISTORY OF PRESENT ILLNESS       Patient information was obtained from: Patient    Use of Intrepreter: N/A   Mariluz Arana is a 89 year old female with history of HTN, hyponatremia, osteoporosis and hypomagnesemia who presents by EMS for HTN.    The patient had an phone call with [facility] on 9/19/23 regarding her low blood pressure of 98/58. Due to this low blood pressure, she states that she stopped taking felodipine.     Today at 4:45 PM, the patient walked down to the living room at the assisted living facility that she lives in and felt a sudden onset of weakness, non-room  spinning dizziness and fatigue. Patient was therefore sent to the ED for evaluation. She also endorses a headache but denies any other complaints at this time.     ================================================================  Review of Systems   Constitutional:  Positive for fatigue.   Neurological:  Positive for dizziness, weakness and headaches.   All other systems reviewed and are negative.      PAST HISTORY     PAST MEDICAL HISTORY:   Past Medical History:   Diagnosis Date    Age-related cataract of left eye 9/21/2021    Aortic stenosis     Echocardiogram with moderate aortic stenosis September 2021    Arthritis     Osteoarthritis    Bilateral cataracts     Bilateral hand numbness 4/17/2023    Presumably carpal tunnel syndrome, previous surgery and already using wrist splints, not interested in further surgery    Chronic anxiety     Dizziness and giddiness 9/17/2012    Gastroesophageal reflux disease     GERD (gastroesophageal reflux disease)     Heart murmur     Hypercholesteremia     Hypercholesteremia     Hypercholesterolemia 12/13/2021    Hypertension     Hyponatremia     Hyponatremia 07/08/2021    Irregular heart beat     Irritable bowel     Irritable bowel     Meniere syndrome     Meniere's disease, bilateral     Osteoarthritis of spine with radiculopathy, unspecified spinal region 07/08/2021    Osteoporosis     Peripheral edema 2/28/2023    Primary osteoarthritis of both hands 12/13/2021    SOB (shortness of breath) 07/08/2021    Squamous cell carcinoma of skin of face 7/18/2023    Venous (peripheral) insufficiency 4/20/2022    Vertigo       PAST SURGICAL HISTORY:   Past Surgical History:   Procedure Laterality Date    CATARACT EXTRACTION Left     2021    DILATION AND CURETTAGE      DILATION AND CURETTAGE      ENDOLYMPHATIC SAC OPERATION  01/01/2012    enhancement    ENHANCE ENDOLYMPHATIC SAC, DEC SIGMOID SINUS, EXTND FACIAL RECESS APPRCH, MASTOIDECTOMY, BMT, COMBO  04/23/2012    Procedure:COMBINED  "ENHANCE ENDOLYMPHATIC SAC, DECOMPRESS SIGMOID SINUS, EXTENDED FACIAL RECESS APPROACH, COMPLETE MASTOIDECTOMY, MYRINGOTOMY, INSERT TUBE; Left Endolymphatic Sac Enhancement, Sigmoid Sinus Decom-  pression, Extended Facial Recess Approach, Myringotomy  , Complete Mastoidectomy; Surgeon:LINN MATHIS; Location:[facility]    INNER EAR SURGERY      Per Pt    KERATOTOMY ARCUATE WITH FEMTOSECOND LASER/IMAGING FOR ATIOL Right 07/19/2018    Procedure: KERATOTOMY ARCUATE WITH FEMTOSECOND LASER/IMAGING FOR ATIOL;  RIGHT EYE KERATOTOMY ARCUATE WITH FEMTOSECOND LASER/IMAGING FOR ATIOL ,  CAPSULOTOMY, LENS FRAGMENTATION, ARCUATE INCISIONS;  Surgeon: Roberth Chambers MD;  Location: [facility]    MASTOIDECTOMY  01/01/2012    PHACOEMULSIFICATION CLEAR CORNEA WITH TORIC INTRAOCULAR LENS IMPLANT Right 07/19/2018    Procedure: PHACOEMULSIFICATION CLEAR CORNEA WITH TORIC INTRAOCULAR LENS IMPLANT;  RIGHT EYE PHACOEMULSIFICATION CLEAR CORNEA WITH TORIC INTRAOCULAR LENS IMPLANT ;  Surgeon: Roberth Chambers MD;  Location: [facility]    TONSILLECTOMY      TONSILLECTOMY        CURRENT MEDICATIONS:   acetaminophen (TYLENOL) 500 MG tablet  atenolol (TENORMIN) 25 MG tablet  felodipine ER (PLENDIL) 2.5 MG 24 hr tablet  fluticasone (FLONASE) 50 MCG/ACT nasal spray  loperamide (IMODIUM) 2 MG capsule  LORazepam (ATIVAN) 0.5 MG tablet  meclizine (ANTIVERT) 25 MG tablet  simvastatin (ZOCOR) 20 MG tablet  valsartan (DIOVAN) 160 MG tablet  VITAMIN D, CHOLECALCIFEROL, PO      ALLERGIES:   Allergies   Allergen Reactions    Actonel [Risedronate]      GI upset    Clindamycin     Cortisone Other (See Comments)     \"heart symptoms\", low blood pressure    Fosamax Other (See Comments)     \"burning my lower esophagus\"    Kenalog [Triamcinolone]     Metronidazole     Minipress [Prazosin]      Hypotension    Penicillin G     Statin Drugs [Statins]      Shortness of breath.  Tolerating Simvastatin (Sept 2016)    Tetanus Toxoid     Prednisone Rash    Zithromax " [Azithromycin Dihydrate] Rash     States got rash after using z rubio      FAMILY HISTORY:   Family History   Problem Relation Age of Onset    Heart Disease Mother     Clotting Disorder Mother         Dies of a blood clot, per pt    Diabetes Father     Cerebrovascular Disease Father     Hypertension Son     Osteoporosis Daughter     Hypertension Daughter     Cancer Sister         Colon    Glaucoma Sister       SOCIAL HISTORY:   Social History     Socioeconomic History    Marital status:     Number of children: 7   Tobacco Use    Smoking status: Never     Passive exposure: Never    Smokeless tobacco: Never   Vaping Use    Vaping Use: Never used   Substance and Sexual Activity    Alcohol use: Yes     Comment: rare    Drug use: No    Sexual activity: Never     Partners: Male   Social History Narrative    Patient is , they are retired.  Lives in assisted care for past 2 years (11/2/2016).        VITALS  Patient Vitals for the past 24 hrs:   BP Temp Temp src Pulse Resp SpO2   09/21/23 1832 -- -- -- -- -- 100 %   09/21/23 1830 (!) 204/89 -- -- 78 25 --   09/21/23 1827 (!) 192/116 98.1  F (36.7  C) Oral 79 16 --        ================================================================    PHYSICAL EXAM     VITAL SIGNS: BP (!) 204/89   Pulse 78   Temp 98.1  F (36.7  C) (Oral)   Resp 25   SpO2 100%    Constitutional:  Awake, no acute distress   HENT:  Atraumatic, oropharynx without exudate or erythema, membranes moist  Lymph:  No adenopathy  Eyes: EOM intact, PERRL, no injection  Neck: Supple  Respiratory:  Clear to auscultation bilaterally, no wheezes or crackles   Cardiovascular:  Regular rate and rhythm, single S1 and S2   GI:  Soft, nontender, nondistended, no rebound or guarding   Musculoskeletal:  Moves all extremities, no lower extremity edema, no deformities    Skin:  Warm, dry  Neurologic:  Alert and oriented x3, no focal deficits noted        ================================================================  LAB       All pertinent labs reviewed and interpreted.   Labs Ordered and Resulted from Time of ED Arrival to Time of ED Departure   BASIC METABOLIC PANEL - Abnormal       Result Value    Sodium 130 (*)     Potassium 4.7      Chloride 96 (*)     Carbon Dioxide (CO2) 25      Anion Gap 9      Urea Nitrogen 25.0 (*)     Creatinine 0.90      Calcium 9.7 (*)     Glucose 96      GFR Estimate 60 (*)    TROPONIN T, HIGH SENSITIVITY - Abnormal    Troponin T, High Sensitivity 23 (*)    TROPONIN T, HIGH SENSITIVITY - Abnormal    Troponin T, High Sensitivity 30 (*)    CBC WITH PLATELETS AND DIFFERENTIAL    WBC Count 7.7      RBC Count 4.05      Hemoglobin 12.9      Hematocrit 39.5      MCV 98      MCH 31.9      MCHC 32.7      RDW 12.6      Platelet Count 222      % Neutrophils 76      % Lymphocytes 14      % Monocytes 8      % Eosinophils 2      % Basophils 0      % Immature Granulocytes 0      NRBCs per 100 WBC 0      Absolute Neutrophils 5.8      Absolute Lymphocytes 1.1      Absolute Monocytes 0.6      Absolute Eosinophils 0.2      Absolute Basophils 0.0      Absolute Immature Granulocytes 0.0      Absolute NRBCs 0.0     TROPONIN T, HIGH SENSITIVITY        ===============================================================  RADIOLOGY       Reviewed all pertinent imaging. Please see official radiology report.   Head CT w/o contrast   Final Result   IMPRESSION:   1.  No CT evidence for acute intracranial process.   2.  Mild chronic microvascular ischemic changes as above.            ================================================================  EKG   EKG reviewed and interpreted by me shows sinus rhythm with a rate of 74, left axis, QTc 450 with some lateral T wave inversions that look unchanged since September 2022      I have independently reviewed and interpreted the EKG(s) documented above.      ================================================================  PROCEDURES         I, Ceciliasheri SNOWDEN Amy, am serving as a scribe to document services personally performed by Dr. May based on my observation and the provider's statements to me. I, Barbara May MD attest that Consuelo ISI Amy is acting in a scribe capacity, has observed my performance of the services and has documented them in accordance with my direction.   Barbara May M.D.   Emergency Medicine   [facility] EMERGENCY ROOM  [address] [facility] [address]  [phone]  Dept: [phone]      Barbara May MD  09/21/23 5527

## 2023-09-21 NOTE — ED TRIAGE NOTES
Pt presents to the ED via EMS from assisted living. Pt was taken off felodipine and has since developed headache and weakness. BP >200 systolic at home and sent in for evaluation. Still taking atenolol.      Triage Assessment       Row Name 09/21/23 7495       Triage Assessment (Adult)    Airway WDL WDL       Respiratory WDL    Respiratory WDL WDL       Skin Circulation/Temperature WDL    Skin Circulation/Temperature WDL WDL       Cardiac WDL    Cardiac WDL X  HTN       Peripheral/Neurovascular WDL    Peripheral Neurovascular WDL WDL       Cognitive/Neuro/Behavioral WDL    Cognitive/Neuro/Behavioral WDL WDL

## 2023-09-22 ENCOUNTER — APPOINTMENT (OUTPATIENT)
Dept: CARDIOLOGY | Facility: CLINIC | Age: 88
End: 2023-09-22
Attending: INTERNAL MEDICINE
Payer: COMMERCIAL

## 2023-09-22 ENCOUNTER — APPOINTMENT (OUTPATIENT)
Dept: PHYSICAL THERAPY | Facility: CLINIC | Age: 88
End: 2023-09-22
Attending: FAMILY MEDICINE
Payer: COMMERCIAL

## 2023-09-22 ENCOUNTER — APPOINTMENT (OUTPATIENT)
Dept: RADIOLOGY | Facility: CLINIC | Age: 88
End: 2023-09-22
Attending: INTERNAL MEDICINE
Payer: COMMERCIAL

## 2023-09-22 PROBLEM — R07.9 ACUTE CHEST PAIN: Status: ACTIVE | Noted: 2023-09-22

## 2023-09-22 PROBLEM — R42 LIGHTHEADEDNESS: Status: ACTIVE | Noted: 2023-09-22

## 2023-09-22 PROBLEM — I10 UNCONTROLLED HYPERTENSION: Status: ACTIVE | Noted: 2023-09-22

## 2023-09-22 LAB
ALBUMIN SERPL BCG-MCNC: 3.5 G/DL (ref 3.5–5.2)
ALBUMIN SERPL BCG-MCNC: 3.7 G/DL (ref 3.5–5.2)
ALBUMIN UR-MCNC: NEGATIVE MG/DL
ALP SERPL-CCNC: 61 U/L (ref 35–104)
ALP SERPL-CCNC: 70 U/L (ref 35–104)
ALT SERPL W P-5'-P-CCNC: 6 U/L (ref 0–50)
ALT SERPL W P-5'-P-CCNC: <5 U/L (ref 0–50)
ANION GAP SERPL CALCULATED.3IONS-SCNC: 12 MMOL/L (ref 7–15)
APPEARANCE UR: CLEAR
AST SERPL W P-5'-P-CCNC: 21 U/L (ref 0–45)
AST SERPL W P-5'-P-CCNC: 23 U/L (ref 0–45)
ATRIAL RATE - MUSE: 62 BPM
BASOPHILS # BLD AUTO: 0 10E3/UL (ref 0–0.2)
BASOPHILS NFR BLD AUTO: 0 %
BILIRUB DIRECT SERPL-MCNC: <0.2 MG/DL (ref 0–0.3)
BILIRUB SERPL-MCNC: 0.4 MG/DL
BILIRUB SERPL-MCNC: 0.6 MG/DL
BILIRUB UR QL STRIP: NEGATIVE
BUN SERPL-MCNC: 17.6 MG/DL (ref 8–23)
CALCIUM SERPL-MCNC: 9 MG/DL (ref 8.2–9.6)
CHLORIDE SERPL-SCNC: 98 MMOL/L (ref 98–107)
COLOR UR AUTO: COLORLESS
CREAT SERPL-MCNC: 0.75 MG/DL (ref 0.51–0.95)
DEPRECATED HCO3 PLAS-SCNC: 24 MMOL/L (ref 22–29)
DIASTOLIC BLOOD PRESSURE - MUSE: 88 MMHG
EGFRCR SERPLBLD CKD-EPI 2021: 74 ML/MIN/1.73M2
EOSINOPHIL # BLD AUTO: 0.2 10E3/UL (ref 0–0.7)
EOSINOPHIL NFR BLD AUTO: 3 %
ERYTHROCYTE [DISTWIDTH] IN BLOOD BY AUTOMATED COUNT: 12.6 % (ref 10–15)
GLUCOSE SERPL-MCNC: 78 MG/DL (ref 70–99)
GLUCOSE UR STRIP-MCNC: NEGATIVE MG/DL
HCT VFR BLD AUTO: 34.4 % (ref 35–47)
HGB BLD-MCNC: 11.2 G/DL (ref 11.7–15.7)
HGB UR QL STRIP: NEGATIVE
IMM GRANULOCYTES # BLD: 0 10E3/UL
IMM GRANULOCYTES NFR BLD: 0 %
INTERPRETATION ECG - MUSE: NORMAL
KETONES UR STRIP-MCNC: NEGATIVE MG/DL
LEUKOCYTE ESTERASE UR QL STRIP: NEGATIVE
LYMPHOCYTES # BLD AUTO: 1.5 10E3/UL (ref 0.8–5.3)
LYMPHOCYTES NFR BLD AUTO: 22 %
MAGNESIUM SERPL-MCNC: 1.7 MG/DL (ref 1.7–2.3)
MCH RBC QN AUTO: 31.7 PG (ref 26.5–33)
MCHC RBC AUTO-ENTMCNC: 32.6 G/DL (ref 31.5–36.5)
MCV RBC AUTO: 98 FL (ref 78–100)
MONOCYTES # BLD AUTO: 0.8 10E3/UL (ref 0–1.3)
MONOCYTES NFR BLD AUTO: 11 %
NEUTROPHILS # BLD AUTO: 4.2 10E3/UL (ref 1.6–8.3)
NEUTROPHILS NFR BLD AUTO: 64 %
NITRATE UR QL: NEGATIVE
NRBC # BLD AUTO: 0 10E3/UL
NRBC BLD AUTO-RTO: 0 /100
NT-PROBNP SERPL-MCNC: 3883 PG/ML (ref 0–1800)
OSMOLALITY SERPL: 276 MMOL/KG (ref 280–301)
OSMOLALITY UR: 274 MMOL/KG (ref 100–1200)
P AXIS - MUSE: 63 DEGREES
PH UR STRIP: 6.5 [PH] (ref 5–7)
PLATELET # BLD AUTO: 189 10E3/UL (ref 150–450)
POTASSIUM SERPL-SCNC: 4 MMOL/L (ref 3.4–5.3)
PR INTERVAL - MUSE: 180 MS
PROT SERPL-MCNC: 5.9 G/DL (ref 6.4–8.3)
PROT SERPL-MCNC: 6.1 G/DL (ref 6.4–8.3)
QRS DURATION - MUSE: 124 MS
QT - MUSE: 424 MS
QTC - MUSE: 430 MS
R AXIS - MUSE: -40 DEGREES
RBC # BLD AUTO: 3.53 10E6/UL (ref 3.8–5.2)
RBC URINE: 2 /HPF
SODIUM SERPL-SCNC: 134 MMOL/L (ref 136–145)
SODIUM UR-SCNC: 67 MMOL/L
SP GR UR STRIP: 1.01 (ref 1–1.03)
SQUAMOUS EPITHELIAL: 1 /HPF
SYSTOLIC BLOOD PRESSURE - MUSE: 188 MMHG
T AXIS - MUSE: 102 DEGREES
TSH SERPL DL<=0.005 MIU/L-ACNC: 2.15 UIU/ML (ref 0.3–4.2)
UROBILINOGEN UR STRIP-MCNC: <2 MG/DL
VENTRICULAR RATE- MUSE: 62 BPM
WBC # BLD AUTO: 6.7 10E3/UL (ref 4–11)
WBC URINE: 3 /HPF

## 2023-09-22 PROCEDURE — 71045 X-RAY EXAM CHEST 1 VIEW: CPT

## 2023-09-22 PROCEDURE — G0378 HOSPITAL OBSERVATION PER HR: HCPCS

## 2023-09-22 PROCEDURE — 97116 GAIT TRAINING THERAPY: CPT | Mod: GP

## 2023-09-22 PROCEDURE — 99418 PROLNG IP/OBS E/M EA 15 MIN: CPT | Performed by: INTERNAL MEDICINE

## 2023-09-22 PROCEDURE — 84300 ASSAY OF URINE SODIUM: CPT | Performed by: INTERNAL MEDICINE

## 2023-09-22 PROCEDURE — 999N000208 ECHOCARDIOGRAM COMPLETE

## 2023-09-22 PROCEDURE — 250N000013 HC RX MED GY IP 250 OP 250 PS 637: Performed by: INTERNAL MEDICINE

## 2023-09-22 PROCEDURE — 81001 URINALYSIS AUTO W/SCOPE: CPT | Performed by: INTERNAL MEDICINE

## 2023-09-22 PROCEDURE — 99207 PR NO BILLABLE SERVICE THIS VISIT: CPT | Performed by: FAMILY MEDICINE

## 2023-09-22 PROCEDURE — 93005 ELECTROCARDIOGRAM TRACING: CPT | Performed by: EMERGENCY MEDICINE

## 2023-09-22 PROCEDURE — 99222 1ST HOSP IP/OBS MODERATE 55: CPT | Mod: 25 | Performed by: INTERNAL MEDICINE

## 2023-09-22 PROCEDURE — 250N000013 HC RX MED GY IP 250 OP 250 PS 637: Performed by: EMERGENCY MEDICINE

## 2023-09-22 PROCEDURE — 250N000013 HC RX MED GY IP 250 OP 250 PS 637: Performed by: HOSPITALIST

## 2023-09-22 PROCEDURE — 97530 THERAPEUTIC ACTIVITIES: CPT | Mod: GP

## 2023-09-22 PROCEDURE — 255N000002 HC RX 255 OP 636: Performed by: FAMILY MEDICINE

## 2023-09-22 PROCEDURE — 85025 COMPLETE CBC W/AUTO DIFF WBC: CPT | Performed by: INTERNAL MEDICINE

## 2023-09-22 PROCEDURE — 36415 COLL VENOUS BLD VENIPUNCTURE: CPT | Performed by: INTERNAL MEDICINE

## 2023-09-22 PROCEDURE — 80053 COMPREHEN METABOLIC PANEL: CPT | Performed by: INTERNAL MEDICINE

## 2023-09-22 PROCEDURE — 83935 ASSAY OF URINE OSMOLALITY: CPT | Performed by: INTERNAL MEDICINE

## 2023-09-22 PROCEDURE — 97161 PT EVAL LOW COMPLEX 20 MIN: CPT | Mod: GP

## 2023-09-22 PROCEDURE — 250N000013 HC RX MED GY IP 250 OP 250 PS 637: Performed by: FAMILY MEDICINE

## 2023-09-22 PROCEDURE — 99223 1ST HOSP IP/OBS HIGH 75: CPT | Performed by: INTERNAL MEDICINE

## 2023-09-22 PROCEDURE — 93306 TTE W/DOPPLER COMPLETE: CPT | Mod: 26 | Performed by: INTERNAL MEDICINE

## 2023-09-22 RX ORDER — FELODIPINE 2.5 MG/1
2.5 TABLET, EXTENDED RELEASE ORAL ONCE
Status: COMPLETED | OUTPATIENT
Start: 2023-09-22 | End: 2023-09-22

## 2023-09-22 RX ORDER — ACETAMINOPHEN 650 MG/1
650 SUPPOSITORY RECTAL EVERY 6 HOURS PRN
Status: DISCONTINUED | OUTPATIENT
Start: 2023-09-22 | End: 2023-09-24 | Stop reason: HOSPADM

## 2023-09-22 RX ORDER — MECLIZINE HYDROCHLORIDE 25 MG/1
25 TABLET ORAL 3 TIMES DAILY PRN
Status: DISCONTINUED | OUTPATIENT
Start: 2023-09-22 | End: 2023-09-24 | Stop reason: HOSPADM

## 2023-09-22 RX ORDER — LORAZEPAM 0.5 MG/1
0.5 TABLET ORAL
Status: COMPLETED | OUTPATIENT
Start: 2023-09-22 | End: 2023-09-22

## 2023-09-22 RX ORDER — VALSARTAN 160 MG/1
160 TABLET ORAL EVERY MORNING
COMMUNITY
End: 2023-10-05

## 2023-09-22 RX ORDER — SIMVASTATIN 20 MG
20 TABLET ORAL EVERY EVENING
Status: DISCONTINUED | OUTPATIENT
Start: 2023-09-22 | End: 2023-09-23

## 2023-09-22 RX ORDER — ONDANSETRON 2 MG/ML
4 INJECTION INTRAMUSCULAR; INTRAVENOUS EVERY 6 HOURS PRN
Status: DISCONTINUED | OUTPATIENT
Start: 2023-09-22 | End: 2023-09-24 | Stop reason: HOSPADM

## 2023-09-22 RX ORDER — ACETAMINOPHEN 325 MG/1
650 TABLET ORAL EVERY 4 HOURS PRN
Status: DISCONTINUED | OUTPATIENT
Start: 2023-09-22 | End: 2023-09-24 | Stop reason: HOSPADM

## 2023-09-22 RX ORDER — FELODIPINE 2.5 MG/1
2.5 TABLET, EXTENDED RELEASE ORAL DAILY
Status: DISCONTINUED | OUTPATIENT
Start: 2023-09-23 | End: 2023-09-24 | Stop reason: HOSPADM

## 2023-09-22 RX ORDER — ONDANSETRON 4 MG/1
4 TABLET, ORALLY DISINTEGRATING ORAL EVERY 6 HOURS PRN
Status: DISCONTINUED | OUTPATIENT
Start: 2023-09-22 | End: 2023-09-24 | Stop reason: HOSPADM

## 2023-09-22 RX ORDER — VALSARTAN 80 MG/1
80 TABLET ORAL EVERY MORNING
Status: DISCONTINUED | OUTPATIENT
Start: 2023-09-22 | End: 2023-09-23

## 2023-09-22 RX ORDER — SIMVASTATIN 20 MG
20 TABLET ORAL AT BEDTIME
COMMUNITY
End: 2024-04-23

## 2023-09-22 RX ORDER — VALSARTAN 80 MG/1
160 TABLET ORAL EVERY MORNING
Status: DISCONTINUED | OUTPATIENT
Start: 2023-09-22 | End: 2023-09-22

## 2023-09-22 RX ADMIN — LORAZEPAM 0.5 MG: 0.5 TABLET ORAL at 01:44

## 2023-09-22 RX ADMIN — FELODIPINE 2.5 MG: 2.5 TABLET, FILM COATED, EXTENDED RELEASE ORAL at 02:24

## 2023-09-22 RX ADMIN — SIMVASTATIN 20 MG: 20 TABLET, FILM COATED ORAL at 21:45

## 2023-09-22 RX ADMIN — ACETAMINOPHEN 650 MG: 325 TABLET ORAL at 02:27

## 2023-09-22 RX ADMIN — VALSARTAN 80 MG: 80 TABLET, FILM COATED ORAL at 13:18

## 2023-09-22 RX ADMIN — LORAZEPAM 0.5 MG: 0.5 TABLET ORAL at 21:45

## 2023-09-22 RX ADMIN — ACETAMINOPHEN 650 MG: 325 TABLET ORAL at 16:02

## 2023-09-22 RX ADMIN — PERFLUTREN 2 ML: 6.52 INJECTION, SUSPENSION INTRAVENOUS at 08:48

## 2023-09-22 RX ADMIN — ACETAMINOPHEN 650 MG: 325 TABLET ORAL at 21:45

## 2023-09-22 RX ADMIN — ACETAMINOPHEN 650 MG: 325 TABLET ORAL at 11:16

## 2023-09-22 ASSESSMENT — ACTIVITIES OF DAILY LIVING (ADL)
ADLS_ACUITY_SCORE: 38
ADLS_ACUITY_SCORE: 42
ADLS_ACUITY_SCORE: 38
DEPENDENT_IADLS:: CLEANING;COOKING;LAUNDRY;MEAL PREPARATION;TRANSPORTATION
ADLS_ACUITY_SCORE: 43
ADLS_ACUITY_SCORE: 43
ADLS_ACUITY_SCORE: 38
ADLS_ACUITY_SCORE: 36
ADLS_ACUITY_SCORE: 43
ADLS_ACUITY_SCORE: 42
ADLS_ACUITY_SCORE: 38

## 2023-09-22 NOTE — CONSULTS
HEART CARE CONSULTATON NOTE        Assessment/Recommendations   Assessment:  Severe aortic stenosis (Echo 9/2022: SHANIA:0.6 cm2, Peak spring: 3.5 m/sec, DI: 0.2, SVi: 36 ml/m2)  Weakness, fatigue  HFmrEF  Elevated troponin, likely T2 demand ischemia  Hypertension  Meniere Disease    Plan:  Echocardiogram   Consider TAVR as outpatient  Hold home atenolol due to HR 50-60s  Continue felodipine 2.5 mg, valsartan 160 mg and will continue to monitor blood pressures here.  Consider prn hydralazine as outpatient        History of Present Illness/Subjective    HPI: Mariluz Arana is a 89 year old female with PMH severe aortic stenosis, GERD, HLD, HTN, Meniere's disease who is admitted for hypertension with initial /100s. She had held her felodipine due to BP of 100/50s, and then her BP increased to >180 systolic. No new symptoms.     She follows with Dr. Franco. Has had progressive weakness and fatigue. No chest pain, palpitations, orthopnea, PND, new HA, vision changes, falls. In February, she moved to an assisted living facility, but remains mostly independent. She is hesitant to pursue any further interventions for her aortic stenosis, but also expresses that she would like to be able to remain independent and that her fatigue and weakness are starting to prevent her from doing this.        Physical Examination  Review of Systems   VITALS: /65   Pulse 53   Temp 98.6  F (37  C) (Oral)   Resp 18   SpO2 98%   BMI: There is no height or weight on file to calculate BMI.  Wt Readings from Last 3 Encounters:   07/20/23 49.6 kg (109 lb 4.8 oz)   07/18/23 49 kg (108 lb)   04/17/23 49.3 kg (108 lb 9.6 oz)     No intake or output data in the 24 hours ending 09/22/23 0820  General Appearance:   no distress, normal body habitus   ENT/Mouth: membranes moist, no oral lesions or bleeding gums.      EYES:  no scleral icterus, normal conjunctivae   Neck: no carotid bruits or thyromegaly   Chest/Lungs:   lungs are clear to  auscultation, no rales or wheezing   Cardiovascular:   Regular. Normal first and second heart sounds with 3/6 systolic murmur, Trace edema bilaterally    Abdomen:  no organomegaly, masses, bruits, or tenderness; bowel sounds are present   Extremities: no cyanosis or clubbing   Skin: no xanthelasma, warm.    Neurologic: no tremors     Psychiatric: alert and oriented x3, calm     Review Of Systems  Skin: negative  Eyes: negative  Ears/Nose/Throat: negative  Respiratory: No shortness of breath, dyspnea on exertion, cough, or hemoptysis  Cardiovascular: negative  Gastrointestinal: negative  Genitourinary: negative  Musculoskeletal: negative  Neurologic: negative  Psychiatric: negative  Hematologic/Lymphatic/Immunologic: negative  Endocrine: negative          Lab Results    Chemistry/lipid CBC Cardiac Enzymes/BNP/TSH/INR   Recent Labs   Lab Test 04/17/23  1232   CHOL 163   HDL 90   LDL 59   TRIG 71     Recent Labs   Lab Test 04/17/23  1232 12/13/21  1557   LDL 59 55     Recent Labs   Lab Test 09/22/23  0606   *   POTASSIUM 4.0   CHLORIDE 98   CO2 24   GLC 78   BUN 17.6   CR 0.75   GFRESTIMATED 74   LILLI 9.0     Recent Labs   Lab Test 09/22/23  0606 09/21/23  1859 04/17/23  1232   CR 0.75 0.90 0.91     No results for input(s): A1C in the last 76393 hours.       Recent Labs   Lab Test 09/22/23  0606   WBC 6.7   HGB 11.2*   HCT 34.4*   MCV 98        Recent Labs   Lab Test 09/22/23  0606 09/21/23  1859 04/17/23  1232   HGB 11.2* 12.9 13.8    Recent Labs   Lab Test 06/14/21  1718 11/21/19  2150 11/21/19  1527   TROPONINI 0.01 <0.01 0.01     Recent Labs   Lab Test 09/21/23  2254 06/14/21  1718 11/21/19  1117 09/18/18  1752   BNP  --  519* 314* 216*   NTBNP 3,883*  --   --   --      Recent Labs   Lab Test 09/21/23  2254   TSH 2.15     No results for input(s): INR in the last 10594 hours.     Medical History  Surgical History Family History Social History   Past Medical History:   Diagnosis Date    Age-related  cataract of left eye 9/21/2021    Aortic stenosis     Echocardiogram with moderate aortic stenosis September 2021    Arthritis     Osteoarthritis    Bilateral cataracts     Bilateral hand numbness 4/17/2023    Presumably carpal tunnel syndrome, previous surgery and already using wrist splints, not interested in further surgery    Chronic anxiety     Dizziness and giddiness 9/17/2012    Gastroesophageal reflux disease     GERD (gastroesophageal reflux disease)     Heart murmur     Hypercholesteremia     Hypercholesteremia     Hypercholesterolemia 12/13/2021    Hypertension     Hyponatremia     Hyponatremia 07/08/2021    Irregular heart beat     Irritable bowel     Irritable bowel     Meniere syndrome     Meniere's disease, bilateral     Osteoarthritis of spine with radiculopathy, unspecified spinal region 07/08/2021    Osteoporosis     Peripheral edema 2/28/2023    Primary osteoarthritis of both hands 12/13/2021    SOB (shortness of breath) 07/08/2021    Squamous cell carcinoma of skin of face 7/18/2023    Venous (peripheral) insufficiency 4/20/2022    Vertigo      Past Surgical History:   Procedure Laterality Date    CATARACT EXTRACTION Left     2021    DILATION AND CURETTAGE      DILATION AND CURETTAGE      ENDOLYMPHATIC SAC OPERATION  01/01/2012    enhancement    ENHANCE ENDOLYMPHATIC SAC, DEC SIGMOID SINUS, EXTND FACIAL RECESS APPRCH, MASTOIDECTOMY, BMT, COMBO  04/23/2012    Procedure:COMBINED ENHANCE ENDOLYMPHATIC SAC, DECOMPRESS SIGMOID SINUS, EXTENDED FACIAL RECESS APPROACH, COMPLETE MASTOIDECTOMY, MYRINGOTOMY, INSERT TUBE; Left Endolymphatic Sac Enhancement, Sigmoid Sinus Decom-  pression, Extended Facial Recess Approach, Myringotomy  , Complete Mastoidectomy; Surgeon:LINN MATHIS; Location:[facility]    INNER EAR SURGERY      Per Pt    KERATOTOMY ARCUATE WITH FEMTOSECOND LASER/IMAGING FOR ATIOL Right 07/19/2018    Procedure: KERATOTOMY ARCUATE WITH FEMTOSECOND LASER/IMAGING FOR ATIOL;  RIGHT EYE KERATOTOMY  ARCUATE WITH FEMTOSECOND LASER/IMAGING FOR ATIOL ,  CAPSULOTOMY, LENS FRAGMENTATION, ARCUATE INCISIONS;  Surgeon: Roberth Chambers MD;  Location:  [facility]    MASTOIDECTOMY  01/01/2012    PHACOEMULSIFICATION CLEAR CORNEA WITH TORIC INTRAOCULAR LENS IMPLANT Right 07/19/2018    Procedure: PHACOEMULSIFICATION CLEAR CORNEA WITH TORIC INTRAOCULAR LENS IMPLANT;  RIGHT EYE PHACOEMULSIFICATION CLEAR CORNEA WITH TORIC INTRAOCULAR LENS IMPLANT ;  Surgeon: Roberth Chambers MD;  Location:  [facility]    TONSILLECTOMY      TONSILLECTOMY       Family History   Problem Relation Age of Onset    Heart Disease Mother     Clotting Disorder Mother         Dies of a blood clot, per pt    Diabetes Father     Cerebrovascular Disease Father     Hypertension Son     Osteoporosis Daughter     Hypertension Daughter     Cancer Sister         Colon    Glaucoma Sister         Social History     Socioeconomic History    Marital status:      Spouse name: Not on file    Number of children: 7    Years of education: Not on file    Highest education level: Not on file   Occupational History    Not on file   Tobacco Use    Smoking status: Never     Passive exposure: Never    Smokeless tobacco: Never   Vaping Use    Vaping Use: Never used   Substance and Sexual Activity    Alcohol use: Yes     Comment: rare    Drug use: No    Sexual activity: Never     Partners: Male   Other Topics Concern    Not on file   Social History Narrative    Patient is , they are retired.  Lives in assisted care for past 2 years (11/2/2016).     Social Determinants of Health     Financial Resource Strain: Not on file   Food Insecurity: Not on file   Transportation Needs: Not on file   Physical Activity: Not on file   Stress: Not on file   Social Connections: Not on file   Interpersonal Safety: Not on file   Housing Stability: Not on file         Medications  Allergies   Current Outpatient Medications   Medication Sig Dispense Refill    acetaminophen  "(TYLENOL) 500 MG tablet Take 1-2 tablets (500-1,000 mg) by mouth every 6 hours as needed for pain Maximum of 6 tablets daily      atenolol (TENORMIN) 25 MG tablet Take 1 tablet (25 mg) by mouth 2 times daily 180 tablet 3    felodipine ER (PLENDIL) 2.5 MG 24 hr tablet Take 1 tablet (2.5 mg) by mouth every morning 90 tablet 3    fluticasone (FLONASE) 50 MCG/ACT nasal spray       loperamide (IMODIUM) 2 MG capsule Take 2 mg by mouth 4 times daily as needed.      LORazepam (ATIVAN) 0.5 MG tablet Take 1 tablet (0.5 mg) by mouth nightly as needed for anxiety 30 tablet 0    meclizine (ANTIVERT) 25 MG tablet Take 1 tablet (25 mg) by mouth 3 times daily as needed for dizziness 30 tablet 3    simvastatin (ZOCOR) 20 MG tablet Take 1 tablet (20 mg) by mouth daily 90 tablet 2    valsartan (DIOVAN) 160 MG tablet Take 1 tablet (160 mg) by mouth daily 90 tablet 3    VITAMIN D, CHOLECALCIFEROL, PO Take 1,000 Units by mouth daily          Allergies   Allergen Reactions    Actonel [Risedronate]      GI upset    Clindamycin     Cortisone Other (See Comments)     \"heart symptoms\", low blood pressure    Fosamax Other (See Comments)     \"burning my lower esophagus\"    Kenalog [Triamcinolone]     Metronidazole     Minipress [Prazosin]      Hypotension    Penicillin G     Statin Drugs [Statins]      Shortness of breath.  Tolerating Simvastatin (Sept 2016)    Tetanus Toxoid     Prednisone Rash    Zithromax [Azithromycin Dihydrate] Rash     States got rash after using ivana Pan DO PGY2    Discussed with attending, Dr. Nunn.     Patient seen and examined with Dr Pan. Agree with HPI, physical exam, assessment and plan.  Spent 60 minutes.         "

## 2023-09-22 NOTE — PROGRESS NOTES
09/22/23 5285   Appointment Info   Signing Clinician's Name / Credentials (PT) ATTILA Mccarthy   Student Supervision Therapy services provided with the co-signing licensed therapist guiding and directing the services, and providing the skilled judgement and assessment throughout the session   Living Environment   People in Home alone   Current Living Arrangements assisted living   Home Accessibility no concerns   Transportation Anticipated family or friend will provide   Self-Care   Usual Activity Tolerance good   Current Activity Tolerance moderate   Equipment Currently Used at Home walker, rolling  (4WW)   Fall history within last six months no   General Information   Onset of Illness/Injury or Date of Surgery 09/21/23   Referring Physician Horacio Machado MD   Patient/Family Therapy Goals Statement (PT) Get back home   Pertinent History of Current Problem (include personal factors and/or comorbidities that impact the POC) Uncontrolled HTN   Existing Precautions/Restrictions no known precautions/restrictions   Cognition   Affect/Mental Status (Cognition) agitated   Bed Mobility   Bed Mobility rolling left;supine-sit;sit-supine   Rolling Left [place] (Bed Mobility) moderate assist (50% patient effort);verbal cues   Supine-Sit [place] (Bed Mobility) contact guard;verbal cues   Sit-Supine [place] (Bed Mobility) contact guard;verbal cues   Bed Mobility Limitations decreased ability to use legs for bridging/pushing;decreased ability to use arms for pushing/pulling   Impairments Contributing to Impaired Bed Mobility decreased strength   Assistive Device (Bed Mobility) draw sheet   Comment, (Bed Mobility) Difficult to stand from bed due to height   Transfers   Transfers sit-stand transfer   Maintains Weight-bearing Status (Transfers) able to maintain   Transfer Safety Concerns Noted decreased balance during turns   Impairments Contributing to Impaired Transfers impaired balance;decreased strength  "  Sit-Stand Transfer   Sit-Stand [place] (Transfers) minimum assist (75% patient effort);verbal cues;2 person assist   Assistive Device (Sit-Stand Transfers) walker, front-wheeled   Comment, (Sit-Stand Transfer) Ax2 due to height of ED bed and pt   Gait/Stairs (Locomotion)   [place] Level (Gait) contact guard   Assistive Device (Gait) walker, front-wheeled   Distance in Feet 5   Distance in Feet (Gait) 10, 10   Pattern (Gait) step-through   Deviations/Abnormal Patterns (Gait) noemi decreased;gait speed decreased   Maintains Weight-bearing Status (Gait) able to maintain   Balance   Balance other (describe)   Balance Comments Pt described herself as \"feeling wobbly\"; seemed to be slightly off balance while walking   Clinical Impression   Criteria for Skilled Therapeutic Intervention Yes, treatment indicated   PT Diagnosis (PT) Impaired functional mobility   Influenced by the following impairments Weakness, balance deficits   Functional limitations due to impairments Transfers, gait, bed mobility   Clinical Presentation (PT Evaluation Complexity) Stable/Uncomplicated   Clinical Presentation Rationale Pt presents as medically diagnosed   Clinical Decision Making (Complexity) low complexity   Planned Therapy Interventions (PT) gait training;patient/family education;transfer training   Risk & Benefits of therapy have been explained patient   PT Total Evaluation Time   PT Eval, Low Complexity Minutes (18940) 10   Physical Therapy Goals   PT Frequency Daily   PT Predicted Duration/Target Date for Goal Attainment 09/25/23   PT Goals Transfers;Gait   PT: Transfers Modified independent;Sit to/from stand;Assistive device   PT: Gait Modified independent;Rolling walker;50 feet   Interventions   Interventions Quick Adds Gait Training;Therapeutic Activity   Therapeutic Activity   Therapeutic Activities: dynamic activities to improve functional performance Minutes (75059) 14   Symptoms Noted During/After Treatment " "Fatigue   Treatment Detail/Skilled Intervention Supine to sit with min A and verbal cueing for instructions and safety; sat EOB for 3 minutes with SBA; sit to stand with min Ax2, FWW, and verbal cueing for safety and hand placement, pt was apprehensive to stand due to the bed height and feet not touching the floor; sit<>stand with SBA and FWW, verbal cueing for hand placement and safety; standing balance for 2 minutes with SBA and FWW; stand to sit on bed with SBA and verbal cueing for directions and safety; sit to supine with min A and verbal cueing for safety; pt was agitated and was surprised that she felt so \"wobbly\"; no LOB noted but pt seemed apprehensive and moved slowly   Gait Training   Gait Training Minutes (85453) 9   Symptoms Noted During/After Treatment (Gait Training) fatigue   Treatment Detail/Skilled Intervention Pt ambulated to bathroom with verbal cueing for safety and proper walker use   [place] Level (Gait Training) contact guard   Physical Assistance Level (Gait Training) verbal cues   Weight Bearing (Gait Training) full weight-bearing   Assistive Device (Gait Training) rolling walker   Pattern Analysis (Gait Training) swing-through gait   Gait Analysis Deviations decreased noemi;decreased step length;decreased stride length   Impairments (Gait Analysis/Training) balance impaired;strength decreased   PT Discharge Planning   PT Plan Transfers, gait   PT Discharge Recommendation (DC Rec) (S)  home with assist;home with home care physical therapy   PT Rationale for DC Rec Pt lives at an [facility] and uses a 4WW at baseline, can ambulate and complete transfers but seems unsteady and lacks confidence when ambulating; safe to return to [facility] if she is able to call for assistance when getting up or ambulating; should have homecare to work on strengthening and balance   PT Brief overview of current status Ambulated 10'x2 with CGA and FWW, sit<>stand with SBA-min A, sit<>supine with min A   Total Session " Time   Timed Code Treatment Minutes 23   Total Session Time (sum of timed and untimed services) 33

## 2023-09-22 NOTE — PLAN OF CARE
Continues to struggle with generalized weakness. Assisted back to bed from chair - 1 person assist with gait belt and walker. On telemetry. BP check WNL. Son Samir in at bedside but will be leaving soon. Patient hoping to get bed/room upstairs soon.

## 2023-09-22 NOTE — DISCHARGE INSTRUCTIONS
The blood pressures in the ER tonight were elevated on arrival, but once patient was [place] without any intervention the blood pressures completely in the normal range.  I recommend you make an appointment with Dr. Palomares to discuss what to do next with the blood pressure medication given that there have been some swings this week and both low and high blood pressures.

## 2023-09-22 NOTE — CONSULTS
Care Management Initial Consult    General Information  Assessment completed with: Patient, VM-chart review, Care Team Member,    Type of CM/SW Visit: Initial Assessment  Primary Care Provider verified and updated as needed: Yes   Readmission within the last 30 days: no previous admission in last 30 days   Reason for Consult: discharge planning, health care directive, transportation  Advance Care Planning: Advance Care Planning Reviewed: present on chart, no concerns identified        Communication Assessment  Patient's communication style: spoken language (English or Bilingual)        Cognitive  Cognitive/Neuro/Behavioral: WDL                      Living Environment:   People in home: alone, facility resident     Current living Arrangements: assisted living  Name of Facility: [facility]    Able to return to prior arrangements: yes     Family/Social Support:  Care provided by: self, child(anabela), other (see comments) (Facility staff assist as needed.)  Provides care for: no one, unable/limited ability to care for self  Marital Status:   Children          Description of Support System: Involved, Supportive    Support Assessment: Adequate family and caregiver support, Adequate social supports    Current Resources:   Patient receiving home care services: No  Community Resources: None  Equipment currently used at home:    Supplies currently used at home: Other (ACE wraps)    Employment/Financial:  Employment Status: retired     Financial Concerns: No concerns identified   Referral to Financial Worker: No     Does the patient's insurance plan have a 3 day qualifying hospital stay waiver?  No    Lifestyle & Psychosocial Needs:  Social Determinants of Health     Food Insecurity: Not on file   Depression: Not at risk (4/17/2023)    PHQ-2     PHQ-2 Score: 2   Housing Stability: Not on file   Tobacco Use: Low Risk  (9/21/2023)    Patient History     Smoking Tobacco Use: Never     Smokeless Tobacco Use: Never     Passive  "Exposure: Never   Financial Resource Strain: Not on file   Alcohol Use: Not on file   Transportation Needs: Not on file   Physical Activity: Not on file   Interpersonal Safety: Not on file   Stress: Not on file   Social Connections: Not on file     Functional Status:  Prior to admission patient needed assistance:   Dependent ADLs:: Ambulation-walker, Bathing  Dependent IADLs:: Cleaning, Cooking, Laundry, Meal Preparation, Transportation  Assessment of Functional Status: Not at  functional baseline    Mental Health Status:  Mental Health Status: No Current Concerns       Chemical Dependency Status:  Chemical Dependency Status: No Current Concerns           Values/Beliefs:  Spiritual, Cultural Beliefs, Yazdanism Practices, Values that affect care: no (None identified)             Additional Information:  Writer met with pt to introduce Care Management(CM), obtain an initial assessment, and provide JONES info. Pt resides at [facility] in [place], [place]. [phone] & [phone]. A voice message was left for the building RN and CM contact info was faxed to facility. Pt states receiving staff assist with I/ADL as listed above. No in-home services. Walker use. No concerns expressed in regard to eventual return home/[facility].    9/21 per , S.-\"89 year old female  presents to the Emergency Department for evaluation of feeling lightheaded, being concerned about her blood pressure.  On chart review, she actually had some lower blood pressures of in the 90s and around 100 systolic just recently and her doctor took her off of her extended release calcium channel blocker. Initial Vitals Reviewed. Initial exam notable for patient who initially had a blood pressure of over 200 systolic, but with no intervention other than resting in the room and some Tylenol and her blood pressure is 134/67.  She has no chest pain.  She did have some slight headache so I did a CT scan to rule out intracranial hemorrhage and this is " "negative.  I did labs to look for signs of hypertensive emergency, even though clinically I have a very low suspicion for this with the complete normalization of her pressures.\"     PT Consult pending. Anticipate improvement and return to [facility] at time of discharge. CM to follow for changes in current anticipated discharge disposition. Family to transport.    At ~1600hrs, pt's son arrived and stated he was supportive of pt returning home/[facility] when appropriate. Pt/family was provided with the Medicare Compare list for Home Care.  Discussed associated Medicare star ratings to assist with choice for referrals/discharge planning- Yes. Education was given to pt/family that star ratings are updated/maintained by Medicare and can be reviewed by visiting www.medicare.gov Yes - Son declined and asked writer to find someone who can start care as soon as possible. [facility] is affiliated with the facility with a potential start date of the 26th or 27th. Son wished for services to start sooner if possible and asked writer to use their discretion with referrals to accomplish this. [facility] stated they can start on the 26th for sure and will be utilized for designated home care needs.    Kvng Dobbins RN      "

## 2023-09-22 NOTE — PROGRESS NOTES
[facility]    Medicine Progress Note - Hospitalist Service    Date of Admission:  9/21/2023    Assessment & Plan   Dizziness/lightheadedness/orthostatic hypotension  Severe aortic stenosis  Cardiology input appreciated  Holding atenolol  Consider TAVR as an outpatient but patient has declined  Continue felodipine and valsartan(half dose) due to relative hypotension  Echocardiogram    Generalized weakness  PT evaluation    Heart failure with reduced ejection fraction  Low-sodium diet    Type II MI/demand ischemia  No need for further evaluation per cardiology    Essential hypertension    Ménière's disease    Mild hyponatremia    Mild anemia       Diet: Combination Diet Low Saturated Fat Na <2400mg Diet, No Caffeine Diet    DVT Prophylaxis: Pneumatic Compression Devices  Flynn Catheter: Not present  Lines: None     Cardiac Monitoring: ACTIVE order. Indication: Chest pain/ ACS rule out (24 hours)  Code Status: No CPR- Pre-arrest intubation OK      Clinically Significant Risk Factors Present on Admission                  # Hypertension: Noted on problem list                 Disposition Plan      Expected Discharge Date: 09/23/2023      Destination: assisted living            Horacio Machado MD  Hospitalist Service  [facility]  Securely message with [employer] (more info)  Text page via [employer] Paging/Directory   ______________________________________________________________________    Interval History   Patient is doing okay.  Complaining about the food.  Still feeling a little bit weak.  Not lightheaded anymore.  Worried about her blood pressure.  Nervous about taking medications.  I ultimately convinced her to take half her dose of her ARB.  Discussed her case directly with cardiology.    Physical Exam   Vital Signs: Temp: 97.5  F (36.4  C) Temp src: Oral BP: (!) 162/74 Pulse: 67   Resp: 18 SpO2: 98 % O2 Device: None (Room air)    Weight: 0 lbs 0 oz    GENERAL:  Alert,  appears comfortable, in no acute distress, appears stated age   HEAD:  Normocephalic, without obvious abnormality, atraumatic   BACK:   Symmetric, no curvature, ROM normal   LUNGS:   Clear to auscultation bilaterally, no rales, rhonchi, or wheezing, symmetric chest rise on inhalation, respirations unlabored   HEART:  Regular rate and rhythm, S1 and S2 normal, no murmur, rub, or gallop    ABDOMEN:   Soft, non-tender, bowel sounds active all four quadrants, no masses, no organomegaly, no rebound or guarding   EXTREMITIES: Extremities normal, atraumatic, no cyanosis or edema    SKIN: Dry to touch, no exanthems in the visualized areas   NEURO: Alert, oriented x 4, moves all four extremities freely/spontaneously   PSYCH: Cooperative, behavior is appropriate        36 MINUTES SPENT BY ME on the date of service doing chart review, history, exam, documentation & further activities per the note.      Data     I have personally reviewed the following data over the past 24 hrs:    6.7  \   11.2 (L)   / 189     134 (L) 98 17.6 /  78   4.0 24 0.75 \     ALT: <5 AST: 21 AP: 61 TBILI: 0.6   ALB: 3.5 TOT PROTEIN: 5.9 (L) LIPASE: N/A     Trop: 33 (H) BNP: 3,883 (H)     TSH: 2.15 T4: N/A A1C: N/A       Imaging results reviewed over the past 24 hrs:   Recent Results (from the past 24 hour(s))   Head CT w/o contrast    Narrative    EXAM: CT HEAD W/O CONTRAST  LOCATION: [facility]  DATE: 9/21/2023    INDICATION: Hypertension.  COMPARISON: CT cervical spine 06/14/2021  TECHNIQUE: Routine CT Head without IV contrast. Multiplanar reformats. Dose reduction techniques were used.    FINDINGS:  INTRACRANIAL CONTENTS: No intracranial hemorrhage, extraaxial collection, or mass effect.  No CT evidence of acute infarct. Mild presumed chronic small vessel ischemic changes. Mild generalized volume loss. No hydrocephalus.     VISUALIZED ORBITS/SINUSES/MASTOIDS: No intraorbital abnormality. No paranasal sinus mucosal disease.  No middle ear or mastoid effusion.    BONES/SOFT TISSUES: No acute abnormality. 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 The basilar invagination is new from prior CT of 2021.      Impression    IMPRESSION:  1.  No CT evidence for acute intracranial process.  2.  Mild chronic microvascular ischemic changes as above.   XR Chest 1 View    Narrative    EXAM: XR CHEST 1 VIEW  LOCATION: [facility]  DATE: 2023    INDICATION: SOB  COMPARISON: None.      Impression    IMPRESSION: Scoliosis. Moderate size hiatal hernia. Slight hyperinflation. Chest otherwise negative.   Echocardiogram Complete    Narrative    [id number]  [id number]  [id number]  [id number]^[id number]^[place] [address]     Name: JO RIVERA  MRN: [id number]  : 1931  Study Date: 2023 08:24 AM  Age: 89 yrs  Gender: Female  Patient Location: [facility]  Reason For Study: Chest Discomfort  Ordering Physician: KJ SHEETS  Performed By: ACE     BSA: 1.4 m2  Height: 57 in  Weight: 109 lb  HR: 54  BP: 126/57 mmHg  ______________________________________________________________________________  Procedure  Complete Portable Echo Adult. Definity (NDC #40696-560) given intravenously.  ______________________________________________________________________________  Interpretation Summary     1. Normal left ventricular size and systolic performance with a visually  estimated ejection fraction of 55%.  2. There is moderate to severe aortic stenosis.  3. There is mild, to perhaps mild-moderate, mitral insufficiency.  4. Normal right ventricular size and systolic performance.  5. There is moderate to severe left atrial enlargement. There is mild-moderate  right atrial enlargement.  6. Right ventricular systolic pressure relative to right atrial pressure is  mildly increased. The pulmonary artery pressure is estimated to be 35-40 mmHg  plus right atrial  pressure (the IVC is not well-visualized).     When compared to the prior real-time echocardiogram dated 8 September 2022,  the ejection fraction appears increased on the current study.  ______________________________________________________________________________  Left ventricle:  Normal left ventricular size and systolic performance with a visually  estimated ejection fraction of 55%. There is normal regional wall motion. Left  ventricular wall thickness is normal.     Assessment of LV Diastolic Function: The cumulative findings suggest impaired  diastolic filling [The septal e' velocity is < 7 cm/s & lateral e' velocity  is  < 10 cm/s. The average E/e' is > 14. TR velocity is > 2.8 m/s. Left atrial  volume index is greater than 34 mL/mÂ ].     Assessment of left atrial pressure (LAP): The cumulative findings suggest  moderately elevated left atrial pressure (the E/A is > 0.8 and <2.0 plus 2 or  3 of 3 of the following present: Average E/e' > 14, TRvel > 2.8 m/s, and/or LA  vol. index > 34 ml/mÂ  ).  Right ventricle:  Normal right ventricular size and systolic performance.     Left atrium:  There is moderate to severe left atrial enlargement.     Right atrium:  There is mild-moderate right atrial enlargement.     IVC:  The IVC is not well-visualized.     Aortic valve:  The aortic valve is comprised of three cusps. There is moderate to severe  aortic stenosis. There is no significant aortic insufficiency.     Mitral valve:  The mitral valve appears morphologically normal. There is mild, to perhaps  mild-moderate, mitral insufficiency.     Tricuspid valve:  The tricuspid valve is grossly morphologically normal. There is mild tricuspid  insufficiency.     Pulmonic valve:  The pulmonic valve is grossly morphologically normal. There is trace pulmonic  insufficiency.     Thoracic aorta:  The aortic root and proximal ascending aorta are of normal dimension.     Pericardium:  There is no significant pericardial  effusion.  ______________________________________________________________________________  ______________________________________________________________________________  MMode/2D Measurements & Calculations  IVSd: 0.67 cm  LVIDd: 4.3 cm  LVIDs: 3.1 cm  LVPWd: 0.89 cm  FS: 29.2 %  LV mass(C)d: 103.3 grams  LV mass(C)dI: 74.3 grams/m2  Ao root diam: 3.1 cm  LA dimension: 3.9 cm  asc Aorta Diam: 3.2 cm  LA/Ao: 1.3  LVOT diam: 2.1 cm  LVOT area: 3.4 cm2  Ao root diam index Ht(cm/m): 2.1  Ao root diam index BSA (cm/m2): 2.2  Asc Ao diam index BSA (cm/m2): 2.3  Asc Ao diam index Ht(cm/m): 2.2  LA Volume (BP): 69.2 ml     LA Volume Index (BP): 49.8 ml/m2  LA Volume Indexed (AL/bp): 52.9 ml/m2  RV Base: 4.5 cm  RWT: 0.41  TAPSE: 2.5 cm     Time Measurements  MM HR: 60.0 BPM     Doppler Measurements & Calculations  MV E max calderon: 95.7 cm/sec  MV A max calderon: 104.5 cm/sec  MV E/A: 0.92  MV max P.5 mmHg  MV mean P.4 mmHg  MV V2 VTI: 40.7 cm  MVA(VTI): 2.0 cm2  MV dec slope: 235.7 cm/sec2  MV dec time: 0.41 sec  Ao V2 max: 378.6 cm/sec  Ao max P.0 mmHg  Ao V2 mean: 262.3 cm/sec  Ao mean P.8 mmHg  Ao V2 VTI: 101.6 cm  SHANIA(I,D): 0.79 cm2  SHANIA(V,D): 0.85 cm2  LV V1 max PG: 3.6 mmHg  LV V1 max: 95.2 cm/sec  LV V1 VTI: 23.8 cm  SV(LVOT): 80.4 ml  SI(LVOT): 57.9 ml/m2  PA acc time: 0.10 sec  TR max calderon: 301.6 cm/sec  TR max P.4 mmHg  AV Calderon Ratio (DI): 0.25  SHANIA Index (cm2/m2): 0.57  E/E': 15.7     E/E' av.6  Lateral E/e': 15.7  Medial E/e': 27.5  Peak E' Calderon: 6.1 cm/sec  RV S Calderon: 14.4 cm/sec     ______________________________________________________________________________  Report approved by: John Hidalgo 2023 10:19 AM

## 2023-09-22 NOTE — PLAN OF CARE
Problem: Hypertension Acute  Goal: Blood Pressure Within Desired Range  Outcome: Progressing  Intervention: Normalize Blood Pressure  Recent Flowsheet Documentation  Taken 9/22/2023 1110 by Albino Rehman RN  Medication Review/Management: medications reviewed    Pt alert and oriented x 3. VSS. Tele- Sinus [place] with BBB. Denies SOB, chest pain and N/V. WEST. Bp improved since admission. Last bp 106/55. MD notified and orders to hold afternoon valsartan. Bp was recheck again in afternoon and had increase to 162/74. MD ordered half dose of valsartan; given per MD order. Report headache pain. PRN acetaminophen 650 mg given. Will continue to monitor and intervene as needed. Up with assist 1 to bedside commode. Calls appropriately.

## 2023-09-22 NOTE — PHARMACY-ADMISSION MEDICATION HISTORY
Pharmacy Intern Admission Medication History    Admission medication history is complete. The information provided in this note is only as accurate as the sources available at the time of the update.    Medication reconciliation/reorder completed by provider prior to medication history? No    Information Source(s): Patient, Patient's pharmacy, Clinic records, and CareEverywhere/SureScripts via in-person    Pertinent Information: Pt stopped taking Felodipine ER because of low BP.    Changes made to PTA medication list:  Added: None  Deleted: Felodipine   Changed: None    Medication Affordability:       Allergies reviewed with patient and updates made in EHR: yes    Medication History Completed By: Tamara Crum 9/22/2023 8:30 AM    Prior to Admission medications    Medication Sig Last Dose Taking? Auth Provider Long Term End Date   acetaminophen (TYLENOL) 500 MG tablet Take 1-2 tablets (500-1,000 mg) by mouth every 6 hours as needed for pain Maximum of 6 tablets daily Past Week Yes Maik Levine MD Yes    atenolol (TENORMIN) 25 MG tablet Take 1 tablet (25 mg) by mouth 2 times daily 9/21/2023 at am Yes Maik Levine MD Yes    fluticasone (FLONASE) 50 MCG/ACT nasal spray Spray 2 sprays into both nostrils daily 9/20/2023 at doesn't have with Yes Reported, Patient Yes    loperamide (IMODIUM) 2 MG capsule Take 2 mg by mouth 4 times daily as needed. Unknown Yes Reported, Patient Yes    LORazepam (ATIVAN) 0.5 MG tablet Take 1 tablet (0.5 mg) by mouth nightly as needed for anxiety 9/21/2023 at pm Yes Maik Levine MD Yes    meclizine (ANTIVERT) 25 MG tablet Take 1 tablet (25 mg) by mouth 3 times daily as needed for dizziness 9/21/2023 at am Yes Maik Levine MD Yes    simvastatin (ZOCOR) 20 MG tablet Take 20 mg by mouth At Bedtime 9/21/2023 at pm Yes Unknown, Entered By History No    valsartan (DIOVAN) 160 MG tablet Take 160 mg by mouth every morning 9/21/2023 at am Yes Unknown, Entered By History No     VITAMIN D, CHOLECALCIFEROL, PO Take 1,000 Units by mouth daily 9/21/2023 at am Yes Reported, Patient Yes

## 2023-09-22 NOTE — H&P
"[facility] MEDICINE ADMISSION HISTORY AND PHYSICAL       Assessment & Plan      1. Dizziness/lightheadedeness, she is not symptomatic anymore  - Head CT negative     - She has history of Menierres and she takes meclizine for this  - She has history of severe aortic stenosis SHANIA:0.6 cm2 - Sees cardiology - not interested with TAVR -- this could also cause her dizziness. She was worried with low BP in the 90s-100s causing her lightheadedness to get worse, hence, she stopped the felodipine  - She has CHF with ejection fraction is 40-45% as of 9/2022 - appears to be stable  - Low suspicion for Acute CNS event like CVA as symptoms had been on for several days. I did talk about getting an MRI and  reasons for it, she refused. \"I dont think I need one\".    - Neuro checks  - avoid hypotension in the setting of severe Aortic stenosis    2. HTN and HL  - PTA meds     3. Elevated troponin - with ischemic changes on lateral leads -- T2 Demand ischemia r/o ACS. Her chest pain appears to be atypical - she is not SOB. Low suspicion for PE or dissection or pericarditis  - tele, pulse ox  - cardiology eval  - At some point, may need G evaluation as well, as she has difficulty with swallowing/handling pills \"sticks in my throat\"  - ECHO in AM    4. Hyponatremia, appears chronic, r/o SIADH  - check osms  - cortisol and TSH    5. Chronic anxiety - seems quite anxious at this point  - uses ativan    6. Bilateral hand numbness, chronic - per clinic notes. She is not  bothered by it     355A - BNP over 3000 - She denies SOB. Continue to monitor, and cautious use of lasix, given severe aortic stenosis.       VTE prophylaxis --  SCDs,   Diet --  NPO     Code Status -- DNR if no pulse, but intubate if needed if with pulse or breathing -- see POLST  -- Patient and Parish -- agrees    Barriers to discharge -- Admitting medical condition/s  Discharge Disposition and goals --  Unable to determine at this point, pending " "clinical progress and response to treatment. Patient may need transfer to SNF or ACR if unsafe to go home and needed treatment inappropriate at home setting OR may need home health care evaluation if care can be delivered at home settings. Consider referral to care manager/    PPE - I was wearing PPE when I met the patient including but not limited to - N95 mask, Gloves, and/or Safety glasses.  Admission Status -- Observation    Care plan was created based on available information and patient's condition at the time of encounter. This was discussed with the patient and/or family members using layman's terms, including counseling/education and they have agreed to proceed. I recommend to revise care plan and to review history if there is change in condition and/or new clinical information that is not available during my encounter. At the end of night shift, this case will be presented to the [facility] [place] Hospitalist.       75 minutes spent by me on the date of service doing chart review, history, exam, diagnostic test results interpretation, documentation & further activities per the note.        Bart Matias MD, MPH, FACP, [facility]  Internal Medicine - Hospitalist        Chief Complaint lightheadedness     HISTORY     - Patient is in ED - 8. She was awake and interactive, when I met her. Met also her son - Parish. She was sitting in big boy chair.   - 9/19 Careline communication -- weak legs/body, dizziness but has Meniere's, has headaches. Also concerns with low BP.   Per ED -- she is from [facility] sudden onset of weakness, non-room spinning dizziness, headache and fatigue.     - He concern was mainly her BP - The felodipine she was on for at least 6 month was stopped 3-4 days ago. This is because her BP was \"98\" systolic. Since stopping, she noticed rise in her BP until on admission to 200s. This prompted the careline communication 9/19.    - She has body and leg weakness, she walks with a walker, and have not " "fallen. She had been feeling weak for at least 7 days -- when asked if she feels she had a stroke \"I dont think so\". No visual symptoms, no slurred speech, and doesn't feel weak now. She was able to raise her legs.     - She also reported she has chronic dizziness, but not dizzy now.  - She reported headaches for several days, and she has been taking tylenol. No vomiting  No URI symptoms.  - She has anxiety and she takes 0.5 mgs and she wants it. She appeared quite anxious  - She has chest pain which is mid to left sided, and not radiating/ \"Im not going to call it chest pain, since it feels more twinge or spasms\" She said she had it for a while. No SOB and she did not appear in resp distress.    - She also reported that she has trouble swallowing, specially with pills, and when she swallows it cause chest pain as well.     - ED course - Negative head CT. Trop mildly elevated. Na of 130. Hgb of 12.9. EKG showed ischemic changes on lateral leads. She was referred to cardiology.     - ROS --- No headache. No dizziness. No weakness. No palpitations. No abdominal pain. No nausea or vomiting. No urinary symptoms. No bleeding symptoms. No weight loss. Rest of 12 point ROS was reviewed and negative.       Past Medical History     Past Medical History:   Diagnosis Date    Age-related cataract of left eye 9/21/2021    Aortic stenosis     Echocardiogram with moderate aortic stenosis September 2021    Arthritis     Osteoarthritis    Bilateral cataracts     Bilateral hand numbness 4/17/2023    Presumably carpal tunnel syndrome, previous surgery and already using wrist splints, not interested in further surgery    Chronic anxiety     Dizziness and giddiness 9/17/2012    Gastroesophageal reflux disease     GERD (gastroesophageal reflux disease)     Heart murmur     Hypercholesteremia     Hypercholesteremia     Hypercholesterolemia 12/13/2021    Hypertension     Hyponatremia     Hyponatremia 07/08/2021    Irregular heart beat     " Irritable bowel     Irritable bowel     Meniere syndrome     Meniere's disease, bilateral     Osteoarthritis of spine with radiculopathy, unspecified spinal region 07/08/2021    Osteoporosis     Peripheral edema 2/28/2023    Primary osteoarthritis of both hands 12/13/2021    SOB (shortness of breath) 07/08/2021    Squamous cell carcinoma of skin of face 7/18/2023    Venous (peripheral) insufficiency 4/20/2022    Vertigo          Surgical History     Past Surgical History:   Procedure Laterality Date    CATARACT EXTRACTION Left     2021    DILATION AND CURETTAGE      DILATION AND CURETTAGE      ENDOLYMPHATIC SAC OPERATION  01/01/2012    enhancement    ENHANCE ENDOLYMPHATIC SAC, DEC SIGMOID SINUS, EXTND FACIAL RECESS APPRCH, MASTOIDECTOMY, BMT, COMBO  04/23/2012    Procedure:COMBINED ENHANCE ENDOLYMPHATIC SAC, DECOMPRESS SIGMOID SINUS, EXTENDED FACIAL RECESS APPROACH, COMPLETE MASTOIDECTOMY, MYRINGOTOMY, INSERT TUBE; Left Endolymphatic Sac Enhancement, Sigmoid Sinus Decom-  pression, Extended Facial Recess Approach, Myringotomy  , Complete Mastoidectomy; Surgeon:LINN MATHIS; Location:[facility]    INNER EAR SURGERY      Per Pt    KERATOTOMY ARCUATE WITH FEMTOSECOND LASER/IMAGING FOR ATIOL Right 07/19/2018    Procedure: KERATOTOMY ARCUATE WITH FEMTOSECOND LASER/IMAGING FOR ATIOL;  RIGHT EYE KERATOTOMY ARCUATE WITH FEMTOSECOND LASER/IMAGING FOR ATIOL ,  CAPSULOTOMY, LENS FRAGMENTATION, ARCUATE INCISIONS;  Surgeon: Roberth Chambers MD;  Location: [facility]    MASTOIDECTOMY  01/01/2012    PHACOEMULSIFICATION CLEAR CORNEA WITH TORIC INTRAOCULAR LENS IMPLANT Right 07/19/2018    Procedure: PHACOEMULSIFICATION CLEAR CORNEA WITH TORIC INTRAOCULAR LENS IMPLANT;  RIGHT EYE PHACOEMULSIFICATION CLEAR CORNEA WITH TORIC INTRAOCULAR LENS IMPLANT ;  Surgeon: Roberth Chambers MD;  Location: [facility]    TONSILLECTOMY      TONSILLECTOMY          Family History      Family History   Problem Relation Age of Onset    Heart  "Disease Mother     Clotting Disorder Mother         Dies of a blood clot, per pt    Diabetes Father     Cerebrovascular Disease Father     Hypertension Son     Osteoporosis Daughter     Hypertension Daughter     Cancer Sister         Colon    Glaucoma Sister          Social History      .  Social History     Socioeconomic History    Marital status:      Spouse name: Not on file    Number of children: 7    Years of education: Not on file    Highest education level: Not on file   Occupational History    Not on file   Tobacco Use    Smoking status: Never     Passive exposure: Never    Smokeless tobacco: Never   Vaping Use    Vaping Use: Never used   Substance and Sexual Activity    Alcohol use: Yes     Comment: rare    Drug use: No    Sexual activity: Never     Partners: Male   Other Topics Concern    Not on file   Social History Narrative    Patient is , they are retired.  Lives in assisted care for past 2 years (11/2/2016).     Social Determinants of Health     Financial Resource Strain: Not on file   Food Insecurity: Not on file   Transportation Needs: Not on file   Physical Activity: Not on file   Stress: Not on file   Social Connections: Not on file   Interpersonal Safety: Not on file   Housing Stability: Not on file          Allergies        Allergies   Allergen Reactions    Actonel [Risedronate]      GI upset    Clindamycin     Cortisone Other (See Comments)     \"heart symptoms\", low blood pressure    Fosamax Other (See Comments)     \"burning my lower esophagus\"    Kenalog [Triamcinolone]     Metronidazole     Minipress [Prazosin]      Hypotension    Penicillin G     Statin Drugs [Statins]      Shortness of breath.  Tolerating Simvastatin (Sept 2016)    Tetanus Toxoid     Prednisone Rash    Zithromax [Azithromycin Dihydrate] Rash     States got rash after using z rubio         Prior to Admission Medications      No current facility-administered medications on file prior to encounter.  acetaminophen " (TYLENOL) 500 MG tablet, Take 1-2 tablets (500-1,000 mg) by mouth every 6 hours as needed for pain Maximum of 6 tablets daily  atenolol (TENORMIN) 25 MG tablet, Take 1 tablet (25 mg) by mouth 2 times daily  felodipine ER (PLENDIL) 2.5 MG 24 hr tablet, Take 1 tablet (2.5 mg) by mouth every morning  fluticasone (FLONASE) 50 MCG/ACT nasal spray,   loperamide (IMODIUM) 2 MG capsule, Take 2 mg by mouth 4 times daily as needed.  LORazepam (ATIVAN) 0.5 MG tablet, Take 1 tablet (0.5 mg) by mouth nightly as needed for anxiety  meclizine (ANTIVERT) 25 MG tablet, Take 1 tablet (25 mg) by mouth 3 times daily as needed for dizziness  simvastatin (ZOCOR) 20 MG tablet, Take 1 tablet (20 mg) by mouth daily  valsartan (DIOVAN) 160 MG tablet, Take 1 tablet (160 mg) by mouth daily  VITAMIN D, CHOLECALCIFEROL, PO, Take 1,000 Units by mouth daily            Review of Systems     A 12 point comprehensive review of systems was negative except as noted above in HPI.    PHYSICAL EXAMINATION       Vitals      Vitals: BP (!) 188/88   Pulse 65   Temp 98.1  F (36.7  C) (Oral)   Resp 28   SpO2 100%   BMI= There is no height or weight on file to calculate BMI.      Examination     General Appearance:  Alert, cooperative, no distress  Head:    Normocephalic, without obvious abnormality, atraumatic  EENT:  PERRL, conjunctiva/corneas clear, EOM's intact.   Neck:   Supple, symmetrical, trachea midline, no adenopathy; no NVE  Back:  Symmetric, no curvature, no CVA tenderness  Chest/Lungs: Clear to auscultation bilaterally, respirations unlabored, No tenderness or deformity. No abdominal breathing or use of accessory muscles.   Heart:    Regular rate and rhythm, S1 and S2 normal, 2/6 SM at LPSB   Abdomen: Soft, non-tender, bowel sounds active all four quadrants, not peritoneal on palpation. Not distended  Extremities:  Extremities normal, atraumatic, no swelling   Skin:  Skin color, texture, turgor normal, no rashes or lesion  Neurologic:  Awake  and alert,  No lateralizing or localizing signs. Strong hand .         Pertinent Lab     Results for orders placed or performed during the hospital encounter of 09/21/23   Head CT w/o contrast    Impression    IMPRESSION:  1.  No CT evidence for acute intracranial process.  2.  Mild chronic microvascular ischemic changes as above.   Basic metabolic panel   Result Value Ref Range    Sodium 130 (L) 136 - 145 mmol/L    Potassium 4.7 3.4 - 5.3 mmol/L    Chloride 96 (L) 98 - 107 mmol/L    Carbon Dioxide (CO2) 25 22 - 29 mmol/L    Anion Gap 9 7 - 15 mmol/L    Urea Nitrogen 25.0 (H) 8.0 - 23.0 mg/dL    Creatinine 0.90 0.51 - 0.95 mg/dL    Calcium 9.7 (H) 8.2 - 9.6 mg/dL    Glucose 96 70 - 99 mg/dL    GFR Estimate 60 (L) >60 mL/min/1.73m2   Result Value Ref Range    Troponin T, High Sensitivity 23 (H) <=14 ng/L   CBC with platelets and differential   Result Value Ref Range    WBC Count 7.7 4.0 - 11.0 10e3/uL    RBC Count 4.05 3.80 - 5.20 10e6/uL    Hemoglobin 12.9 11.7 - 15.7 g/dL    Hematocrit 39.5 35.0 - 47.0 %    MCV 98 78 - 100 fL    MCH 31.9 26.5 - 33.0 pg    MCHC 32.7 31.5 - 36.5 g/dL    RDW 12.6 10.0 - 15.0 %    Platelet Count 222 150 - 450 10e3/uL    % Neutrophils 76 %    % Lymphocytes 14 %    % Monocytes 8 %    % Eosinophils 2 %    % Basophils 0 %    % Immature Granulocytes 0 %    NRBCs per 100 WBC 0 <1 /100    Absolute Neutrophils 5.8 1.6 - 8.3 10e3/uL    Absolute Lymphocytes 1.1 0.8 - 5.3 10e3/uL    Absolute Monocytes 0.6 0.0 - 1.3 10e3/uL    Absolute Eosinophils 0.2 0.0 - 0.7 10e3/uL    Absolute Basophils 0.0 0.0 - 0.2 10e3/uL    Absolute Immature Granulocytes 0.0 <=0.4 10e3/uL    Absolute NRBCs 0.0 10e3/uL   Result Value Ref Range    Troponin T, High Sensitivity 30 (H) <=14 ng/L   Result Value Ref Range    Troponin T, High Sensitivity 33 (H) <=14 ng/L   ECG 12-LEAD WITH MUSE (LHE)   Result Value Ref Range    Systolic Blood Pressure 204 mmHg    Diastolic Blood Pressure 89 mmHg    Ventricular Rate 74 BPM     Atrial Rate 74 BPM    GA Interval 166 ms    QRS Duration 124 ms     ms    QTc 450 ms    P Axis 70 degrees    R AXIS -34 degrees    T Axis 108 degrees    Interpretation ECG       Sinus rhythm  Left axis deviation  Left ventricular hypertrophy with QRS widening and repolarization abnormality  Possible Inferior infarct , age undetermined  Abnormal ECG  When compared with ECG of 22-SEP-2022 12:52,  Premature supraventricular complexes are no longer Present  Confirmed by SEE ED PROVIDER NOTE FOR, ECG INTERPRETATION ([id number]),  Erlinda Triplett ([id number]) on 9/21/2023 8:34:38 PM             Pertinent Radiology

## 2023-09-22 NOTE — ED NOTES
EMERGENCY DEPARTMENT SIGN OUT NOTE        ED COURSE AND MEDICAL DECISION MAKING  Patient was signed out to me by Dr Barbara Kamara at 10:30 PM.  11:55 PM: I rechecked on the patient.  She is not having any significant chest pain other than a couple of twinges.  She does still feel foggy and have a headache.  She is concerned about going home with her current blood pressures.  She reports being treated for hypertension but was recently taken off one of her medications as her blood pressures were improving.  She is worried that if she starts taking this again that she might have her blood pressure go too low and she could pass out.  She lives alone.  12:22 AM I spoke with Dr. Matias with [facility] who accepts the patient for admission.  He wants me to consult cardiology for up trending troponin to see if they want to start Heparin or just to trend the troponin.   12:50 AM I rechecked on the patient and discussed mediation use with her.    In brief, Mariluz Arana is a 89 year old female who initially presented with hypertension.     At time of sign out, disposition was pending delta trop and reevaluation.  Pending repeat troponin which came back equivocal to repeat which was 30 on the second check.  At 33 this would not be a significant enough delta change per protocol.  I discussed results with patient however she reported having intermittent bouts of chest pain while here in the ER and headache still after blood pressure had crept back up to systolic of 200 now.  I discussed blood pressure control with her and her son and she recently been taken off of her felodipine.  They thought this might contribute some of her lightheadedness however she reports now having headache and some dizziness which she has been taking meclizine for.  After discussion with patient and her son she will have repeat EKG which was unchanged from prior.  Given that she has had troponin rechecked I would not recheck 1 right away as presently she  does not have chest pain but did just earlier prior to the blood being drawn.  We discussed options for her going home and blood pressure control which patient is hesitant to restart anything  She had difficulty in the past as well as she is has not to restart the felodipine as her blood pressure went too low previously.  Additionally with symptoms and patient does live by herself we finally discussed observation admission.  Patient would be more comfortable with this and son does agree that getting her blood pressure and symptoms under control would be best.  I did order patient's felodipine since blood pressure was elevated today and we will try to restart that with plan for monitoring and maybe adjusting one of her other blood pressure meds if needed.  Patient was comfortable this plan and discussed with hospitalist for admission.    FINAL IMPRESSION    1. Lightheadedness    2. Acute chest pain    3. Uncontrolled hypertension        ED MEDS  Medications   melatonin tablet 1 mg (has no administration in time range)   ondansetron (ZOFRAN ODT) ODT tab 4 mg (has no administration in time range)     Or   ondansetron (ZOFRAN) injection 4 mg (has no administration in time range)   acetaminophen (TYLENOL) tablet 650 mg (650 mg Oral $Given 9/22/23 0227)     Or   acetaminophen (TYLENOL) Suppository 650 mg ( Rectal See Alternative 9/22/23 0227)   meclizine (ANTIVERT) tablet 25 mg (has no administration in time range)   acetaminophen (TYLENOL) tablet 650 mg (650 mg Oral $Given 9/21/23 1951)   felodipine ER (PLENDIL) 24 hr tablet 2.5 mg (2.5 mg Oral $Given 9/22/23 0224)   LORazepam (ATIVAN) tablet 0.5 mg (0.5 mg Oral $Given 9/22/23 0144)       LAB  Labs Ordered and Resulted from Time of ED Arrival to Time of ED Departure   BASIC METABOLIC PANEL - Abnormal       Result Value    Sodium 130 (*)     Potassium 4.7      Chloride 96 (*)     Carbon Dioxide (CO2) 25      Anion Gap 9      Urea Nitrogen 25.0 (*)     Creatinine 0.90       Calcium 9.7 (*)     Glucose 96      GFR Estimate 60 (*)    TROPONIN T, HIGH SENSITIVITY - Abnormal    Troponin T, High Sensitivity 23 (*)    TROPONIN T, HIGH SENSITIVITY - Abnormal    Troponin T, High Sensitivity 30 (*)    TROPONIN T, HIGH SENSITIVITY - Abnormal    Troponin T, High Sensitivity 33 (*)    HEPATIC FUNCTION PANEL - Abnormal    Protein Total 6.1 (*)     Albumin 3.7      Bilirubin Total 0.4      Alkaline Phosphatase 70      AST 23      ALT 6      Bilirubin Direct <0.20     N TERMINAL PRO BNP OUTPATIENT - Abnormal    N Terminal Pro BNP Outpatient 3,883 (*)    MAGNESIUM - Normal    Magnesium 1.7     TSH - Normal    TSH 2.15     CBC WITH PLATELETS AND DIFFERENTIAL    WBC Count 7.7      RBC Count 4.05      Hemoglobin 12.9      Hematocrit 39.5      MCV 98      MCH 31.9      MCHC 32.7      RDW 12.6      Platelet Count 222      % Neutrophils 76      % Lymphocytes 14      % Monocytes 8      % Eosinophils 2      % Basophils 0      % Immature Granulocytes 0      NRBCs per 100 WBC 0      Absolute Neutrophils 5.8      Absolute Lymphocytes 1.1      Absolute Monocytes 0.6      Absolute Eosinophils 0.2      Absolute Basophils 0.0      Absolute Immature Granulocytes 0.0      Absolute NRBCs 0.0     COMPREHENSIVE METABOLIC PANEL   OSMOLALITY   SODIUM RANDOM URINE   OSMOLALITY, RANDOM URINE   ROUTINE UA WITH MICROSCOPIC REFLEX TO CULTURE     RADIOLOGY    Head CT w/o contrast   Final Result   IMPRESSION:   1.  No CT evidence for acute intracranial process.   2.  Mild chronic microvascular ischemic changes as above.      Echocardiogram Complete    (Results Pending)       EKG  Performed at: 00:26 on 22-September-2023    Ventricular rate 62 bpm  WV interval 180 ms  QRS duration 124 ms  QT/QTc 424/430  P-R-T axes 63-(-40)-102  Rhythm:Sinus    Impression: Sinus rhythm with left axis deviation, LVH criteria but similar morphology to earlier EKG with some change in precordial leads progression likely related to lead  placement.    Comparison:Compared with ECG from 21-September-2023          DISCHARGE MEDS   New Prescriptions    No medications on file       Dr. Lj Chi  [facility] EMERGENCY ROOM  [facility] [facility] [id number] [phone]       Lj Chi MD  09/22/23 5310

## 2023-09-22 NOTE — PLAN OF CARE
Report given to Yaz MCDONALD, Cynthia. Belongings gathered by ER Tech and patient transferred upstairs to room 306. She's very pleased to have a room.

## 2023-09-23 ENCOUNTER — APPOINTMENT (OUTPATIENT)
Dept: PHYSICAL THERAPY | Facility: CLINIC | Age: 88
End: 2023-09-23
Payer: COMMERCIAL

## 2023-09-23 LAB
ANION GAP SERPL CALCULATED.3IONS-SCNC: 7 MMOL/L (ref 7–15)
BUN SERPL-MCNC: 19.5 MG/DL (ref 8–23)
CALCIUM SERPL-MCNC: 8.9 MG/DL (ref 8.2–9.6)
CHLORIDE SERPL-SCNC: 99 MMOL/L (ref 98–107)
CREAT SERPL-MCNC: 0.74 MG/DL (ref 0.51–0.95)
DEPRECATED HCO3 PLAS-SCNC: 24 MMOL/L (ref 22–29)
EGFRCR SERPLBLD CKD-EPI 2021: 75 ML/MIN/1.73M2
GLUCOSE SERPL-MCNC: 83 MG/DL (ref 70–99)
HGB BLD-MCNC: 11.9 G/DL (ref 11.7–15.7)
MAGNESIUM SERPL-MCNC: 1.7 MG/DL (ref 1.7–2.3)
POTASSIUM SERPL-SCNC: 4.3 MMOL/L (ref 3.4–5.3)
SODIUM SERPL-SCNC: 130 MMOL/L (ref 136–145)

## 2023-09-23 PROCEDURE — 83735 ASSAY OF MAGNESIUM: CPT | Performed by: FAMILY MEDICINE

## 2023-09-23 PROCEDURE — 250N000013 HC RX MED GY IP 250 OP 250 PS 637: Performed by: INTERNAL MEDICINE

## 2023-09-23 PROCEDURE — 85018 HEMOGLOBIN: CPT | Performed by: FAMILY MEDICINE

## 2023-09-23 PROCEDURE — 250N000013 HC RX MED GY IP 250 OP 250 PS 637

## 2023-09-23 PROCEDURE — 97116 GAIT TRAINING THERAPY: CPT | Mod: GP

## 2023-09-23 PROCEDURE — G0378 HOSPITAL OBSERVATION PER HR: HCPCS

## 2023-09-23 PROCEDURE — 99233 SBSQ HOSP IP/OBS HIGH 50: CPT | Performed by: FAMILY MEDICINE

## 2023-09-23 PROCEDURE — 80048 BASIC METABOLIC PNL TOTAL CA: CPT | Performed by: FAMILY MEDICINE

## 2023-09-23 PROCEDURE — 99233 SBSQ HOSP IP/OBS HIGH 50: CPT | Performed by: INTERNAL MEDICINE

## 2023-09-23 PROCEDURE — 97530 THERAPEUTIC ACTIVITIES: CPT | Mod: GP

## 2023-09-23 PROCEDURE — 36415 COLL VENOUS BLD VENIPUNCTURE: CPT | Performed by: FAMILY MEDICINE

## 2023-09-23 PROCEDURE — 250N000013 HC RX MED GY IP 250 OP 250 PS 637: Performed by: FAMILY MEDICINE

## 2023-09-23 RX ORDER — LOPERAMIDE HCL 2 MG
2 CAPSULE ORAL 4 TIMES DAILY PRN
Status: DISCONTINUED | OUTPATIENT
Start: 2023-09-23 | End: 2023-09-24 | Stop reason: HOSPADM

## 2023-09-23 RX ORDER — ACETAMINOPHEN 500 MG
500-1000 TABLET ORAL EVERY 6 HOURS PRN
Status: DISCONTINUED | OUTPATIENT
Start: 2023-09-23 | End: 2023-09-24 | Stop reason: HOSPADM

## 2023-09-23 RX ORDER — VALSARTAN 80 MG/1
80 TABLET ORAL ONCE
Status: DISCONTINUED | OUTPATIENT
Start: 2023-09-24 | End: 2023-09-23

## 2023-09-23 RX ORDER — MAGNESIUM OXIDE 400 MG/1
400 TABLET ORAL EVERY 4 HOURS
Status: COMPLETED | OUTPATIENT
Start: 2023-09-23 | End: 2023-09-23

## 2023-09-23 RX ORDER — MECLIZINE HYDROCHLORIDE 25 MG/1
25 TABLET ORAL 3 TIMES DAILY PRN
Status: DISCONTINUED | OUTPATIENT
Start: 2023-09-23 | End: 2023-09-24 | Stop reason: HOSPADM

## 2023-09-23 RX ORDER — VALSARTAN 80 MG/1
80 TABLET ORAL ONCE
Status: COMPLETED | OUTPATIENT
Start: 2023-09-23 | End: 2023-09-23

## 2023-09-23 RX ORDER — SIMVASTATIN 20 MG
20 TABLET ORAL AT BEDTIME
Status: DISCONTINUED | OUTPATIENT
Start: 2023-09-23 | End: 2023-09-24 | Stop reason: HOSPADM

## 2023-09-23 RX ORDER — VITAMIN B COMPLEX
25 TABLET ORAL DAILY
Status: DISCONTINUED | OUTPATIENT
Start: 2023-09-23 | End: 2023-09-24 | Stop reason: HOSPADM

## 2023-09-23 RX ORDER — VALSARTAN 80 MG/1
160 TABLET ORAL EVERY MORNING
Status: DISCONTINUED | OUTPATIENT
Start: 2023-09-24 | End: 2023-09-24 | Stop reason: HOSPADM

## 2023-09-23 RX ORDER — LORAZEPAM 0.5 MG/1
0.5 TABLET ORAL
Status: DISCONTINUED | OUTPATIENT
Start: 2023-09-23 | End: 2023-09-24 | Stop reason: HOSPADM

## 2023-09-23 RX ORDER — FLUTICASONE PROPIONATE 50 MCG
2 SPRAY, SUSPENSION (ML) NASAL DAILY
Status: DISCONTINUED | OUTPATIENT
Start: 2023-09-23 | End: 2023-09-24 | Stop reason: HOSPADM

## 2023-09-23 RX ADMIN — Medication 25 MCG: at 09:02

## 2023-09-23 RX ADMIN — Medication 400 MG: at 20:17

## 2023-09-23 RX ADMIN — FELODIPINE 2.5 MG: 2.5 TABLET, FILM COATED, EXTENDED RELEASE ORAL at 09:48

## 2023-09-23 RX ADMIN — ACETAMINOPHEN 500 MG: 500 TABLET ORAL at 16:54

## 2023-09-23 RX ADMIN — VALSARTAN 80 MG: 80 TABLET, FILM COATED ORAL at 12:05

## 2023-09-23 RX ADMIN — VALSARTAN 80 MG: 80 TABLET, FILM COATED ORAL at 09:02

## 2023-09-23 RX ADMIN — Medication 400 MG: at 23:58

## 2023-09-23 RX ADMIN — ACETAMINOPHEN 650 MG: 325 TABLET ORAL at 05:33

## 2023-09-23 RX ADMIN — SIMVASTATIN 20 MG: 20 TABLET, FILM COATED ORAL at 20:17

## 2023-09-23 RX ADMIN — LORAZEPAM 0.5 MG: 0.5 TABLET ORAL at 22:01

## 2023-09-23 ASSESSMENT — ACTIVITIES OF DAILY LIVING (ADL)
ADLS_ACUITY_SCORE: 36
ADLS_ACUITY_SCORE: 34
ADLS_ACUITY_SCORE: 36
ADLS_ACUITY_SCORE: 35
ADLS_ACUITY_SCORE: 34
ADLS_ACUITY_SCORE: 36
ADLS_ACUITY_SCORE: 34
ADLS_ACUITY_SCORE: 35
ADLS_ACUITY_SCORE: 36
ADLS_ACUITY_SCORE: 36

## 2023-09-23 NOTE — PLAN OF CARE
Problem: Hypertension Acute  Goal: Blood Pressure Within Desired Range  Outcome: Progressing  Intervention: Normalize Blood Pressure  Recent Flowsheet Documentation  Taken 9/23/2023 0015 by Krista Basilio RN  Medication Review/Management: medications reviewed  Taken 9/22/2023 2100 by Krista Basilio RN  Medication Review/Management: medications reviewed     Problem: Fall Injury Risk  Goal: Absence of Fall and Fall-Related Injury  Outcome: Progressing  Intervention: Identify and Manage Contributors  Recent Flowsheet Documentation  Taken 9/23/2023 0015 by Krista Basilio RN  Medication Review/Management: medications reviewed  Taken 9/22/2023 2100 by Krista Basilio RN  Medication Review/Management: medications reviewed  Intervention: Promote Injury-Free Environment  Recent Flowsheet Documentation  Taken 9/23/2023 0015 by Krista Basilio RN  Safety Promotion/Fall Prevention: safety round/check completed   Goal Outcome Evaluation:         Pt denies dizziness overnight. Sleeping at intervals.  VSS.  Tele runs SB to NSR with BBB.  Pt calling for assist to BR.  Medicated at  for c/o HA with Tylenol.

## 2023-09-23 NOTE — PROGRESS NOTES
"  HEART CARE ENCOUNTER CONSULTATON NOTE      [facility]  [phone]      Assessment/Recommendations   Assessment:  Severe aortic stenosis: Patient has declined TAVR.  We addressed again during this hospitalization.   Heart failure with preserved ejection fraction  Hypertension  Meniere Disease  Anxiety     Plan:  Atenolol discontinued  Continue felodipine 2.5 mg, valsartan 160 mg   Possible discharge today.  Primary cardiologist Dr. Franco         History of Present Illness/Subjective    Feeling better.  Blood pressure has remained stable and better controlled on current regimen  Echocardiogram 9/22/2023  1. Normal left ventricular size and systolic performance with a visually  estimated ejection fraction of 55%.  2. There is moderate to severe aortic stenosis.  3. There is mild, to perhaps mild-moderate, mitral insufficiency.  4. Normal right ventricular size and systolic performance.  5. There is moderate to severe left atrial enlargement. There is mild-moderate  right atrial enlargement.  6. Right ventricular systolic pressure relative to right atrial pressure is  mildly increased. The pulmonary artery pressure is estimated to be 35-40 mmHg  plus right atrial pressure (the IVC is not well-visualized).     When compared to the prior real-time echocardiogram dated 8 September 2022,  the ejection fraction appears increased on the current study.     Physical Examination  Review of Systems   Vitals: BP (!) 165/72 (BP Location: Left arm)   Pulse 72   Temp 97.7  F (36.5  C) (Oral)   Resp 16   Ht 1.473 m (4' 10\")   Wt 48.4 kg (106 lb 11.2 oz)   SpO2 95%   BMI 22.30 kg/m    BMI= Body mass index is 22.3 kg/m .  Wt Readings from Last 3 Encounters:   09/23/23 48.4 kg (106 lb 11.2 oz)   07/20/23 49.6 kg (109 lb 4.8 oz)   07/18/23 49 kg (108 lb)       General Appearance:   no distress, normal body habitus   ENT/Mouth: membranes moist, no oral lesions or bleeding gums.      EYES:  no scleral " icterus, normal conjunctivae       Chest/Lungs:   lungs are clear to auscultation   Cardiovascular:   Regular. Normal first and second heart sounds with no murmur no edema bilaterally        Extremities: no cyanosis or clubbing   Skin: no xanthelasma, warm.    Neurologic: normal  bilateral, no tremors     Psychiatric: alert and oriented x3, calm        Please refer above for cardiac ROS details.        Medical History  Surgical History Family History Social History   Past Medical History:   Diagnosis Date    Age-related cataract of left eye 9/21/2021    Aortic stenosis     Echocardiogram with moderate aortic stenosis September 2021    Arthritis     Osteoarthritis    Bilateral cataracts     Bilateral hand numbness 4/17/2023    Presumably carpal tunnel syndrome, previous surgery and already using wrist splints, not interested in further surgery    Chronic anxiety     Dizziness and giddiness 9/17/2012    Gastroesophageal reflux disease     GERD (gastroesophageal reflux disease)     Heart murmur     Hypercholesteremia     Hypercholesteremia     Hypercholesterolemia 12/13/2021    Hypertension     Hyponatremia     Hyponatremia 07/08/2021    Irregular heart beat     Irritable bowel     Irritable bowel     Meniere syndrome     Meniere's disease, bilateral     Osteoarthritis of spine with radiculopathy, unspecified spinal region 07/08/2021    Osteoporosis     Peripheral edema 2/28/2023    Primary osteoarthritis of both hands 12/13/2021    SOB (shortness of breath) 07/08/2021    Squamous cell carcinoma of skin of face 7/18/2023    Venous (peripheral) insufficiency 4/20/2022    Vertigo      Past Surgical History:   Procedure Laterality Date    CATARACT EXTRACTION Left     2021    DILATION AND CURETTAGE      DILATION AND CURETTAGE      ENDOLYMPHATIC SAC OPERATION  01/01/2012    enhancement    ENHANCE ENDOLYMPHATIC SAC, DEC SIGMOID SINUS, EXTND FACIAL RECESS APPRCH, MASTOIDECTOMY, BMT, COMBO  04/23/2012    Procedure:COMBINED  ENHANCE ENDOLYMPHATIC SAC, DECOMPRESS SIGMOID SINUS, EXTENDED FACIAL RECESS APPROACH, COMPLETE MASTOIDECTOMY, MYRINGOTOMY, INSERT TUBE; Left Endolymphatic Sac Enhancement, Sigmoid Sinus Decom-  pression, Extended Facial Recess Approach, Myringotomy  , Complete Mastoidectomy; Surgeon:LINN MATHIS; Location:[facility]    INNER EAR SURGERY      Per Pt    KERATOTOMY ARCUATE WITH FEMTOSECOND LASER/IMAGING FOR ATIOL Right 07/19/2018    Procedure: KERATOTOMY ARCUATE WITH FEMTOSECOND LASER/IMAGING FOR ATIOL;  RIGHT EYE KERATOTOMY ARCUATE WITH FEMTOSECOND LASER/IMAGING FOR ATIOL ,  CAPSULOTOMY, LENS FRAGMENTATION, ARCUATE INCISIONS;  Surgeon: Roberth Chambers MD;  Location:  [facility]    MASTOIDECTOMY  01/01/2012    PHACOEMULSIFICATION CLEAR CORNEA WITH TORIC INTRAOCULAR LENS IMPLANT Right 07/19/2018    Procedure: PHACOEMULSIFICATION CLEAR CORNEA WITH TORIC INTRAOCULAR LENS IMPLANT;  RIGHT EYE PHACOEMULSIFICATION CLEAR CORNEA WITH TORIC INTRAOCULAR LENS IMPLANT ;  Surgeon: Roberth Chambers MD;  Location:  [facility]    TONSILLECTOMY      TONSILLECTOMY       Family History   Problem Relation Age of Onset    Heart Disease Mother     Clotting Disorder Mother         Dies of a blood clot, per pt    Diabetes Father     Cerebrovascular Disease Father     Hypertension Son     Osteoporosis Daughter     Hypertension Daughter     Cancer Sister         Colon    Glaucoma Sister         Social History     Socioeconomic History    Marital status:      Spouse name: Not on file    Number of children: 7    Years of education: Not on file    Highest education level: Not on file   Occupational History    Not on file   Tobacco Use    Smoking status: Never     Passive exposure: Never    Smokeless tobacco: Never   Vaping Use    Vaping Use: Never used   Substance and Sexual Activity    Alcohol use: Yes     Comment: rare    Drug use: No    Sexual activity: Never     Partners: Male   Other Topics Concern    Not on file   Social History  "Narrative    Patient is , they are retired.  Lives in assisted care for past 2 years (11/2/2016).     Social Determinants of Health     Financial Resource Strain: Not on file   Food Insecurity: Not on file   Transportation Needs: Not on file   Physical Activity: Not on file   Stress: Not on file   Social Connections: Not on file   Interpersonal Safety: Not on file   Housing Stability: Not on file           Medications  Allergies   No current outpatient medications on file.       Allergies   Allergen Reactions    Actonel [Risedronate]      GI upset    Clindamycin     Cortisone Other (See Comments)     \"heart symptoms\", low blood pressure    Fosamax Other (See Comments)     \"burning my lower esophagus\"    Kenalog [Triamcinolone]     Metronidazole     Minipress [Prazosin]      Hypotension    Penicillin G     Statin Drugs [Statins]      Shortness of breath.  Tolerating Simvastatin (Sept 2016)    Tetanus Toxoid     Prednisone Rash    Zithromax [Azithromycin Dihydrate] Rash     States got rash after using z rubio          Lab Results    Chemistry/lipid CBC Cardiac Enzymes/BNP/TSH/INR   Recent Labs   Lab Test 04/17/23  1232   CHOL 163   HDL 90   LDL 59   TRIG 71     Recent Labs   Lab Test 04/17/23  1232 12/13/21  1557   LDL 59 55     Recent Labs   Lab Test 09/23/23  0511   *   POTASSIUM 4.3   CHLORIDE 99   CO2 24   GLC 83   BUN 19.5   CR 0.74   GFRESTIMATED 75   LILLI 8.9     Recent Labs   Lab Test 09/23/23  0511 09/22/23  0606 09/21/23  1859   CR 0.74 0.75 0.90     No results for input(s): A1C in the last 78994 hours.       Recent Labs   Lab Test 09/23/23  0511 09/22/23  0606   WBC  --  6.7   HGB 11.9 11.2*   HCT  --  34.4*   MCV  --  98   PLT  --  189     Recent Labs   Lab Test 09/23/23  0511 09/22/23  0606 09/21/23  1859   HGB 11.9 11.2* 12.9    Recent Labs   Lab Test 06/14/21  1718 11/21/19  2150 11/21/19  1527   TROPONINI 0.01 <0.01 0.01     Recent Labs   Lab Test 09/21/23  2254 06/14/21  1718 11/21/19  1117 " 09/18/18  1752   BNP  --  519* 314* 216*   NTBNP 3,883*  --   --   --      Recent Labs   Lab Test 09/21/23  2254   TSH 2.15     No results for input(s): INR in the last 87069 hours.     Pennie Nunn MD

## 2023-09-23 NOTE — PROGRESS NOTES
Care Management Follow Up    Length of Stay (days): 0    Expected Discharge Date: 09/25/2023     Concerns to be Addressed: discharge planning       Patient plan of care discussed at interdisciplinary rounds: Yes    Anticipated Discharge Disposition:  TCU     Anticipated Discharge Services:  TBD    Anticipated Discharge DME: None    Patient/family educated on Medicare website which has current facility and service quality ratings: yes    Education Provided on the Discharge Plan: Yes (AVS per bedside RN)    Patient/Family in Agreement with the Plan: yes    Referrals Placed by CM/SW: Post Acute Facilities        Additional Information:  CM reviewed chart. CM met with patient. Patient states she lives at [facility] and has minimal services.   CM left VM for Ruthy at [facility] regarding discharge. 11:28 AM. Patient feels too weak to go home and prefers to go to TCU. Patient is agreeable to a referral being sent to [facility] ([facility]) Patient is aware that her insurance may not be accepted at this TCU. Patient will review the TCU list with family within the next couple of hours and update CM with TCU choices. 3:02 PM. Transportation TBD. CM will follow.       TCU referrals pending  [facility] ([facility])   [facility] ()   [facility] ([facility])   [facility] ([facility])       Geraldine Arteaga RN

## 2023-09-23 NOTE — PROGRESS NOTES
[facility]    Medicine Progress Note - Hospitalist Service    Date of Admission:  9/21/2023    Assessment & Plan   89-year-old female admitted on 9/21 2023 complaining of lightheadedness and issues with her blood pressure.  She noted that she been quite hypertensive at home.  She complained to her primary and that her blood pressures have been low and ultimately meds had been recently changed.  Patient was noted to have a mildly elevated troponin and was admitted for further evaluation treatment.      Dizziness/lightheadedness/orthostatic hypotension  Severe aortic stenosis  Cardiology input appreciated  Holding atenolol  Consider TAVR as an outpatient but patient has declined  Continue felodipine and valsartan initiate(initially at half dose but now back to full preadmission dosing)  Echocardiogram reviewed with preserved ejection fraction  Per cardiology will discharge on felodipine and valsartan with plan to hold atenolol  Patient is in agreement     Generalized weakness  PT evaluation appreciated  Patient and family very concerned that she will not be able walk down to dinner  She feels very weak  Plan is for TCU as of 9/23     Heart failure with reduced ejection fraction  Low-sodium diet     Type II MI/demand ischemia  No need for further evaluation per cardiology  Patient declines angiogram     Essential hypertension  Meds as above     Ménière's disease  Likely contributed to admitting symptoms  As needed meclizine  TCU    Mild hyponatremia  Persistent and chronic     Mild anemia  No signs of losses    Generalized anxiety  Clearly contributing to symptoms    Disposition/barriers to discharge  Plan was to discharge to home with home care but patient is quite anxious and has problems with ambulating down to her cafeteria.  Care management made aware and plans for TCU hopefully as soon as 9/24.       Diet: Combination Diet Low Saturated Fat Na <2400mg Diet, No Caffeine Diet    DVT  "Prophylaxis: Pneumatic Compression Devices  Flynn Catheter: Not present  Lines: None     Cardiac Monitoring: None  Code Status: No CPR- Pre-arrest intubation OK      Clinically Significant Risk Factors Present on Admission                  # Hypertension: Noted on problem list                 Disposition Plan      Expected Discharge Date: 09/25/2023    Discharge Delays: Placement - Homecare  Destination: assisted living  Discharge Comments: Cardiology adjusting meds, D/C to detention with HomeCare (no service able to start until 9/26)          Horacio Machado MD  Hospitalist Service  [facility]  Securely message with [employer] (more info)  Text page via [employer] Paging/Directory   ______________________________________________________________________    Interval History   \"I feel so weak\".  Patient does not feel like she is ready to discharge.  Explained that from a medical standpoint that she is.  We talked about TCU as she does not feel as though she would be safe in her current disposition.  Her son agrees with this.  Ultimately reach back out to care management and plan will be to go to TCU.    Physical Exam   Vital Signs: Temp: 98.1  F (36.7  C) Temp src: Oral BP: (!) 140/64 Pulse: 64   Resp: 16 SpO2: 98 % O2 Device: None (Room air)    Weight: 106 lbs 11.2 oz    General Appearance: No apparent distress  Respiratory: Clear to auscultation bilaterally  Cardiovascular: Regular rate and rhythm with occasional PVC noted  GI: Soft and nontender  Skin: Visualized skin is normal  Other: Good eye contact with normal affect      52 MINUTES SPENT BY ME on the date of service doing chart review, history, exam, documentation & further activities per the note.      Data     I have personally reviewed the following data over the past 24 hrs:    N/A  \   11.9   / N/A     130 (L) 99 19.5 /  83   4.3 24 0.74 \       Imaging results reviewed over the past 24 hrs:   No results found for this or any previous visit " (from the past 24 hour(s)).

## 2023-09-23 NOTE — UTILIZATION REVIEW
Concurrent stay review; Secondary Review Determination - [facility]        Under the authority of the Utilization Management Committee, the utilization review process indicated a secondary review on the above patient.  The review outcome is based on review of the medical records, discussions with staff, and applying clinical experience noted on the date of the review.        (x) Observation/outpatient Status Appropriate - Concurrent stay review       RATIONALE FOR DETERMINATION:     The patient is a 89-year-old female admitted on 9/21/2023.  Patient comes to the emergency room because of dizziness and lightheadedness.  She does have a significant cardiac history including severe aortic stenosis with appropriate degree of valve involvement to qualify for a TAVR.  The patient is not interested in the TAVR.  The patient has had low blood pressures and was seen by cardiology for further evaluation.  She does have ejection fraction of 40 to 45% as of a year ago echocardiogram.  Cardiology did recommend atenolol discontinuation but continuing fluid appealing and valsartan and states the patient is likely ready for discharge at this point given current symptoms and function.  Social work and nursing evaluating appropriate disposition.  Lightheadedness has improved.  Based on current status with discharge planning in the works, would recommend observation intermediate at this time.    Patient delayed discharge is related to disposition, there is no medical necessity for inpatient admission at the time of this review. If there is a change in patient status, please resend for review.    The information on this document is developed by the utilization review team in order for the business office to ensure compliance.  This only denotes the appropriateness of proper admission status and does not reflect the quality of care rendered.       The definitions of Inpatient Status and Observation Status used  in making the determination above are those provided in the CMS Coverage Manual, Chapter 1 and Chapter 6, section 70.4.       Sincerely,    Sathish Castañeda MD

## 2023-09-24 ENCOUNTER — APPOINTMENT (OUTPATIENT)
Dept: PHYSICAL THERAPY | Facility: CLINIC | Age: 88
End: 2023-09-24
Payer: COMMERCIAL

## 2023-09-24 VITALS
BODY MASS INDEX: 22.4 KG/M2 | WEIGHT: 106.7 LBS | HEIGHT: 58 IN | OXYGEN SATURATION: 95 % | RESPIRATION RATE: 18 BRPM | TEMPERATURE: 97.6 F | HEART RATE: 66 BPM | SYSTOLIC BLOOD PRESSURE: 129 MMHG | DIASTOLIC BLOOD PRESSURE: 61 MMHG

## 2023-09-24 PROBLEM — I10 UNCONTROLLED HYPERTENSION: Status: RESOLVED | Noted: 2023-09-22 | Resolved: 2023-09-24

## 2023-09-24 LAB
MAGNESIUM SERPL-MCNC: 1.8 MG/DL (ref 1.7–2.3)
POTASSIUM SERPL-SCNC: 4.1 MMOL/L (ref 3.4–5.3)

## 2023-09-24 PROCEDURE — 83735 ASSAY OF MAGNESIUM: CPT | Performed by: FAMILY MEDICINE

## 2023-09-24 PROCEDURE — 36415 COLL VENOUS BLD VENIPUNCTURE: CPT | Performed by: FAMILY MEDICINE

## 2023-09-24 PROCEDURE — 99239 HOSP IP/OBS DSCHRG MGMT >30: CPT | Performed by: INTERNAL MEDICINE

## 2023-09-24 PROCEDURE — G0378 HOSPITAL OBSERVATION PER HR: HCPCS

## 2023-09-24 PROCEDURE — 97530 THERAPEUTIC ACTIVITIES: CPT | Mod: GP

## 2023-09-24 PROCEDURE — 250N000013 HC RX MED GY IP 250 OP 250 PS 637: Performed by: INTERNAL MEDICINE

## 2023-09-24 PROCEDURE — 250N000013 HC RX MED GY IP 250 OP 250 PS 637

## 2023-09-24 PROCEDURE — 250N000013 HC RX MED GY IP 250 OP 250 PS 637: Performed by: FAMILY MEDICINE

## 2023-09-24 PROCEDURE — 97116 GAIT TRAINING THERAPY: CPT | Mod: GP

## 2023-09-24 PROCEDURE — 84132 ASSAY OF SERUM POTASSIUM: CPT | Performed by: FAMILY MEDICINE

## 2023-09-24 RX ORDER — ACETAMINOPHEN 500 MG
500 TABLET ORAL EVERY 6 HOURS PRN
COMMUNITY
Start: 2023-09-24 | End: 2024-07-22

## 2023-09-24 RX ORDER — ACETAMINOPHEN 500 MG
500-1000 TABLET ORAL EVERY 8 HOURS PRN
COMMUNITY
Start: 2023-09-24 | End: 2023-09-24

## 2023-09-24 RX ORDER — FELODIPINE 2.5 MG/1
2.5 TABLET, EXTENDED RELEASE ORAL DAILY
Qty: 90 TABLET | Refills: 3 | Status: SHIPPED | OUTPATIENT
Start: 2023-09-25 | End: 2024-09-19

## 2023-09-24 RX ADMIN — ACETAMINOPHEN 500 MG: 500 TABLET ORAL at 08:48

## 2023-09-24 RX ADMIN — ACETAMINOPHEN 650 MG: 325 TABLET ORAL at 00:20

## 2023-09-24 RX ADMIN — VALSARTAN 160 MG: 80 TABLET, FILM COATED ORAL at 08:49

## 2023-09-24 RX ADMIN — FELODIPINE 2.5 MG: 2.5 TABLET, FILM COATED, EXTENDED RELEASE ORAL at 08:48

## 2023-09-24 RX ADMIN — Medication 25 MCG: at 08:48

## 2023-09-24 ASSESSMENT — ACTIVITIES OF DAILY LIVING (ADL)
ADLS_ACUITY_SCORE: 34
ADLS_ACUITY_SCORE: 38
ADLS_ACUITY_SCORE: 34
ADLS_ACUITY_SCORE: 38
ADLS_ACUITY_SCORE: 34
ADLS_ACUITY_SCORE: 38

## 2023-09-24 NOTE — PROGRESS NOTES
Care Management Follow Up    Length of Stay (days): 0    Expected Discharge Date: 09/25/2023     Concerns to be Addressed: discharge planning       Patient plan of care discussed at interdisciplinary rounds: Yes    Anticipated Discharge Disposition: Transitional Care     Anticipated Discharge Services:  TBD    Anticipated Discharge DME: None    Patient/family educated on Medicare website which has current facility and service quality ratings: yes    Education Provided on the Discharge Plan: Yes (AVS per bedside RN)    Patient/Family in Agreement with the Plan: yes    Referrals Placed by CM/SW: Post Acute Facilities      Additional Information:  CM reviewed chart. CM met with patient. Lives at [facility]. Patient feels too weak to go home. Requests that TCU referrals be sent. Patient and Parish (son) discussed TCU options and gave CM choices of TCU's to send referrals. Transportation TBD. CM will follow.   CM notified that patient prefers to go back to [facility] (home)1:06 PM   CM called to [facility]. CM spoke to Micheline and she is in agreement with discharge today. 2:19 PM   CM sent home are orders.     [facility] phone # [phone]     Referrals pending  [facility] ([facility]) - accepts as long as can get insurance authorization  [facility] ()   [facility] ([facility])   [facility] ([facility])     Geraldine Arteaga RN

## 2023-09-24 NOTE — PROGRESS NOTES
Physical Therapy Discharge Summary    Reason for therapy discharge:    All goals and outcomes met, no further needs identified.    Progress towards therapy goal(s). See goals on Care Plan in [facility] electronic health record for goal details.  Goals met    Therapy recommendation(s):    Continued therapy is recommended.  Rationale/Recommendations:  Home PT.

## 2023-09-24 NOTE — DISCHARGE SUMMARY
[facility]  Hospitalist Discharge Summary      Date of Admission:  9/21/2023  Date of Discharge:  9/24/2023  Discharging Provider: Elsa Alas MD  Discharge Service: Hospitalist Service    Discharge Diagnoses   Uncontrolled hypertension     Clinically Significant Risk Factors          Follow-ups Needed After Discharge   Follow-up Appointments     Follow-up and recommended labs and tests       Follow up with Dr. Franco, at cardiology clinic, within 7 days to   follow-up on medication changes for high blood pressure. No follow up labs   or test are needed.  Referral to cardiology clinic was placed.            Unresulted Labs Ordered in the Past 30 Days of this Admission       No orders found from 8/22/2023 to 9/22/2023.            Discharge Disposition   Discharged to home with home health  Condition at discharge: Stable    Hospital Course   Mariluz Arana is a pleasant 89-year-old female with severe AS and hypertension who was admitted on 9/21 for symptomatic hypertension (SBP >180) with lightheadedness and chest pain.  Cardiology was consulted and recommended TTE which in fact showed stable/increased EF.  Troponin was elevated and per the cardiology this is most likely due to type 2 demand ischemia.  Her hypertension regimen was changed to felodipine 2.5 mg, valsartan 160 mg. PTA Tylenol was discontinued.  Blood pressure has been well controlled with this new regiment with -140.  Initial discharge planning was for TCU given her weakness.  Fortunately, she continued to improve with inpatient PT/OT and ultimately felt comfortable to go home with home health on 9/24.     Consultations This Hospital Stay   CARDIOLOGY IP CONSULT  PHYSICAL THERAPY ADULT IP CONSULT  CARE MANAGEMENT / SOCIAL WORK IP CONSULT    Code Status   No CPR- Pre-arrest intubation OK    Time Spent on this Encounter   I, Elsa Alas MD, personally saw the patient today and spent greater than 30 minutes discharging  this patient.       Elsa Alas MD  [facility]ART [facility]  [address] [address]  Phone: [phone]  Fax: [phone]  ______________________________________________________________________    Physical Exam   Vital Signs: Temp: 97.6  F (36.4  C) Temp src: Oral BP: 129/61 Pulse: 66   Resp: 18 SpO2: 95 % O2 Device: None (Room air)    Weight: 106 lbs 11.2 oz  Constitutional: awake, alert, cooperative, no apparent distress, and appears stated age  Eyes: Lids and lashes normal, pupils equal, round and reactive to light, extra ocular muscles intact, sclera clear, conjunctiva normal  Respiratory: No increased work of breathing, good air exchange, clear to auscultation bilaterally, no crackles or wheezing  Cardiovascular: Regular rate and rhythm, normal S1 and S2  GI: No scars, normal bowel sounds, soft, non-distended, non-tender, no masses palpated, no hepatosplenomegally  Skin: no bruising or bleeding  Musculoskeletal: There is no redness, warmth, or swelling of the joints.  Full range of motion noted.  Motor strength is 5 out of 5 all extremities bilaterally.  Tone is normal.  Neurologic: Awake, alert, oriented to name, place and time.  Cranial nerves II-XII are grossly intact.    Neuropsychiatric: General: normal, calm, and normal eye contact       Primary Care Physician   Maik Levine    Discharge Orders      Adult Cardiology Eval Marilyn Referral      Reason for your hospital stay    Symptomatic hypertension     Follow-up and recommended labs and tests     Follow up with Dr. Franco, at cardiology clinic, within 7 days to follow-up on medication changes for high blood pressure. No follow up labs or test are needed.  Referral to cardiology clinic was placed.     Activity    Your activity upon discharge: activity as tolerated     Resume Home Care Services     Diet    Follow this diet upon discharge: Orders Placed This Encounter      Combination Diet Low  Saturated Fat Na <2400mg Diet, No Caffeine Diet       Significant Results and Procedures   Results for orders placed or performed during the hospital encounter of 23   Head CT w/o contrast    Narrative    EXAM: CT HEAD W/O CONTRAST  LOCATION: [facility]  DATE: 2023    INDICATION: Hypertension.  COMPARISON: CT cervical spine 2021  TECHNIQUE: Routine CT Head without IV contrast. Multiplanar reformats. Dose reduction techniques were used.    FINDINGS:  INTRACRANIAL CONTENTS: No intracranial hemorrhage, extraaxial collection, or mass effect.  No CT evidence of acute infarct. Mild presumed chronic small vessel ischemic changes. Mild generalized volume loss. No hydrocephalus.     VISUALIZED ORBITS/SINUSES/MASTOIDS: No intraorbital abnormality. No paranasal sinus mucosal disease. No middle ear or mastoid effusion.    BONES/SOFT TISSUES: No acute abnormality. 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 The basilar invagination is new from prior CT of 2021.      Impression    IMPRESSION:  1.  No CT evidence for acute intracranial process.  2.  Mild chronic microvascular ischemic changes as above.   XR Chest 1 View    Narrative    EXAM: XR CHEST 1 VIEW  LOCATION: [facility]  DATE: 2023    INDICATION: SOB  COMPARISON: None.      Impression    IMPRESSION: Scoliosis. Moderate size hiatal hernia. Slight hyperinflation. Chest otherwise negative.   Echocardiogram Complete    Narrative    [id number]  [id number]  [id number]  [id number]^[id number]^[place] [id number]     Name: JO RIVERA  MRN: [id number]  : 1931  Study Date: 2023 08:24 AM  Age: 89 yrs  Gender: Female  Patient Location: [facility]  Reason For Study: Chest Discomfort  Ordering Physician: KJ SHEETS  Performed By: ACE     BSA: 1.4 m2  Height: 57 in  Weight: 109 lb  HR: 54  BP: 126/57  mmHg  ______________________________________________________________________________  Procedure  Complete Portable Echo Adult. Definity (NDC #96581-326) given intravenously.  ______________________________________________________________________________  Interpretation Summary     1. Normal left ventricular size and systolic performance with a visually  estimated ejection fraction of 55%.  2. There is moderate to severe aortic stenosis.  3. There is mild, to perhaps mild-moderate, mitral insufficiency.  4. Normal right ventricular size and systolic performance.  5. There is moderate to severe left atrial enlargement. There is mild-moderate  right atrial enlargement.  6. Right ventricular systolic pressure relative to right atrial pressure is  mildly increased. The pulmonary artery pressure is estimated to be 35-40 mmHg  plus right atrial pressure (the IVC is not well-visualized).     When compared to the prior real-time echocardiogram dated 8 September 2022,  the ejection fraction appears increased on the current study.  ______________________________________________________________________________  Left ventricle:  Normal left ventricular size and systolic performance with a visually  estimated ejection fraction of 55%. There is normal regional wall motion. Left  ventricular wall thickness is normal.     Assessment of LV Diastolic Function: The cumulative findings suggest impaired  diastolic filling [The septal e' velocity is < 7 cm/s & lateral e' velocity  is  < 10 cm/s. The average E/e' is > 14. TR velocity is > 2.8 m/s. Left atrial  volume index is greater than 34 mL/mÂ ].     Assessment of left atrial pressure (LAP): The cumulative findings suggest  moderately elevated left atrial pressure (the E/A is > 0.8 and <2.0 plus 2 or  3 of 3 of the following present: Average E/e' > 14, TRvel > 2.8 m/s, and/or LA  vol. index > 34 ml/mÂ  ).  Right ventricle:  Normal right ventricular size and systolic performance.      Left atrium:  There is moderate to severe left atrial enlargement.     Right atrium:  There is mild-moderate right atrial enlargement.     IVC:  The IVC is not well-visualized.     Aortic valve:  The aortic valve is comprised of three cusps. There is moderate to severe  aortic stenosis. There is no significant aortic insufficiency.     Mitral valve:  The mitral valve appears morphologically normal. There is mild, to perhaps  mild-moderate, mitral insufficiency.     Tricuspid valve:  The tricuspid valve is grossly morphologically normal. There is mild tricuspid  insufficiency.     Pulmonic valve:  The pulmonic valve is grossly morphologically normal. There is trace pulmonic  insufficiency.     Thoracic aorta:  The aortic root and proximal ascending aorta are of normal dimension.     Pericardium:  There is no significant pericardial effusion.  ______________________________________________________________________________  ______________________________________________________________________________  MMode/2D Measurements & Calculations  IVSd: 0.67 cm  LVIDd: 4.3 cm  LVIDs: 3.1 cm  LVPWd: 0.89 cm  FS: 29.2 %  LV mass(C)d: 103.3 grams  LV mass(C)dI: 74.3 grams/m2  Ao root diam: 3.1 cm  LA dimension: 3.9 cm  asc Aorta Diam: 3.2 cm  LA/Ao: 1.3  LVOT diam: 2.1 cm  LVOT area: 3.4 cm2  Ao root diam index Ht(cm/m): 2.1  Ao root diam index BSA (cm/m2): 2.2  Asc Ao diam index BSA (cm/m2): 2.3  Asc Ao diam index Ht(cm/m): 2.2  LA Volume (BP): 69.2 ml     LA Volume Index (BP): 49.8 ml/m2  LA Volume Indexed (AL/bp): 52.9 ml/m2  RV Base: 4.5 cm  RWT: 0.41  TAPSE: 2.5 cm     Time Measurements  MM HR: 60.0 BPM     Doppler Measurements & Calculations  MV E max spring: 95.7 cm/sec  MV A max spring: 104.5 cm/sec  MV E/A: 0.92  MV max P.5 mmHg  MV mean P.4 mmHg  MV V2 VTI: 40.7 cm  MVA(VTI): 2.0 cm2  MV dec slope: 235.7 cm/sec2  MV dec time: 0.41 sec  Ao V2 max: 378.6 cm/sec  Ao max P.0 mmHg  Ao V2 mean: 262.3 cm/sec  Ao mean  P.8 mmHg  Ao V2 VTI: 101.6 cm  SHANIA(I,D): 0.79 cm2  SHANIA(V,D): 0.85 cm2  LV V1 max PG: 3.6 mmHg  LV V1 max: 95.2 cm/sec  LV V1 VTI: 23.8 cm  SV(LVOT): 80.4 ml  SI(LVOT): 57.9 ml/m2  PA acc time: 0.10 sec  TR max calderon: 301.6 cm/sec  TR max P.4 mmHg  AV Calderon Ratio (DI): 0.25  SHANIA Index (cm2/m2): 0.57  E/E': 15.7     E/E' av.6  Lateral E/e': 15.7  Medial E/e': 27.5  Peak E' Calderon: 6.1 cm/sec  RV S Calderon: 14.4 cm/sec     ______________________________________________________________________________  Report approved by: John Hidalgo 2023 10:19 AM             Discharge Medications   Current Discharge Medication List        START taking these medications    Details   felodipine ER (PLENDIL) 2.5 MG 24 hr tablet Take 1 tablet (2.5 mg) by mouth daily for 360 days  Qty: 90 tablet, Refills: 3    Associated Diagnoses: Primary hypertension           CONTINUE these medications which have CHANGED    Details   acetaminophen (TYLENOL) 500 MG tablet Take 1 tablet (500 mg) by mouth every 6 hours as needed for pain Maximum of 6 tablets daily    Associated Diagnoses: Primary osteoarthritis involving multiple joints           CONTINUE these medications which have NOT CHANGED    Details   fluticasone (FLONASE) 50 MCG/ACT nasal spray Spray 2 sprays into both nostrils daily      loperamide (IMODIUM) 2 MG capsule Take 2 mg by mouth 4 times daily as needed.      LORazepam (ATIVAN) 0.5 MG tablet Take 1 tablet (0.5 mg) by mouth nightly as needed for anxiety  Qty: 30 tablet, Refills: 0    Associated Diagnoses: Chronic anxiety      meclizine (ANTIVERT) 25 MG tablet Take 1 tablet (25 mg) by mouth 3 times daily as needed for dizziness  Qty: 30 tablet, Refills: 3    Associated Diagnoses: Meniere's disease, bilateral      simvastatin (ZOCOR) 20 MG tablet Take 20 mg by mouth At Bedtime      valsartan (DIOVAN) 160 MG tablet Take 160 mg by mouth every morning      Vitamin D3 (CHOLECALCIFEROL) 25 mcg (1000 units) tablet Take 1,000  "Units by mouth daily           STOP taking these medications       atenolol (TENORMIN) 25 MG tablet Comments:   Reason for Stopping:             Allergies   Allergies   Allergen Reactions    Actonel [Risedronate]      GI upset    Clindamycin     Cortisone Other (See Comments)     \"heart symptoms\", low blood pressure    Fosamax Other (See Comments)     \"burning my lower esophagus\"    Kenalog [Triamcinolone]     Metronidazole     Minipress [Prazosin]      Hypotension    Penicillin G     Statin Drugs [Statins]      Shortness of breath.  Tolerating Simvastatin (Sept 2016)    Tetanus Toxoid     Prednisone Rash    Zithromax [Azithromycin Dihydrate] Rash     States got rash after using z rubio     "

## 2023-09-24 NOTE — PLAN OF CARE
Problem: Plan of Care - These are the overarching goals to be used throughout the patient stay.    Goal: Optimal Comfort and Wellbeing  Outcome: Progressing  Intervention: Monitor Pain and Promote Comfort  Recent Flowsheet Documentation  Taken 9/24/2023 0020 by Krista Basilio RN  Pain Management Interventions: medication (see MAR)     Problem: Fall Injury Risk  Goal: Absence of Fall and Fall-Related Injury  Outcome: Progressing  Intervention: Promote Injury-Free Environment  Recent Flowsheet Documentation  Taken 9/24/2023 0020 by Krista Basilio RN  Safety Promotion/Fall Prevention: safety round/check completed   Goal Outcome Evaluation:                  Pt c/o nerve type pain tonight in outer aspect of left foot.  Tylenol given wihout relief. Some improvement after walking to the BR.  Orthopedic blend oils to left foot in [place] oil  Pt ambulating to BR with assist.

## 2023-09-24 NOTE — PLAN OF CARE
Goal Outcome Evaluation:      Plan of Care Reviewed With: patient  Patient stated feeling improved.  Ambulated in room and up to chair with minimal assist.  Walked hallways with PT.  Stated goal to go home not TCU.  Updated MD and care manager.

## 2023-09-25 NOTE — PROGRESS NOTES
CM was contacted by [facility]. Requesting clarification on home care orders. CM trying to assist.   CM was able to get the needed home care orders.

## 2023-09-26 ENCOUNTER — VIRTUAL VISIT (OUTPATIENT)
Dept: FAMILY MEDICINE | Facility: CLINIC | Age: 88
End: 2023-09-26
Payer: COMMERCIAL

## 2023-09-26 ENCOUNTER — TELEPHONE (OUTPATIENT)
Dept: INTERNAL MEDICINE | Facility: CLINIC | Age: 88
End: 2023-09-26

## 2023-09-26 DIAGNOSIS — F41.0 GENERALIZED ANXIETY DISORDER WITH PANIC ATTACKS: ICD-10-CM

## 2023-09-26 DIAGNOSIS — R06.89 HEAVY BREATHING: ICD-10-CM

## 2023-09-26 DIAGNOSIS — F41.1 GENERALIZED ANXIETY DISORDER WITH PANIC ATTACKS: ICD-10-CM

## 2023-09-26 DIAGNOSIS — F41.9 CHRONIC ANXIETY: ICD-10-CM

## 2023-09-26 DIAGNOSIS — I35.0 AORTIC VALVE STENOSIS, ETIOLOGY OF CARDIAC VALVE DISEASE UNSPECIFIED: Primary | ICD-10-CM

## 2023-09-26 PROCEDURE — 99213 OFFICE O/P EST LOW 20 MIN: CPT | Mod: 95 | Performed by: NURSE PRACTITIONER

## 2023-09-26 RX ORDER — LORAZEPAM 0.5 MG/1
0.5 TABLET ORAL
Qty: 30 TABLET | Refills: 0 | Status: SHIPPED | OUTPATIENT
Start: 2023-09-26 | End: 2023-10-23

## 2023-09-26 ASSESSMENT — ENCOUNTER SYMPTOMS: SHORTNESS OF BREATH: 1

## 2023-09-26 NOTE — TELEPHONE ENCOUNTER
Has her blood pressure is sounding well controlled with the combination of valsartan 160 mg daily and felodipine 2.5 mg daily, it should be okay to continue this regimen and to hold off on restarting her beta-blocker.  I would recommend a hospital follow-up appointment with me in the next 10 days.  Please help her schedule and she will need 40 minutes.

## 2023-09-26 NOTE — TELEPHONE ENCOUNTER
Pt denies wanting triage.     Pt wants to update Dr. Levine on meds.     Pt discharged from hospital 9/24/23 and they DC'd her metoprolol. Off of metoprolol since 9/21. started her on her felodipine 2.5mg daily and valsartan 160mg daily, was told to Follow-up with cardiology in 7 days, unable to get in until 10/11.     Pt wants to inform Dr. Levine of pulse 9/25 HR 97 /71 today  /75    Pt Denies CP, SOB, dizziness, weakness, fever.    Pt wanting to know if Dr. Levine recommends any med changes until she sees cardiology

## 2023-09-26 NOTE — TELEPHONE ENCOUNTER
S-(situation): Feedback from PCP re: medications and hospital follow up    B-(background): See previous. Hx aortic stenosis and PVC. Recently on hospitalist service at  9/21-24 for uncontrolled HTN.    A-(assessment): when calling patient back, she sounds notably short of breath (labored, audible breaths, though unable to get accurate respiratory rate due to conversation; approximately 24 breaths/min)    R-(recommendations): Assisted patient to schedule follow up with PCP, but also advised that telephone appointment with CNP may be helpful in determining if it is appropriate to wait this long to be seen in person. Patient ultimately agrees to talk to CNP.

## 2023-09-26 NOTE — PROGRESS NOTES
Mariluz is a 89 year old who is being evaluated via a billable telephone visit.      What phone number would you like to be contacted at? [phone]  How would you like to obtain your AVS? Mail a copy    Distant Location (provider location):  On-site    Assessment & Plan     Aortic valve stenosis, etiology of cardiac valve disease unspecified  Follow up with cardiology. I did discuss previous recommendation for TAVR. I discussed this is likely contributing to fatigue and possibly breathing issues as well.    Generalized anxiety disorder with panic attacks  This could be contributing to the perceived breathing issue.     Heavy breathing  Heavy breathing noted during call.02 sats are normal (98%) at her facility. I recommend they continue to check daily.     She is able to maintain full sentences and doesn't endorse any shortness of breath on the call. I think she is ok to wait to see her primary until next week as planned however if this is continuing or worsening she should be seen in clinic prior to this visit on 10/5.     She can be scheduled with me in person if needed. She would need 02 assessed in person. Possible chest xray and lungs assessed.                 DUSTIN SOOD [facility]   Mariluz is a 89 year old, presenting for the following health issues:  Shortness of Breath (Spoke with Jessica regarding shortness of breath.)        9/26/2023     2:54 PM   Additional Questions   Roomed by Nica GARCÍA       Shortness of Breath                   Review of Systems   Respiratory:  Positive for shortness of breath.             Objective    Vitals - Patient Reported  Systolic (Patient Reported): 133  Diastolic (Patient Reported): 75  SpO2 (Patient Reported): 98  Pulse (Patient Reported): 102    Routinely check oxygen -98% this morning.    Feeling tired-everything she does is a big effort. Noticing that activity seems to be making harder. Has known diagnosis of  aortic stenosis. Was offered TAVR but declined. She is wondering if there are other options to help with fatigue.    Recently people have been telling her it seems like she is taking big breaths after talking. Patient denies any shortness of breath.     Physical Exam   alert and no distress  PSYCH: Alert and oriented times 3; coherent speech, normal   rate and volume, able to articulate logical thoughts, able   to abstract reason, no tangential thoughts, no hallucinations   or delusions  Her affect is anxious  RESP: No cough, no audible wheezing, able to talk in full sentences-takes deep breaths in between sentences   Remainder of exam unable to be completed due to telephone visits                Phone call duration: 18 minutes

## 2023-09-27 ENCOUNTER — TELEPHONE (OUTPATIENT)
Dept: INTERNAL MEDICINE | Facility: CLINIC | Age: 88
End: 2023-09-27
Payer: COMMERCIAL

## 2023-09-27 NOTE — PROGRESS NOTES
[facility]  OUTPATIENT PHYSICAL THERAPY EVALUATION  PLAN OF TREATMENT FOR OUTPATIENT REHABILITATION  (COMPLETE FOR INITIAL CLAIMS ONLY)  Patient's Last Name, First Name, M.I.  YOB: 1931  SumitMariluz  S                        Provider's Name  [facility] Medical Record No.  [id number]                             Onset Date:  09/21/23   Start of Care Date:      Type:     _X_PT   ___OT   ___SLP Medical Diagnosis:                 PT Diagnosis:  Impaired functional mobility Visits from SOC:  1     See note for plan of treatment, functional goals and certification details    I CERTIFY THE NEED FOR THESE SERVICES FURNISHED UNDER        THIS PLAN OF TREATMENT AND WHILE UNDER MY CARE     (Physician co-signature of this document indicates review and certification of the therapy plan).

## 2023-09-27 NOTE — TELEPHONE ENCOUNTER
Home Care is calling regarding an established patient with [facility].    Requesting orders from: Maik Levine  Provider is following patient: Yes  Is this a 60-day recertification request?  No    Orders Requested    Physical Therapy  Request for delay in care, service to be provided within same scheduled day    PT Start of Care scheduled for 9/29/23. PT will call after initial eval for further orders.     Verbal orders given.  Home Care will send orders for provider to sign.  Confirmed ok to leave a detailed message with call back.  Contact information confirmed and updated as needed.    Felicita Gordon RN

## 2023-09-28 ENCOUNTER — MEDICAL CORRESPONDENCE (OUTPATIENT)
Dept: HEALTH INFORMATION MANAGEMENT | Facility: CLINIC | Age: 88
End: 2023-09-28
Payer: COMMERCIAL

## 2023-09-29 ENCOUNTER — TELEPHONE (OUTPATIENT)
Dept: INTERNAL MEDICINE | Facility: CLINIC | Age: 88
End: 2023-09-29
Payer: COMMERCIAL

## 2023-09-29 NOTE — TELEPHONE ENCOUNTER
Call To Request Verbal Orders  Contacts         Type Contact Phone/Fax    09/29/2023 01:59 PM CDT Phone (Incoming) Jes with [facility] [phone]        Reason for Call:    Physical therapy 1x/week for 4 weeks  E/O Week for 2 additional visits    What are your questions or concerns:  Jes Lynn called to request verbal orders    Okay to leave a detailed message?: Yes at Other phone number:  [phone]

## 2023-10-05 ENCOUNTER — OFFICE VISIT (OUTPATIENT)
Dept: INTERNAL MEDICINE | Facility: CLINIC | Age: 88
End: 2023-10-05
Payer: COMMERCIAL

## 2023-10-05 VITALS
OXYGEN SATURATION: 98 % | WEIGHT: 110 LBS | RESPIRATION RATE: 20 BRPM | HEIGHT: 58 IN | DIASTOLIC BLOOD PRESSURE: 50 MMHG | BODY MASS INDEX: 23.09 KG/M2 | TEMPERATURE: 97.8 F | HEART RATE: 94 BPM | SYSTOLIC BLOOD PRESSURE: 128 MMHG

## 2023-10-05 DIAGNOSIS — I35.0 AORTIC VALVE STENOSIS, ETIOLOGY OF CARDIAC VALVE DISEASE UNSPECIFIED: ICD-10-CM

## 2023-10-05 DIAGNOSIS — I10 PRIMARY HYPERTENSION: Primary | ICD-10-CM

## 2023-10-05 DIAGNOSIS — R53.83 FATIGUE, UNSPECIFIED TYPE: ICD-10-CM

## 2023-10-05 PROCEDURE — G0008 ADMIN INFLUENZA VIRUS VAC: HCPCS | Performed by: INTERNAL MEDICINE

## 2023-10-05 PROCEDURE — 90662 IIV NO PRSV INCREASED AG IM: CPT | Performed by: INTERNAL MEDICINE

## 2023-10-05 PROCEDURE — 99214 OFFICE O/P EST MOD 30 MIN: CPT | Mod: 25 | Performed by: INTERNAL MEDICINE

## 2023-10-05 RX ORDER — VALSARTAN 80 MG/1
80 TABLET ORAL EVERY EVENING
Qty: 90 TABLET | Refills: 0 | Status: SHIPPED | OUTPATIENT
Start: 2023-10-05 | End: 2023-11-24

## 2023-10-05 RX ORDER — ATENOLOL 25 MG/1
25 TABLET ORAL EVERY MORNING
Qty: 90 TABLET | Refills: 3 | Status: SHIPPED | OUTPATIENT
Start: 2023-10-05

## 2023-10-05 NOTE — PROGRESS NOTES
"  Assessment & Plan     Primary hypertension  89-year-old woman with history of difficult to control hypertension.   who had been doing well with combination of valsartan 160 mg, felodipine 2.5 mg daily and atenolol 25 mg twice daily.  Recently hospitalized from September 21-24 with uncontrolled hypertension associated with lightheadedness and chest pain.  Cardiology was consulted.  Echocardiogram showing stable left ventricular systolic function with EF 55% and no change in known moderate to severe aortic stenosis.  Slightly elevated troponin which cardiology attributed to demand ischemia.  Her medications were changed during her hospitalization with the discontinuance of her atenolol but continued use of felodipine and valsartan which is somewhat confusing since she presented with uncontrolled hypertension.  Her systolic pressures with this change remained stable in the hospital ranging from 130-140 and she was discharged with this regimen.  Her blood pressures continue to look well controlled without the beta-blocker but her heart rate is noted to be running between  and she is complaining of increasing fatigue and decreased exercise tolerance.  Her beta-blocker needs to be restarted at a dose of 25 mg daily.  However, to avoid her blood pressure dropping too low, I will cut back on her valsartan to only 80 mg daily.  She will stay on the same dose of felodipine.  She will take the felodipine and atenolol in the morning and will take valsartan in the evening.  She has a follow-up appointment in 2 weeks and I will see how she is doing with these adjustments.  - valsartan (DIOVAN) 80 MG tablet; Take 1 tablet (80 mg) by mouth every evening    Fatigue, unspecified type  Profound fatigue feeling \"wiped out\".  She is borderline tachycardic with heart rate in the 90s.  Previously in the 50-60s when taking atenolol 25 mg twice daily.  I believe with her increased heart rate off the beta-blocker combined with known " "moderate to severe aortic stenosis, it is contributing to her current symptoms.  Restarting the beta-blocker and improving her heart rate should lead to improved symptoms.  Her echocardiogram showed normal left ventricular systolic function with EF 55%.    Aortic valve stenosis, etiology of cardiac valve disease unspecified  As above, known moderate to severe aortic stenosis but stable on recent echocardiogram.  Will need improved heart rate control with beta-blocker.          30 minutes spent by me on the date of the encounter doing chart review, history and exam, documentation and further activities per the note     MED REC REQUIRED  Post Medication Reconciliation Status:  Discharge medications reconciled and changed, see notes/orders  See Patient Instructions    Maik Levine MD  [facility]    Alonzo Mcgraw is a 89 year old, presenting for the following health issues: Here to follow-up after hospitalization with uncontrolled hypertension.  See assessment and plan for details.  Hospital F/U (9/21/23 @ [facility] for HTN)        10/5/2023     1:56 PM   Additional Questions   Roomed by            Review of Systems   Brief intermittent chest pains unchanged.  No increasing edema.      Objective    /50   Pulse 94   Temp 97.8  F (36.6  C) (Oral)   Resp 20   Ht 1.473 m (4' 10\")   Wt 49.9 kg (110 lb)   SpO2 98%   BMI 22.99 kg/m    Body mass index is 22.99 kg/m .  Physical Exam   Anxious elderly woman who does not appear acutely ill.  O2 sats 98% on room air  Breathing comfortably  Lungs clear bilaterally without rales or wheezes  Heart regular rate and rhythm with rate in the 90s  No change in chronic bilateral lower extremity edema        "

## 2023-10-06 ENCOUNTER — TELEPHONE (OUTPATIENT)
Dept: INTERNAL MEDICINE | Facility: CLINIC | Age: 88
End: 2023-10-06

## 2023-10-06 NOTE — TELEPHONE ENCOUNTER
10/06/23  General Call      Reason for Call:  Should pt keep appt with cardiology?    What are your questions or concerns:  Pt's son calling, wants to know if pt still needs the cardiology appt next week. Since pt just saw Dr. Levine and meds were changed, they want to know if the cardiologist appt is still necessary. The cardiology appt was set up back when pt was hospitalized. Please advise    Date of last appointment with provider: 10/05/23    Okay to leave a detailed message?: Yes at Home number on file [phone] (home)

## 2023-10-09 DIAGNOSIS — Z53.9 DIAGNOSIS NOT YET DEFINED: Primary | ICD-10-CM

## 2023-10-09 PROCEDURE — G0180 MD CERTIFICATION HHA PATIENT: HCPCS | Performed by: INTERNAL MEDICINE

## 2023-10-11 ENCOUNTER — OFFICE VISIT (OUTPATIENT)
Dept: CARDIOLOGY | Facility: CLINIC | Age: 88
End: 2023-10-11
Attending: INTERNAL MEDICINE
Payer: COMMERCIAL

## 2023-10-11 VITALS
HEART RATE: 59 BPM | SYSTOLIC BLOOD PRESSURE: 172 MMHG | BODY MASS INDEX: 22.96 KG/M2 | DIASTOLIC BLOOD PRESSURE: 90 MMHG | HEIGHT: 58 IN | WEIGHT: 109.4 LBS | RESPIRATION RATE: 16 BRPM | OXYGEN SATURATION: 90 %

## 2023-10-11 DIAGNOSIS — I35.0 NONRHEUMATIC AORTIC VALVE STENOSIS: Primary | ICD-10-CM

## 2023-10-11 DIAGNOSIS — I10 PRIMARY HYPERTENSION: ICD-10-CM

## 2023-10-11 DIAGNOSIS — R60.0 PERIPHERAL EDEMA: ICD-10-CM

## 2023-10-11 PROCEDURE — 99214 OFFICE O/P EST MOD 30 MIN: CPT

## 2023-10-11 NOTE — PROGRESS NOTES
HEART CARE ENCOUNTER CONSULTATON NOTE      [facility]  [phone]      Assessment/Recommendations   Assessment:   Hypertension: Elevated today at 172/90, similar reading with recheck, BP cuff available is slightly small for patient.  Her home blood pressure readings range from 120s to 140s systolic.  I believe this is adequate control for this patient.   - Atenolol 25 mg once, felodipine 2.5 mg, valsartan 80 mg. Adjusted to original regimen before hospitalization a resent PCP visit.  Atenolol restarted with heart rates consistently in the 90s with reports of fatigue, heart rates have been reduced since.  Dyslipidemia: simvastatin 20 mg, Last LDL 59  Aortic stenosis: Moderate to severe aortic stenosis with stable/increased EF on echo vs one performed in 9/2022. Patient has refused replacement on multiple occasions  - Likely contributing to symptoms given severity      Plan:   Continue current antihypertensive regimen  Has follow up with PCP for blood pressure monitoring in a few weeks      Follow up in 3 months with Dr. Franco     History of Present Illness/Subjective    HPI: Mariluz Arana is a 89 year old female with PMHx of severe aortic stenosis, HTN, HLD presents for post-hospital discharge follow up. Presented to emergency department with lightheadedness, elevated BP. CT head negative, elevated troponin attributed to demand ischemia. Echocardiogram repeated and showed normalization of EF to 55% from previous mild reduction, mod-sev aortic stenosis. With concern for bradycardia atenolol was discontinued with increase in valsartan to 160 mg. Primary reverted back to original regimen due to tachycardia and restarting atenolol with reduction in valsartan to avoid hypotension.     Patient reports increased fatigue since discharge that is very slowly improving.  Distinguishes that she feels more fatigued than short of breath.  Continues to have intermittent lightheadedness that she believes  "is also improving.  She is doing PT at assisted living and says she becomes so tired afterward she has to take a nap.  This is slightly improved since restarting atenolol.  Currently taking 25 mg daily when was taking 50 mg daily prior.  She has daily blood pressure and heart rate readings with her at the visit today.  Blood pressure has been in the 120s to 140s systolic.  Heart rates reported in the 60s, 59 today, since restarting atenolol.  Previously heart rates in the 90s.    She is accompanied by her daughter today who does say she struggled with fatigue prior to hospitalization.  She has longstanding lower extremity edema edema for which she wears compression stockings with improvement.      Echocardiogram 9/21/2023 Results:  1. Normal left ventricular size and systolic performance with a visually  estimated ejection fraction of 55%.  2. There is moderate to severe aortic stenosis.  3. There is mild, to perhaps mild-moderate, mitral insufficiency.  4. Normal right ventricular size and systolic performance.  5. There is moderate to severe left atrial enlargement. There is mild-moderate  right atrial enlargement.  6. Right ventricular systolic pressure relative to right atrial pressure is  mildly increased. The pulmonary artery pressure is estimated to be 35-40 mmHg  plus right atrial pressure (the IVC is not well-visualized).     Physical Examination  Review of Systems   Vitals: BP (!) 172/90 (BP Location: Right arm, Patient Position: Sitting, Cuff Size: Adult Small)   Pulse 59   Resp 16   Ht 1.473 m (4' 10\")   Wt 49.6 kg (109 lb 6.4 oz)   SpO2 90%   BMI 22.86 kg/m    BMI= Body mass index is 22.86 kg/m .  Wt Readings from Last 3 Encounters:   10/11/23 49.6 kg (109 lb 6.4 oz)   10/05/23 49.9 kg (110 lb)   09/23/23 48.4 kg (106 lb 11.2 oz)           ENT/Mouth: membranes moist, no oral lesions or bleeding gums.      EYES:  no scleral icterus, normal conjunctivae                    Neck: No carotid bruit or " thyromegaly   Chest/Lungs:   lungs are clear to auscultation, no rales or wheezing, , equal chest wall expansion    Cardiovascular:   Regular. Normal first and second heart sounds with 3/6 systolic ejection murmur, rubs, or gallops; the carotid, radial and posterior tibial pulses are intact, edema bilaterally    Abdomen:  no tenderness; bowel sounds are present   Extremities: no cyanosis or clubbing   Skin: no xanthelasma, warm.    Neurologic: no tremors     Psychiatric: alert and oriented x3, calm        Please refer above for cardiac ROS details.        Medical History  Surgical History Family History Social History   Past Medical History:   Diagnosis Date    Age-related cataract of left eye 9/21/2021    Aortic stenosis     Echocardiogram with moderate aortic stenosis September 2021    Arthritis     Osteoarthritis    Bilateral cataracts     Bilateral hand numbness 4/17/2023    Presumably carpal tunnel syndrome, previous surgery and already using wrist splints, not interested in further surgery    Chronic anxiety     Dizziness and giddiness 9/17/2012    Gastroesophageal reflux disease     GERD (gastroesophageal reflux disease)     Heart murmur     Hypercholesteremia     Hypercholesteremia     Hypercholesterolemia 12/13/2021    Hypertension     Hyponatremia     Hyponatremia 07/08/2021    Irregular heart beat     Irritable bowel     Irritable bowel     Meniere syndrome     Meniere's disease, bilateral     Osteoarthritis of spine with radiculopathy, unspecified spinal region 07/08/2021    Osteoporosis     Peripheral edema 2/28/2023    Primary osteoarthritis of both hands 12/13/2021    SOB (shortness of breath) 07/08/2021    Squamous cell carcinoma of skin of face 7/18/2023    Venous (peripheral) insufficiency 4/20/2022    Vertigo      Past Surgical History:   Procedure Laterality Date    CATARACT EXTRACTION Left     2021    DILATION AND CURETTAGE      DILATION AND CURETTAGE      ENDOLYMPHATIC SAC OPERATION  01/01/2012     enhancement    ENHANCE ENDOLYMPHATIC SAC, DEC SIGMOID SINUS, EXTND FACIAL RECESS APPRCH, MASTOIDECTOMY, BMT, COMBO  04/23/2012    Procedure:COMBINED ENHANCE ENDOLYMPHATIC SAC, DECOMPRESS SIGMOID SINUS, EXTENDED FACIAL RECESS APPROACH, COMPLETE MASTOIDECTOMY, MYRINGOTOMY, INSERT TUBE; Left Endolymphatic Sac Enhancement, Sigmoid Sinus Decom-  pression, Extended Facial Recess Approach, Myringotomy  , Complete Mastoidectomy; Surgeon:LINN MATHIS; Location:[facility]    INNER EAR SURGERY      Per Pt    KERATOTOMY ARCUATE WITH FEMTOSECOND LASER/IMAGING FOR ATIOL Right 07/19/2018    Procedure: KERATOTOMY ARCUATE WITH FEMTOSECOND LASER/IMAGING FOR ATIOL;  RIGHT EYE KERATOTOMY ARCUATE WITH FEMTOSECOND LASER/IMAGING FOR ATIOL ,  CAPSULOTOMY, LENS FRAGMENTATION, ARCUATE INCISIONS;  Surgeon: Roberth Chambers MD;  Location:  [facility]    MASTOIDECTOMY  01/01/2012    PHACOEMULSIFICATION CLEAR CORNEA WITH TORIC INTRAOCULAR LENS IMPLANT Right 07/19/2018    Procedure: PHACOEMULSIFICATION CLEAR CORNEA WITH TORIC INTRAOCULAR LENS IMPLANT;  RIGHT EYE PHACOEMULSIFICATION CLEAR CORNEA WITH TORIC INTRAOCULAR LENS IMPLANT ;  Surgeon: Roberth Chambers MD;  Location:  [facility]    TONSILLECTOMY      TONSILLECTOMY       Family History   Problem Relation Age of Onset    Heart Disease Mother     Clotting Disorder Mother         Dies of a blood clot, per pt    Diabetes Father     Cerebrovascular Disease Father     Hypertension Son     Osteoporosis Daughter     Hypertension Daughter     Cancer Sister         Colon    Glaucoma Sister         Social History     Socioeconomic History    Marital status:      Spouse name: Not on file    Number of children: 7    Years of education: Not on file    Highest education level: Not on file   Occupational History    Not on file   Tobacco Use    Smoking status: Never     Passive exposure: Never    Smokeless tobacco: Never   Vaping Use    Vaping Use: Never used   Substance and Sexual Activity     Alcohol use: Yes     Comment: rare    Drug use: No    Sexual activity: Never     Partners: Male   Other Topics Concern    Not on file   Social History Narrative    Patient is , they are retired.  Lives in assisted care for past 2 years (11/2/2016).     Social Determinants of Health     Financial Resource Strain: Low Risk  (10/5/2023)    Financial Resource Strain     Within the past 12 months, have you or your family members you live with been unable to get utilities (heat, electricity) when it was really needed?: No   Food Insecurity: Low Risk  (10/5/2023)    Food Insecurity     Within the past 12 months, did you worry that your food would run out before you got money to buy more?: No     Within the past 12 months, did the food you bought just not last and you didn t have money to get more?: No   Transportation Needs: Low Risk  (10/5/2023)    Transportation Needs     Within the past 12 months, has lack of transportation kept you from medical appointments, getting your medicines, non-medical meetings or appointments, work, or from getting things that you need?: No   Physical Activity: Not on file   Stress: Not on file   Social Connections: Not on file   Interpersonal Safety: Low Risk  (10/5/2023)    Interpersonal Safety     Do you feel physically and emotionally safe where you currently live?: Yes     Within the past 12 months, have you been hit, slapped, kicked or otherwise physically hurt by someone?: No     Within the past 12 months, have you been humiliated or emotionally abused in other ways by your partner or ex-partner?: No   Housing Stability: Low Risk  (10/5/2023)    Housing Stability     Do you have housing? : Yes     Are you worried about losing your housing?: No           Medications  Allergies   Current Outpatient Medications   Medication Sig Dispense Refill    acetaminophen (TYLENOL) 500 MG tablet Take 1 tablet (500 mg) by mouth every 6 hours as needed for pain Maximum of 6 tablets daily       "atenolol (TENORMIN) 25 MG tablet Take 1 tablet (25 mg) by mouth every morning 90 tablet 3    felodipine ER (PLENDIL) 2.5 MG 24 hr tablet Take 1 tablet (2.5 mg) by mouth daily for 360 days 90 tablet 3    fluticasone (FLONASE) 50 MCG/ACT nasal spray Spray 2 sprays into both nostrils daily      loperamide (IMODIUM) 2 MG capsule Take 2 mg by mouth 4 times daily as needed.      LORazepam (ATIVAN) 0.5 MG tablet Take 1 tablet (0.5 mg) by mouth nightly as needed for anxiety 30 tablet 0    meclizine (ANTIVERT) 25 MG tablet Take 1 tablet (25 mg) by mouth 3 times daily as needed for dizziness 30 tablet 3    simvastatin (ZOCOR) 20 MG tablet Take 20 mg by mouth At Bedtime      valsartan (DIOVAN) 80 MG tablet Take 1 tablet (80 mg) by mouth every evening 90 tablet 0    Vitamin D3 (CHOLECALCIFEROL) 25 mcg (1000 units) tablet Take 1,000 Units by mouth daily         Allergies   Allergen Reactions    Actonel [Risedronate]      GI upset    Clindamycin     Cortisone Other (See Comments)     \"heart symptoms\", low blood pressure    Fosamax Other (See Comments)     \"burning my lower esophagus\"    Kenalog [Triamcinolone]     Metronidazole     Minipress [Prazosin]      Hypotension    Penicillin G     Statin Drugs [Statins]      Shortness of breath.  Tolerating Simvastatin (Sept 2016)    Tetanus Toxoid     Prednisone Rash    Zithromax [Azithromycin Dihydrate] Rash     States got rash after using z rubio          Lab Results    Chemistry/lipid CBC Cardiac Enzymes/BNP/TSH/INR   Recent Labs   Lab Test 04/17/23  1232   CHOL 163   HDL 90   LDL 59   TRIG 71     Recent Labs   Lab Test 04/17/23  1232 12/13/21  1557   LDL 59 55     Recent Labs   Lab Test 09/24/23  0715 09/23/23  0511   NA  --  130*   POTASSIUM 4.1 4.3   CHLORIDE  --  99   CO2  --  24   GLC  --  83   BUN  --  19.5   CR  --  0.74   GFRESTIMATED  --  75   LILLI  --  8.9     Recent Labs   Lab Test 09/23/23  0511 09/22/23  0606 09/21/23  1859   CR 0.74 0.75 0.90     No results for input(s): " A1C in the last 95480 hours.       Recent Labs   Lab Test 09/23/23  0511 09/22/23  0606   WBC  --  6.7   HGB 11.9 11.2*   HCT  --  34.4*   MCV  --  98   PLT  --  189     Recent Labs   Lab Test 09/23/23  0511 09/22/23  0606 09/21/23  1859   HGB 11.9 11.2* 12.9    Recent Labs   Lab Test 06/14/21  1718 11/21/19  2150 11/21/19  1527   TROPONINI 0.01 <0.01 0.01     Recent Labs   Lab Test 09/21/23  2254 06/14/21  1718 11/21/19  1117 09/18/18  1752   BNP  --  519* 314* 216*   NTBNP 3,883*  --   --   --      Recent Labs   Lab Test 09/21/23  2254   TSH 2.15     No results for input(s): INR in the last 54456 hours.     Deyanira Casillas PA-C

## 2023-10-11 NOTE — PATIENT INSTRUCTIONS
It was a pleasure taking part in your care today:    - No medication changes  - Follow up with Dr. Franco in 3 months    Please call the [facility] clinic with any questions or concerns at [phone].     Deyanira Castro PA-C

## 2023-10-11 NOTE — LETTER
10/11/2023    Maik Levine MD  [address] [facility] [address]    RE: Mariluz Arana       Dear Colleague,     I had the pleasure of seeing Mariluz Arana in the [facility].    HEART CARE ENCOUNTER CONSULTATON NOTE      [facility]  [phone]      Assessment/Recommendations   Assessment:   Hypertension: Elevated today at 172/90, similar reading with recheck, BP cuff available is slightly small for patient.  Her home blood pressure readings range from 120s to 140s systolic.  I believe this is adequate control for this patient.   - Atenolol 25 mg once, felodipine 2.5 mg, valsartan 80 mg. Adjusted to original regimen before hospitalization a resent PCP visit.  Atenolol restarted with heart rates consistently in the 90s with reports of fatigue, heart rates have been reduced since.  Dyslipidemia: simvastatin 20 mg, Last LDL 59  Aortic stenosis: Moderate to severe aortic stenosis with stable/increased EF on echo vs one performed in 9/2022. Patient has refused replacement on multiple occasions  - Likely contributing to symptoms given severity      Plan:   Continue current antihypertensive regimen  Has follow up with PCP for blood pressure monitoring in a few weeks      Follow up in 3 months with Dr. Franco     History of Present Illness/Subjective    HPI: Mariluz Arana is a 89 year old female with PMHx of severe aortic stenosis, HTN, HLD presents for post-hospital discharge follow up. Presented to emergency department with lightheadedness, elevated BP. CT head negative, elevated troponin attributed to demand ischemia. Echocardiogram repeated and showed normalization of EF to 55% from previous mild reduction, mod-sev aortic stenosis. With concern for bradycardia atenolol was discontinued with increase in valsartan to 160 mg. Primary reverted back to original regimen due to tachycardia and restarting atenolol with reduction in valsartan to avoid hypotension.  "    Patient reports increased fatigue since discharge that is very slowly improving.  Distinguishes that she feels more fatigued than short of breath.  Continues to have intermittent lightheadedness that she believes is also improving.  She is doing PT at assisted living and says she becomes so tired afterward she has to take a nap.  This is slightly improved since restarting atenolol.  Currently taking 25 mg daily when was taking 50 mg daily prior.  She has daily blood pressure and heart rate readings with her at the visit today.  Blood pressure has been in the 120s to 140s systolic.  Heart rates reported in the 60s, 59 today, since restarting atenolol.  Previously heart rates in the 90s.    She is accompanied by her daughter today who does say she struggled with fatigue prior to hospitalization.  She has longstanding lower extremity edema edema for which she wears compression stockings with improvement.      Echocardiogram 9/21/2023 Results:  1. Normal left ventricular size and systolic performance with a visually  estimated ejection fraction of 55%.  2. There is moderate to severe aortic stenosis.  3. There is mild, to perhaps mild-moderate, mitral insufficiency.  4. Normal right ventricular size and systolic performance.  5. There is moderate to severe left atrial enlargement. There is mild-moderate  right atrial enlargement.  6. Right ventricular systolic pressure relative to right atrial pressure is  mildly increased. The pulmonary artery pressure is estimated to be 35-40 mmHg  plus right atrial pressure (the IVC is not well-visualized).     Physical Examination  Review of Systems   Vitals: BP (!) 172/90 (BP Location: Right arm, Patient Position: Sitting, Cuff Size: Adult Small)   Pulse 59   Resp 16   Ht 1.473 m (4' 10\")   Wt 49.6 kg (109 lb 6.4 oz)   SpO2 90%   BMI 22.86 kg/m    BMI= Body mass index is 22.86 kg/m .  Wt Readings from Last 3 Encounters:   10/11/23 49.6 kg (109 lb 6.4 oz)   10/05/23 49.9 " kg (110 lb)   09/23/23 48.4 kg (106 lb 11.2 oz)           ENT/Mouth: membranes moist, no oral lesions or bleeding gums.      EYES:  no scleral icterus, normal conjunctivae                    Neck: No carotid bruit or thyromegaly   Chest/Lungs:   lungs are clear to auscultation, no rales or wheezing, , equal chest wall expansion    Cardiovascular:   Regular. Normal first and second heart sounds with 3/6 systolic ejection murmur, rubs, or gallops; the carotid, radial and posterior tibial pulses are intact, edema bilaterally    Abdomen:  no tenderness; bowel sounds are present   Extremities: no cyanosis or clubbing   Skin: no xanthelasma, warm.    Neurologic: no tremors     Psychiatric: alert and oriented x3, calm        Please refer above for cardiac ROS details.        Medical History  Surgical History Family History Social History   Past Medical History:   Diagnosis Date    Age-related cataract of left eye 9/21/2021    Aortic stenosis     Echocardiogram with moderate aortic stenosis September 2021    Arthritis     Osteoarthritis    Bilateral cataracts     Bilateral hand numbness 4/17/2023    Presumably carpal tunnel syndrome, previous surgery and already using wrist splints, not interested in further surgery    Chronic anxiety     Dizziness and giddiness 9/17/2012    Gastroesophageal reflux disease     GERD (gastroesophageal reflux disease)     Heart murmur     Hypercholesteremia     Hypercholesteremia     Hypercholesterolemia 12/13/2021    Hypertension     Hyponatremia     Hyponatremia 07/08/2021    Irregular heart beat     Irritable bowel     Irritable bowel     Meniere syndrome     Meniere's disease, bilateral     Osteoarthritis of spine with radiculopathy, unspecified spinal region 07/08/2021    Osteoporosis     Peripheral edema 2/28/2023    Primary osteoarthritis of both hands 12/13/2021    SOB (shortness of breath) 07/08/2021    Squamous cell carcinoma of skin of face 7/18/2023    Venous (peripheral)  insufficiency 4/20/2022    Vertigo      Past Surgical History:   Procedure Laterality Date    CATARACT EXTRACTION Left     2021    DILATION AND CURETTAGE      DILATION AND CURETTAGE      ENDOLYMPHATIC SAC OPERATION  01/01/2012    enhancement    ENHANCE ENDOLYMPHATIC SAC, DEC SIGMOID SINUS, EXTND FACIAL RECESS APPRCH, MASTOIDECTOMY, BMT, COMBO  04/23/2012    Procedure:COMBINED ENHANCE ENDOLYMPHATIC SAC, DECOMPRESS SIGMOID SINUS, EXTENDED FACIAL RECESS APPROACH, COMPLETE MASTOIDECTOMY, MYRINGOTOMY, INSERT TUBE; Left Endolymphatic Sac Enhancement, Sigmoid Sinus Decom-  pression, Extended Facial Recess Approach, Myringotomy  , Complete Mastoidectomy; Surgeon:LINN MATHIS; Location:[facility]    INNER EAR SURGERY      Per Pt    KERATOTOMY ARCUATE WITH FEMTOSECOND LASER/IMAGING FOR ATIOL Right 07/19/2018    Procedure: KERATOTOMY ARCUATE WITH FEMTOSECOND LASER/IMAGING FOR ATIOL;  RIGHT EYE KERATOTOMY ARCUATE WITH FEMTOSECOND LASER/IMAGING FOR ATIOL ,  CAPSULOTOMY, LENS FRAGMENTATION, ARCUATE INCISIONS;  Surgeon: Roberth Chambers MD;  Location:  [facility]    MASTOIDECTOMY  01/01/2012    PHACOEMULSIFICATION CLEAR CORNEA WITH TORIC INTRAOCULAR LENS IMPLANT Right 07/19/2018    Procedure: PHACOEMULSIFICATION CLEAR CORNEA WITH TORIC INTRAOCULAR LENS IMPLANT;  RIGHT EYE PHACOEMULSIFICATION CLEAR CORNEA WITH TORIC INTRAOCULAR LENS IMPLANT ;  Surgeon: Roberth Chambers MD;  Location: [facility]    TONSILLECTOMY      TONSILLECTOMY       Family History   Problem Relation Age of Onset    Heart Disease Mother     Clotting Disorder Mother         Dies of a blood clot, per pt    Diabetes Father     Cerebrovascular Disease Father     Hypertension Son     Osteoporosis Daughter     Hypertension Daughter     Cancer Sister         Colon    Glaucoma Sister         Social History     Socioeconomic History    Marital status:      Spouse name: Not on file    Number of children: 7    Years of education: Not on file    Highest  education level: Not on file   Occupational History    Not on file   Tobacco Use    Smoking status: Never     Passive exposure: Never    Smokeless tobacco: Never   Vaping Use    Vaping Use: Never used   Substance and Sexual Activity    Alcohol use: Yes     Comment: rare    Drug use: No    Sexual activity: Never     Partners: Male   Other Topics Concern    Not on file   Social History Narrative    Patient is , they are retired.  Lives in assisted care for past 2 years (11/2/2016).     Social Determinants of Health     Financial Resource Strain: Low Risk  (10/5/2023)    Financial Resource Strain     Within the past 12 months, have you or your family members you live with been unable to get utilities (heat, electricity) when it was really needed?: No   Food Insecurity: Low Risk  (10/5/2023)    Food Insecurity     Within the past 12 months, did you worry that your food would run out before you got money to buy more?: No     Within the past 12 months, did the food you bought just not last and you didn t have money to get more?: No   Transportation Needs: Low Risk  (10/5/2023)    Transportation Needs     Within the past 12 months, has lack of transportation kept you from medical appointments, getting your medicines, non-medical meetings or appointments, work, or from getting things that you need?: No   Physical Activity: Not on file   Stress: Not on file   Social Connections: Not on file   Interpersonal Safety: Low Risk  (10/5/2023)    Interpersonal Safety     Do you feel physically and emotionally safe where you currently live?: Yes     Within the past 12 months, have you been hit, slapped, kicked or otherwise physically hurt by someone?: No     Within the past 12 months, have you been humiliated or emotionally abused in other ways by your partner or ex-partner?: No   Housing Stability: Low Risk  (10/5/2023)    Housing Stability     Do you have housing? : Yes     Are you worried about losing your housing?: No      "      Medications  Allergies   Current Outpatient Medications   Medication Sig Dispense Refill    acetaminophen (TYLENOL) 500 MG tablet Take 1 tablet (500 mg) by mouth every 6 hours as needed for pain Maximum of 6 tablets daily      atenolol (TENORMIN) 25 MG tablet Take 1 tablet (25 mg) by mouth every morning 90 tablet 3    felodipine ER (PLENDIL) 2.5 MG 24 hr tablet Take 1 tablet (2.5 mg) by mouth daily for 360 days 90 tablet 3    fluticasone (FLONASE) 50 MCG/ACT nasal spray Spray 2 sprays into both nostrils daily      loperamide (IMODIUM) 2 MG capsule Take 2 mg by mouth 4 times daily as needed.      LORazepam (ATIVAN) 0.5 MG tablet Take 1 tablet (0.5 mg) by mouth nightly as needed for anxiety 30 tablet 0    meclizine (ANTIVERT) 25 MG tablet Take 1 tablet (25 mg) by mouth 3 times daily as needed for dizziness 30 tablet 3    simvastatin (ZOCOR) 20 MG tablet Take 20 mg by mouth At Bedtime      valsartan (DIOVAN) 80 MG tablet Take 1 tablet (80 mg) by mouth every evening 90 tablet 0    Vitamin D3 (CHOLECALCIFEROL) 25 mcg (1000 units) tablet Take 1,000 Units by mouth daily         Allergies   Allergen Reactions    Actonel [Risedronate]      GI upset    Clindamycin     Cortisone Other (See Comments)     \"heart symptoms\", low blood pressure    Fosamax Other (See Comments)     \"burning my lower esophagus\"    Kenalog [Triamcinolone]     Metronidazole     Minipress [Prazosin]      Hypotension    Penicillin G     Statin Drugs [Statins]      Shortness of breath.  Tolerating Simvastatin (Sept 2016)    Tetanus Toxoid     Prednisone Rash    Zithromax [Azithromycin Dihydrate] Rash     States got rash after using z rubio          Lab Results    Chemistry/lipid CBC Cardiac Enzymes/BNP/TSH/INR   Recent Labs   Lab Test 04/17/23  1232   CHOL 163   HDL 90   LDL 59   TRIG 71     Recent Labs   Lab Test 04/17/23  1232 12/13/21  1557   LDL 59 55     Recent Labs   Lab Test 09/24/23  0715 09/23/23  0511   NA  --  130*   POTASSIUM 4.1 4.3 "   CHLORIDE  --  99   CO2  --  24   GLC  --  83   BUN  --  19.5   CR  --  0.74   GFRESTIMATED  --  75   LILLI  --  8.9     Recent Labs   Lab Test 09/23/23  0511 09/22/23  0606 09/21/23  1859   CR 0.74 0.75 0.90     No results for input(s): A1C in the last 56396 hours.       Recent Labs   Lab Test 09/23/23  0511 09/22/23  0606   WBC  --  6.7   HGB 11.9 11.2*   HCT  --  34.4*   MCV  --  98   PLT  --  189     Recent Labs   Lab Test 09/23/23  0511 09/22/23  0606 09/21/23  1859   HGB 11.9 11.2* 12.9    Recent Labs   Lab Test 06/14/21  1718 11/21/19  2150 11/21/19  1527   TROPONINI 0.01 <0.01 0.01     Recent Labs   Lab Test 09/21/23  2254 06/14/21  1718 11/21/19  1117 09/18/18  1752   BNP  --  519* 314* 216*   NTBNP 3,883*  --   --   --      Recent Labs   Lab Test 09/21/23  2254   TSH 2.15     No results for input(s): INR in the last 37665 hours.     Deyanira Casillas PA-C                                          Thank you for allowing me to participate in the care of your patient.      Sincerely,     Deyanira Castro PA-C     [facility]  cc:   Georgi Franco MD  [address]  [place] [address]

## 2023-10-17 DIAGNOSIS — Z53.9 DIAGNOSIS NOT YET DEFINED: Primary | ICD-10-CM

## 2023-10-17 PROCEDURE — G0180 MD CERTIFICATION HHA PATIENT: HCPCS | Performed by: INTERNAL MEDICINE

## 2023-10-23 ENCOUNTER — OFFICE VISIT (OUTPATIENT)
Dept: INTERNAL MEDICINE | Facility: CLINIC | Age: 88
End: 2023-10-23
Payer: COMMERCIAL

## 2023-10-23 VITALS
DIASTOLIC BLOOD PRESSURE: 80 MMHG | RESPIRATION RATE: 20 BRPM | HEIGHT: 58 IN | BODY MASS INDEX: 23.3 KG/M2 | WEIGHT: 111 LBS | TEMPERATURE: 97.5 F | HEART RATE: 60 BPM | SYSTOLIC BLOOD PRESSURE: 128 MMHG

## 2023-10-23 DIAGNOSIS — I35.0 AORTIC VALVE STENOSIS, ETIOLOGY OF CARDIAC VALVE DISEASE UNSPECIFIED: ICD-10-CM

## 2023-10-23 DIAGNOSIS — I10 PRIMARY HYPERTENSION: Primary | ICD-10-CM

## 2023-10-23 DIAGNOSIS — F41.9 CHRONIC ANXIETY: ICD-10-CM

## 2023-10-23 DIAGNOSIS — E87.1 HYPONATREMIA: ICD-10-CM

## 2023-10-23 DIAGNOSIS — R53.82 CHRONIC FATIGUE: ICD-10-CM

## 2023-10-23 DIAGNOSIS — I87.2 VENOUS (PERIPHERAL) INSUFFICIENCY: ICD-10-CM

## 2023-10-23 PROBLEM — R42 LIGHTHEADEDNESS: Status: RESOLVED | Noted: 2023-09-22 | Resolved: 2023-10-23

## 2023-10-23 PROCEDURE — 90480 ADMN SARSCOV2 VAC 1/ONLY CMP: CPT | Performed by: INTERNAL MEDICINE

## 2023-10-23 PROCEDURE — 91320 SARSCV2 VAC 30MCG TRS-SUC IM: CPT | Performed by: INTERNAL MEDICINE

## 2023-10-23 PROCEDURE — 99214 OFFICE O/P EST MOD 30 MIN: CPT | Performed by: INTERNAL MEDICINE

## 2023-10-23 RX ORDER — LORAZEPAM 0.5 MG/1
0.5 TABLET ORAL
Qty: 30 TABLET | Refills: 0 | Status: SHIPPED | OUTPATIENT
Start: 2023-10-23 | End: 2023-11-24

## 2023-10-23 NOTE — PROGRESS NOTES
Assessment & Plan     Primary hypertension  89-year-old woman with multiple medical problems including hypertension that is looking very well controlled with a combination of valsartan 80 mg daily, felodipine 2.5 mg daily and atenolol 25 mg daily.  Initially elevated at the start of her visit but 128/80 when rechecked and this is similar to what she is finding at home.  Her heart rate is now better controlled since restarting the beta-blocker.    Aortic valve stenosis, etiology of cardiac valve disease unspecified  Known aortic valve stenosis but not interested in any interventions.  Continue current management keeping blood pressure control.  Followed by cardiology    Chronic fatigue  Chronic fatigue is multifactorial and in large part due to her age of 89 although aortic stenosis and side effect of medications could be contributing.    Hyponatremia  She continues free water restriction    Venous (peripheral) insufficiency  Wearing elastic compression stockings    Chronic anxiety  Chronic anxiety discussed    Chronic headaches  Headaches sounding like tension headaches that are relieved with Tylenol.    See Patient Instructions    Maik Levine MD  [facility]    Alonzo Mcgraw is a 89 year old, presenting for the following health issues:  Follow Up        10/23/2023    12:45 PM   Additional Questions   Roomed by        History of Present Illness       Hypertension: She presents for follow up of hypertension.  She does check blood pressure  regularly outside of the clinic. Outside blood pressures have been over 140/90. She does not follow a low salt diet.     She eats 2-3 servings of fruits and vegetables daily.She exercises with enough effort to increase her heart rate 9 or less minutes per day.  She exercises with enough effort to increase her heart rate 3 or less days per week.   She is taking medications regularly.                 Review of Systems   No chest  "pain.  No worsening edema      Objective    /80   Pulse 60   Temp 97.5  F (36.4  C) (Oral)   Resp 20   Ht 1.473 m (4' 10\")   Wt 50.3 kg (111 lb)   LMP  (LMP Unknown)   Breastfeeding No   BMI 23.20 kg/m    Body mass index is 23.2 kg/m .  Physical Exam   Anxious but otherwise well-appearing elderly woman  Lungs are clear  Heart regular rate and rhythm with 3/6 systolic murmur  No change chronic edema      "

## 2023-11-16 DIAGNOSIS — I10 PRIMARY HYPERTENSION: ICD-10-CM

## 2023-11-16 RX ORDER — VALSARTAN 80 MG/1
80 TABLET ORAL EVERY EVENING
Qty: 90 TABLET | Refills: 0 | OUTPATIENT
Start: 2023-11-16

## 2023-11-18 DIAGNOSIS — F41.9 CHRONIC ANXIETY: ICD-10-CM

## 2023-11-24 ENCOUNTER — TELEPHONE (OUTPATIENT)
Dept: INTERNAL MEDICINE | Facility: CLINIC | Age: 88
End: 2023-11-24
Payer: COMMERCIAL

## 2023-11-24 DIAGNOSIS — I10 PRIMARY HYPERTENSION: ICD-10-CM

## 2023-11-24 RX ORDER — VALSARTAN 80 MG/1
80 TABLET ORAL EVERY EVENING
Qty: 90 TABLET | Refills: 0 | Status: SHIPPED | OUTPATIENT
Start: 2023-11-24 | End: 2024-02-05

## 2023-11-24 RX ORDER — LORAZEPAM 0.5 MG/1
0.5 TABLET ORAL
Qty: 30 TABLET | Refills: 0 | Status: SHIPPED | OUTPATIENT
Start: 2023-11-24 | End: 2023-12-19

## 2023-11-24 NOTE — TELEPHONE ENCOUNTER
S-(situation):     Call back to Jes and left detailed VM with VO.    B-(background): See previous    A-(assessment): NA    R-(recommendations): No further action - closing encounter.

## 2023-11-24 NOTE — TELEPHONE ENCOUNTER
Home Care is calling regarding an established patient with [facility].       Requesting orders from: Maik Levine  Provider is following patient: Yes  Is this a 60-day recertification request?  No    Orders Requested    Skilled Nursing  Request for initial evaluation and treatment (one time)   For CHF and GI concerns    Occupational Therapy  Request for initial evaluation and treatment (one time)   Lymphedema treatment    Physical Therapy   Recert visit today wrapped up with PT   Can not discharge until other evals are complete.    Confirmed ok to leave a detailed message with call back.  Contact information confirmed and updated as needed.    [facility], [phone]    Francisco Javier Olguin RN

## 2023-11-24 NOTE — TELEPHONE ENCOUNTER
11-24-23    FYI - Status Update    Who is Calling: Jes @ [facility] home care     Update: jes looking for verbal orders:  Kimal for skilled nursing & OT   &  Physical therapy- discharge visit on 12-8 to allow OT & skilled nursing to get established     Does caller want a call/response back: Yes     Okay to leave a detailed message?: Yes at Other phone number:  [phone]

## 2023-11-24 NOTE — TELEPHONE ENCOUNTER
S-(situation): request for VO for home care - clarification needed on dates of new certification period. Call to Jes at  [phone], no answer. Left detailed message on confidential voicemail.    B-(background): See previous    A-(assessment): Need more information from [facility]    R-(recommendations): Asked Jes to kindly speak directly to RN to clarify dates and VO needed.

## 2023-11-25 ENCOUNTER — NURSE TRIAGE (OUTPATIENT)
Dept: NURSING | Facility: CLINIC | Age: 88
End: 2023-11-25
Payer: COMMERCIAL

## 2023-11-25 NOTE — TELEPHONE ENCOUNTER
"Initial caller is pt's daughter (Jaycee).  Not currently with pt.  Therefore advised contacting pt directly ...  Reached pt who identifies self and participates in triage conversation.    COVID Positive/Requesting COVID treatment    Patient is positive for COVID and requesting treatment options.    Date of positive COVID test (PCR or at home)? Nov 25, 2023  Current COVID symptoms: fever or chills, fatigue, and congestion or runny nose  Date COVID symptoms began: Nov 23, 2023    Message should be routed to clinic RN pool. Best phone number to use for call back: [phone]    Add'l Triage Notes:  No severe symptoms requiring emergent eval.  Currently on day two of covid episode.  [place] PCP = Dr Maik Levine.  Last OV 10/23/23.    Pt reports very mild cold-like symptoms.  No feverish chills at this time.  Pt states \"Feeling pretty well actually.\"  Discussed add'l self-care measures for relief of phlemmy throat and congestion.  Also reviewed isolation precautions as already determined by her assisted living facility.  Pt understands to await a callback from clinic team Monday for Paxlovid discussion.  Pt declines offer of [place] [facility]'s [facility] service.    Nila [facility] Nurse Advisor     Reason for Disposition   [1] HIGH RISK patient (e.g., weak immune system, age > 64 years, obesity with BMI 30 or higher, pregnant, chronic lung disease or other chronic medical condition) AND [2] COVID symptoms (e.g., cough, fever)  (Exceptions: Already seen by PCP and no new or worsening symptoms.)    Additional Information   Negative: SEVERE difficulty breathing (e.g., struggling for each breath, speaks in single words)   Negative: Difficult to awaken or acting confused (e.g., disoriented, slurred speech)   Negative: Bluish (or gray) lips or face now   Negative: Shock suspected (e.g., cold/pale/clammy skin, too weak to stand, low BP, rapid pulse)   Negative: Sounds like a life-threatening emergency to the " triager   Negative: [1] Diagnosed or suspected COVID-19 AND [2] symptoms lasting 3 or more weeks   Negative: [1] COVID-19 exposure AND [2] no symptoms   Negative: COVID-19 vaccine reaction suspected (e.g., fever, headache, muscle aches) occurring 1 to 3 days after getting vaccine   Negative: COVID-19 vaccine, questions about   Negative: [1] Lives with someone known to have influenza (flu test positive) AND [2] flu-like symptoms (e.g., cough, runny nose, sore throat, SOB; with or without fever)   Negative: [1] Possible COVID-19 symptoms AND [2] triager concerned about severity of symptoms or other causes   Negative: COVID-19 and breastfeeding, questions about   Negative: SEVERE or constant chest pain or pressure  (Exception: Mild central chest pain, present only when coughing.)   Negative: MODERATE difficulty breathing (e.g., speaks in phrases, SOB even at rest, pulse 100-120)   Negative: [1] Headache AND [2] stiff neck (can't touch chin to chest)   Negative: Oxygen level (e.g., pulse oximetry) 90 percent or lower   Negative: Chest pain or pressure  (Exception: MILD central chest pain, present only when coughing.)   Negative: [1] Drinking very little AND [2] dehydration suspected (e.g., no urine > 12 hours, very dry mouth, very lightheaded)   Negative: Patient sounds very sick or weak to the triager   Negative: MILD difficulty breathing (e.g., minimal/no SOB at rest, SOB with walking, pulse <100)   Negative: Fever > 103 F (39.4 C)   Negative: [1] Fever > 101 F (38.3 C) AND [2] age > 60 years   Negative: [1] Fever > 100.0 F (37.8 C) AND [2] bedridden (e.g., CVA, chronic illness, recovering from surgery)   Negative: Oxygen level (e.g., pulse oximetry) 91 to 94 percent    Protocols used: Coronavirus (COVID-19) Diagnosed or [id number]

## 2023-11-25 NOTE — TELEPHONE ENCOUNTER
Patient requesting COVID treatment    Monday will be COVID day: 4  PCP: Maik Levine  Last office visit: 10/23/23    Please follow up with patient for consideration of antiviral treatment.     Yana Galvez RN

## 2023-11-27 NOTE — TELEPHONE ENCOUNTER
Outgoing call to pt, refused Paxlovid.     Pt doesn't think she needs Paxlovid at this time since the only symptom she have is occasional headache.  Denies any other symptoms at this time.  Pt take Tylenol to help with her headache and it is effective per pt.   Pt stated she understands that she can no longer get Paxlovid after Day 5 of testing positive for covid.   Quarantine time and masking reviewed with pt.    Red flags reviewed with pt.   No further questions at this time.     RN COVID TREATMENT VISIT  11/27/23      The patient has been triaged and does not require a higher level of care.    Mariluz Arana  89 year old  Current weight? 110 lbs     Has the patient been seen by a primary care provider at an [facility] or [facility] within the past two years? Yes.   Have you been in close proximity to/do you have a known exposure to a person with a confirmed case of influenza? No.     General treatment eligibility:  Date of positive COVID test (PCR or at home)?  11/25/23    Are you or have you been hospitalized for this COVID-19 infection? No.   Have you received monoclonal antibodies or antiviral treatment for COVID-19 since this positive test? No.   Do you have any of the following conditions that place you at risk of being very sick from COVID-19?   Yes, patient has at least one high risk condition as noted above.     Current COVID symptoms:   - headache  Yes. Patient has at least one symptom as selected.     Kleber GARCIA RN

## 2023-11-28 ENCOUNTER — MEDICAL CORRESPONDENCE (OUTPATIENT)
Dept: HEALTH INFORMATION MANAGEMENT | Facility: CLINIC | Age: 88
End: 2023-11-28

## 2023-11-29 ENCOUNTER — NURSE TRIAGE (OUTPATIENT)
Dept: NURSING | Facility: CLINIC | Age: 88
End: 2023-11-29
Payer: COMMERCIAL

## 2023-11-29 NOTE — TELEPHONE ENCOUNTER
Wants to talk about how to continue with what she's doing. Covid concern. Wants to know what the recommendation is for what to be doing.  I explained it's five days of isolation and if fever free, days 6-10 of isolation, she can be around others if she's wearing a mask.  Wash the high touch surfaces. She has no other questions.  Celia Whittaker RN  [facility]    Reason for Disposition   Follow-up information-only call to recent contact, no triage required    Additional Information   Negative: New-onset or worsening symptoms, see that protocol (e.g., diarrhea, runny nose, sore throat)   Negative: Medicine question not related to refill or renewal   Negative: Requesting a renewal or refill of a medicine patient is currently taking   Negative: Questions or concerns about high blood pressure   Negative: Nursing judgment   Negative: Nursing judgment   Negative: Nursing judgment   Negative: Requesting lab results and adult stable (no new symptoms, not worsening)   Negative: Requesting referral to a specialist   Negative: Questions about durable medical equipment ordered and triager unable to answer   Negative: Requesting regular office appointment and adult stable (no new symptoms, not worsening)   Negative: Question about upcoming scheduled surgery, procedure or test, no triage required, and triager able to answer question   Negative: Caller is not with the adult (patient) AND patient has probable NON-URGENT symptoms   Negative: General information question, no triage required and triager able to answer question   Negative: Health information question, no triage required and triager able to answer question    Protocols used: Information Only Call - No Triage-A-[place]

## 2023-11-30 ENCOUNTER — TELEPHONE (OUTPATIENT)
Dept: INTERNAL MEDICINE | Facility: CLINIC | Age: 88
End: 2023-11-30
Payer: COMMERCIAL

## 2023-11-30 NOTE — TELEPHONE ENCOUNTER
Home Care is calling regarding an established patient with [facility].       Requesting orders from: Maik Levine  Provider is following patient: Yes  Is this a 60-day recertification request?  No    Orders Requested    Occupational Therapy  Request for initial certification (first set of orders)   Frequency: 1 visit every other week for 7 weeks      Information was gathered and will be sent to provider for review.  RN will contact Home Care with information after provider review.  Confirmed ok to leave a detailed message with call back.  Contact information confirmed and updated as needed.    Felicita Gordon RN

## 2023-12-01 ENCOUNTER — TELEPHONE (OUTPATIENT)
Dept: INTERNAL MEDICINE | Facility: CLINIC | Age: 88
End: 2023-12-01
Payer: COMMERCIAL

## 2023-12-01 NOTE — TELEPHONE ENCOUNTER
Home Care is calling regarding an established patient with [facility].       Requesting orders from: Maik Levine  Provider is following patient: Yes  Is this a 60-day recertification request?  No    Orders Requested    Skilled Nursing  Request for initial certification (first set of orders)   Frequency: 1x/wk for 3 wks  then 1x every other wk for 3 wks     Covid   Requesting pcp to lift covid precaution on 12/6 since that will be her day 11 after tested positive for covid.     Routed to pcp to review and approve if appropriate.   Confirmed ok to leave a detailed message with call back.  Contact information confirmed and updated as needed.    Kleber Justice RN

## 2023-12-04 ENCOUNTER — MEDICAL CORRESPONDENCE (OUTPATIENT)
Dept: HEALTH INFORMATION MANAGEMENT | Facility: CLINIC | Age: 88
End: 2023-12-04

## 2023-12-08 ENCOUNTER — MEDICAL CORRESPONDENCE (OUTPATIENT)
Dept: HEALTH INFORMATION MANAGEMENT | Facility: CLINIC | Age: 88
End: 2023-12-08

## 2023-12-14 ENCOUNTER — MEDICAL CORRESPONDENCE (OUTPATIENT)
Dept: HEALTH INFORMATION MANAGEMENT | Facility: CLINIC | Age: 88
End: 2023-12-14

## 2023-12-19 DIAGNOSIS — F41.9 CHRONIC ANXIETY: ICD-10-CM

## 2023-12-19 RX ORDER — LORAZEPAM 0.5 MG/1
0.5 TABLET ORAL
Qty: 30 TABLET | Refills: 0 | Status: SHIPPED | OUTPATIENT
Start: 2023-12-19 | End: 2024-01-15

## 2024-01-05 ENCOUNTER — OFFICE VISIT (OUTPATIENT)
Dept: INTERNAL MEDICINE | Facility: CLINIC | Age: 89
End: 2024-01-05
Payer: COMMERCIAL

## 2024-01-05 VITALS
RESPIRATION RATE: 16 BRPM | OXYGEN SATURATION: 98 % | WEIGHT: 109.7 LBS | DIASTOLIC BLOOD PRESSURE: 70 MMHG | SYSTOLIC BLOOD PRESSURE: 148 MMHG | BODY MASS INDEX: 22.93 KG/M2 | HEART RATE: 59 BPM

## 2024-01-05 DIAGNOSIS — R60.0 PERIPHERAL EDEMA: ICD-10-CM

## 2024-01-05 DIAGNOSIS — E87.1 HYPONATREMIA: ICD-10-CM

## 2024-01-05 DIAGNOSIS — I35.0 AORTIC VALVE STENOSIS, ETIOLOGY OF CARDIAC VALVE DISEASE UNSPECIFIED: ICD-10-CM

## 2024-01-05 DIAGNOSIS — R53.82 CHRONIC FATIGUE: ICD-10-CM

## 2024-01-05 DIAGNOSIS — I10 PRIMARY HYPERTENSION: Primary | ICD-10-CM

## 2024-01-05 PROBLEM — R07.9 ACUTE CHEST PAIN: Status: RESOLVED | Noted: 2023-09-22 | Resolved: 2024-01-05

## 2024-01-05 LAB
ALBUMIN SERPL BCG-MCNC: 4.4 G/DL (ref 3.5–5.2)
ALP SERPL-CCNC: 83 U/L (ref 40–150)
ALT SERPL W P-5'-P-CCNC: 14 U/L (ref 0–50)
ANION GAP SERPL CALCULATED.3IONS-SCNC: 11 MMOL/L (ref 7–15)
AST SERPL W P-5'-P-CCNC: 28 U/L (ref 0–45)
BILIRUB SERPL-MCNC: 0.5 MG/DL
BUN SERPL-MCNC: 20.9 MG/DL (ref 8–23)
CALCIUM SERPL-MCNC: 9.7 MG/DL (ref 8.2–9.6)
CHLORIDE SERPL-SCNC: 98 MMOL/L (ref 98–107)
CREAT SERPL-MCNC: 0.85 MG/DL (ref 0.51–0.95)
DEPRECATED HCO3 PLAS-SCNC: 24 MMOL/L (ref 22–29)
EGFRCR SERPLBLD CKD-EPI 2021: 64 ML/MIN/1.73M2
ERYTHROCYTE [DISTWIDTH] IN BLOOD BY AUTOMATED COUNT: 12.6 % (ref 10–15)
GLUCOSE SERPL-MCNC: 111 MG/DL (ref 70–99)
HCT VFR BLD AUTO: 39.1 % (ref 35–47)
HGB BLD-MCNC: 12.8 G/DL (ref 11.7–15.7)
MCH RBC QN AUTO: 32.4 PG (ref 26.5–33)
MCHC RBC AUTO-ENTMCNC: 32.7 G/DL (ref 31.5–36.5)
MCV RBC AUTO: 99 FL (ref 78–100)
PLATELET # BLD AUTO: 214 10E3/UL (ref 150–450)
POTASSIUM SERPL-SCNC: 4.6 MMOL/L (ref 3.4–5.3)
PROT SERPL-MCNC: 7.3 G/DL (ref 6.4–8.3)
RBC # BLD AUTO: 3.95 10E6/UL (ref 3.8–5.2)
SODIUM SERPL-SCNC: 133 MMOL/L (ref 135–145)
WBC # BLD AUTO: 7.6 10E3/UL (ref 4–11)

## 2024-01-05 PROCEDURE — 80053 COMPREHEN METABOLIC PANEL: CPT | Performed by: INTERNAL MEDICINE

## 2024-01-05 PROCEDURE — 99214 OFFICE O/P EST MOD 30 MIN: CPT | Performed by: INTERNAL MEDICINE

## 2024-01-05 PROCEDURE — 36415 COLL VENOUS BLD VENIPUNCTURE: CPT | Performed by: INTERNAL MEDICINE

## 2024-01-05 PROCEDURE — 85027 COMPLETE CBC AUTOMATED: CPT | Performed by: INTERNAL MEDICINE

## 2024-01-05 NOTE — PROGRESS NOTES
Assessment & Plan     Primary hypertension  89-year-old woman here to follow-up medical problems including hypertension.  Her blood pressure is slightly above goal today but relatively well-controlled for her age and with her multiple intolerances to other blood pressure medications.  She continues on valsartan 80 mg every evening along with felodipine 2.5 mg daily and atenolol 25 mg daily.  I would not recommend any addition or changes to her regimen at this time.  Will monitor renal function.  - Comprehensive metabolic panel (BMP + Alb, Alk Phos, ALT, AST, Total. Bili, TP); Future    Dizziness  She is felt some slight dizziness/disequilibrium the past week.  No true vertigo.  This does not sound like orthostatic hypotension.  She is not lightheaded or faint.  She tried some meclizine which was partially helpful.  I suspect related to problem with it in her ear.  No other symptoms to suggest posterior circulation stroke.    Aortic valve stenosis, etiology of cardiac valve disease unspecified  Known severe aortic valve stenosis.  TAVR has been discussed but she is not interested in proceeding with any aggressive interventions at this time.  She will meet with her cardiologist Dr. Franco next week.  She does complain of shortness of breath although this does not seem to be progressive.  Denies any chest pain.    Peripheral edema  Edema is better controlled wearing elastic compression stockings.  Felodipine is a likely contributor    Hyponatremia  She avoids diuretics with history of hyponatremia.  She continues free water restriction  - Comprehensive metabolic panel (BMP + Alb, Alk Phos, ALT, AST, Total. Bili, TP); Future    Chronic fatigue  She does feel tired and some of this is obviously related to her age but I will check other metabolic studies including CBC and CMP.  Mild anemia was seen last time  - CBC with platelets; Future  - Comprehensive metabolic panel (BMP + Alb, Alk Phos, ALT, AST, Total. Bili, TP);  Future    Chronic headaches sounding like tension type headaches.  We discussed using Tylenol as needed             Follow-up in 3 months for annual wellness visit    Maik Levine MD  [facility]    Alonzo Mcgraw is a 89 year old, presenting for the following health issues: Several issues discussed today including follow-up of hypertension, recent dizziness and known aortic stenosis.  Also having headaches and fatigue.  See assessment and plan for details.  Follow Up (2 Month F/U-Had a couple days of  dizziness starting Tue. )        1/5/2024     2:13 PM   Additional Questions   Roomed by Amairani       History of Present Illness       Hypertension: She presents for follow up of hypertension.  She does check blood pressure  regularly outside of the clinic. Outside blood pressures have been over 140/90. She follows a low salt diet.     She eats 2-3 servings of fruits and vegetables daily.She consumes 1 sweetened beverage(s) daily.She exercises with enough effort to increase her heart rate 9 or less minutes per day.  She exercises with enough effort to increase her heart rate 3 or less days per week.   She is taking medications regularly.         Review of Systems   No chest pain.  Edema stable      Objective    BP (!) 148/70   Pulse 59   Resp 16   Wt 49.8 kg (109 lb 11.2 oz)   LMP  (LMP Unknown)   SpO2 98%   BMI 22.93 kg/m    Body mass index is 22.93 kg/m .  Physical Exam   Anxious but well-appearing elderly woman  Lungs clear bilaterally without rales or wheezes  Heart regular rate and rhythm with 3/6 systolic murmur  1+ bilateral lower extremity edema with elastic compression stockings in place  Neurologically intact

## 2024-01-05 NOTE — LETTER
January 8, 2024      Mariluz AVERY [address]   [facility] [address]        Dear Mariluz,    Your kidney function remains normal and your sodium is stable at 133.  Normal CBC with no anemia.    Resulted Orders   CBC with platelets   Result Value Ref Range    WBC Count 7.6 4.0 - 11.0 10e3/uL    RBC Count 3.95 3.80 - 5.20 10e6/uL    Hemoglobin 12.8 11.7 - 15.7 g/dL    Hematocrit 39.1 35.0 - 47.0 %    MCV 99 78 - 100 fL    MCH 32.4 26.5 - 33.0 pg    MCHC 32.7 31.5 - 36.5 g/dL    RDW 12.6 10.0 - 15.0 %    Platelet Count 214 150 - 450 10e3/uL   Comprehensive metabolic panel (BMP + Alb, Alk Phos, ALT, AST, Total. Bili, TP)   Result Value Ref Range    Sodium 133 (L) 135 - 145 mmol/L      Comment:      Reference intervals for this test were updated on 09/26/2023 to more accurately reflect our healthy population. There may be differences in the flagging of prior results with similar values performed with this method. Interpretation of those prior results can be made in the context of the updated reference intervals.     Potassium 4.6 3.4 - 5.3 mmol/L    Carbon Dioxide (CO2) 24 22 - 29 mmol/L    Anion Gap 11 7 - 15 mmol/L    Urea Nitrogen 20.9 8.0 - 23.0 mg/dL    Creatinine 0.85 0.51 - 0.95 mg/dL    GFR Estimate 64 >60 mL/min/1.73m2    Calcium 9.7 (H) 8.2 - 9.6 mg/dL    Chloride 98 98 - 107 mmol/L    Glucose 111 (H) 70 - 99 mg/dL    Alkaline Phosphatase 83 40 - 150 U/L      Comment:      Reference intervals for this test were updated on 11/14/2023 to more accurately reflect our healthy population. There may be differences in the flagging of prior results with similar values performed with this method. Interpretation of those prior results can be made in the context of the updated reference intervals.    AST 28 0 - 45 U/L      Comment:      Reference intervals for this test were updated on 6/12/2023 to more accurately reflect our healthy population. There may be differences in the flagging of prior results with  similar values performed with this method. Interpretation of those prior results can be made in the context of the updated reference intervals.    ALT 14 0 - 50 U/L      Comment:      Reference intervals for this test were updated on 6/12/2023 to more accurately reflect our healthy population. There may be differences in the flagging of prior results with similar values performed with this method. Interpretation of those prior results can be made in the context of the updated reference intervals.      Protein Total 7.3 6.4 - 8.3 g/dL    Albumin 4.4 3.5 - 5.2 g/dL    Bilirubin Total 0.5 <=1.2 mg/dL       If you have any questions or concerns, please call the clinic at the number listed above.       Sincerely,      Maik Levine MD

## 2024-01-11 ENCOUNTER — OFFICE VISIT (OUTPATIENT)
Dept: CARDIOLOGY | Facility: CLINIC | Age: 89
End: 2024-01-11
Payer: COMMERCIAL

## 2024-01-11 VITALS
SYSTOLIC BLOOD PRESSURE: 146 MMHG | HEIGHT: 58 IN | OXYGEN SATURATION: 97 % | TEMPERATURE: 98.5 F | HEART RATE: 67 BPM | BODY MASS INDEX: 22.77 KG/M2 | WEIGHT: 108.5 LBS | DIASTOLIC BLOOD PRESSURE: 87 MMHG | RESPIRATION RATE: 16 BRPM

## 2024-01-11 DIAGNOSIS — I35.0 NONRHEUMATIC AORTIC VALVE STENOSIS: Primary | ICD-10-CM

## 2024-01-11 DIAGNOSIS — I10 PRIMARY HYPERTENSION: ICD-10-CM

## 2024-01-11 PROCEDURE — 99214 OFFICE O/P EST MOD 30 MIN: CPT | Performed by: INTERNAL MEDICINE

## 2024-01-11 NOTE — PROGRESS NOTES
[facility]  [phone]          Assessment/Recommendations   Patient with known hypertension, moderately severe aortic stenosis who is stable at this time she was hospitalized with increased blood pressure and her medications were adjusted and readjusted as an outpatient and blood pressure is now are not quite acceptable.  Blood pressures from home are from 1  systolic.  He does get some dizziness which meclizine helps so I suspect this is related to inner ear type issues.  This dizziness seems to improve if she stands up which would be against orthostatic symptoms or hypotension.    I am not changing her medications today.  I have encouraged her to maintain some walking on a daily basis to keep her strength up and she will take that under advisement.    She has not been interested in the any procedures including transcutaneous aortic valve replacement.  Recent echocardiogram showed that the mean gradient across aortic valve was mildly increased to 32 mmHg.    Will plan on seeing her back in 1 year but of course to be happy to see her sooner if questions or problems arise.  30 minutes spent with chart review, patient visit, and documentation.     History of Present Illness/Subjective    Ms. Mariluz Arana is a 89 year old female with known aortic stenosis and hypertension.  She has been stable since getting out of the hospital in September.  Blood pressures are reviewed and are quite acceptable.  No low blood pressures.  She has not had any syncopal episodes but does get a little dizzy although meclizine seems to help this and the dizziness seems to come on more when she is sitting and if she stands up it seems to get better.  She has not had any chest discomfort.  Breathing has been stable.  She denies orthopnea or paroxysmal nocturnal dyspnea.  No peripheral edema but does wear support stockings.         Physical Examination Review of Systems   BP (!) 146/87 (BP Location: Right  "arm, Patient Position: Sitting, Cuff Size: Adult Regular)   Pulse 67   Temp 98.5  F (36.9  C) (Oral)   Resp 16   Ht 1.473 m (4' 10\")   Wt 49.2 kg (108 lb 8 oz)   LMP  (LMP Unknown)   SpO2 97%   BMI 22.68 kg/m    Body mass index is 22.68 kg/m .  Wt Readings from Last 3 Encounters:   01/11/24 49.2 kg (108 lb 8 oz)   01/05/24 49.8 kg (109 lb 11.2 oz)   10/23/23 50.3 kg (111 lb)     General Appearance:   Alert, cooperative and in no acute distress.   ENT/Mouth: Pink/moist oral mucosa   EYES:  no scleral icterus, normal conjunctivae   Neck: JVP normal. No Hepatojugular reflux. Thyroid not visualized.   Chest/Lungs:   Lungs are clear to auscultation, equal chest wall expansion.   Cardiovascular:   S1, S2 with 3/6 systolic murmur , no clicks or rubs. Brachial, radial pulses are intact and symetric. No carotid bruits noted   Abdomen:  Nontender.    Extremities: No cyanosis, clubbing and trace pretibial edema   Skin: no xanthelasma, warm.    Neurologic: normal arm movement bilateral, no tremors     Psychiatric: Appropriate affect.      Enc Vitals  BP: (!) 146/87  Pulse: 67  Resp: 16  Temp: 98.5  F (36.9  C)  Temp src: Oral  SpO2: 97 %  Weight: 49.2 kg (108 lb 8 oz)  Height: 147.3 cm (4' 10\")                                           Medical History  Surgical History Family History Social History   Past Medical History:   Diagnosis Date    Age-related cataract of left eye 09/21/2021    Aortic stenosis     Echocardiogram with moderate aortic stenosis September 2021    Arthritis     Osteoarthritis    Bilateral cataracts     Bilateral hand numbness 04/17/2023    Presumably carpal tunnel syndrome, previous surgery and already using wrist splints, not interested in further surgery    Chronic anxiety     Chronic fatigue 10/23/2023    Dizziness and giddiness 09/17/2012    Gastroesophageal reflux disease     GERD (gastroesophageal reflux disease)     Heart murmur     Hypercholesteremia     Hypercholesteremia     " Hypercholesterolemia 12/13/2021    Hypertension     Hyponatremia     Hyponatremia 07/08/2021    Irregular heart beat     Irritable bowel     Irritable bowel     Meniere syndrome     Meniere's disease, bilateral     Osteoarthritis of spine with radiculopathy, unspecified spinal region 07/08/2021    Osteoporosis     Peripheral edema 02/28/2023    Primary osteoarthritis of both hands 12/13/2021    SOB (shortness of breath) 07/08/2021    Squamous cell carcinoma of skin of face 07/18/2023    Venous (peripheral) insufficiency 04/20/2022    Vertigo     Past Surgical History:   Procedure Laterality Date    CATARACT EXTRACTION Left     2021    DILATION AND CURETTAGE      DILATION AND CURETTAGE      ENDOLYMPHATIC SAC OPERATION  01/01/2012    enhancement    ENHANCE ENDOLYMPHATIC SAC, DEC SIGMOID SINUS, EXTND FACIAL RECESS APPRCH, MASTOIDECTOMY, BMT, COMBO  04/23/2012    Procedure:COMBINED ENHANCE ENDOLYMPHATIC SAC, DECOMPRESS SIGMOID SINUS, EXTENDED FACIAL RECESS APPROACH, COMPLETE MASTOIDECTOMY, MYRINGOTOMY, INSERT TUBE; Left Endolymphatic Sac Enhancement, Sigmoid Sinus Decom-  pression, Extended Facial Recess Approach, Myringotomy  , Complete Mastoidectomy; Surgeon:LINN MATHIS; Location:[facility]    INNER EAR SURGERY      Per Pt    KERATOTOMY ARCUATE WITH FEMTOSECOND LASER/IMAGING FOR ATIOL Right 07/19/2018    Procedure: KERATOTOMY ARCUATE WITH FEMTOSECOND LASER/IMAGING FOR ATIOL;  RIGHT EYE KERATOTOMY ARCUATE WITH FEMTOSECOND LASER/IMAGING FOR ATIOL ,  CAPSULOTOMY, LENS FRAGMENTATION, ARCUATE INCISIONS;  Surgeon: Roberth Chambers MD;  Location: [facility]    MASTOIDECTOMY  01/01/2012    PHACOEMULSIFICATION CLEAR CORNEA WITH TORIC INTRAOCULAR LENS IMPLANT Right 07/19/2018    Procedure: PHACOEMULSIFICATION CLEAR CORNEA WITH TORIC INTRAOCULAR LENS IMPLANT;  RIGHT EYE PHACOEMULSIFICATION CLEAR CORNEA WITH TORIC INTRAOCULAR LENS IMPLANT ;  Surgeon: Roberth Chambers MD;  Location: [facility]    TONSILLECTOMY       TONSILLECTOMY      Family History   Problem Relation Age of Onset    Heart Disease Mother     Clotting Disorder Mother         Dies of a blood clot, per pt    Diabetes Father     Cerebrovascular Disease Father     Hypertension Son     Osteoporosis Daughter     Hypertension Daughter     Cancer Sister         Colon    Glaucoma Sister     Social History     Socioeconomic History    Marital status:      Spouse name: Not on file    Number of children: 7    Years of education: Not on file    Highest education level: Not on file   Occupational History    Not on file   Tobacco Use    Smoking status: Never     Passive exposure: Never    Smokeless tobacco: Never   Vaping Use    Vaping Use: Never used   Substance and Sexual Activity    Alcohol use: Yes     Comment: rare    Drug use: No    Sexual activity: Never     Partners: Male   Other Topics Concern    Not on file   Social History Narrative    Patient is , they are retired.  Lives in assisted care for past 2 years (11/2/2016).     Social Determinants of Health     Financial Resource Strain: Low Risk  (1/5/2024)    Financial Resource Strain     Within the past 12 months, have you or your family members you live with been unable to get utilities (heat, electricity) when it was really needed?: No   Food Insecurity: Low Risk  (1/5/2024)    Food Insecurity     Within the past 12 months, did you worry that your food would run out before you got money to buy more?: No     Within the past 12 months, did the food you bought just not last and you didn t have money to get more?: No   Transportation Needs: Low Risk  (1/5/2024)    Transportation Needs     Within the past 12 months, has lack of transportation kept you from medical appointments, getting your medicines, non-medical meetings or appointments, work, or from getting things that you need?: No   Physical Activity: Not on file   Stress: Not on file   Social Connections: Not on file   Interpersonal Safety: Low Risk   "(1/5/2024)    Interpersonal Safety     Do you feel physically and emotionally safe where you currently live?: Yes     Within the past 12 months, have you been hit, slapped, kicked or otherwise physically hurt by someone?: No     Within the past 12 months, have you been humiliated or emotionally abused in other ways by your partner or ex-partner?: No   Housing Stability: Low Risk  (1/5/2024)    Housing Stability     Do you have housing? : Yes     Are you worried about losing your housing?: No          Medications  Allergies   Current Outpatient Medications   Medication Sig Dispense Refill    acetaminophen (TYLENOL) 500 MG tablet Take 1 tablet (500 mg) by mouth every 6 hours as needed for pain Maximum of 6 tablets daily      atenolol (TENORMIN) 25 MG tablet Take 1 tablet (25 mg) by mouth every morning 90 tablet 3    felodipine ER (PLENDIL) 2.5 MG 24 hr tablet Take 1 tablet (2.5 mg) by mouth daily for 360 days 90 tablet 3    fluticasone (FLONASE) 50 MCG/ACT nasal spray Spray 2 sprays into both nostrils daily      loperamide (IMODIUM) 2 MG capsule Take 2 mg by mouth 4 times daily as needed.      LORazepam (ATIVAN) 0.5 MG tablet Take 1 tablet (0.5 mg) by mouth nightly as needed for anxiety 30 tablet 0    meclizine (ANTIVERT) 25 MG tablet Take 1 tablet (25 mg) by mouth 3 times daily as needed for dizziness 30 tablet 3    simvastatin (ZOCOR) 20 MG tablet Take 20 mg by mouth At Bedtime      valsartan (DIOVAN) 80 MG tablet TAKE 1 TABLET BY MOUTH EVERY EVENING 90 tablet 0    Vitamin D3 (CHOLECALCIFEROL) 25 mcg (1000 units) tablet Take 1,000 Units by mouth daily      Allergies   Allergen Reactions    Actonel [Risedronate]      GI upset    Clindamycin     Cortisone Other (See Comments)     \"heart symptoms\", low blood pressure    Fosamax Other (See Comments)     \"burning my lower esophagus\"    Kenalog [Triamcinolone]     Metronidazole     Minipress [Prazosin]      Hypotension    Penicillin G     Statin Drugs [Statins]      " Shortness of breath.  Tolerating Simvastatin (Sept 2016)    Tetanus Toxoid     Prednisone Rash    Zithromax [Azithromycin Dihydrate] Rash     States got rash after using z rubio         Lab Results    Chemistry/lipid CBC Cardiac Enzymes/BNP/TSH/INR   Lab Results   Component Value Date    CHOL 163 04/17/2023    HDL 90 04/17/2023    TRIG 71 04/17/2023    BUN 20.9 01/05/2024     (L) 01/05/2024    CO2 24 01/05/2024    Lab Results   Component Value Date    WBC 7.6 01/05/2024    HGB 12.8 01/05/2024    HCT 39.1 01/05/2024    MCV 99 01/05/2024     01/05/2024    Lab Results   Component Value Date    TROPONINI 0.01 06/14/2021     (H) 06/14/2021    TSH 2.15 09/21/2023

## 2024-01-11 NOTE — LETTER
1/11/2024    Maik Levine MD  [address] [facility] [address]    RE: Mariluz Arana       Dear Colleague,     I had the pleasure of seeing Mar[facility]uz Arana in the [facility].      [facility]  [phone]          Assessment/Recommendations   Patient with known hypertension, moderately severe aortic stenosis who is stable at this time she was hospitalized with increased blood pressure and her medications were adjusted and readjusted as an outpatient and blood pressure is now are not quite acceptable.  Blood pressures from home are from 1  systolic.  He does get some dizziness which meclizine helps so I suspect this is related to inner ear type issues.  This dizziness seems to improve if she stands up which would be against orthostatic symptoms or hypotension.    I am not changing her medications today.  I have encouraged her to maintain some walking on a daily basis to keep her strength up and she will take that under advisement.    She has not been interested in the any procedures including transcutaneous aortic valve replacement.  Recent echocardiogram showed that the mean gradient across aortic valve was mildly increased to 32 mmHg.    Will plan on seeing her back in 1 year but of course to be happy to see her sooner if questions or problems arise.  30 minutes spent with chart review, patient visit, and documentation.     History of Present Illness/Subjective    Ms. Mariluz Arana is a 89 year old female with known aortic stenosis and hypertension.  She has been stable since getting out of the hospital in September.  Blood pressures are reviewed and are quite acceptable.  No low blood pressures.  She has not had any syncopal episodes but does get a little dizzy although meclizine seems to help this and the dizziness seems to come on more when she is sitting and if she stands up it seems to get better.  She has not had any chest discomfort.  Breathing  "has been stable.  She denies orthopnea or paroxysmal nocturnal dyspnea.  No peripheral edema but does wear support stockings.         Physical Examination Review of Systems   BP (!) 146/87 (BP Location: Right arm, Patient Position: Sitting, Cuff Size: Adult Regular)   Pulse 67   Temp 98.5  F (36.9  C) (Oral)   Resp 16   Ht 1.473 m (4' 10\")   Wt 49.2 kg (108 lb 8 oz)   LMP  (LMP Unknown)   SpO2 97%   BMI 22.68 kg/m    Body mass index is 22.68 kg/m .  Wt Readings from Last 3 Encounters:   01/11/24 49.2 kg (108 lb 8 oz)   01/05/24 49.8 kg (109 lb 11.2 oz)   10/23/23 50.3 kg (111 lb)     General Appearance:   Alert, cooperative and in no acute distress.   ENT/Mouth: Pink/moist oral mucosa   EYES:  no scleral icterus, normal conjunctivae   Neck: JVP normal. No Hepatojugular reflux. Thyroid not visualized.   Chest/Lungs:   Lungs are clear to auscultation, equal chest wall expansion.   Cardiovascular:   S1, S2 with 3/6 systolic murmur , no clicks or rubs. Brachial, radial pulses are intact and symetric. No carotid bruits noted   Abdomen:  Nontender.    Extremities: No cyanosis, clubbing and trace pretibial edema   Skin: no xanthelasma, warm.    Neurologic: normal arm movement bilateral, no tremors     Psychiatric: Appropriate affect.      Enc Vitals  BP: (!) 146/87  Pulse: 67  Resp: 16  Temp: 98.5  F (36.9  C)  Temp src: Oral  SpO2: 97 %  Weight: 49.2 kg (108 lb 8 oz)  Height: 147.3 cm (4' 10\")                                           Medical History  Surgical History Family History Social History   Past Medical History:   Diagnosis Date    Age-related cataract of left eye 09/21/2021    Aortic stenosis     Echocardiogram with moderate aortic stenosis September 2021    Arthritis     Osteoarthritis    Bilateral cataracts     Bilateral hand numbness 04/17/2023    Presumably carpal tunnel syndrome, previous surgery and already using wrist splints, not interested in further surgery    Chronic anxiety     Chronic " fatigue 10/23/2023    Dizziness and giddiness 09/17/2012    Gastroesophageal reflux disease     GERD (gastroesophageal reflux disease)     Heart murmur     Hypercholesteremia     Hypercholesteremia     Hypercholesterolemia 12/13/2021    Hypertension     Hyponatremia     Hyponatremia 07/08/2021    Irregular heart beat     Irritable bowel     Irritable bowel     Meniere syndrome     Meniere's disease, bilateral     Osteoarthritis of spine with radiculopathy, unspecified spinal region 07/08/2021    Osteoporosis     Peripheral edema 02/28/2023    Primary osteoarthritis of both hands 12/13/2021    SOB (shortness of breath) 07/08/2021    Squamous cell carcinoma of skin of face 07/18/2023    Venous (peripheral) insufficiency 04/20/2022    Vertigo     Past Surgical History:   Procedure Laterality Date    CATARACT EXTRACTION Left     2021    DILATION AND CURETTAGE      DILATION AND CURETTAGE      ENDOLYMPHATIC SAC OPERATION  01/01/2012    enhancement    ENHANCE ENDOLYMPHATIC SAC, DEC SIGMOID SINUS, EXTND FACIAL RECESS APPRCH, MASTOIDECTOMY, BMT, COMBO  04/23/2012    Procedure:COMBINED ENHANCE ENDOLYMPHATIC SAC, DECOMPRESS SIGMOID SINUS, EXTENDED FACIAL RECESS APPROACH, COMPLETE MASTOIDECTOMY, MYRINGOTOMY, INSERT TUBE; Left Endolymphatic Sac Enhancement, Sigmoid Sinus Decom-  pression, Extended Facial Recess Approach, Myringotomy  , Complete Mastoidectomy; Surgeon:LINN MATHIS; Location:[facility]    INNER EAR SURGERY      Per Pt    KERATOTOMY ARCUATE WITH FEMTOSECOND LASER/IMAGING FOR ATIOL Right 07/19/2018    Procedure: KERATOTOMY ARCUATE WITH FEMTOSECOND LASER/IMAGING FOR ATIOL;  RIGHT EYE KERATOTOMY ARCUATE WITH FEMTOSECOND LASER/IMAGING FOR ATIOL ,  CAPSULOTOMY, LENS FRAGMENTATION, ARCUATE INCISIONS;  Surgeon: Roberth Chambers MD;  Location:  [facility]    MASTOIDECTOMY  01/01/2012    PHACOEMULSIFICATION CLEAR CORNEA WITH TORIC INTRAOCULAR LENS IMPLANT Right 07/19/2018    Procedure: PHACOEMULSIFICATION CLEAR CORNEA  WITH TORIC INTRAOCULAR LENS IMPLANT;  RIGHT EYE PHACOEMULSIFICATION CLEAR CORNEA WITH TORIC INTRAOCULAR LENS IMPLANT ;  Surgeon: Roberth Chambers MD;  Location:  [facility]    TONSILLECTOMY      TONSILLECTOMY      Family History   Problem Relation Age of Onset    Heart Disease Mother     Clotting Disorder Mother         Dies of a blood clot, per pt    Diabetes Father     Cerebrovascular Disease Father     Hypertension Son     Osteoporosis Daughter     Hypertension Daughter     Cancer Sister         Colon    Glaucoma Sister     Social History     Socioeconomic History    Marital status:      Spouse name: Not on file    Number of children: 7    Years of education: Not on file    Highest education level: Not on file   Occupational History    Not on file   Tobacco Use    Smoking status: Never     Passive exposure: Never    Smokeless tobacco: Never   Vaping Use    Vaping Use: Never used   Substance and Sexual Activity    Alcohol use: Yes     Comment: rare    Drug use: No    Sexual activity: Never     Partners: Male   Other Topics Concern    Not on file   Social History Narrative    Patient is , they are retired.  Lives in assisted care for past 2 years (11/2/2016).     Social Determinants of Health     Financial Resource Strain: Low Risk  (1/5/2024)    Financial Resource Strain     Within the past 12 months, have you or your family members you live with been unable to get utilities (heat, electricity) when it was really needed?: No   Food Insecurity: Low Risk  (1/5/2024)    Food Insecurity     Within the past 12 months, did you worry that your food would run out before you got money to buy more?: No     Within the past 12 months, did the food you bought just not last and you didn t have money to get more?: No   Transportation Needs: Low Risk  (1/5/2024)    Transportation Needs     Within the past 12 months, has lack of transportation kept you from medical appointments, getting your medicines, non-medical  "meetings or appointments, work, or from getting things that you need?: No   Physical Activity: Not on file   Stress: Not on file   Social Connections: Not on file   Interpersonal Safety: Low Risk  (1/5/2024)    Interpersonal Safety     Do you feel physically and emotionally safe where you currently live?: Yes     Within the past 12 months, have you been hit, slapped, kicked or otherwise physically hurt by someone?: No     Within the past 12 months, have you been humiliated or emotionally abused in other ways by your partner or ex-partner?: No   Housing Stability: Low Risk  (1/5/2024)    Housing Stability     Do you have housing? : Yes     Are you worried about losing your housing?: No          Medications  Allergies   Current Outpatient Medications   Medication Sig Dispense Refill    acetaminophen (TYLENOL) 500 MG tablet Take 1 tablet (500 mg) by mouth every 6 hours as needed for pain Maximum of 6 tablets daily      atenolol (TENORMIN) 25 MG tablet Take 1 tablet (25 mg) by mouth every morning 90 tablet 3    felodipine ER (PLENDIL) 2.5 MG 24 hr tablet Take 1 tablet (2.5 mg) by mouth daily for 360 days 90 tablet 3    fluticasone (FLONASE) 50 MCG/ACT nasal spray Spray 2 sprays into both nostrils daily      loperamide (IMODIUM) 2 MG capsule Take 2 mg by mouth 4 times daily as needed.      LORazepam (ATIVAN) 0.5 MG tablet Take 1 tablet (0.5 mg) by mouth nightly as needed for anxiety 30 tablet 0    meclizine (ANTIVERT) 25 MG tablet Take 1 tablet (25 mg) by mouth 3 times daily as needed for dizziness 30 tablet 3    simvastatin (ZOCOR) 20 MG tablet Take 20 mg by mouth At Bedtime      valsartan (DIOVAN) 80 MG tablet TAKE 1 TABLET BY MOUTH EVERY EVENING 90 tablet 0    Vitamin D3 (CHOLECALCIFEROL) 25 mcg (1000 units) tablet Take 1,000 Units by mouth daily      Allergies   Allergen Reactions    Actonel [Risedronate]      GI upset    Clindamycin     Cortisone Other (See Comments)     \"heart symptoms\", low blood pressure    " "Fosamax Other (See Comments)     \"burning my lower esophagus\"    Kenalog [Triamcinolone]     Metronidazole     Minipress [Prazosin]      Hypotension    Penicillin G     Statin Drugs [Statins]      Shortness of breath.  Tolerating Simvastatin (Sept 2016)    Tetanus Toxoid     Prednisone Rash    Zithromax [Azithromycin Dihydrate] Rash     States got rash after using z rubio         Lab Results    Chemistry/lipid CBC Cardiac Enzymes/BNP/TSH/INR   Lab Results   Component Value Date    CHOL 163 04/17/2023    HDL 90 04/17/2023    TRIG 71 04/17/2023    BUN 20.9 01/05/2024     (L) 01/05/2024    CO2 24 01/05/2024    Lab Results   Component Value Date    WBC 7.6 01/05/2024    HGB 12.8 01/05/2024    HCT 39.1 01/05/2024    MCV 99 01/05/2024     01/05/2024    Lab Results   Component Value Date    TROPONINI 0.01 06/14/2021     (H) 06/14/2021    TSH 2.15 09/21/2023                Thank you for allowing me to participate in the care of your patient.      Sincerely,     Georgi Franco MD     [facility]  cc:   Deyanira Casillas PA-C  [address] [facility] [address]  [address]  [place] [address]      "

## 2024-01-15 DIAGNOSIS — F41.9 CHRONIC ANXIETY: ICD-10-CM

## 2024-01-15 RX ORDER — LORAZEPAM 0.5 MG/1
0.5 TABLET ORAL
Qty: 30 TABLET | Refills: 0 | Status: SHIPPED | OUTPATIENT
Start: 2024-01-15 | End: 2024-02-05

## 2024-01-18 ENCOUNTER — TELEPHONE (OUTPATIENT)
Dept: FAMILY MEDICINE | Facility: CLINIC | Age: 89
End: 2024-01-18
Payer: COMMERCIAL

## 2024-01-18 NOTE — TELEPHONE ENCOUNTER
Home Care is calling regarding an established patient with [facility].    Requesting orders from: Maik Levine  Provider is following patient: Yes  Is this a 60-day recertification request?  No    Orders Requested    Occupational Therapy  Request for delay in care, service is not able to be provided within same scheduled day.   Requesting to move OT visit from this week to next week      Information was gathered and will be sent to provider for review.  RN will contact Home Care with information after provider review.  Confirmed ok to leave a detailed message with call back.  Contact information confirmed and updated as needed.    Jessica Lewis RN

## 2024-01-22 ENCOUNTER — MEDICAL CORRESPONDENCE (OUTPATIENT)
Dept: HEALTH INFORMATION MANAGEMENT | Facility: CLINIC | Age: 89
End: 2024-01-22
Payer: COMMERCIAL

## 2024-01-26 ENCOUNTER — TELEPHONE (OUTPATIENT)
Dept: INTERNAL MEDICINE | Facility: CLINIC | Age: 89
End: 2024-01-26
Payer: COMMERCIAL

## 2024-01-26 NOTE — TELEPHONE ENCOUNTER
Home Care is calling regarding an established patient with [facility].       Requesting orders from: Maik Levine  Provider is following patient: Yes  Is this a 60-day recertification request?  No    Verbal Orders Requested    Occupational Therapy  Request for initial certification (first set of orders)   Frequency:   1x every other week for 7 weeks.    Confirmed ok to leave a detailed message with call back.  Contact information confirmed and updated as needed.  [facility]  [phone]    Dee Cunningham RN

## 2024-01-27 ENCOUNTER — MEDICAL CORRESPONDENCE (OUTPATIENT)
Dept: HEALTH INFORMATION MANAGEMENT | Facility: CLINIC | Age: 89
End: 2024-01-27

## 2024-01-29 NOTE — TELEPHONE ENCOUNTER
Called and spoke with Nanda from [facility]. Relayed provider VO as stated. No further questions.

## 2024-02-03 DIAGNOSIS — I10 PRIMARY HYPERTENSION: ICD-10-CM

## 2024-02-03 DIAGNOSIS — F41.9 CHRONIC ANXIETY: ICD-10-CM

## 2024-02-05 RX ORDER — VALSARTAN 80 MG/1
80 TABLET ORAL EVERY EVENING
Qty: 90 TABLET | Refills: 3 | Status: SHIPPED | OUTPATIENT
Start: 2024-02-05

## 2024-02-05 RX ORDER — LORAZEPAM 0.5 MG/1
0.5 TABLET ORAL
Qty: 30 TABLET | Refills: 0 | Status: SHIPPED | OUTPATIENT
Start: 2024-02-05 | End: 2024-03-11

## 2024-02-08 ENCOUNTER — MEDICAL CORRESPONDENCE (OUTPATIENT)
Dept: HEALTH INFORMATION MANAGEMENT | Facility: CLINIC | Age: 89
End: 2024-02-08

## 2024-02-08 DIAGNOSIS — Z53.9 DIAGNOSIS NOT YET DEFINED: Primary | ICD-10-CM

## 2024-02-08 PROCEDURE — G0179 MD RECERTIFICATION HHA PT: HCPCS | Performed by: INTERNAL MEDICINE

## 2024-02-23 ENCOUNTER — APPOINTMENT (OUTPATIENT)
Dept: RADIOLOGY | Facility: CLINIC | Age: 89
End: 2024-02-23
Attending: EMERGENCY MEDICINE
Payer: COMMERCIAL

## 2024-02-23 ENCOUNTER — HOSPITAL ENCOUNTER (EMERGENCY)
Facility: CLINIC | Age: 89
Discharge: HOME | End: 2024-02-23
Attending: EMERGENCY MEDICINE | Admitting: EMERGENCY MEDICINE
Payer: COMMERCIAL

## 2024-02-23 ENCOUNTER — APPOINTMENT (OUTPATIENT)
Dept: CT IMAGING | Facility: CLINIC | Age: 89
End: 2024-02-23
Attending: EMERGENCY MEDICINE
Payer: COMMERCIAL

## 2024-02-23 VITALS
WEIGHT: 109 LBS | OXYGEN SATURATION: 98 % | RESPIRATION RATE: 16 BRPM | HEIGHT: 58 IN | SYSTOLIC BLOOD PRESSURE: 185 MMHG | TEMPERATURE: 97.4 F | DIASTOLIC BLOOD PRESSURE: 82 MMHG | HEART RATE: 64 BPM | BODY MASS INDEX: 22.88 KG/M2

## 2024-02-23 DIAGNOSIS — R42 LIGHTHEADEDNESS: ICD-10-CM

## 2024-02-23 DIAGNOSIS — I35.0 AORTIC VALVE STENOSIS, ETIOLOGY OF CARDIAC VALVE DISEASE UNSPECIFIED: ICD-10-CM

## 2024-02-23 DIAGNOSIS — R51.9 HEADACHE, UNSPECIFIED HEADACHE TYPE: ICD-10-CM

## 2024-02-23 LAB
ANION GAP SERPL CALCULATED.3IONS-SCNC: 11 MMOL/L (ref 7–15)
ATRIAL RATE - MUSE: 73 BPM
BASOPHILS # BLD AUTO: 0 10E3/UL (ref 0–0.2)
BASOPHILS NFR BLD AUTO: 0 %
BUN SERPL-MCNC: 22.3 MG/DL (ref 8–23)
CALCIUM SERPL-MCNC: 9.9 MG/DL (ref 8.2–9.6)
CHLORIDE SERPL-SCNC: 98 MMOL/L (ref 98–107)
CREAT SERPL-MCNC: 0.91 MG/DL (ref 0.51–0.95)
DEPRECATED HCO3 PLAS-SCNC: 25 MMOL/L (ref 22–29)
DIASTOLIC BLOOD PRESSURE - MUSE: NORMAL MMHG
EGFRCR SERPLBLD CKD-EPI 2021: 59 ML/MIN/1.73M2
EOSINOPHIL # BLD AUTO: 0.2 10E3/UL (ref 0–0.7)
EOSINOPHIL NFR BLD AUTO: 2 %
ERYTHROCYTE [DISTWIDTH] IN BLOOD BY AUTOMATED COUNT: 12.6 % (ref 10–15)
GLUCOSE SERPL-MCNC: 97 MG/DL (ref 70–99)
HCT VFR BLD AUTO: 38.6 % (ref 35–47)
HGB BLD-MCNC: 12.7 G/DL (ref 11.7–15.7)
IMM GRANULOCYTES # BLD: 0 10E3/UL
IMM GRANULOCYTES NFR BLD: 1 %
INTERPRETATION ECG - MUSE: NORMAL
LYMPHOCYTES # BLD AUTO: 1.1 10E3/UL (ref 0.8–5.3)
LYMPHOCYTES NFR BLD AUTO: 14 %
MCH RBC QN AUTO: 31.9 PG (ref 26.5–33)
MCHC RBC AUTO-ENTMCNC: 32.9 G/DL (ref 31.5–36.5)
MCV RBC AUTO: 97 FL (ref 78–100)
MONOCYTES # BLD AUTO: 0.7 10E3/UL (ref 0–1.3)
MONOCYTES NFR BLD AUTO: 8 %
NEUTROPHILS # BLD AUTO: 6.3 10E3/UL (ref 1.6–8.3)
NEUTROPHILS NFR BLD AUTO: 75 %
NRBC # BLD AUTO: 0 10E3/UL
NRBC BLD AUTO-RTO: 0 /100
P AXIS - MUSE: 57 DEGREES
PLATELET # BLD AUTO: 226 10E3/UL (ref 150–450)
POTASSIUM SERPL-SCNC: 4.3 MMOL/L (ref 3.4–5.3)
PR INTERVAL - MUSE: 182 MS
QRS DURATION - MUSE: 128 MS
QT - MUSE: 412 MS
QTC - MUSE: 453 MS
R AXIS - MUSE: -19 DEGREES
RBC # BLD AUTO: 3.98 10E6/UL (ref 3.8–5.2)
SODIUM SERPL-SCNC: 134 MMOL/L (ref 135–145)
SYSTOLIC BLOOD PRESSURE - MUSE: NORMAL MMHG
T AXIS - MUSE: 97 DEGREES
VENTRICULAR RATE- MUSE: 73 BPM
WBC # BLD AUTO: 8.3 10E3/UL (ref 4–11)

## 2024-02-23 PROCEDURE — 99285 EMERGENCY DEPT VISIT HI MDM: CPT | Mod: 25

## 2024-02-23 PROCEDURE — 96361 HYDRATE IV INFUSION ADD-ON: CPT

## 2024-02-23 PROCEDURE — 258N000003 HC RX IP 258 OP 636: Performed by: EMERGENCY MEDICINE

## 2024-02-23 PROCEDURE — 85004 AUTOMATED DIFF WBC COUNT: CPT | Performed by: EMERGENCY MEDICINE

## 2024-02-23 PROCEDURE — 36415 COLL VENOUS BLD VENIPUNCTURE: CPT | Performed by: EMERGENCY MEDICINE

## 2024-02-23 PROCEDURE — 96360 HYDRATION IV INFUSION INIT: CPT

## 2024-02-23 PROCEDURE — 80048 BASIC METABOLIC PNL TOTAL CA: CPT | Performed by: EMERGENCY MEDICINE

## 2024-02-23 PROCEDURE — 71046 X-RAY EXAM CHEST 2 VIEWS: CPT

## 2024-02-23 PROCEDURE — 93005 ELECTROCARDIOGRAM TRACING: CPT | Performed by: EMERGENCY MEDICINE

## 2024-02-23 PROCEDURE — 70450 CT HEAD/BRAIN W/O DYE: CPT

## 2024-02-23 RX ADMIN — SODIUM CHLORIDE 500 ML: 9 INJECTION, SOLUTION INTRAVENOUS at 13:25

## 2024-02-23 ASSESSMENT — ACTIVITIES OF DAILY LIVING (ADL)
ADLS_ACUITY_SCORE: 40
ADLS_ACUITY_SCORE: 40
ADLS_ACUITY_SCORE: 38
ADLS_ACUITY_SCORE: 40

## 2024-02-23 ASSESSMENT — COLUMBIA-SUICIDE SEVERITY RATING SCALE - C-SSRS
1. IN THE PAST MONTH, HAVE YOU WISHED YOU WERE DEAD OR WISHED YOU COULD GO TO SLEEP AND NOT WAKE UP?: NO
2. HAVE YOU ACTUALLY HAD ANY THOUGHTS OF KILLING YOURSELF IN THE PAST MONTH?: NO
6. HAVE YOU EVER DONE ANYTHING, STARTED TO DO ANYTHING, OR PREPARED TO DO ANYTHING TO END YOUR LIFE?: NO

## 2024-02-23 NOTE — DISCHARGE INSTRUCTIONS
You should contact your doctor as he may want to get an echocardiogram done to assess her aortic valve stenosis.  When this gets severe it can cause you to pass out.  It is possible your episode of lightheadedness is a symptom of this.

## 2024-02-23 NOTE — ED PROVIDER NOTES
Emergency Department Encounter      NAME: Mariluz Arana  AGE: 89 year old female  YOB: 1931  MRN: [id number]  EVALUATION DATE & TIME: 2024 11:59 AM    PCP: Maik Levine    ED PROVIDER: Hal Viera M.D.      Chief Complaint   Patient presents with    Headache    Syncope         FINAL IMPRESSION:  1. Lightheadedness    2. Aortic valve stenosis, etiology of cardiac valve disease unspecified    3. Headache, unspecified headache type        MEDICAL DECISION MAKIN:41 PM I met with the patient, obtained history, performed an initial exam, and discussed options and plan for diagnostics and treatment here in the ED.   3:15 PM Reevaluated and updated the patient with findings. We discussed the plan for discharge and the patient is agreeable. Reviewed supportive cares, symptomatic treatment, outpatient follow up, and reasons to return to the Emergency Department. Patient to be discharged by ED RN.     This patient is a 89-year-old female with a history of hypertension, hyperlipidemia and osteoarthritis who lives in an assisted living facility.  2 hours before coming to the ER she had a brief  spell in which she was standing and steadying herself on her walker.  She says that she had a sudden brief wave of pain in her head and that she felt like she blacked out although she remained standing at.  Her headache pain is gone now.  She had a normal neurologic exam.  She has had some brief fleeting episodes of sharp chest pain as well as some weakness and fatigue since the event.  On the patient exam I noted a 3 out of 6 systolic murmur but the patient is aware of this.  It sounded consistent with aortic stenosis murmur.  We discussed that if the aortic stenosis gets severe enough it can make you have a syncopal episode.  She will follow-up with her doctor to discuss this.  An EKG was done which showed sinus rhythm.  I independently reviewed and interpreted the EKG.  Head CT is done because  of the headache and it did not show any acute findings.  I independently reviewed and interpreted the CT.  A chest x-ray was done because of the chest pain and she had a retrocardiac hiatal hernia but no sign of pneumothorax, pneumonia or pleural effusion.  There were no acute bony changes seen.  I independently reviewed and interpreted the x-ray.  Her CBC and basic metabolic profile were unremarkable.  While she was in the ER she received a bolus of IV fluid.  The patient was comfortable being discharged home to follow-up with her doctor to discuss these symptoms.    Pertinent Labs & Imaging studies reviewed. (See chart for details)    The importance of close follow up was discussed. We reviewed warning signs and symptoms, and I instructed Ms. Arana to return to the emergency department immediately if she develops any new or worsening symptoms. I provided additional verbal discharge instructions. Ms. Arana expressed understanding and agreement with this plan of care, her questions were answered, and she was discharged in stable condition.     Medical Decision Making  Obtained supplemental history:Supplemental history obtained?: Family Member/Significant Other  Reviewed external records: External records reviewed?: Documented in chart  Care impacted by chronic illness:Chronic Pain, Hyperlipidemia, and Hypertension  Care significantly affected by social determinants of health:Access to Medical Care  Did you consider but not order tests?: Work up considered but not performed and documented in chart, if applicable  Did you interpret images independently?: Independent interpretation of ECG and images noted in documentation, when applicable.  Consultation discussion with other provider:Did you involve another provider (consultant, , pharmacy, etc.)?: I discussed the care with another health care provider, see documentation for details.  Discharge. No recommendations on prescription strength medication(s). See  "documentation for any additional details.      MEDICATIONS GIVEN IN THE EMERGENCY:  Medications   sodium chloride 0.9% BOLUS 500 mL (0 mLs Intravenous Stopped 2/23/24 1502)       NEW PRESCRIPTIONS STARTED AT TODAY'S ER VISIT:  New Prescriptions    No medications on file          =================================================================    HPI    Patient information was obtained from: patient and son    Use of : N/A       Mariluz Arana is a 89 year old female with a past medical history of Meniere's disease, severe aortic stenosis, HTN, HLD, Chronic JOSE, hyponatremia, OA, who presents headache and syncope.    Per patient's son, patient lives at an assisted living facility and at around 2 hours PTA (~10:30 AM), she had a brief syncopal episode but didn't fall because she was hanging onto her walker tightly.     Patient reports at 10:30 AM, she was walking in her walker when she felt sudden onset brief rush of \"pain\" from the back of her head to the front. She then briefly \"blacked out\" but didn't fall or sustain head trauma as she was holding onto her walker very tightly. She describes the pain as a \"loud something.\" Currently the pain is resolved now. Shortly after blacking out, she gradually came to and alerted the nurse aide who took her vitals which was normal. Since onset, she feels persistent generalized weakness and fatigue. She also notes during the syncopal episode, she had some very brief 1-2 seconds of chest pain which she describes as a \"bolt of lightening.\" She otherwise denies associating nausea, headache, and neck pain. There were no other concerns/complaints at this time.      REVIEW OF SYSTEMS   Review of Systems     PAST MEDICAL HISTORY:  Past Medical History:   Diagnosis Date    Age-related cataract of left eye 09/21/2021    Aortic stenosis     Echocardiogram with moderate aortic stenosis September 2021    Arthritis     Osteoarthritis    Bilateral cataracts     Bilateral hand " numbness 04/17/2023    Presumably carpal tunnel syndrome, previous surgery and already using wrist splints, not interested in further surgery    Chronic anxiety     Chronic fatigue 10/23/2023    Dizziness and giddiness 09/17/2012    Gastroesophageal reflux disease     GERD (gastroesophageal reflux disease)     Heart murmur     Hypercholesteremia     Hypercholesteremia     Hypercholesterolemia 12/13/2021    Hypertension     Hyponatremia     Hyponatremia 07/08/2021    Irregular heart beat     Irritable bowel     Irritable bowel     Meniere syndrome     Meniere's disease, bilateral     Osteoarthritis of spine with radiculopathy, unspecified spinal region 07/08/2021    Osteoporosis     Peripheral edema 02/28/2023    Primary osteoarthritis of both hands 12/13/2021    SOB (shortness of breath) 07/08/2021    Squamous cell carcinoma of skin of face 07/18/2023    Venous (peripheral) insufficiency 04/20/2022    Vertigo        PAST SURGICAL HISTORY:  Past Surgical History:   Procedure Laterality Date    CATARACT EXTRACTION Left     2021    DILATION AND CURETTAGE      DILATION AND CURETTAGE      ENDOLYMPHATIC SAC OPERATION  01/01/2012    enhancement    ENHANCE ENDOLYMPHATIC SAC, DEC SIGMOID SINUS, EXTND FACIAL RECESS APPRCH, MASTOIDECTOMY, BMT, COMBO  04/23/2012    Procedure:COMBINED ENHANCE ENDOLYMPHATIC SAC, DECOMPRESS SIGMOID SINUS, EXTENDED FACIAL RECESS APPROACH, COMPLETE MASTOIDECTOMY, MYRINGOTOMY, INSERT TUBE; Left Endolymphatic Sac Enhancement, Sigmoid Sinus Decom-  pression, Extended Facial Recess Approach, Myringotomy  , Complete Mastoidectomy; Surgeon:LINN MATHIS; Location:[facility]    INNER EAR SURGERY      Per Pt    KERATOTOMY ARCUATE WITH FEMTOSECOND LASER/IMAGING FOR ATIOL Right 07/19/2018    Procedure: KERATOTOMY ARCUATE WITH FEMTOSECOND LASER/IMAGING FOR ATIOL;  RIGHT EYE KERATOTOMY ARCUATE WITH FEMTOSECOND LASER/IMAGING FOR ATIOL ,  CAPSULOTOMY, LENS FRAGMENTATION, ARCUATE INCISIONS;  Surgeon: Trish  "Roberth Nicole MD;  Location:  [facility]    MASTOIDECTOMY  01/01/2012    PHACOEMULSIFICATION CLEAR CORNEA WITH TORIC INTRAOCULAR LENS IMPLANT Right 07/19/2018    Procedure: PHACOEMULSIFICATION CLEAR CORNEA WITH TORIC INTRAOCULAR LENS IMPLANT;  RIGHT EYE PHACOEMULSIFICATION CLEAR CORNEA WITH TORIC INTRAOCULAR LENS IMPLANT ;  Surgeon: Roberth Chambers MD;  Location: [facility]    TONSILLECTOMY      TONSILLECTOMY         CURRENT MEDICATIONS:    No current facility-administered medications for this encounter.    Current Outpatient Medications:     acetaminophen (TYLENOL) 500 MG tablet, Take 1 tablet (500 mg) by mouth every 6 hours as needed for pain Maximum of 6 tablets daily, Disp: , Rfl:     atenolol (TENORMIN) 25 MG tablet, Take 1 tablet (25 mg) by mouth every morning, Disp: 90 tablet, Rfl: 3    felodipine ER (PLENDIL) 2.5 MG 24 hr tablet, Take 1 tablet (2.5 mg) by mouth daily for 360 days, Disp: 90 tablet, Rfl: 3    fluticasone (FLONASE) 50 MCG/ACT nasal spray, Spray 2 sprays into both nostrils daily, Disp: , Rfl:     loperamide (IMODIUM) 2 MG capsule, Take 2 mg by mouth 4 times daily as needed., Disp: , Rfl:     LORazepam (ATIVAN) 0.5 MG tablet, Take 1 tablet (0.5 mg) by mouth nightly as needed for anxiety, Disp: 30 tablet, Rfl: 0    meclizine (ANTIVERT) 25 MG tablet, Take 1 tablet (25 mg) by mouth 3 times daily as needed for dizziness, Disp: 30 tablet, Rfl: 3    simvastatin (ZOCOR) 20 MG tablet, Take 20 mg by mouth At Bedtime, Disp: , Rfl:     valsartan (DIOVAN) 80 MG tablet, TAKE 1 TABLET BY MOUTH EVERY EVENING, Disp: 90 tablet, Rfl: 3    Vitamin D3 (CHOLECALCIFEROL) 25 mcg (1000 units) tablet, Take 1,000 Units by mouth daily, Disp: , Rfl:     ALLERGIES:  Allergies   Allergen Reactions    Actonel [Risedronate]      GI upset    Clindamycin     Cortisone Other (See Comments)     \"heart symptoms\", low blood pressure    Fosamax Other (See Comments)     \"burning my lower esophagus\"    Kenalog [Triamcinolone]     " Metronidazole     Minipress [Prazosin]      Hypotension    Penicillin G     Statin Drugs [Statins]      Shortness of breath.  Tolerating Simvastatin (Sept 2016)    Tetanus Toxoid     Prednisone Rash    Zithromax [Azithromycin Dihydrate] Rash     States got rash after using z rubio       FAMILY HISTORY:  Family History   Problem Relation Age of Onset    Heart Disease Mother     Clotting Disorder Mother         Dies of a blood clot, per pt    Diabetes Father     Cerebrovascular Disease Father     Hypertension Son     Osteoporosis Daughter     Hypertension Daughter     Cancer Sister         Colon    Glaucoma Sister        SOCIAL HISTORY:   Social History     Socioeconomic History    Marital status:      Spouse name: None    Number of children: 7    Years of education: None    Highest education level: None   Tobacco Use    Smoking status: Never     Passive exposure: Never    Smokeless tobacco: Never   Vaping Use    Vaping Use: Never used   Substance and Sexual Activity    Alcohol use: Yes     Comment: rare    Drug use: No    Sexual activity: Never     Partners: Male   Social History Narrative    Patient is , they are retired.  Lives in assisted care for past 2 years (11/2/2016).     Social Determinants of Health     Financial Resource Strain: Low Risk  (1/5/2024)    Financial Resource Strain     Within the past 12 months, have you or your family members you live with been unable to get utilities (heat, electricity) when it was really needed?: No   Food Insecurity: Low Risk  (1/5/2024)    Food Insecurity     Within the past 12 months, did you worry that your food would run out before you got money to buy more?: No     Within the past 12 months, did the food you bought just not last and you didn t have money to get more?: No   Transportation Needs: Low Risk  (1/5/2024)    Transportation Needs     Within the past 12 months, has lack of transportation kept you from medical appointments, getting your medicines,  "non-medical meetings or appointments, work, or from getting things that you need?: No   Interpersonal Safety: Low Risk  (1/5/2024)    Interpersonal Safety     Do you feel physically and emotionally safe where you currently live?: Yes     Within the past 12 months, have you been hit, slapped, kicked or otherwise physically hurt by someone?: No     Within the past 12 months, have you been humiliated or emotionally abused in other ways by your partner or ex-partner?: No   Housing Stability: Low Risk  (1/5/2024)    Housing Stability     Do you have housing? : Yes     Are you worried about losing your housing?: No       PHYSICAL EXAM:    Vitals: BP (!) 157/80   Pulse 64   Temp 97.4  F (36.3  C) (Temporal)   Resp 16   Ht 1.473 m (4' 10\")   Wt 49.4 kg (109 lb)   LMP  (LMP Unknown)   SpO2 98%   BMI 22.78 kg/m     Constitutional: Well developed, well nourished. Comfortable appearing.  HEAD:Normocephalic, atraumatic,   Eyes: PERRLA, EOM intact, conjunctiva clear, no discharge  ENT: mucous membranes moist, nose normal.   Neck- Supple, gross ROM intact.  No JVD.  No palpable nodes.  Pulmonary: Few crackles at right base. No respiratory distress, no wheezing, speaks full sentences easily.  Chest: No chest wall tenderness  Cardiovascular: Normal heart rate, regular rhythm. 3/6 systolic murmur over the precordium. No lower extremity edema, 2+ DP pulses.   GI: Soft, no tenderness to deep palpation in all quadrants, not distended, no masses.  No hepatosplenomegaly.  Musculoskeletal: Moving all 4 extremities intentionally and without pain. No obvious deformity.  Back: No CVA tenderness  Skin: Warm, dry, no rash.  Neurologic: Alert & oriented x 3, speech clear, moving all extremities spontaneously   Psychiatric: Affect normal, cooperative.     LAB:  All pertinent labs reviewed and interpreted.  Labs Ordered and Resulted from Time of ED Arrival to Time of ED Departure   BASIC METABOLIC PANEL - Abnormal       Result Value    " Sodium 134 (*)     Potassium 4.3      Chloride 98      Carbon Dioxide (CO2) 25      Anion Gap 11      Urea Nitrogen 22.3      Creatinine 0.91      GFR Estimate 59 (*)     Calcium 9.9 (*)     Glucose 97     CBC WITH PLATELETS AND DIFFERENTIAL    WBC Count 8.3      RBC Count 3.98      Hemoglobin 12.7      Hematocrit 38.6      MCV 97      MCH 31.9      MCHC 32.9      RDW 12.6      Platelet Count 226      % Neutrophils 75      % Lymphocytes 14      % Monocytes 8      % Eosinophils 2      % Basophils 0      % Immature Granulocytes 1      NRBCs per 100 WBC 0      Absolute Neutrophils 6.3      Absolute Lymphocytes 1.1      Absolute Monocytes 0.7      Absolute Eosinophils 0.2      Absolute Basophils 0.0      Absolute Immature Granulocytes 0.0      Absolute NRBCs 0.0         RADIOLOGY:  XR Chest 2 Views   Final Result   IMPRESSION: Probable retrocardiac hiatal hernia. Thoracolumbar scoliosis. No focal consolidation, pneumothorax or pleural effusion. There is probable erosion of the proximal shafts of the humeri bilaterally and in the inferior distal clavicle    bilaterally. This is unchanged from the prior study. No acute bony fracture. Old compression deformity at T12      CT Head w/o Contrast   Final Result   IMPRESSION:       1. Senescent changes and sequelae of chronic microangiopathy without acute intracranial abnormality.          EKG:   Performed at: 2/23/2024 11:58:57  Impression: Sinus rhythm, Left ventricular hypertrophy with QRS widening, T wave abnormality, consider lateal ischemia, Abnormal ECG  Rate: 73  Rhythm: Sinus rhythm  QRS Interval: 128  QTc Interval: 453  Comparison: No significant changes found when compared to ECG of 9/22/2023 00:26  I have independently reviewed and interpreted the EKG(s) documented above.     PROCEDURES:   Procedures       I, Linda Lopez, am serving as a scribe to document services personally performed by Dr. Hal Viera based on my observation and the provider's statements to me.  I, Hal Viera M.D. attest that Linda Lopez is acting in a scribe capacity, has observed my performance of the services and has documented them in accordance with my direction.      Hal Viera M.D.  Emergency Medicine  [facility] EMERGENCY ROOM  [address] [address]  [phone]  Dept: [phone]     Hal Viera MD  03/16/24 0987

## 2024-02-23 NOTE — ED TRIAGE NOTES
Pt c/o of feeling weak and almost having a syncopal episode about 1 hour ago. She had a pain the posterior area of her head that made her feel like she was about to pass out. She did not fall or hit her head. Since then she has felt weak and tired. Denies any pain currently.

## 2024-03-01 ENCOUNTER — TELEPHONE (OUTPATIENT)
Dept: INTERNAL MEDICINE | Facility: CLINIC | Age: 89
End: 2024-03-01
Payer: COMMERCIAL

## 2024-03-01 ENCOUNTER — MEDICAL CORRESPONDENCE (OUTPATIENT)
Dept: HEALTH INFORMATION MANAGEMENT | Facility: CLINIC | Age: 89
End: 2024-03-01

## 2024-03-01 NOTE — TELEPHONE ENCOUNTER
FYI - Status Update    Who is Calling: Alejandrina adair     Update:  OT to following week and they will be out next week    Does caller want a call/response back: No

## 2024-03-04 ENCOUNTER — TELEPHONE (OUTPATIENT)
Dept: INTERNAL MEDICINE | Facility: CLINIC | Age: 89
End: 2024-03-04
Payer: COMMERCIAL

## 2024-03-04 NOTE — TELEPHONE ENCOUNTER
General Call    Contacts         Type Contact Phone/Fax    03/04/2024 01:11 PM CST Phone (Incoming) [facility] [phone]     [phone]          Reason for Call:  Provider signature    What are your questions or concerns:  Someone signed an order for this patient on 3.1.24, order number-[id number]. Someone other that Dr. Levine (he was out that day) signed this patients PT order and they cannot read the signature.  Please call Donya at [facility] with who signed this order.    Date of last appointment with provider: 1.5.24-ov-Dr. Levine    Okay to leave a detailed message?: No at Other phone number:  [phone], ext [phone]*

## 2024-03-09 DIAGNOSIS — F41.9 CHRONIC ANXIETY: ICD-10-CM

## 2024-03-11 RX ORDER — LORAZEPAM 0.5 MG/1
0.5 TABLET ORAL
Qty: 30 TABLET | Refills: 0 | Status: SHIPPED | OUTPATIENT
Start: 2024-03-11 | End: 2024-04-05

## 2024-04-05 DIAGNOSIS — F41.9 CHRONIC ANXIETY: ICD-10-CM

## 2024-04-05 RX ORDER — LORAZEPAM 0.5 MG/1
0.5 TABLET ORAL
Qty: 30 TABLET | Refills: 0 | Status: SHIPPED | OUTPATIENT
Start: 2024-04-05 | End: 2024-04-23

## 2024-04-23 DIAGNOSIS — I10 ESSENTIAL (PRIMARY) HYPERTENSION: ICD-10-CM

## 2024-04-23 DIAGNOSIS — F41.9 CHRONIC ANXIETY: ICD-10-CM

## 2024-04-23 RX ORDER — SIMVASTATIN 20 MG
20 TABLET ORAL DAILY
Qty: 90 TABLET | Refills: 3 | Status: SHIPPED | OUTPATIENT
Start: 2024-04-23

## 2024-04-23 RX ORDER — LORAZEPAM 0.5 MG/1
0.5 TABLET ORAL
Qty: 30 TABLET | Refills: 0 | Status: SHIPPED | OUTPATIENT
Start: 2024-04-23 | End: 2024-05-31

## 2024-05-14 ENCOUNTER — PATIENT OUTREACH (OUTPATIENT)
Dept: INTERNAL MEDICINE | Facility: CLINIC | Age: 89
End: 2024-05-14
Payer: COMMERCIAL

## 2024-05-14 NOTE — TELEPHONE ENCOUNTER
Patient Quality Outreach    Patient is due for the following:   Physical Annual Wellness Visit    Next Steps:   Patient has upcoming appointment, these items will be addressed at that time.    Type of outreach:    Chart review performed, no outreach needed.    Next Steps:  Reach out within 90 days via [employer].    Max number of attempts reached: No. Will try again in 90 days if patient still on fail list.    Questions for provider review:    None           Maureen Dixon [facility]  Chart routed to .

## 2024-05-16 ENCOUNTER — OFFICE VISIT (OUTPATIENT)
Dept: INTERNAL MEDICINE | Facility: CLINIC | Age: 89
End: 2024-05-16
Payer: COMMERCIAL

## 2024-05-16 VITALS
RESPIRATION RATE: 20 BRPM | WEIGHT: 109 LBS | OXYGEN SATURATION: 95 % | DIASTOLIC BLOOD PRESSURE: 80 MMHG | HEIGHT: 58 IN | BODY MASS INDEX: 22.88 KG/M2 | HEART RATE: 64 BPM | SYSTOLIC BLOOD PRESSURE: 160 MMHG | TEMPERATURE: 97.7 F

## 2024-05-16 DIAGNOSIS — M47.812 SPONDYLOSIS OF CERVICAL REGION WITHOUT MYELOPATHY OR RADICULOPATHY: ICD-10-CM

## 2024-05-16 DIAGNOSIS — I87.2 VENOUS (PERIPHERAL) INSUFFICIENCY: ICD-10-CM

## 2024-05-16 DIAGNOSIS — R26.81 GAIT INSTABILITY: ICD-10-CM

## 2024-05-16 DIAGNOSIS — E87.1 HYPONATREMIA: ICD-10-CM

## 2024-05-16 DIAGNOSIS — I35.0 AORTIC VALVE STENOSIS, ETIOLOGY OF CARDIAC VALVE DISEASE UNSPECIFIED: ICD-10-CM

## 2024-05-16 DIAGNOSIS — M81.0 AGE-RELATED OSTEOPOROSIS WITHOUT CURRENT PATHOLOGICAL FRACTURE: ICD-10-CM

## 2024-05-16 DIAGNOSIS — C44.320 SQUAMOUS CELL CARCINOMA OF SKIN OF FACE: ICD-10-CM

## 2024-05-16 DIAGNOSIS — E78.00 HYPERCHOLESTEROLEMIA: ICD-10-CM

## 2024-05-16 DIAGNOSIS — I10 PRIMARY HYPERTENSION: ICD-10-CM

## 2024-05-16 DIAGNOSIS — R20.0 BILATERAL HAND NUMBNESS: ICD-10-CM

## 2024-05-16 DIAGNOSIS — F41.9 CHRONIC ANXIETY: ICD-10-CM

## 2024-05-16 DIAGNOSIS — Z00.00 ENCOUNTER FOR MEDICARE ANNUAL WELLNESS EXAM: Primary | ICD-10-CM

## 2024-05-16 LAB
ALBUMIN UR-MCNC: NEGATIVE MG/DL
APPEARANCE UR: CLEAR
BACTERIA #/AREA URNS HPF: ABNORMAL /HPF
BILIRUB UR QL STRIP: NEGATIVE
COLOR UR AUTO: YELLOW
ERYTHROCYTE [DISTWIDTH] IN BLOOD BY AUTOMATED COUNT: 12.4 % (ref 10–15)
GLUCOSE UR STRIP-MCNC: NEGATIVE MG/DL
HCT VFR BLD AUTO: 39.5 % (ref 35–47)
HGB BLD-MCNC: 12.7 G/DL (ref 11.7–15.7)
HGB UR QL STRIP: ABNORMAL
KETONES UR STRIP-MCNC: NEGATIVE MG/DL
LEUKOCYTE ESTERASE UR QL STRIP: ABNORMAL
MCH RBC QN AUTO: 31.8 PG (ref 26.5–33)
MCHC RBC AUTO-ENTMCNC: 32.2 G/DL (ref 31.5–36.5)
MCV RBC AUTO: 99 FL (ref 78–100)
NITRATE UR QL: NEGATIVE
PH UR STRIP: 7 [PH] (ref 5–8)
PLATELET # BLD AUTO: 223 10E3/UL (ref 150–450)
RBC # BLD AUTO: 4 10E6/UL (ref 3.8–5.2)
RBC #/AREA URNS AUTO: ABNORMAL /HPF
SP GR UR STRIP: 1.01 (ref 1–1.03)
SQUAMOUS #/AREA URNS AUTO: ABNORMAL /LPF
TRANS CELLS #/AREA URNS HPF: ABNORMAL /HPF
UROBILINOGEN UR STRIP-ACNC: 0.2 E.U./DL
WBC # BLD AUTO: 7.3 10E3/UL (ref 4–11)
WBC #/AREA URNS AUTO: ABNORMAL /HPF
WBC CLUMPS #/AREA URNS HPF: PRESENT /HPF

## 2024-05-16 PROCEDURE — 80061 LIPID PANEL: CPT | Performed by: INTERNAL MEDICINE

## 2024-05-16 PROCEDURE — 85027 COMPLETE CBC AUTOMATED: CPT | Performed by: INTERNAL MEDICINE

## 2024-05-16 PROCEDURE — G0439 PPPS, SUBSEQ VISIT: HCPCS | Performed by: INTERNAL MEDICINE

## 2024-05-16 PROCEDURE — 82306 VITAMIN D 25 HYDROXY: CPT | Performed by: INTERNAL MEDICINE

## 2024-05-16 PROCEDURE — 99215 OFFICE O/P EST HI 40 MIN: CPT | Mod: 25 | Performed by: INTERNAL MEDICINE

## 2024-05-16 PROCEDURE — 80053 COMPREHEN METABOLIC PANEL: CPT | Performed by: INTERNAL MEDICINE

## 2024-05-16 PROCEDURE — 87086 URINE CULTURE/COLONY COUNT: CPT | Mod: GZ | Performed by: INTERNAL MEDICINE

## 2024-05-16 PROCEDURE — 84443 ASSAY THYROID STIM HORMONE: CPT | Performed by: INTERNAL MEDICINE

## 2024-05-16 PROCEDURE — 36415 COLL VENOUS BLD VENIPUNCTURE: CPT | Performed by: INTERNAL MEDICINE

## 2024-05-16 PROCEDURE — 81001 URINALYSIS AUTO W/SCOPE: CPT | Performed by: INTERNAL MEDICINE

## 2024-05-16 RX ORDER — RESPIRATORY SYNCYTIAL VIRUS VACCINE 120MCG/0.5
0.5 KIT INTRAMUSCULAR ONCE
Qty: 1 EACH | Refills: 0 | Status: CANCELLED | OUTPATIENT
Start: 2024-05-16 | End: 2024-05-16

## 2024-05-16 SDOH — HEALTH STABILITY: PHYSICAL HEALTH: ON AVERAGE, HOW MANY DAYS PER WEEK DO YOU ENGAGE IN MODERATE TO STRENUOUS EXERCISE (LIKE A BRISK WALK)?: 0 DAYS

## 2024-05-16 ASSESSMENT — SOCIAL DETERMINANTS OF HEALTH (SDOH): HOW OFTEN DO YOU GET TOGETHER WITH FRIENDS OR RELATIVES?: ONCE A WEEK

## 2024-05-16 NOTE — LETTER
May 17, 2024      Mariluz AVERY [address]   [facility] [address]        Dear Mariluz,    Your cholesterol is very well-controlled with simvastatin.  The CBC is normal.  No anemia.  Normal white blood cell count.  Normal thyroid function.  Your vitamin D level looks good at 48.  Normal kidney function.  Normal liver studies.    Your calcium is slightly elevated.  I am not too concerned but I may check your parathyroid hormone at your next visit.    Some nonspecific findings on your urinalysis but nothing of concern in the your urine culture was nondiagnostic.    Resulted Orders   Lipid Profile (Chol, Trig, HDL, LDL calc)   Result Value Ref Range    Cholesterol 158 <200 mg/dL    Triglycerides 60 <150 mg/dL    Direct Measure HDL 91 >=50 mg/dL    LDL Cholesterol Calculated 55 <=100 mg/dL    Non HDL Cholesterol 67 <130 mg/dL    Patient Fasting > 8hrs? Yes     Narrative    Cholesterol  Desirable:  <200 mg/dL    Triglycerides  Normal:  Less than 150 mg/dL  Borderline High:  150-199 mg/dL  High:  200-499 mg/dL  Very High:  Greater than or equal to 500 mg/dL    Direct Measure HDL  Female:  Greater than or equal to 50 mg/dL   Male:  Greater than or equal to 40 mg/dL    LDL Cholesterol  Desirable:  <100mg/dL  Above Desirable:  100-129 mg/dL   Borderline High:  130-159 mg/dL   High:  160-189 mg/dL   Very High:  >= 190 mg/dL    Non HDL Cholesterol  Desirable:  130 mg/dL  Above Desirable:  130-159 mg/dL  Borderline High:  160-189 mg/dL  High:  190-219 mg/dL  Very High:  Greater than or equal to 220 mg/dL   CBC with platelets   Result Value Ref Range    WBC Count 7.3 4.0 - 11.0 10e3/uL    RBC Count 4.00 3.80 - 5.20 10e6/uL    Hemoglobin 12.7 11.7 - 15.7 g/dL    Hematocrit 39.5 35.0 - 47.0 %    MCV 99 78 - 100 fL    MCH 31.8 26.5 - 33.0 pg    MCHC 32.2 31.5 - 36.5 g/dL    RDW 12.4 10.0 - 15.0 %    Platelet Count 223 150 - 450 10e3/uL   Comprehensive metabolic panel (BMP + Alb, Alk Phos, ALT, AST, Total. Bili, TP)    Result Value Ref Range    Sodium 134 (L) 135 - 145 mmol/L      Comment:      Reference intervals for this test were updated on 09/26/2023 to more accurately reflect our healthy population. There may be differences in the flagging of prior results with similar values performed with this method. Interpretation of those prior results can be made in the context of the updated reference intervals.     Potassium 4.7 3.4 - 5.3 mmol/L    Carbon Dioxide (CO2) 24 22 - 29 mmol/L    Anion Gap 11 7 - 15 mmol/L    Urea Nitrogen 24.9 (H) 8.0 - 23.0 mg/dL    Creatinine 0.88 0.51 - 0.95 mg/dL    GFR Estimate 61 >60 mL/min/1.73m2    Calcium 10.3 (H) 8.2 - 9.6 mg/dL    Chloride 99 98 - 107 mmol/L    Glucose 90 70 - 99 mg/dL    Alkaline Phosphatase 75 40 - 150 U/L      Comment:      Reference intervals for this test were updated on 11/14/2023 to more accurately reflect our healthy population. There may be differences in the flagging of prior results with similar values performed with this method. Interpretation of those prior results can be made in the context of the updated reference intervals.    AST 27 0 - 45 U/L      Comment:      Reference intervals for this test were updated on 6/12/2023 to more accurately reflect our healthy population. There may be differences in the flagging of prior results with similar values performed with this method. Interpretation of those prior results can be made in the context of the updated reference intervals.    ALT 14 0 - 50 U/L      Comment:      Reference intervals for this test were updated on 6/12/2023 to more accurately reflect our healthy population. There may be differences in the flagging of prior results with similar values performed with this method. Interpretation of those prior results can be made in the context of the updated reference intervals.      Protein Total 7.5 6.4 - 8.3 g/dL    Albumin 4.7 3.5 - 5.2 g/dL    Bilirubin Total 0.8 <=1.2 mg/dL    Patient Fasting > 8hrs? Yes     Vitamin D deficiency screening   Result Value Ref Range    Vitamin D, Total (25-Hydroxy) 48 20 - 50 ng/mL      Comment:      optimum levels    Narrative    Season, race, dietary intake, and treatment affect the concentration of 25-hydroxy-Vitamin D. Values may decrease during winter months and increase during summer months.    Vitamin D determination is routinely performed by an immunoassay specific for 25 hydroxyvitamin D3.  If an individual is on vitamin D2(ergocalciferol) supplementation, please specify 25 OH vitamin D2 and D3 level determination by LCMSMS test VITD23.     TSH with free T4 reflex   Result Value Ref Range    TSH 2.44 0.30 - 4.20 uIU/mL   UA Macroscopic with reflex to Microscopic and Culture - Lab Collect   Result Value Ref Range    Color Urine Yellow Colorless, Straw, Light Yellow, Yellow    Appearance Urine Clear Clear    Glucose Urine Negative Negative mg/dL    Bilirubin Urine Negative Negative    Ketones Urine Negative Negative mg/dL    Specific Gravity Urine 1.015 1.005 - 1.030    Blood Urine Trace (A) Negative    pH Urine 7.0 5.0 - 8.0    Protein Albumin Urine Negative Negative mg/dL    Urobilinogen Urine 0.2 0.2, 1.0 E.U./dL    Nitrite Urine Negative Negative    Leukocyte Esterase Urine Moderate (A) Negative   Urine Microscopic Exam   Result Value Ref Range    Bacteria Urine Few (A) None Seen /HPF    RBC Urine 2-5 (A) 0-2 /HPF /HPF    WBC Urine 5-10 (A) 0-5 /HPF /HPF    Squamous Epithelials Urine Moderate (A) None Seen /LPF    WBC Clumps Urine Present (A) None Seen /HPF    Transitional Epithelials Urine Few (A) None Seen /HPF   Urine Culture   Result Value Ref Range    Culture 10,000-50,000 CFU/mL Mixture of urogenital fernanda        If you have any questions or concerns, please call the clinic at the number listed above.       Sincerely,      Maik Levine MD

## 2024-05-16 NOTE — PATIENT INSTRUCTIONS
Preventive Care Advice   This is general advice we often give to help people stay healthy. Your care team may have specific advice just for you. Please talk to your care team about your own preventive care needs.  Lifestyle  Exercise at least 150 minutes each week (30 minutes a day, 5 days a week).  Do muscle strengthening activities 2 days a week. These help control your weight and prevent disease.  No smoking.  Wear sunscreen to prevent skin cancer.  You should see a dermatologist every year with your history of skin cancer  Nutrition  Eat 5 or more servings of fruits and vegetables each day.  Try wheat bread, brown rice and whole grain pasta (instead of white bread, rice, and pasta).  Get enough calcium and vitamin D. Check the label on foods and aim for 100% of the RDA (recommended daily allowance).  Regular exams  Have a dental exam and cleaning every 6 months.  Annual eye exam  See your health care team every year to talk about:  Any changes in your health.  Any medicines your care team has prescribed.  Preventive care, family planning, and ways to prevent chronic diseases.  Shots (vaccines)   COVID-19 shot: Get this shot when it's due.  Flu shot: Get a flu shot every year.  Recommending RSV vaccine.  This can be obtained at your pharmacy  Shingles shot (for age 50 and up).  This can be completed at your pharmacy.  General health tests  Diabetes screening:  Cholesterol test: Annually  Bone density scan (DEXA): DEXA is recommended for   Cancer screening tests  Breast cancer scan (mammogram): Consider getting an annual mammogram for breast cancer screening  Colon cancer screening: It is important to start screening for colon cancer at age 45.  Colonoscopy for colon cancer screening is not recommended at your age as risks of procedure are too great  For informational purposes only. Not to replace the advice of your health care provider. Copyright   2023 [employer] [facility]. All rights reserved. Clinically  reviewed by the [facility] Transitions Program. [facility] 964010 - REV 04/24.    Preventing Falls: Care Instructions  Injuries and health problems such as trouble walking or poor eyesight can increase your risk of falling. So can some medicines. But there are things you can do to help prevent falls. You can exercise to get stronger. You can also arrange your home to make it safer.    Talk to your doctor about the medicines you take. Ask if any of them increase the risk of falls and whether they can be changed or stopped.   Try to exercise regularly. It can help improve your strength and balance. This can help lower your risk of falling.     Practice fall safety and prevention.    Wear low-heeled shoes that fit well and give your feet good support. Talk to your doctor if you have foot problems that make this hard.  Carry a cellphone or wear a medical alert device that you can use to call for help.  Use stepladders instead of chairs to reach high objects. Don't climb if you're at risk for falls. Ask for help, if needed.  Wear the correct eyeglasses, if you need them.    Make your home safer.    Remove rugs, cords, clutter, and furniture from walkways.  Keep your house well lit. Use night-lights in hallways and bathrooms.  Install and use sturdy handrails on stairways.  Wear nonskid footwear, even inside. Don't walk barefoot or in socks without shoes.    Be safe outside.    Use handrails, curb cuts, and ramps whenever possible.  Keep your hands free by using a shoulder bag or backpack.  Try to walk in well-lit areas. Watch out for uneven ground, changes in pavement, and debris.  Be careful in the winter. Walk on the grass or gravel when sidewalks are slippery. Use de-icer on steps and walkways. Add non-slip devices to shoes.    Put grab bars and nonskid mats in your shower or tub and near the toilet. Try to use a shower chair or bath bench when bathing.   Get into a tub or shower by putting in your weaker leg  "first. Get out with your strong side first. Have a phone or medical alert device in the bathroom with you.   Where can you learn more?  Go to https://www.[employer].net/patiented  Enter G117 in the search box to learn more about \"Preventing Falls: Care Instructions.\"  Current as of: July 17, 2023               Content Version: 14.0    4160-1892 [employer].   Care instructions adapted under license by your healthcare professional. If you have questions about a medical condition or this instruction, always ask your healthcare professional. [employer] disclaims any warranty or liability for your use of this information.      Learning About Stress  What is stress?     Stress is your body's response to a hard situation. Your body can have a physical, emotional, or mental response. Stress is a fact of life for most people, and it affects everyone differently. What causes stress for you may not be stressful for someone else.  A lot of things can cause stress. You may feel stress when you go on a job interview, take a test, or run a race. This kind of short-term stress is normal and even useful. It can help you if you need to work hard or react quickly. For example, stress can help you finish an important job on time.  Long-term stress is caused by ongoing stressful situations or events. Examples of long-term stress include long-term health problems, ongoing problems at work, or conflicts in your family. Long-term stress can harm your health.  How does stress affect your health?  When you are stressed, your body responds as though you are in danger. It makes hormones that speed up your heart, make you breathe faster, and give you a burst of energy. This is called the fight-or-flight stress response. If the stress is over quickly, your body goes back to normal and no harm is done.  But if stress happens too often or lasts too long, it can have bad effects. Long-term stress can make you more likely " to get sick, and it can make symptoms of some diseases worse. If you tense up when you are stressed, you may develop neck, shoulder, or low back pain. Stress is linked to high blood pressure and heart disease.  Stress also harms your emotional health. It can make you ramirez, tense, or depressed. Your relationships may suffer, and you may not do well at work or school.  What can you do to manage stress?  You can try these things to help manage stress:   Do something active. Exercise or activity can help reduce stress. Walking is a great way to get started. Even everyday activities such as housecleaning or yard work can help.  Try yoga or hiral chi. These techniques combine exercise and meditation. You may need some training at first to learn them.  Do something you enjoy. For example, listen to music or go to a movie. Practice your hobby or do volunteer work.  Meditate. This can help you relax, because you are not worrying about what happened before or what may happen in the future.  Do guided imagery. Imagine yourself in any setting that helps you feel calm. You can use online videos, books, or a teacher to guide you.  Do breathing exercises. For example:  From a standing position, bend forward from the waist with your knees slightly bent. Let your arms dangle close to the floor.  Breathe in slowly and deeply as you return to a standing position. Roll up slowly and lift your head last.  Hold your breath for just a few seconds in the standing position.  Breathe out slowly and bend forward from the waist.  Let your feelings out. Talk, laugh, cry, and express anger when you need to. Talking with supportive friends or family, a counselor, or a chucho leader about your feelings is a healthy way to relieve stress. Avoid discussing your feelings with people who make you feel worse.  Write. It may help to write about things that are bothering you. This helps you find out how much stress you feel and what is causing it. When you  "know this, you can find better ways to cope.  What can you do to prevent stress?  You might try some of these things to help prevent stress:  Manage your time. This helps you find time to do the things you want and need to do.  Get enough sleep. Your body recovers from the stresses of the day while you are sleeping.  Get support. Your family, friends, and community can make a difference in how you experience stress.  Limit your news feed. Avoid or limit time on social media or news that may make you feel stressed.  Do something active. Exercise or activity can help reduce stress. Walking is a great way to get started.  Where can you learn more?  Go to https://www."[employer].net/patiented  Enter N032 in the search box to learn more about \"Learning About Stress.\"  Current as of: October 24, 2023               Content Version: 14.0    1715-3964 [employer].   Care instructions adapted under license by your healthcare professional. If you have questions about a medical condition or this instruction, always ask your healthcare professional. [employer] disclaims any warranty or liability for your use of this information.      "

## 2024-05-16 NOTE — PROGRESS NOTES
Preventive Care Visit  [facility]  Maik Levine MD, Internal Medicine  May 16, 2024      Assessment & Plan     Encounter for Medicare annual wellness exam  Immunizations are reviewed and recommending RSV vaccine and getting Shingrix completed she has a living well.  Non-smoker.  She uses minimal alcohol.  Regular exercise and good diet habits  discussed.  Further colonoscopy is not recommended at her age as risks outweigh benefit.  Discussed annual mammogram for breast cancer screening.  DEXA for osteoporosis screening recommended but she declines.  Dementia and depression screening completed.  Recommending annual eye exam.  Recommending seeing a dentist every 6 months.  Skin exam performed and recommending regular use of sunblock.  Recommending seeing a dermatologist every year.  Will screen for diabetes with fasting glucose.      Primary hypertension  Despite elevated blood pressure today, she is monitoring her blood pressure at home and it has been generally well-controlled with systolic under 140 on the majority of times.  Continue current medications including atenolol, valsartan, and felodipine.  Tolerating these medications without side effects  - CBC with platelets; Future  - Comprehensive metabolic panel (BMP + Alb, Alk Phos, ALT, AST, Total. Bili, TP); Future  - TSH with free T4 reflex; Future  - UA Macroscopic with reflex to Microscopic and Culture - Lab Collect; Future    Aortic valve stenosis, etiology of cardiac valve disease unspecified  Known severe aortic valve stenosis.  She has declined any interventions including TAVR.  Denies any exertional chest pain.  She did have episode of near syncope in February unclear if related to valve disease.  She has had no recurrent symptoms.  Continue current medical management    Chronic anxiety  Ongoing issues with anxiety using lorazepam nightly.  Other medications have been tried but not well-tolerated    Spondylosis of  cervical region without myelopathy or radiculopathy  Chronic neck pain.  She is interested in more physical therapy and referral is made.  She will look into whether she can get PT at her assisted living facility.  - Physical Therapy  Referral; Future    Gait instability  Using her walker whenever up ambulating    Hypercholesterolemia  Recheck lipid profile taking simvastatin 20 mg daily  - Lipid Profile (Chol, Trig, HDL, LDL calc); Future    Bilateral hand numbness  Ongoing numbness and tingling in both hands.  Previous carpal tunnel release.  Unclear if this is carpal tunnel syndrome or related to her neck.  Recommending EMG to clarify  - EMG; Future    Venous (peripheral) insufficiency  Wearing compression stockings with generally good control of peripheral edema.  She is unable to tolerate diuretics with history of hyponatremia    Age-related osteoporosis without current pathological fracture  Recommending repeating a DEXA but she declines.  Emphasize importance of getting adequate calcium and vitamin D and weightbearing exercise  - Vitamin D deficiency screening; Future    Squamous cell carcinoma of skin of face  Encouraging her to see a dermatologist every year    Hyponatremia  Monitor electrolytes.  Avoiding diuretics.    Osteoarthritis bilateral shoulders.  She is not a candidate for surgery.  We discussed following up with orthopedics as she may benefit from cortisone injections although symptoms are mostly lack of range of motion rather than pain.    Patient has been advised of split billing requirements and indicates understanding: Yes    61 minutes spent in the management of this patient including reviewing records, obtaining history, performing exam, ordering appropriate tests and documenting visit.    20 of those minutes spent addressing wellness and 41 minutes spent addressing chronic and new medical problems.  See assessment plan for details.    Counseling  Appropriate preventive services  were discussed with this patient, including applicable screening as appropriate for fall prevention, nutrition, physical activity, Tobacco-use cessation, weight loss and cognition.  Checklist reviewing preventive services available has been given to the patient.  Reviewed patient's diet, addressing concerns and/or questions.   Discussed possible causes of fatigue. Updated plan of care.  Patient reported difficulty with activities of daily living were addressed today.The patient was provided with written information regarding signs of hearing loss.   Information on urinary incontinence and treatment options given to patient.       Follow-up in 4 months    Alonzo Mcgraw is a 89 year old, presenting for the following: Here for annual wellness visit and to follow-up medical problems including generalized anxiety, hypertension, aortic stenosis and other issues.  See assessment and plan for details  Physical (AWV, fasting)        5/16/2024    10:55 AM   Additional Questions   Roomed by    Accompanied by son and daughter         Health Care Directive  Patient has a Health Care Directive on file  Discussed advance care planning with patient.          5/16/2024   General Health   How would you rate your overall physical health? Good   Feel stress (tense, anxious, or unable to sleep) Rather much   (!) STRESS CONCERN      5/16/2024   Nutrition   Diet: Low salt    Breakfast skipped         5/16/2024   Exercise   Days per week of moderate/strenous exercise 0 days   (!) EXERCISE CONCERN      5/16/2024   Social Factors   Frequency of gathering with friends or relatives Once a week   Worry food won't last until get money to buy more No   Food not last or not have enough money for food? No   Do you have housing?  Yes   Are you worried about losing your housing? No   Lack of transportation? No   Unable to get utilities (heat,electricity)? No         5/16/2024   Fall Risk   Fallen 2 or more times in the past year? No    Trouble with walking or balance? Yes   Reason Gait Speed Test Not Completed Patient declines          5/16/2024   Activities of Daily Living- Home Safety   Needs help with the following daily activites Transportation    Shopping    Preparing meals    Housework    Bathing    Laundry    Dressing   Safety concerns in the home None of the above         5/16/2024   Dental   Dentist two times every year? Yes         5/16/2024   Hearing Screening   Hearing concerns? (!) I FEEL THAT PEOPLE ARE MUMBLING OR NOT SPEAKING CLEARLY.    (!) I NEED TO ASK PEOPLE TO SPEAK UP OR REPEAT THEMSELVES.    (!) IT'S HARDER TO UNDERSTAND WOMEN'S VOICES THAN MEN'S VOICES.    (!) IT'S HARD TO FOLLOW A CONVERSATION IN A NOISY RESTAURANT OR CROWDED ROOM.    (!) TROUBLE UNDESTANDING A SPEAKER IN A PUBLIC MEETING OR [employer] SERVICE.    (!) TROUBLE FOLLOWING DIALOGUE IN THE THEATHER.    (!) TROUBLE UNDERSTANDING SOFT OR WHISPERED SPEECH.    (!) TROUBLE UNDERSTANDING SPEECH ON THE TELEPHONE         5/16/2024   Driving Risk Screening   Patient/family members have concerns about driving (!) DECLINE         5/16/2024   General Alertness/Fatigue Screening   Have you been more tired than usual lately? (!) YES         5/16/2024   Urinary Incontinence Screening   Bothered by leaking urine in past 6 months Yes         5/16/2024   TB Screening   Were you born outside of the [place]? No         Today's PHQ-2 Score:       5/16/2024    10:45 AM   PHQ-2 ( 1999 [employer])   Q1: Little interest or pleasure in doing things 0   Q2: Feeling down, depressed or hopeless 1   PHQ-2 Score 1   Q1: Little interest or pleasure in doing things Not at all   Q2: Feeling down, depressed or hopeless Several days   PHQ-2 Score 1           5/16/2024   Substance Use   Alcohol more than 3/day or more than 7/wk Not Applicable   Do you have a current opioid prescription? No   How severe/bad is pain from 1 to 10? 0/10 (No Pain)   Do you use any other substances recreationally? No     Social  History     Tobacco Use    Smoking status: Never     Passive exposure: Never    Smokeless tobacco: Never   Vaping Use    Vaping status: Never Used   Substance Use Topics    Alcohol use: Yes     Comment: rare    Drug use: No                    Reviewed and updated as needed this visit by Provider                    Past Medical History:   Diagnosis Date    Age-related cataract of left eye 09/21/2021    Aortic stenosis     Echocardiogram with moderate aortic stenosis September 2021    Arthritis     Osteoarthritis    Bilateral cataracts     Bilateral hand numbness 04/17/2023    Presumably carpal tunnel syndrome, previous surgery and already using wrist splints, not interested in further surgery    Chronic anxiety     Chronic fatigue 10/23/2023    Dizziness and giddiness 09/17/2012    Gastroesophageal reflux disease     GERD (gastroesophageal reflux disease)     Heart murmur     Hypercholesteremia     Hypercholesteremia     Hypercholesterolemia 12/13/2021    Hypertension     Hyponatremia     Hyponatremia 07/08/2021    Irregular heart beat     Irritable bowel     Irritable bowel     Meniere syndrome     Meniere's disease, bilateral     Osteoarthritis of spine with radiculopathy, unspecified spinal region 07/08/2021    Osteoporosis     Peripheral edema 02/28/2023    Primary osteoarthritis of both hands 12/13/2021    SOB (shortness of breath) 07/08/2021    Spondylosis of cervical region without myelopathy or radiculopathy 05/16/2024    Squamous cell carcinoma of skin of face 07/18/2023    Venous (peripheral) insufficiency 04/20/2022    Vertigo      Past Surgical History:   Procedure Laterality Date    CATARACT EXTRACTION Left     2021    DILATION AND CURETTAGE      DILATION AND CURETTAGE      ENDOLYMPHATIC SAC OPERATION  01/01/2012    enhancement    ENHANCE ENDOLYMPHATIC SAC, DEC SIGMOID SINUS, EXTND FACIAL RECESS APPRCH, MASTOIDECTOMY, BMT, COMBO  04/23/2012    Procedure:COMBINED ENHANCE ENDOLYMPHATIC SAC, DECOMPRESS  SIGMOID SINUS, EXTENDED FACIAL RECESS APPROACH, COMPLETE MASTOIDECTOMY, MYRINGOTOMY, INSERT TUBE; Left Endolymphatic Sac Enhancement, Sigmoid Sinus Decom-  pression, Extended Facial Recess Approach, Myringotomy  , Complete Mastoidectomy; Surgeon:LINN MATHIS; Location:[facility]    INNER EAR SURGERY      Per Pt    KERATOTOMY ARCUATE WITH FEMTOSECOND LASER/IMAGING FOR ATIOL Right 07/19/2018    Procedure: KERATOTOMY ARCUATE WITH FEMTOSECOND LASER/IMAGING FOR ATIOL;  RIGHT EYE KERATOTOMY ARCUATE WITH FEMTOSECOND LASER/IMAGING FOR ATIOL ,  CAPSULOTOMY, LENS FRAGMENTATION, ARCUATE INCISIONS;  Surgeon: Roberth Chambers MD;  Location:  [facility]    MASTOIDECTOMY  01/01/2012    PHACOEMULSIFICATION CLEAR CORNEA WITH TORIC INTRAOCULAR LENS IMPLANT Right 07/19/2018    Procedure: PHACOEMULSIFICATION CLEAR CORNEA WITH TORIC INTRAOCULAR LENS IMPLANT;  RIGHT EYE PHACOEMULSIFICATION CLEAR CORNEA WITH TORIC INTRAOCULAR LENS IMPLANT ;  Surgeon: Roberth Chambers MD;  Location:  [facility]    TONSILLECTOMY      TONSILLECTOMY       Current providers sharing in care for this patient include:  Patient Care Team:  Maik Levine MD as PCP - General (Internal Medicine)  Maik Levine MD as Assigned PCP  Georgi Franco MD as MD (Cardiovascular Disease)  Georgi Franco MD as Assigned Heart and Vascular Provider  Giovany Yoder MD as MD (Dermatology)    The following health maintenance items are reviewed in Epic and correct as of today:  Health Maintenance   Topic Date Due    ZOSTER IMMUNIZATION (1 of 2) Never done    RSV VACCINE (Pregnancy & 60+) (1 - 1-dose 60+ series) Never done    COVID-19 Vaccine (7 - 2023-24 season) 02/23/2024    LIPID  04/17/2024    ANNUAL REVIEW OF HM ORDERS  04/17/2024    MEDICARE ANNUAL WELLNESS VISIT  04/17/2024    FALL RISK ASSESSMENT  05/16/2025    DTAP/TDAP/TD IMMUNIZATION (2 - Td or Tdap) 11/11/2025    ADVANCE CARE PLANNING  05/16/2029    PHQ-2 (once per calendar year)  Completed     "INFLUENZA VACCINE  Completed    Pneumococcal Vaccine: 65+ Years  Completed    IPV IMMUNIZATION  Aged Out    HPV IMMUNIZATION  Aged Out    MENINGITIS IMMUNIZATION  Aged Out    RSV MONOCLONAL ANTIBODY  Aged Out         Review of Systems  Constitutional, HEENT, cardiovascular, pulmonary, GI, , musculoskeletal, neuro, skin, endocrine and psych systems are negative, except as otherwise noted.     Objective    Exam  BP (!) 166/76 (BP Location: Right arm, Patient Position: Sitting, Cuff Size: Adult Regular)   Pulse 64   Temp 97.7  F (36.5  C) (Oral)   Resp 20   Ht 1.473 m (4' 10\")   Wt 49.4 kg (109 lb)   LMP  (LMP Unknown)   SpO2 95%   Breastfeeding No   BMI 22.78 kg/m     Estimated body mass index is 22.78 kg/m  as calculated from the following:    Height as of this encounter: 1.473 m (4' 10\").    Weight as of this encounter: 49.4 kg (109 lb).    Physical Exam  EYES: Eyelids, conjunctiva, and sclera were normal. Pupils were normal. Cornea, iris, and lens were normal bilaterally.  HEAD, EARS, NOSE, MOUTH, AND THROAT: Head and face were normal. TMs and external auditory canals are normal.  Oropharynx normal  NECK: Neck appearance was normal. There were no neck masses and the thyroid was not enlarged and no nodules are felt.  No lymphadenopathy.  RESPIRATORY: Breathing pattern was normal and the chest moved symmetrically.   Lung sounds were normal and there were no rales or wheezes.  CARDIOVASCULAR: Regular rhythm.  3/6 systolic murmur.  Peripheral pulses in arms and legs were normal.  Trace-1+ bilateral lower extremity edema.   GASTROINTESTINAL:  Bowel sounds were present.   Palpation detected no tenderness, mass, or enlarged organs.   MUSCULOSKELETAL: Decreased range of motion of both shoulders and cervical spine  LYMPHATIC: There were no enlarged nodes.  SKIN/HAIR/NAILS: Skin color was normal.  There were no concerning skin lesions.  NEUROLOGIC: The patient was alert and oriented to person, place, time, and " circumstance. Speech was normal. Cranial nerves were normal. Motor strength was normal for age. The patient was normally coordinated.  PSYCHIATRIC: Anxious.  Memory intact         5/16/2024   Mini Cog   Clock Draw Score 2 Normal   3 Item Recall 3 objects recalled   Mini Cog Total Score 5              Signed Electronically by: Maik Levine MD

## 2024-05-17 DIAGNOSIS — E83.52 HYPERCALCEMIA: Primary | ICD-10-CM

## 2024-05-17 LAB
ALBUMIN SERPL BCG-MCNC: 4.7 G/DL (ref 3.5–5.2)
ALP SERPL-CCNC: 75 U/L (ref 40–150)
ALT SERPL W P-5'-P-CCNC: 14 U/L (ref 0–50)
ANION GAP SERPL CALCULATED.3IONS-SCNC: 11 MMOL/L (ref 7–15)
AST SERPL W P-5'-P-CCNC: 27 U/L (ref 0–45)
BACTERIA UR CULT: NORMAL
BILIRUB SERPL-MCNC: 0.8 MG/DL
BUN SERPL-MCNC: 24.9 MG/DL (ref 8–23)
CALCIUM SERPL-MCNC: 10.3 MG/DL (ref 8.2–9.6)
CHLORIDE SERPL-SCNC: 99 MMOL/L (ref 98–107)
CHOLEST SERPL-MCNC: 158 MG/DL
CREAT SERPL-MCNC: 0.88 MG/DL (ref 0.51–0.95)
DEPRECATED HCO3 PLAS-SCNC: 24 MMOL/L (ref 22–29)
EGFRCR SERPLBLD CKD-EPI 2021: 61 ML/MIN/1.73M2
FASTING STATUS PATIENT QL REPORTED: YES
FASTING STATUS PATIENT QL REPORTED: YES
GLUCOSE SERPL-MCNC: 90 MG/DL (ref 70–99)
HDLC SERPL-MCNC: 91 MG/DL
LDLC SERPL CALC-MCNC: 55 MG/DL
NONHDLC SERPL-MCNC: 67 MG/DL
POTASSIUM SERPL-SCNC: 4.7 MMOL/L (ref 3.4–5.3)
PROT SERPL-MCNC: 7.5 G/DL (ref 6.4–8.3)
SODIUM SERPL-SCNC: 134 MMOL/L (ref 135–145)
TRIGL SERPL-MCNC: 60 MG/DL
TSH SERPL DL<=0.005 MIU/L-ACNC: 2.44 UIU/ML (ref 0.3–4.2)
VIT D+METAB SERPL-MCNC: 48 NG/ML (ref 20–50)

## 2024-05-29 ENCOUNTER — TRANSFERRED RECORDS (OUTPATIENT)
Dept: HEALTH INFORMATION MANAGEMENT | Facility: CLINIC | Age: 89
End: 2024-05-29

## 2024-05-31 DIAGNOSIS — F41.9 CHRONIC ANXIETY: ICD-10-CM

## 2024-05-31 RX ORDER — LORAZEPAM 0.5 MG/1
0.5 TABLET ORAL
Qty: 30 TABLET | Refills: 0 | Status: SHIPPED | OUTPATIENT
Start: 2024-05-31 | End: 2024-06-28

## 2024-06-18 ENCOUNTER — NURSE TRIAGE (OUTPATIENT)
Dept: NURSING | Facility: CLINIC | Age: 89
End: 2024-06-18
Payer: COMMERCIAL

## 2024-06-19 NOTE — TELEPHONE ENCOUNTER
Nurse Triage SBAR    Situation: Bilateral Leg edema    Background: Patient calling. Weight increase started a few weeks ago. Wears compression stockings. She is up 5 pounds today.     Assessment: Bilateral leg swelling. Walking slower. Denied fevers. Temp earlier was 98.5. NO chest pain. Pt denied any new breathing concerns or SOB but she did sound winded on the phone.     Protocol Recommended Disposition: See Physician within 4 hours (Or PCP Triage)    Recommendation: According to the protocol, Patient should be seen within 4 hours (Or PCP Triage). Advised Patient that the patient needs to wait for a call-back after nurse speaks with the on-call Provider. Care advice given. Patient verbalizes understanding and agrees with plan of care. Reviewed concerning symptoms and when to call back.     Paging on call Dr Jesus Barriga at 8:08pm. Per MD - If she is having any breathing concerns then she needs to be seen in the ED, otherwise, she should be seen tomorrow.     Provider Recommendation Follow Up:   Reached patient/caregiver. Informed of provider's recommendations. Patient verbalized understanding and agrees with the plan. Pt was connected to scheduling.     Jes Bernal, RN Nursing Advisor 6/18/2024 8:31 PM     Reason for Disposition   SEVERE leg swelling (e.g., swelling extends above knee, entire leg is swollen, weeping fluid)    Additional Information   Negative: SEVERE difficulty breathing (e.g., struggling for each breath, speaks in single words)   Negative: Looks like a broken bone or dislocated joint (e.g., crooked or deformed)   Negative: Sounds like a life-threatening emergency to the triager   Negative: Chest pain   Negative: Followed a leg injury   Negative: [1] Followed an insect bite AND [2] localized swelling (e.g., small area of puffy or swollen skin)   Negative: Swelling of one ankle joint   Negative: Swelling of knee is main symptom   Negative: Pregnant   Negative: Postpartum (from 0 to 6 weeks after  delivery)   Negative: [1] Heart failure suspected (e.g., ankle swelling, shortness of breath, weight gain) AND [2] recently in hospital for heart failure   Negative: Difficulty breathing at rest   Negative: Entire foot is cool or blue in comparison to other side   Negative: [1] Can't walk or can barely walk AND [2] new-onset   Negative: [1] Difficulty breathing with exertion (e.g., walking) AND [2] new-onset or worsening   Negative: [1] Red area or streak AND [2] fever   Negative: [1] Swelling is painful to touch AND [2] fever   Negative: [1] Cast on leg or ankle AND [2] now increased pain   Negative: Patient sounds very sick or weak to the triager    Protocols used: Leg Swelling and Edema-A-AH

## 2024-06-20 ENCOUNTER — OFFICE VISIT (OUTPATIENT)
Dept: INTERNAL MEDICINE | Facility: CLINIC | Age: 89
End: 2024-06-20
Payer: COMMERCIAL

## 2024-06-20 VITALS
WEIGHT: 110 LBS | DIASTOLIC BLOOD PRESSURE: 80 MMHG | SYSTOLIC BLOOD PRESSURE: 180 MMHG | TEMPERATURE: 97.9 F | BODY MASS INDEX: 23.09 KG/M2 | HEART RATE: 66 BPM | HEIGHT: 58 IN | RESPIRATION RATE: 20 BRPM

## 2024-06-20 DIAGNOSIS — R60.0 PERIPHERAL EDEMA: Primary | ICD-10-CM

## 2024-06-20 DIAGNOSIS — I10 PRIMARY HYPERTENSION: ICD-10-CM

## 2024-06-20 DIAGNOSIS — I35.0 AORTIC VALVE STENOSIS, ETIOLOGY OF CARDIAC VALVE DISEASE UNSPECIFIED: ICD-10-CM

## 2024-06-20 PROCEDURE — 99214 OFFICE O/P EST MOD 30 MIN: CPT | Performed by: INTERNAL MEDICINE

## 2024-06-20 PROCEDURE — G2211 COMPLEX E/M VISIT ADD ON: HCPCS | Performed by: INTERNAL MEDICINE

## 2024-06-20 RX ORDER — RESPIRATORY SYNCYTIAL VIRUS VACCINE 120MCG/0.5
0.5 KIT INTRAMUSCULAR ONCE
Qty: 1 EACH | Refills: 0 | Status: CANCELLED | OUTPATIENT
Start: 2024-06-20 | End: 2024-06-20

## 2024-06-20 RX ORDER — BUMETANIDE 0.5 MG/1
0.5 TABLET ORAL EVERY OTHER DAY
Qty: 15 TABLET | Refills: 11 | Status: SHIPPED | OUTPATIENT
Start: 2024-06-20 | End: 2024-06-25

## 2024-06-20 NOTE — PROGRESS NOTES
Assessment & Plan     Peripheral edema  89-year-old woman with known aortic stenosis last evaluated with echocardiogram in 2023 as moderate to severe.  She has had chronic peripheral edema but has been experiencing increasing bilateral lower extremity swelling now up to her knees.  Additionally a 5 pound weight gain on her scale at her facility.  Denies feeling more short of breath.  No chest pain or palpitations.  Lungs clear bilaterally.  Heart regular rate and rhythm with frequent ectopy and loud 3/6 systolic murmur.  There is 2+ bilateral lower extremity edema up to her knees.  O2 sats 95% on room air.  Blood pressure elevated 180/80.  I suspect she is experiencing increasing congestive heart failure related to her severe aortic stenosis.  Recommending starting Bumex 0.5 mg every other day and to be reassessed in 5 days with an appointment here at the office.  She will continue to restrict her sodium as she is already doing.  Will need to watch electrolytes carefully with history of hyponatremia.  This occurred with HCTZ and hopefully she will tolerate this low-dose of Bumex every other day without trouble.  She will also continue to wear her elastic stockings.  - bumetanide (BUMEX) 0.5 MG tablet; Take 1 tablet (0.5 mg) by mouth every other day    Aortic valve stenosis, etiology of cardiac valve disease unspecified  Known aortic valve stenosis moderate to severe on last echocardiogram with suspicion of progression and now causing congestive heart failure.  Begin Bumex 0.5 mg every other day.  She has declined any interventions including TAVR    Primary hypertension  Blood pressure is elevated and I suspect related to fluid overload.  Beginning diuretic as above.  Continue current doses of atenolol valsartan and felodipine.    Reassurance regarding ecchymosis involving right arm.    The longitudinal plan of care for the diagnosis(es)/condition(s) as documented were addressed during this visit. Due to the added  "complexity in care, I will continue to support Mariluz in the subsequent management and with ongoing continuity of care.             Follow-up in 5 days    Subjective   Mariluz is a 89 year old, presenting for the following health issues: Here with increasing bilateral lower extremity edema with known severe aortic stenosis.  See assessment and plan for details  Edema (legs) and Musculoskeletal Problem (Right arm pain)      6/20/2024     3:45 PM   Additional Questions   Roomed by      Musculoskeletal Problem    History of Present Illness       Vascular Disease:  She presents for follow up of vascular disease.     She never takes nitroglycerin. She is not taking daily aspirin.    Reason for visit:  Swelling in the leg    She eats 2-3 servings of fruits and vegetables daily.She consumes 1 sweetened beverage(s) daily.She exercises with enough effort to increase her heart rate 9 or less minutes per day.  She exercises with enough effort to increase her heart rate 3 or less days per week.   She is taking medications regularly.           Review of Systems  Constitutional, HEENT, cardiovascular, pulmonary, gi and gu systems are negative, except as otherwise noted.      Objective    BP (!) 172/86 (BP Location: Right arm, Patient Position: Sitting, Cuff Size: Adult Regular)   Pulse (!) 36   Temp 97.9  F (36.6  C) (Oral)   Ht 1.473 m (4' 10\")   Wt 49.9 kg (110 lb)   LMP  (LMP Unknown)   BMI 22.99 kg/m    Body mass index is 22.99 kg/m .  Physical Exam   Anxious elderly woman  O2 sats 95% on room air  Lungs clear bilaterally  Heart regular rhythm and rate with frequent ectopy and 3/6 systolic murmur  2+ bilateral lower extremity edema            Signed Electronically by: Maik Levine MD    "

## 2024-06-25 ENCOUNTER — TELEPHONE (OUTPATIENT)
Dept: INTERNAL MEDICINE | Facility: CLINIC | Age: 89
End: 2024-06-25

## 2024-06-25 ENCOUNTER — OFFICE VISIT (OUTPATIENT)
Dept: INTERNAL MEDICINE | Facility: CLINIC | Age: 89
End: 2024-06-25
Payer: COMMERCIAL

## 2024-06-25 VITALS
HEIGHT: 58 IN | OXYGEN SATURATION: 100 % | SYSTOLIC BLOOD PRESSURE: 160 MMHG | DIASTOLIC BLOOD PRESSURE: 84 MMHG | WEIGHT: 108.6 LBS | HEART RATE: 50 BPM | TEMPERATURE: 97.8 F | BODY MASS INDEX: 22.8 KG/M2 | RESPIRATION RATE: 18 BRPM

## 2024-06-25 DIAGNOSIS — I35.0 AORTIC VALVE STENOSIS, ETIOLOGY OF CARDIAC VALVE DISEASE UNSPECIFIED: ICD-10-CM

## 2024-06-25 DIAGNOSIS — R19.7 ACUTE DIARRHEA: ICD-10-CM

## 2024-06-25 DIAGNOSIS — E83.52 HYPERCALCEMIA: ICD-10-CM

## 2024-06-25 DIAGNOSIS — I10 PRIMARY HYPERTENSION: ICD-10-CM

## 2024-06-25 DIAGNOSIS — Z51.81 ENCOUNTER FOR THERAPEUTIC DRUG MONITORING: ICD-10-CM

## 2024-06-25 DIAGNOSIS — R60.0 PERIPHERAL EDEMA: Primary | ICD-10-CM

## 2024-06-25 LAB
ANION GAP SERPL CALCULATED.3IONS-SCNC: 10 MMOL/L (ref 7–15)
BUN SERPL-MCNC: 19.5 MG/DL (ref 8–23)
CALCIUM SERPL-MCNC: 10.3 MG/DL (ref 8.2–9.6)
CHLORIDE SERPL-SCNC: 98 MMOL/L (ref 98–107)
CREAT SERPL-MCNC: 0.93 MG/DL (ref 0.51–0.95)
DEPRECATED HCO3 PLAS-SCNC: 26 MMOL/L (ref 22–29)
EGFRCR SERPLBLD CKD-EPI 2021: 57 ML/MIN/1.73M2
GLUCOSE SERPL-MCNC: 99 MG/DL (ref 70–99)
POTASSIUM SERPL-SCNC: 4.5 MMOL/L (ref 3.4–5.3)
PTH-INTACT SERPL-MCNC: 27 PG/ML (ref 15–65)
SODIUM SERPL-SCNC: 134 MMOL/L (ref 135–145)

## 2024-06-25 PROCEDURE — 83970 ASSAY OF PARATHORMONE: CPT | Performed by: INTERNAL MEDICINE

## 2024-06-25 PROCEDURE — 36415 COLL VENOUS BLD VENIPUNCTURE: CPT | Performed by: INTERNAL MEDICINE

## 2024-06-25 PROCEDURE — 99214 OFFICE O/P EST MOD 30 MIN: CPT | Performed by: INTERNAL MEDICINE

## 2024-06-25 PROCEDURE — G2211 COMPLEX E/M VISIT ADD ON: HCPCS | Performed by: INTERNAL MEDICINE

## 2024-06-25 PROCEDURE — 80048 BASIC METABOLIC PNL TOTAL CA: CPT | Performed by: INTERNAL MEDICINE

## 2024-06-25 NOTE — TELEPHONE ENCOUNTER
----- Message from Maik Levine sent at 6/25/2024  5:50 PM CDT -----  Please call and let her know that her kidney function and sodium level were just fine.  Sodium of 134 and no different than before she took the 2 doses of Bumex.

## 2024-06-25 NOTE — TELEPHONE ENCOUNTER
Attempted to call patient. Home number not in service, mobile number is to [facility]. (CTC on file) No answer. LMTCB.

## 2024-06-25 NOTE — PROGRESS NOTES
Assessment & Plan     Peripheral edema  89-year-old woman with known aortic stenosis here to follow-up after her appointment last week when it appeared that she was experiencing increasing peripheral edema with concerns for worsening congestive heart failure.  Despite her history of not tolerating diuretics secondary to hyponatremia specifically with HCTZ, Bumex was started at a dose of 0.5 mg every other day.  She took a dose on Friday and Sunday.  She did experience some GI side effects as discussed below.  Her weight is down 1.4 pounds.  Today's exam showing legs looking much better.  I do not see any significant edema involving either lower extremity.  Mild effusions around both knees likely more related to arthritis.  I may have been misled into thinking that she is having worsening congestive heart failure as she was not wearing her elastic compression stockings.  Looking at her weights, she was 109 pounds in February 2024, 110 pounds last week and today 108.6.  At this point especially as she may be having side effects from Bumex, I am asking her to discontinue the diuretic and to continue to monitor her leg edema and look for any other new symptoms.  Will have her follow-up in 3 weeks.    Primary hypertension  Blood pressure still not under control but is being rechecked later today at her facility.  Blood pressures tend to be labile    Aortic valve stenosis, etiology of cardiac valve disease unspecified  Known aortic valve stenosis with concerns that this may be contributing to CHF, as above    Acute diarrhea  Over the weekend including into today she is having some vague dyspepsia and did have a bout of diarrhea.  She is attributing this to the Bumex but this is unclear.  Will see if her symptoms resolve over the next week off of the medication.    Encounter for therapeutic drug monitoring    - Basic metabolic panel  (Ca, Cl, CO2, Creat, Gluc, K, Na, BUN); Future    The longitudinal plan of care for the  "diagnosis(es)/condition(s) as documented were addressed during this visit. Due to the added complexity in care, I will continue to support Mariluz in the subsequent management and with ongoing continuity of care.             Follow-up in 3 weeks    Subjective   Mariluz is a 89 year old, presenting for the following health issues: Here to follow-up regarding peripheral edema and aortic stenosis with concerns for congestive heart failure.  See assessment and plan for details  Follow Up      6/25/2024     9:42 AM   Additional Questions   Roomed by            Review of Systems  Constitutional, HEENT, cardiovascular, pulmonary, gi and gu systems are negative, except as otherwise noted.      Objective    BP (!) 160/84 (BP Location: Right arm, Patient Position: Sitting, Cuff Size: Adult Regular)   Pulse 50   Temp 97.8  F (36.6  C) (Oral)   Resp 18   Ht 1.473 m (4' 10\")   Wt 49.3 kg (108 lb 9.6 oz)   LMP  (LMP Unknown)   SpO2 100%   Breastfeeding No   BMI 22.70 kg/m    Body mass index is 22.7 kg/m .  Physical Exam   Anxious but otherwise well-appearing elderly woman  Lungs clear bilaterally no rales or wheezes  Heart regular rate and rhythm with 3/6 systolic murmur  No significant peripheral edema is seen in either lower extremity today        Signed Electronically by: Maik Levine MD    "

## 2024-06-26 ENCOUNTER — MEDICAL CORRESPONDENCE (OUTPATIENT)
Dept: HEALTH INFORMATION MANAGEMENT | Facility: CLINIC | Age: 89
End: 2024-06-26

## 2024-06-26 NOTE — TELEPHONE ENCOUNTER
Son [facility] returning call. CTC on file. Relayed provider message in detail. No further questions at this time.

## 2024-06-27 ENCOUNTER — MEDICAL CORRESPONDENCE (OUTPATIENT)
Dept: HEALTH INFORMATION MANAGEMENT | Facility: CLINIC | Age: 89
End: 2024-06-27

## 2024-06-28 DIAGNOSIS — F41.9 CHRONIC ANXIETY: ICD-10-CM

## 2024-06-28 RX ORDER — LORAZEPAM 0.5 MG/1
0.5 TABLET ORAL
Qty: 30 TABLET | Refills: 0 | Status: SHIPPED | OUTPATIENT
Start: 2024-06-28 | End: 2024-07-29

## 2024-07-05 ENCOUNTER — MEDICAL CORRESPONDENCE (OUTPATIENT)
Dept: HEALTH INFORMATION MANAGEMENT | Facility: CLINIC | Age: 89
End: 2024-07-05

## 2024-07-18 ENCOUNTER — MEDICAL CORRESPONDENCE (OUTPATIENT)
Dept: HEALTH INFORMATION MANAGEMENT | Facility: CLINIC | Age: 89
End: 2024-07-18

## 2024-07-22 ENCOUNTER — OFFICE VISIT (OUTPATIENT)
Dept: INTERNAL MEDICINE | Facility: CLINIC | Age: 89
End: 2024-07-22
Payer: COMMERCIAL

## 2024-07-22 VITALS
BODY MASS INDEX: 22.46 KG/M2 | HEART RATE: 68 BPM | OXYGEN SATURATION: 95 % | SYSTOLIC BLOOD PRESSURE: 168 MMHG | DIASTOLIC BLOOD PRESSURE: 84 MMHG | RESPIRATION RATE: 14 BRPM | WEIGHT: 107 LBS | HEIGHT: 58 IN | TEMPERATURE: 98 F

## 2024-07-22 DIAGNOSIS — M47.812 SPONDYLOSIS OF CERVICAL REGION WITHOUT MYELOPATHY OR RADICULOPATHY: ICD-10-CM

## 2024-07-22 DIAGNOSIS — I10 PRIMARY HYPERTENSION: Primary | ICD-10-CM

## 2024-07-22 DIAGNOSIS — I35.0 AORTIC VALVE STENOSIS, ETIOLOGY OF CARDIAC VALVE DISEASE UNSPECIFIED: ICD-10-CM

## 2024-07-22 DIAGNOSIS — K58.9 IRRITABLE BOWEL SYNDROME, UNSPECIFIED TYPE: ICD-10-CM

## 2024-07-22 PROCEDURE — 99214 OFFICE O/P EST MOD 30 MIN: CPT | Performed by: INTERNAL MEDICINE

## 2024-07-22 PROCEDURE — G2211 COMPLEX E/M VISIT ADD ON: HCPCS | Performed by: INTERNAL MEDICINE

## 2024-07-22 RX ORDER — SENNOSIDES 8.6 MG
1300 CAPSULE ORAL 2 TIMES DAILY PRN
COMMUNITY
Start: 2024-07-22

## 2024-07-22 NOTE — PATIENT INSTRUCTIONS
If you continue to experience severe neck pain radiating into the back your head, gabapentin might be a good option.  Alternatively I can refer you to our spine clinic.    Let me know if your blood pressure continues to run over 140/90

## 2024-07-22 NOTE — PROGRESS NOTES
Assessment & Plan     Primary hypertension  89-year-old woman with hypertension and aortic stenosis.  Blood pressure is again not at goal today but she did take Advil last night as she is having increasing pain in her neck.  Blood pressure was running well-controlled the past 2 weeks and she continues on the combination of valsartan, felodipine, and atenolol.  I would not recommend any additional medication at this time but I am stressing importance of avoiding NSAIDs.    There was previously some concern about peripheral edema/fluid retention but her weight remains stable and edema looks well-controlled with compression stockings.  She does have some effusions involving both knees but I suspect this is more arthritis related.    Spondylosis of cervical region without myelopathy or radiculopathy  Chronic neck pain experiencing increasing pain in the past several weeks including pain in her occipital nerve.  She is receiving physical therapy at her facility and she thinks it might be helping partially.  However, she had to take Advil last night as acetaminophen was not fully effective.  We discussed changing to Tylenol arthritis allowing her to take up to 1300 mg at a time and she can do this twice daily as needed.  She really should avoid NSAIDs with her blood pressure issues.  We discussed other treatment strategies including starting gabapentin although she is reluctant to try any new medication.  She may also benefit from a referral to our spine clinic and she will consider  - acetaminophen (TYLENOL) 650 MG CR tablet; Take 2 tablets (1,300 mg) by mouth 2 times daily as needed for pain    Aortic valve stenosis, etiology of cardiac valve disease unspecified  Known aortic valve stenosis.  Managing medically.  She declines any other interventions.  No CHF symptoms at this time.    Irritable bowel syndrome, unspecified type  She describes some frequent stools.  Not necessarily diarrhea but there are some days where  "she will need to make multiple trips to have a bowel movement.  In the past she has used Imodium and has done this for 30+ years.  However, Imodium does not seem to be working quite as well as before.  There does not sound to be any changes in her diet.  No increased dairy use or caffeine intake.  It might be reasonable for her to try taking 2 tablets of Imodium rather than 1.  She has done this in the past when having diarrhea and could try this strategy on days when she plans to leave home.    The longitudinal plan of care for the diagnosis(es)/condition(s) as documented were addressed during this visit. Due to the added complexity in care, I will continue to support Mariluz in the subsequent management and with ongoing continuity of care.         Follow-up appointment in 2 months    Subjective   Mariluz is a 89 year old, presenting for the following health issues: Here to follow-up medical problems including aortic stenosis, hypertension, change in bowel movements, and worsening neck pain.  See assessment and plan for details  Follow Up (Edema, HTN, Pain in neck worsening, going to PT)        7/22/2024     2:11 PM   Additional Questions   Roomed by      History of Present Illness       Reason for visit:  Recheck leg swelling    She eats 2-3 servings of fruits and vegetables daily.She consumes 1 sweetened beverage(s) daily.She exercises with enough effort to increase her heart rate 9 or less minutes per day.  She exercises with enough effort to increase her heart rate 3 or less days per week.   She is taking medications regularly.           Review of Systems  Constitutional, HEENT, cardiovascular, pulmonary, gi and gu systems are negative, except as otherwise noted.      Objective    BP (!) 168/84   Pulse 68   Temp 98  F (36.7  C) (Oral)   Resp 14   Ht 1.473 m (4' 10\")   Wt 48.5 kg (107 lb)   LMP  (LMP Unknown)   SpO2 95%   Breastfeeding No   BMI 22.36 kg/m    Body mass index is 22.36 kg/m .  Physical " Exam   Pleasant and well-appearing elderly woman  Lungs clear bilaterally no rales or wheezes  Heart regular rate and rhythm with 3/6 systolic murmur  Peripheral edema looks well-controlled wearing compression stockings.  Mild effusions involving both knees        Signed Electronically by: Maik Levine MD

## 2024-07-29 DIAGNOSIS — F41.9 CHRONIC ANXIETY: ICD-10-CM

## 2024-07-29 RX ORDER — LORAZEPAM 0.5 MG/1
0.5 TABLET ORAL
Qty: 30 TABLET | Refills: 0 | Status: SHIPPED | OUTPATIENT
Start: 2024-07-29 | End: 2024-08-23

## 2024-08-05 ENCOUNTER — TELEPHONE (OUTPATIENT)
Dept: INTERNAL MEDICINE | Facility: CLINIC | Age: 89
End: 2024-08-05
Payer: COMMERCIAL

## 2024-08-05 DIAGNOSIS — M47.812 SPONDYLOSIS OF CERVICAL REGION WITHOUT MYELOPATHY OR RADICULOPATHY: Primary | ICD-10-CM

## 2024-08-05 NOTE — TELEPHONE ENCOUNTER
Incoming call from son, Parish. Relayed Dr. Levine message about the referral. No further questions at this time.     Rossi QURESHI RN

## 2024-08-05 NOTE — TELEPHONE ENCOUNTER
Order/Referral Request    Who is requesting: Son (Parish)    Orders being requested: Spine Clinic    Reason service is needed/diagnosis: N/A    When are orders needed by: ASAP    Has this been discussed with Provider: Yes    Does patient have a preference on a Group/Provider/Facility? [facility]    Does patient have an appointment scheduled?: No    Where to send orders: Place orders within Epic    Okay to leave a detailed message?: Yes at Cell number on file:    Telephone Information:   Mobile [phone]

## 2024-08-05 NOTE — TELEPHONE ENCOUNTER
Outgoing call to SARIAH Resendiz on file. Patient is having neck pain and waiting to be seen by someone this week if possible.     Pended Orthopedic referral for provider to review.     Rossi QURESHI RN        LOV 7/22/24    Spondylosis of cervical region without myelopathy or radiculopathy  Chronic neck pain experiencing increasing pain in the past several weeks including pain in her occipital nerve.  She is receiving physical therapy at her facility and she thinks it might be helping partially.  However, she had to take Advil last night as acetaminophen was not fully effective.  We discussed changing to Tylenol arthritis allowing her to take up to 1300 mg at a time and she can do this twice daily as needed.  She really should avoid NSAIDs with her blood pressure issues.  We discussed other treatment strategies including starting gabapentin although she is reluctant to try any new medication.  She may also benefit from a referral to our spine clinic and she will consider  - acetaminophen (TYLENOL) 650 MG CR tablet; Take 2 tablets (1,300 mg) by mouth 2 times daily as needed for pain

## 2024-08-05 NOTE — TELEPHONE ENCOUNTER
"Called to relay provider message, no answer. LMTCB. Will relay phone number for referral upon return call.     \"If you don't hear from a representative within 2 business days, please call [phone].\"  "

## 2024-08-06 NOTE — TELEPHONE ENCOUNTER
S: Incoming call from patient, requesting to see someone (at the spine clinic) sooner than 8/20/24.     B: Son (Parish) was calling yesterday and referral to spine was referred yesterday.      LOV 7/22/24     Spondylosis of cervical region without myelopathy or radiculopathy  Chronic neck pain experiencing increasing pain in the past several weeks including pain in her occipital nerve.  She is receiving physical therapy at her facility and she thinks it might be helping partially.  However, she had to take Advil last night as acetaminophen was not fully effective.  We discussed changing to Tylenol arthritis allowing her to take up to 1300 mg at a time and she can do this twice daily as needed.  She really should avoid NSAIDs with her blood pressure issues.  We discussed other treatment strategies including starting gabapentin although she is reluctant to try any new medication.  She may also benefit from a referral to our spine clinic and she will consider  - acetaminophen (TYLENOL) 650 MG CR tablet; Take 2 tablets (1,300 mg) by mouth 2 times daily as needed for pain    A: Patient calling for a follow-up and would like a sooner appointment than 8/20/24.     Pt also states:   She is having urgent pain and would like someone to look at it.   Neck pain worsening at night, she barely able to lift her head and she would have to stand up to be able to drink water.   She is doing okay during the day.     She is not comfortable and would like to know if there is other places she could go to or somewhere else she can see at a sooner time.     Denies any breathing difficulties or swallowing.    Does not want to go to UC or ED.     Call back # [phone]     R: routing to pcp to review and advise.     Kleber GARCIA RN

## 2024-08-06 NOTE — TELEPHONE ENCOUNTER
There is nothing I can do to get her an earlier appointment at the spine clinic.  All I can do is send a referral.  I do not control their scheduling.  Another thought is that I could put her on a 6-day Medrol Dosepak that provides anti-inflammatory effects and may provide her some relief of symptoms.  Medrol does come with some potential side effects including possible fluid retention or raising blood pressure and would not be ideal but is about the only thing that I can offer to provide her some immediate relief.  If she would like to discuss further before starting, a telephone visit can be completed on Thursday at 11:40 AM.

## 2024-08-06 NOTE — TELEPHONE ENCOUNTER
Outgoing call to patient, relayed Dr. Levine's message. Patient said she does not do well on steroids. She said she will just have to deal with the pain until she can be seen.     Did advise to the son yesterday and patient today- that [facility] if they want her to be seen sooner. She did not really want to do that either.     At this time, she did not want to do the steroid. Did not want to schedule an appointment with Dr. Levine.     Rossi QURESHI RN

## 2024-08-13 ENCOUNTER — VIRTUAL VISIT (OUTPATIENT)
Dept: INTERNAL MEDICINE | Facility: CLINIC | Age: 89
End: 2024-08-13
Payer: COMMERCIAL

## 2024-08-13 ENCOUNTER — TELEPHONE (OUTPATIENT)
Dept: INTERNAL MEDICINE | Facility: CLINIC | Age: 89
End: 2024-08-13
Payer: COMMERCIAL

## 2024-08-13 DIAGNOSIS — M47.812 OSTEOARTHRITIS OF CERVICAL SPINE, UNSPECIFIED SPINAL OSTEOARTHRITIS COMPLICATION STATUS: Primary | ICD-10-CM

## 2024-08-13 PROCEDURE — 99442 PR PHYSICIAN TELEPHONE EVALUATION 11-20 MIN: CPT | Mod: 93 | Performed by: INTERNAL MEDICINE

## 2024-08-13 RX ORDER — ERYTHROMYCIN 5 MG/G
OINTMENT OPHTHALMIC
COMMUNITY
Start: 2024-07-30

## 2024-08-13 RX ORDER — GABAPENTIN 100 MG/1
CAPSULE ORAL
Qty: 81 CAPSULE | Refills: 11 | Status: SHIPPED | OUTPATIENT
Start: 2024-08-13 | End: 2024-09-16

## 2024-08-13 NOTE — TELEPHONE ENCOUNTER
Called to patient, relayed provider's message in detail.     They will go see [facility] first, if pt decides to try Gabapentin then daughter will call us back.     No further questions at this time.     Kleber P. RN

## 2024-08-13 NOTE — TELEPHONE ENCOUNTER
S-(situation): Patient's daughter-in-law, Shruthi calls to see if provider recommends patient be seen at [facility] today or see what primary care provider's plan for patient's neck pain is.    B-(background): Patient was seen by primary care provider on 07/22/2024 and acetaminophen dose was increased. Discussed possible Gabapentin for future.     A-(assessment): Patient has been seen at [facility] for neck pain in the past. Patient is reporting more severe neck pain and patient plans to return back to [facility] today at 1:00pm. Daughter-in-law wants to know if going back to [facility] is a good idea or what the plan should be for patient's continued neck pain and new symptoms of tingling in her fingers that started overnight yesterday.     Patient has appointment to see spine clinic on 08/20/2024. Patient has follow up appointment with primary care provider on 09/16/2024.     R-(recommendations): Routing to primary care provider and home clinic to advise.    Thank you,  Evaristo Gordon, Triage RN [place]  12:05 PM 8/13/2024

## 2024-08-13 NOTE — TELEPHONE ENCOUNTER
Incoming call from Shruthi, patient's daughter in law. She states Mariluz seen in [facility] this afternoon, she reports that [facility] provider thought Gabapentin is a good idea. She states they did XRAY's on both shoulders and neck, [facility] also scheduled patient to get cortisone shot in her shoulders on Thursday 8/15 and pull fluid to check for gout and they mmay also order MRI. They id suggest to keep patient's neck & spine appt on Monday. Shruthi says patient would like to grab that 4:40 PM telephone visit today with PCP and have the gabapentin prescribed today. RN booked patient. She states please call patient on her home phone number in the system at [phone]

## 2024-08-13 NOTE — TELEPHONE ENCOUNTER
Keeping appointment today with [facility] and seeing the spine clinic next Tuesday are both good ideas.  If patient wishes to try gabapentin, we can discuss further with a telephone visit at the end of the day today-4:40 PM or later in the week

## 2024-08-13 NOTE — PROGRESS NOTES
Mariluz is a 89 year old who is being evaluated via a billable telephone visit.    What phone number would you like to be contacted at?   How would you like to obtain your AVS? Teo  Originating Location (pt. Location): Home    Distant Location (provider location):  On-site    Assessment & Plan     Osteoarthritis of cervical spine, unspecified spinal osteoarthritis complication status  89-year-old woman experiencing worsening severe neck pain with concerns for severe cervical arthritis and possible spinal stenosis.  She was evaluated at [facility] orthopedics this afternoon and MRI cervical spine is being ordered.  She has an appointment next week at the [place] spine clinic.  She is already using acetaminophen which is not adequately controlling her pain.  She is unable to take NSAIDs given her history of hypertension.  I think she would benefit from gabapentin and this has been discussed at previous visits but she has been reluctant to take because of side effects.  We discussed that we could start with a low-dose of 100 mg at bedtime and increase to 200 mg on day 3.  The dose can be further titrated up to 300 mg after 5 days.  We discussed side effects including sedation and dizziness which we will hopefully avoid with a slow titration.  She is open to trying the medication and prescription is sent to her pharmacy  - gabapentin (NEURONTIN) 100 MG capsule; Take 1 capsule (100 mg) by mouth at bedtime for 2 days, THEN 2 capsules (200 mg) at bedtime for 5 days, THEN 3 capsules (300 mg) at bedtime for 23 days.            Follow-up appointment in 6 weeks    Subjective   Mariluz is a 89 year old, presenting for the following health issues: Telephone visit completed today to address worsening neck pain.  See assessment and plan for details  Follow Up (Neck pain, saw [facility])      8/13/2024     3:49 PM   Additional Questions   Roomed by                      Objective           Vitals:  No vitals were obtained today due to  virtual visit.    Physical Exam   General: Alert and no distress //Respiratory: No audible wheeze, cough, or shortness of breath // Psychiatric:  Appropriate affect, tone, and pace of words      Exam limited by telephone visit      Phone call duration: 17 minutes  Signed Electronically by: Maik Levine MD

## 2024-08-20 ENCOUNTER — OFFICE VISIT (OUTPATIENT)
Dept: NEUROSURGERY | Facility: CLINIC | Age: 89
End: 2024-08-20
Attending: INTERNAL MEDICINE
Payer: COMMERCIAL

## 2024-08-20 VITALS
DIASTOLIC BLOOD PRESSURE: 91 MMHG | WEIGHT: 105.4 LBS | SYSTOLIC BLOOD PRESSURE: 196 MMHG | BODY MASS INDEX: 22.13 KG/M2 | HEIGHT: 58 IN | HEART RATE: 71 BPM

## 2024-08-20 DIAGNOSIS — R52 SEVERE PAIN: ICD-10-CM

## 2024-08-20 DIAGNOSIS — M54.12 RIGHT CERVICAL RADICULOPATHY: ICD-10-CM

## 2024-08-20 DIAGNOSIS — M50.30 DDD (DEGENERATIVE DISC DISEASE), CERVICAL: ICD-10-CM

## 2024-08-20 DIAGNOSIS — F40.240 CLAUSTROPHOBIA: ICD-10-CM

## 2024-08-20 DIAGNOSIS — M54.2 NECK PAIN ON RIGHT SIDE: Primary | ICD-10-CM

## 2024-08-20 DIAGNOSIS — M11.20 CALCIUM PYROPHOSPHATE DEPOSITION DISEASE (CPPD): ICD-10-CM

## 2024-08-20 PROCEDURE — 99205 OFFICE O/P NEW HI 60 MIN: CPT | Performed by: NURSE PRACTITIONER

## 2024-08-20 RX ORDER — DIAZEPAM 5 MG
TABLET ORAL
Qty: 1 TABLET | Refills: 0 | Status: SHIPPED | OUTPATIENT
Start: 2024-08-20

## 2024-08-20 RX ORDER — CELECOXIB 50 MG/1
50 CAPSULE ORAL 2 TIMES DAILY
Qty: 30 CAPSULE | Refills: 2 | Status: SHIPPED | OUTPATIENT
Start: 2024-08-20 | End: 2024-09-16

## 2024-08-20 NOTE — LETTER
8/20/2024      Mariluz Arana  [address] [place] [address] [address]  [facility] [address]      Dear Colleague,    Thank you for referring your patient, Mariluz Arana, to the [facility] [place]. Please see a copy of my visit note below.    ASSESSMENT: Mariluz Arana is a 89 year old female presents for consultation at the request of PCP Maik Levine, with past medical history significant for hypercholesterolemia, hypertension, IBS, Ménière's disease, bilateral hand numbness, muscular deconditioning, osteoarthritis multiple joints, osteoporosis, squamous cell carcinoma of the face, osteoarthritis of the cervical spine, generalized anxiety disorder, gait instability, insomnia, risk for falls, chronic fatigue who presents today for new patient evaluation of :     -Severe right neck pain with some consistency with upper cervical spine radiculopathy versus cervical facet arthropathy.    -Bilateral hand numbness and tingling consistent with radiculopathy.    Patient is neurologically intact on exam. No myelopathic or red flag symptoms.          7/27/2021     4:02 PM   OSWESTRY DISABILITY INDEX   Count 10   Sum 23   Oswestry Score (%) 46 %            Diagnoses and all orders for this visit:  Neck pain on right side  -     MR Cervical Spine w/o Contrast; Future  -     celecoxib (CELEBREX) 50 MG capsule; Take 1 capsule (50 mg) by mouth 2 times daily  Right cervical radiculopathy  -     MR Cervical Spine w/o Contrast; Future  -     diazepam (VALIUM) 5 MG tablet; Valium 5 mg p.o., take 1 tablet prior to MRI as instructed by radiology.  Must have .  DDD (degenerative disc disease), cervical  -     MR Cervical Spine w/o Contrast; Future  Calcium pyrophosphate deposition disease (CPPD)  -     MR Cervical Spine w/o Contrast; Future  Severe pain  -     MR Cervical Spine w/o Contrast; Future  -     celecoxib (CELEBREX) 50 MG capsule; Take 1 capsule (50 mg) by mouth 2 times daily  Claustrophobia  -      diazepam (VALIUM) 5 MG tablet; Valium 5 mg p.o., take 1 tablet prior to MRI as instructed by radiology.  Must have .  Other orders  -     Spine  Referral     PLAN:  Reviewed spine anatomy and disease process. Discussed diagnosis and treatment options with the patient today. A shared decision making model was used. The patient's values and choices were respected. The following represents what was discussed and decided upon by the provider and the patient.     -DIAGNOSTIC TESTS:  Images were personally reviewed and interpreted and explained to patient today using spine model.   -- Ordered urgent cervical spine MRI due to severe right neck pain with oral Valium due to claustrophobia.    -- Cervical spine x-ray 8/13/2024 with grade 1 spondylolisthesis C4-5 with advanced spondylosis and multilevel facet arthropathy.  -- Cervical spine CT scan 6/14/2021 with no acute fracture.  Advanced Periodontoid CPPD with degenerative changes.  Mild to moderate spinal stenosis below the foramen magnum.  Moderate spinal stenosis with severe right and moderate left foraminal stenosis at C3-4.  Moderate bilateral foraminal stenosis at C4-5.  Mild to moderate bilateral foraminal stenosis at C5-6.  Moderate right and severe left foraminal stenosis at C6-7.  Severe bilateral foraminal stenosis at C7-T1.    -PHYSICAL THERAPY: Patient did not tolerate physical therapy as this severely aggravated her pain.  Discussed the importance of core strengthening, ROM, stretching exercises with the patient and how each of these entities is important in decreasing pain.  Explained to the patient that the purpose of physical therapy is to teach the patient a home exercise program.  These exercises need to be performed every day in order to decrease pain and prevent future occurrences of pain.  Likened it to brushing one's teeth.      -MEDICATIONS:   Valium 5 mg prescribed to take prior to MRI due to claustrophobia.  -Patient had side  effects to steroid therefore did not want to trial an oral steroid.  Prescribed Celebrex 50 mg twice daily for pain and inflammation.  Advised patient avoid OTC NSAIDs while taking this medication and take it with full glass of water and food.    -She can continue with acetaminophen 650 mg twice daily as well which she is also doing at this time.  -Did advise lidocaine gel as well to the right neck, she defers a prescription today for this.  Discussed multiple medication options today with patient. Discussed risks, side effects, and proper use of medications. Patient verbalized understanding.    -INTERVENTIONS: Consider injections pending imaging review ELISEO RIGHT TFESI, versus IL ELISEO versus, versus MBB.  Due to severity of her pain I would recommend starting with a ELISEO if appropriate.  Discussed risks and benefits of injections with patient today.    -PATIENT EDUCATION: Total time of 62 minutes, on the day of service, spent with the patient, reviewing the chart, placing orders, and documenting.     -FOLLOW-UP:   Follow-up nurse call for imaging results or in person visit    Advised patient to call the Spine Center if symptoms worsen or you have problems controlling bladder and bowel function.   ______________________________________________________________________    SUBJECTIVE:   Mariluz Arana  is a 89 year old female who presents today for new patient evaluation of neck pain that is severe generalized RIGHT neck pain that radiates to the occipital region and into the right frontal region as well as numbness and tingling in bilateral hands right greater than left that has been ongoing for the last 2 months.  She reports that she initially started physical therapy for neck limited range of motion however she does feel that unfortunately physical therapy is what has caused her neck pain to occur now.  Currently her pain is severe at an 8/10, a 9 and intolerable she reports at its worst, a 5 at its best.  She does  report that it is intolerable with any range of motion of her neck and she is also having difficulties with sleeping unfortunately this time as well    -Treatment to Date: No prior spinal surgery or spinal injections    -Medications:  Acetaminophen with minimal benefit  Ibuprofen with minimal benefit    Failed medications:  Diclofenac gel  Gabapentin 100 mg 1 dose with intolerance    Current Outpatient Medications   Medication Sig Dispense Refill     acetaminophen (TYLENOL) 650 MG CR tablet Take 2 tablets (1,300 mg) by mouth 2 times daily as needed for pain       celecoxib (CELEBREX) 50 MG capsule Take 1 capsule (50 mg) by mouth 2 times daily 30 capsule 2     diazepam (VALIUM) 5 MG tablet Valium 5 mg p.o., take 1 tablet prior to MRI as instructed by radiology.  Must have . 1 tablet 0     atenolol (TENORMIN) 25 MG tablet Take 1 tablet (25 mg) by mouth every morning 90 tablet 3     erythromycin (ROMYCIN) 5 MG/GM ophthalmic ointment Apply a thin layer onto base of both upper and lower, right and left eyelashes at bedtime as needed.       felodipine ER (PLENDIL) 2.5 MG 24 hr tablet Take 1 tablet (2.5 mg) by mouth daily for 360 days 90 tablet 3     fluticasone (FLONASE) 50 MCG/ACT nasal spray Spray 2 sprays into both nostrils daily       gabapentin (NEURONTIN) 100 MG capsule Take 1 capsule (100 mg) by mouth at bedtime for 2 days, THEN 2 capsules (200 mg) at bedtime for 5 days, THEN 3 capsules (300 mg) at bedtime for 23 days. (Patient not taking: Reported on 8/20/2024) 81 capsule 11     loperamide (IMODIUM) 2 MG capsule Take 2 mg by mouth 4 times daily as needed.       LORazepam (ATIVAN) 0.5 MG tablet Take 1 tablet (0.5 mg) by mouth nightly as needed for anxiety 30 tablet 0     meclizine (ANTIVERT) 25 MG tablet Take 1 tablet (25 mg) by mouth 3 times daily as needed for dizziness 30 tablet 3     simvastatin (ZOCOR) 20 MG tablet Take 1 tablet (20 mg) by mouth daily 90 tablet 3     valsartan (DIOVAN) 80 MG tablet TAKE  "1 TABLET BY MOUTH EVERY EVENING 90 tablet 3     Vitamin D3 (CHOLECALCIFEROL) 25 mcg (1000 units) tablet Take 1,000 Units by mouth daily       No current facility-administered medications for this visit.       Allergies   Allergen Reactions     Actonel [Risedronate]      GI upset     Clindamycin      Cortisone Other (See Comments)     \"heart symptoms\", low blood pressure     Fosamax Other (See Comments)     \"burning my lower esophagus\"     Kenalog [Triamcinolone]      Metronidazole      Minipress [Prazosin]      Hypotension     Penicillin G      Statin Drugs [Statins]      Shortness of breath.  Tolerating Simvastatin (Sept 2016)     Tetanus Toxoid      Prednisone Rash     Zithromax [Azithromycin Dihydrate] Rash     States got rash after using z rubio       Past Medical History:   Diagnosis Date     Age-related cataract of left eye 09/21/2021     Aortic stenosis     Echocardiogram with moderate aortic stenosis September 2021     Arthritis     Osteoarthritis     Bilateral cataracts      Bilateral hand numbness 04/17/2023    Presumably carpal tunnel syndrome, previous surgery and already using wrist splints, not interested in further surgery     Chronic anxiety      Chronic fatigue 10/23/2023     Dizziness and giddiness 09/17/2012     Gastroesophageal reflux disease      GERD (gastroesophageal reflux disease)      Heart murmur      Hypercholesteremia      Hypercholesteremia      Hypercholesterolemia 12/13/2021     Hypertension      Hyponatremia      Hyponatremia 07/08/2021     Irregular heart beat      Irritable bowel      Irritable bowel      Irritable bowel syndrome 07/22/2024     Meniere syndrome      Meniere's disease, bilateral      Osteoarthritis of spine with radiculopathy, unspecified spinal region 07/08/2021     Osteoporosis      Peripheral edema 02/28/2023     Primary osteoarthritis of both hands 12/13/2021     SOB (shortness of breath) 07/08/2021     Spondylosis of cervical region without myelopathy or " radiculopathy 05/16/2024     Squamous cell carcinoma of skin of face 07/18/2023     Venous (peripheral) insufficiency 04/20/2022     Vertigo         Patient Active Problem List   Diagnosis     Aortic stenosis     Chronic anxiety     Gait instability     Generalized anxiety disorder with panic attacks     HTN (hypertension)     Hypomagnesemia     Hyponatremia     Insomnia     Meniere's disease, bilateral     Muscular deconditioning     Osteoarthritis of spine with radiculopathy, unspecified spinal region     Primary osteoarthritis involving multiple joints     PVC (premature ventricular contraction)     Risk for falls     Varicose veins of both lower extremities     Visual impairment     Primary osteoarthritis of both hands     Osteoporosis     Hypercholesterolemia     Venous (peripheral) insufficiency     Peripheral edema     Nocturia     Bilateral hand numbness     Squamous cell carcinoma of skin of face     Chronic fatigue     Osteoarthritis of cervical spine     Irritable bowel syndrome       Past Surgical History:   Procedure Laterality Date     CATARACT EXTRACTION Left     2021     DILATION AND CURETTAGE       DILATION AND CURETTAGE       ENDOLYMPHATIC SAC OPERATION  01/01/2012    enhancement     ENHANCE ENDOLYMPHATIC SAC, DEC SIGMOID SINUS, EXTND FACIAL RECESS APPRCH, MASTOIDECTOMY, BMT, COMBO  04/23/2012    Procedure:COMBINED ENHANCE ENDOLYMPHATIC SAC, DECOMPRESS SIGMOID SINUS, EXTENDED FACIAL RECESS APPROACH, COMPLETE MASTOIDECTOMY, MYRINGOTOMY, INSERT TUBE; Left Endolymphatic Sac Enhancement, Sigmoid Sinus Decom-  pression, Extended Facial Recess Approach, Myringotomy  , Complete Mastoidectomy; Surgeon:LINN MATHIS; Location:[facility]     INNER EAR SURGERY      Per Pt     KERATOTOMY ARCUATE WITH FEMTOSECOND LASER/IMAGING FOR ATIOL Right 07/19/2018    Procedure: KERATOTOMY ARCUATE WITH FEMTOSECOND LASER/IMAGING FOR ATIOL;  RIGHT EYE KERATOTOMY ARCUATE WITH FEMTOSECOND LASER/IMAGING FOR ATIOL ,   "CAPSULOTOMY, LENS FRAGMENTATION, ARCUATE INCISIONS;  Surgeon: Roberth Chambers MD;  Location: [facility]     MASTOIDECTOMY  01/01/2012     PHACOEMULSIFICATION CLEAR CORNEA WITH TORIC INTRAOCULAR LENS IMPLANT Right 07/19/2018    Procedure: PHACOEMULSIFICATION CLEAR CORNEA WITH TORIC INTRAOCULAR LENS IMPLANT;  RIGHT EYE PHACOEMULSIFICATION CLEAR CORNEA WITH TORIC INTRAOCULAR LENS IMPLANT ;  Surgeon: Roberth Chambers MD;  Location: [facility]     TONSILLECTOMY       TONSILLECTOMY         Family History   Problem Relation Age of Onset     Heart Disease Mother      Clotting Disorder Mother         Dies of a blood clot, per pt     Diabetes Father      Cerebrovascular Disease Father      Hypertension Son      Osteoporosis Daughter      Hypertension Daughter      Cancer Sister         Colon     Glaucoma Sister        Reviewed past medical, surgical, and family history with patient found on new patient intake packet located in EMR Media tab.     SOCIAL HX: Patient is retired.  Patient denies smoking/tobacco use, denies alcohol use, denies history being a heavy drinker, denies recreational drug use.    ROS: Positive for joint pain, dizziness, excessive tiredness, anxiety, changes in vision, eye pain, headache.  Specifically negative for bowel/bladder dysfunction, balance changes, foot drop, fevers, chills, appetite changes, nausea/vomiting, unexplained weight loss. Otherwise 13 systems reviewed are negative. Please see the patient's intake questionnaire from today for details.    OBJECTIVE:  BP (!) 196/91 (BP Location: Right arm, Patient Position: Sitting, Cuff Size: Adult Regular)   Pulse 71   Ht 4' 10\" (1.473 m)   Wt 105 lb 6.4 oz (47.8 kg)   LMP  (LMP Unknown)   BMI 22.03 kg/m      PHYSICAL EXAMINATION:  --CONSTITUTIONAL: Vital signs as above. No acute distress. The patient is well nourished and well groomed.  --PSYCHIATRIC: The patient is awake, alert, oriented to person, place, time and answering questions " appropriately with clear speech. Appropriate mood and affect   --HEENT: Sclera are non-injected. Extraocular muscles are intact. Thyroid moves easily upon swallowing.  Moist oral mucosa.  --SKIN: Skin over the face, bilateral upper extremities, and posterior torso is clean, dry, intact without rashes.  --RESPIRATORY: Normal rhythm and effort. No abnormal accessory muscle breathing patterns noted.   --GROSS MOTOR: Easily arises from a seated position. Toe walking and heel walking are normal.    --CERVICAL SPINE: Inspection reveals no evidence of deformity. Range of motion significantly limited in range of motion flexion extension and more dramatically so in lateral rotation and head tilt.  Tenderness to palpation General Cervical spine right greater than left.  No tenderness over the occipital region.  Spurling maneuver negative bilaterally.  --SHOULDERS: Full range of motion bilaterally.  --UPPER EXTREMITY MOTOR TESTING:  Wrist flexion left 5/5, right 5/5  Wrist extension left 5/5, right 5/5  Pronators left 5/5, right 5/5  Biceps left 5/5, right 5/5   Triceps left 5/5, right 5/5   Shoulder abduction left 5/5, right 5/5   left 5/5, right 5/5  --NEUROLOGIC: CN III-XII are grossly intact. 2/4 symmetric biceps, brachioradialis, triceps reflexes bilaterally. Sensation to upper extremities is intact.  Negative Arteaga's bilaterally.    --VASCULAR: 2/4 radial pulses bilaterally. Warm upper limbs bilaterally. Capillary refill in the upper extremities is less than 1 second.    RESULTS: Prior medical records from [facility]where were reviewed today.     Imaging:  Spine imaging was personally reviewed and interpreted today. The images were shown to the patient and the findings were explained using a spine model.        XR Shoulder Right 2 Views    Result Date: 8/13/2024  EXAM: XR SHOULDER RT 2+ VIEWS LOCATION: [facility] DATE: 8/13/2024 INDICATION: Neck pain, Cervicalgia, Pain in right shoulder.  COMPARISON: None. IMPRESSION: 1. Anterosuperior dislocation of the humeral head in relation to the native glenoid. There is bony remodeling and hypertrophic change in the scapula and humeral head at the pseudoarticulation indicating that this is chronic. Biconcave glenoid. There is also remodeling of the undersurface of the distal clavicle and acromion and proximal humeral metadiaphysis from long-standing bone on bone contact. 2. Diffuse bone demineralization. There is no evidence of acute fracture. 3. Narrowing of the acromiohumeral space indicating full-thickness rotator cuff tear, with adjacent sclerosis indicating that this is also chronic.    XR Shoulder Left 2 Views    Result Date: 8/13/2024  EXAM: XR SHOULDER LT 2+ VIEWS LOCATION: [facility] DATE: 8/13/2024 INDICATION: Comparrison view, Cervicalgia COMPARISON: Correlation with same day right shoulder radiographs. IMPRESSION: Advanced arthropathy of the glenohumeral joint with remodeling/erosive change of the glenoid and extensive periarticular erosive change of the humeral head. Scattered debris and/or calcific densities about the glenohumeral joint. Marked remodeling/erosive change of the acromion and the medial clavicle, including the acromial base. No definite fracture. Constellation of findings may reflect advanced destructive arthropathy related to a rotator cuff tear with hydroxyapatite deposition ([place] shoulder), crystalline arthropathy such as gout or CPPD, or neuropathic arthropathy.    XR Cervical Spine 2/3 Views    Result Date: 8/13/2024  EXAM: XR CERVICAL SPINE 2 VIEWS LOCATION: [facility] DATE: 8/13/2024 INDICATION: Neck pain, Cervicalgia, Pain in right shoulder COMPARISON: Cervical spine radiographs 6/14/2021. IMPRESSION: Osteopenia. Cervical vertebral body heights are maintained. Grade 1 retrolisthesis of C4 on C5. Advanced cervical spondylosis. Mild to moderate multilevel degenerative facet changes. No prevertebral soft tissue  swelling. The visualized portions of the lungs are clear. Atherosclerotic vascular calcifications in the neck bilaterally.         Again, thank you for allowing me to participate in the care of your patient.        Sincerely,        Krista Morris CNP

## 2024-08-20 NOTE — PROGRESS NOTES
ASSESSMENT: Mariluz Arana is a 89 year old female presents for consultation at the request of PCP Maik Levine, with past medical history significant for hypercholesterolemia, hypertension, IBS, Ménière's disease, bilateral hand numbness, muscular deconditioning, osteoarthritis multiple joints, osteoporosis, squamous cell carcinoma of the face, osteoarthritis of the cervical spine, generalized anxiety disorder, gait instability, insomnia, risk for falls, chronic fatigue who presents today for new patient evaluation of :     -Severe right neck pain with some consistency with upper cervical spine radiculopathy versus cervical facet arthropathy.    -Bilateral hand numbness and tingling consistent with radiculopathy.    Patient is neurologically intact on exam. No myelopathic or red flag symptoms.          7/27/2021     4:02 PM   OSWESTRY DISABILITY INDEX   Count 10   Sum 23   Oswestry Score (%) 46 %            Diagnoses and all orders for this visit:  Neck pain on right side  -     MR Cervical Spine w/o Contrast; Future  -     celecoxib (CELEBREX) 50 MG capsule; Take 1 capsule (50 mg) by mouth 2 times daily  Right cervical radiculopathy  -     MR Cervical Spine w/o Contrast; Future  -     diazepam (VALIUM) 5 MG tablet; Valium 5 mg p.o., take 1 tablet prior to MRI as instructed by radiology.  Must have .  DDD (degenerative disc disease), cervical  -     MR Cervical Spine w/o Contrast; Future  Calcium pyrophosphate deposition disease (CPPD)  -     MR Cervical Spine w/o Contrast; Future  Severe pain  -     MR Cervical Spine w/o Contrast; Future  -     celecoxib (CELEBREX) 50 MG capsule; Take 1 capsule (50 mg) by mouth 2 times daily  Claustrophobia  -     diazepam (VALIUM) 5 MG tablet; Valium 5 mg p.o., take 1 tablet prior to MRI as instructed by radiology.  Must have .  Other orders  -     Spine  Referral     PLAN:  Reviewed spine anatomy and disease process. Discussed diagnosis and treatment  options with the patient today. A shared decision making model was used. The patient's values and choices were respected. The following represents what was discussed and decided upon by the provider and the patient.     -DIAGNOSTIC TESTS:  Images were personally reviewed and interpreted and explained to patient today using spine model.   -- Ordered urgent cervical spine MRI due to severe right neck pain with oral Valium due to claustrophobia.    -- Cervical spine x-ray 8/13/2024 with grade 1 spondylolisthesis C4-5 with advanced spondylosis and multilevel facet arthropathy.  -- Cervical spine CT scan 6/14/2021 with no acute fracture.  Advanced Periodontoid CPPD with degenerative changes.  Mild to moderate spinal stenosis below the foramen magnum.  Moderate spinal stenosis with severe right and moderate left foraminal stenosis at C3-4.  Moderate bilateral foraminal stenosis at C4-5.  Mild to moderate bilateral foraminal stenosis at C5-6.  Moderate right and severe left foraminal stenosis at C6-7.  Severe bilateral foraminal stenosis at C7-T1.    -PHYSICAL THERAPY: Patient did not tolerate physical therapy as this severely aggravated her pain.  Discussed the importance of core strengthening, ROM, stretching exercises with the patient and how each of these entities is important in decreasing pain.  Explained to the patient that the purpose of physical therapy is to teach the patient a home exercise program.  These exercises need to be performed every day in order to decrease pain and prevent future occurrences of pain.  Likened it to brushing one's teeth.      -MEDICATIONS:   Valium 5 mg prescribed to take prior to MRI due to claustrophobia.  -Patient had side effects to steroid therefore did not want to trial an oral steroid.  Prescribed Celebrex 50 mg twice daily for pain and inflammation.  Advised patient avoid OTC NSAIDs while taking this medication and take it with full glass of water and food.    -She can continue  with acetaminophen 650 mg twice daily as well which she is also doing at this time.  -Did advise lidocaine gel as well to the right neck, she defers a prescription today for this.  Discussed multiple medication options today with patient. Discussed risks, side effects, and proper use of medications. Patient verbalized understanding.    -INTERVENTIONS: Consider injections pending imaging review ELISEO RIGHT TFESI, versus [place] ELISEO versus, versus MBB.  Due to severity of her pain I would recommend starting with a ELISEO if appropriate.  Discussed risks and benefits of injections with patient today.    -PATIENT EDUCATION: Total time of 62 minutes, on the day of service, spent with the patient, reviewing the chart, placing orders, and documenting.     -FOLLOW-UP:   Follow-up nurse call for imaging results or in person visit    Advised patient to call the Spine Center if symptoms worsen or you have problems controlling bladder and bowel function.   ______________________________________________________________________    SUBJECTIVE:   Mariluz Arana  is a 89 year old female who presents today for new patient evaluation of neck pain that is severe generalized RIGHT neck pain that radiates to the occipital region and into the right frontal region as well as numbness and tingling in bilateral hands right greater than left that has been ongoing for the last 2 months.  She reports that she initially started physical therapy for neck limited range of motion however she does feel that unfortunately physical therapy is what has caused her neck pain to occur now.  Currently her pain is severe at an 8/10, a 9 and intolerable she reports at its worst, a 5 at its best.  She does report that it is intolerable with any range of motion of her neck and she is also having difficulties with sleeping unfortunately this time as well    -Treatment to Date: No prior spinal surgery or spinal injections    -Medications:  Acetaminophen with minimal  benefit  Ibuprofen with minimal benefit    Failed medications:  Diclofenac gel  Gabapentin 100 mg 1 dose with intolerance    Current Outpatient Medications   Medication Sig Dispense Refill    acetaminophen (TYLENOL) 650 MG CR tablet Take 2 tablets (1,300 mg) by mouth 2 times daily as needed for pain      celecoxib (CELEBREX) 50 MG capsule Take 1 capsule (50 mg) by mouth 2 times daily 30 capsule 2    diazepam (VALIUM) 5 MG tablet Valium 5 mg p.o., take 1 tablet prior to MRI as instructed by radiology.  Must have . 1 tablet 0    atenolol (TENORMIN) 25 MG tablet Take 1 tablet (25 mg) by mouth every morning 90 tablet 3    erythromycin (ROMYCIN) 5 MG/GM ophthalmic ointment Apply a thin layer onto base of both upper and lower, right and left eyelashes at bedtime as needed.      felodipine ER (PLENDIL) 2.5 MG 24 hr tablet Take 1 tablet (2.5 mg) by mouth daily for 360 days 90 tablet 3    fluticasone (FLONASE) 50 MCG/ACT nasal spray Spray 2 sprays into both nostrils daily      gabapentin (NEURONTIN) 100 MG capsule Take 1 capsule (100 mg) by mouth at bedtime for 2 days, THEN 2 capsules (200 mg) at bedtime for 5 days, THEN 3 capsules (300 mg) at bedtime for 23 days. (Patient not taking: Reported on 8/20/2024) 81 capsule 11    loperamide (IMODIUM) 2 MG capsule Take 2 mg by mouth 4 times daily as needed.      LORazepam (ATIVAN) 0.5 MG tablet Take 1 tablet (0.5 mg) by mouth nightly as needed for anxiety 30 tablet 0    meclizine (ANTIVERT) 25 MG tablet Take 1 tablet (25 mg) by mouth 3 times daily as needed for dizziness 30 tablet 3    simvastatin (ZOCOR) 20 MG tablet Take 1 tablet (20 mg) by mouth daily 90 tablet 3    valsartan (DIOVAN) 80 MG tablet TAKE 1 TABLET BY MOUTH EVERY EVENING 90 tablet 3    Vitamin D3 (CHOLECALCIFEROL) 25 mcg (1000 units) tablet Take 1,000 Units by mouth daily       No current facility-administered medications for this visit.       Allergies   Allergen Reactions    Actonel [Risedronate]      GI  "upset    Clindamycin     Cortisone Other (See Comments)     \"heart symptoms\", low blood pressure    Fosamax Other (See Comments)     \"burning my lower esophagus\"    Kenalog [Triamcinolone]     Metronidazole     Minipress [Prazosin]      Hypotension    Penicillin G     Statin Drugs [Statins]      Shortness of breath.  Tolerating Simvastatin (Sept 2016)    Tetanus Toxoid     Prednisone Rash    Zithromax [Azithromycin Dihydrate] Rash     States got rash after using z rubio       Past Medical History:   Diagnosis Date    Age-related cataract of left eye 09/21/2021    Aortic stenosis     Echocardiogram with moderate aortic stenosis September 2021    Arthritis     Osteoarthritis    Bilateral cataracts     Bilateral hand numbness 04/17/2023    Presumably carpal tunnel syndrome, previous surgery and already using wrist splints, not interested in further surgery    Chronic anxiety     Chronic fatigue 10/23/2023    Dizziness and giddiness 09/17/2012    Gastroesophageal reflux disease     GERD (gastroesophageal reflux disease)     Heart murmur     Hypercholesteremia     Hypercholesteremia     Hypercholesterolemia 12/13/2021    Hypertension     Hyponatremia     Hyponatremia 07/08/2021    Irregular heart beat     Irritable bowel     Irritable bowel     Irritable bowel syndrome 07/22/2024    Meniere syndrome     Meniere's disease, bilateral     Osteoarthritis of spine with radiculopathy, unspecified spinal region 07/08/2021    Osteoporosis     Peripheral edema 02/28/2023    Primary osteoarthritis of both hands 12/13/2021    SOB (shortness of breath) 07/08/2021    Spondylosis of cervical region without myelopathy or radiculopathy 05/16/2024    Squamous cell carcinoma of skin of face 07/18/2023    Venous (peripheral) insufficiency 04/20/2022    Vertigo         Patient Active Problem List   Diagnosis    Aortic stenosis    Chronic anxiety    Gait instability    Generalized anxiety disorder with panic attacks    HTN (hypertension)    " Hypomagnesemia    Hyponatremia    Insomnia    Meniere's disease, bilateral    Muscular deconditioning    Osteoarthritis of spine with radiculopathy, unspecified spinal region    Primary osteoarthritis involving multiple joints    PVC (premature ventricular contraction)    Risk for falls    Varicose veins of both lower extremities    Visual impairment    Primary osteoarthritis of both hands    Osteoporosis    Hypercholesterolemia    Venous (peripheral) insufficiency    Peripheral edema    Nocturia    Bilateral hand numbness    Squamous cell carcinoma of skin of face    Chronic fatigue    Osteoarthritis of cervical spine    Irritable bowel syndrome       Past Surgical History:   Procedure Laterality Date    CATARACT EXTRACTION Left     2021    DILATION AND CURETTAGE      DILATION AND CURETTAGE      ENDOLYMPHATIC SAC OPERATION  01/01/2012    enhancement    ENHANCE ENDOLYMPHATIC SAC, DEC SIGMOID SINUS, EXTND FACIAL RECESS APPRCH, MASTOIDECTOMY, BMT, COMBO  04/23/2012    Procedure:COMBINED ENHANCE ENDOLYMPHATIC SAC, DECOMPRESS SIGMOID SINUS, EXTENDED FACIAL RECESS APPROACH, COMPLETE MASTOIDECTOMY, MYRINGOTOMY, INSERT TUBE; Left Endolymphatic Sac Enhancement, Sigmoid Sinus Decom-  pression, Extended Facial Recess Approach, Myringotomy  , Complete Mastoidectomy; Surgeon:LINN MATHIS; Location:[facility]    INNER EAR SURGERY      Per Pt    KERATOTOMY ARCUATE WITH FEMTOSECOND LASER/IMAGING FOR ATIOL Right 07/19/2018    Procedure: KERATOTOMY ARCUATE WITH FEMTOSECOND LASER/IMAGING FOR ATIOL;  RIGHT EYE KERATOTOMY ARCUATE WITH FEMTOSECOND LASER/IMAGING FOR ATIOL ,  CAPSULOTOMY, LENS FRAGMENTATION, ARCUATE INCISIONS;  Surgeon: Roberth Chambers MD;  Location:  [facility]    MASTOIDECTOMY  01/01/2012    PHACOEMULSIFICATION CLEAR CORNEA WITH TORIC INTRAOCULAR LENS IMPLANT Right 07/19/2018    Procedure: PHACOEMULSIFICATION CLEAR CORNEA WITH TORIC INTRAOCULAR LENS IMPLANT;  RIGHT EYE PHACOEMULSIFICATION CLEAR CORNEA WITH TORIC  "INTRAOCULAR LENS IMPLANT ;  Surgeon: Roberth Chambers MD;  Location:  [facility]    TONSILLECTOMY      TONSILLECTOMY         Family History   Problem Relation Age of Onset    Heart Disease Mother     Clotting Disorder Mother         Dies of a blood clot, per pt    Diabetes Father     Cerebrovascular Disease Father     Hypertension Son     Osteoporosis Daughter     Hypertension Daughter     Cancer Sister         Colon    Glaucoma Sister        Reviewed past medical, surgical, and family history with patient found on new patient intake packet located in EMR Media tab.     SOCIAL HX: Patient is retired.  Patient denies smoking/tobacco use, denies alcohol use, denies history being a heavy drinker, denies recreational drug use.    ROS: Positive for joint pain, dizziness, excessive tiredness, anxiety, changes in vision, eye pain, headache.  Specifically negative for bowel/bladder dysfunction, balance changes, foot drop, fevers, chills, appetite changes, nausea/vomiting, unexplained weight loss. Otherwise 13 systems reviewed are negative. Please see the patient's intake questionnaire from today for details.    OBJECTIVE:  BP (!) 196/91 (BP Location: Right arm, Patient Position: Sitting, Cuff Size: Adult Regular)   Pulse 71   Ht 4' 10\" (1.473 m)   Wt 105 lb 6.4 oz (47.8 kg)   LMP  (LMP Unknown)   BMI 22.03 kg/m      PHYSICAL EXAMINATION:  --CONSTITUTIONAL: Vital signs as above. No acute distress. The patient is well nourished and well groomed.  --PSYCHIATRIC: The patient is awake, alert, oriented to person, place, time and answering questions appropriately with clear speech. Appropriate mood and affect   --HEENT: Sclera are non-injected. Extraocular muscles are intact. Thyroid moves easily upon swallowing.  Moist oral mucosa.  --SKIN: Skin over the face, bilateral upper extremities, and posterior torso is clean, dry, intact without rashes.  --RESPIRATORY: Normal rhythm and effort. No abnormal accessory muscle " breathing patterns noted.   --GROSS MOTOR: Easily arises from a seated position. Toe walking and heel walking are normal.    --CERVICAL SPINE: Inspection reveals no evidence of deformity. Range of motion significantly limited in range of motion flexion extension and more dramatically so in lateral rotation and head tilt.  Tenderness to palpation General Cervical spine right greater than left.  No tenderness over the occipital region.  Spurling maneuver negative bilaterally.  --SHOULDERS: Full range of motion bilaterally.  --UPPER EXTREMITY MOTOR TESTING:  Wrist flexion left 5/5, right 5/5  Wrist extension left 5/5, right 5/5  Pronators left 5/5, right 5/5  Biceps left 5/5, right 5/5   Triceps left 5/5, right 5/5   Shoulder abduction left 5/5, right 5/5   left 5/5, right 5/5  --NEUROLOGIC: CN III-XII are grossly intact. 2/4 symmetric biceps, brachioradialis, triceps reflexes bilaterally. Sensation to upper extremities is intact.  Negative Arteaga's bilaterally.    --VASCULAR: 2/4 radial pulses bilaterally. Warm upper limbs bilaterally. Capillary refill in the upper extremities is less than 1 second.    RESULTS: Prior medical records from [facility] Everywhere were reviewed today.     Imaging:  Spine imaging was personally reviewed and interpreted today. The images were shown to the patient and the findings were explained using a spine model.        XR Shoulder Right 2 Views    Result Date: 8/13/2024  EXAM: XR SHOULDER RT 2+ VIEWS LOCATION: [facility] DATE: 8/13/2024 INDICATION: Neck pain, Cervicalgia, Pain in right shoulder. COMPARISON: None. IMPRESSION: 1. Anterosuperior dislocation of the humeral head in relation to the native glenoid. There is bony remodeling and hypertrophic change in the scapula and humeral head at the pseudoarticulation indicating that this is chronic. Biconcave glenoid. There is also remodeling of the undersurface of the distal clavicle and acromion and proximal humeral  metadiaphysis from long-standing bone on bone contact. 2. Diffuse bone demineralization. There is no evidence of acute fracture. 3. Narrowing of the acromiohumeral space indicating full-thickness rotator cuff tear, with adjacent sclerosis indicating that this is also chronic.    XR Shoulder Left 2 Views    Result Date: 8/13/2024  EXAM: XR SHOULDER LT 2+ VIEWS LOCATION: [facility] DATE: 8/13/2024 INDICATION: Comparrison view, Cervicalgia COMPARISON: Correlation with same day right shoulder radiographs. IMPRESSION: Advanced arthropathy of the glenohumeral joint with remodeling/erosive change of the glenoid and extensive periarticular erosive change of the humeral head. Scattered debris and/or calcific densities about the glenohumeral joint. Marked remodeling/erosive change of the acromion and the medial clavicle, including the acromial base. No definite fracture. Constellation of findings may reflect advanced destructive arthropathy related to a rotator cuff tear with hydroxyapatite deposition ([facility]), crystalline arthropathy such as gout or CPPD, or neuropathic arthropathy.    XR Cervical Spine 2/3 Views    Result Date: 8/13/2024  EXAM: XR CERVICAL SPINE 2 VIEWS LOCATION: [facility] DATE: 8/13/2024 INDICATION: Neck pain, Cervicalgia, Pain in right shoulder COMPARISON: Cervical spine radiographs 6/14/2021. IMPRESSION: Osteopenia. Cervical vertebral body heights are maintained. Grade 1 retrolisthesis of C4 on C5. Advanced cervical spondylosis. Mild to moderate multilevel degenerative facet changes. No prevertebral soft tissue swelling. The visualized portions of the lungs are clear. Atherosclerotic vascular calcifications in the neck bilaterally.

## 2024-08-20 NOTE — PATIENT INSTRUCTIONS
~Spine Center Scheduling #[phone].  ~Please call our [facility] Spine Nurse Navigation #[phone] with any questions or concerns about your treatment plan, if symptoms worsen and you would like to be seen urgently, or if you have problems controlling bladder and bowel function.  ~For any future flareups or new symptoms, recommend follow-up in clinic or contact the nurse navigator line.  ~Please note that any My Chart messages may take multiple days for a response due to the high volume of patients seen in clinic.  Anything sent Thursday night or after will be answered the following week when able, as Krista Morris CNP does not work in clinic on Fridays.   ~Krista Morris CNP is at the [facility] on Tuesdays, otherwise primarily at the [facility].       Imaging has been ordered. Radiology will call you to schedule. Please call below if you do not hear from them in the next couple of days.     [facility] Radiology Scheduling    Please call [phone] to schedule your image(s) (select option #1). There are 3 different locations near, see below.   There are imaging options for other locations as well, the imaging schedulers can help with other locations within [place].     [facility]  [address] [facility]  [address], [address]   [place] [id number] [place] [address]       Prescribed Celebrex 50 mg two times daily today. Please take as prescribed, as needed for pain control as well as to aid in decreasing inflammation. Take medication with a full glass of water and food.   *Do not take Advil, Ibuprofen, Aleve, or Naproxen while taking this medication as it can cause organ failure if taken together*  This medication does have risks if taken long term, these risks include: gastrointestinal irritation, kidney dysfunction, and cardiovascular  effects.       An injection has been ordered today to potentially help with your pain symptoms. These injections do not fix what is going on in your back, therefore they typically do not take away the pain completely, however they can many times help improve symptoms. Injections should always be completed along with other modalities such as physical therapy for the best long term outcomes. If injections alone are done, then pain will likely return.     [facility] Injection Requirements    A  is required for all fluoroscopically-guided injections.  Injection appointments may be cancelled if there are signs/symptoms of an active infection or if the patient is being actively treated with antibiotics for a diagnosed infection.  Patients may have their steroid injection cancelled if they have had another steroid injection within 2 weeks.  Diabetic patients will have their blood glucose levels checked the day of their injection and the appointment will be rescheduled if the blood glucose level is 300 or higher.  Patients with allergies to cortisone, local anesthetics, iodine, or contrast dye should contact the Spine Center to further discuss these considerations.  Patients scheduled for medial branch block diagnostic injections should refrain from taking pain medication the day of the procedure.  The medial branch block injection appointment will be rescheduled if the patient's pain rating is not 5/10 or greater at the time of the procedure.  Patients taking warfarin/Coumadin will have their INR checked the day of the procedure and the procedure may be rescheduled if the INR is greater than 3.0.  Please contact the Spine Center (#[phone]) if you are taking any prescription blood-thinning medications (warfarin, Plavix, Lovenox, Eliquis, Brilinta, Effient, etc.) as special dosing adjustments may need to be made depending on the type of injection you are scheduled to receive.  It is  recommended that you delay having your steroid injection if you have received a flu shot or shingles vaccine within 2 weeks.

## 2024-08-22 ENCOUNTER — TELEPHONE (OUTPATIENT)
Dept: PHYSICAL MEDICINE AND REHAB | Facility: CLINIC | Age: 89
End: 2024-08-22
Payer: COMMERCIAL

## 2024-08-22 ENCOUNTER — HOSPITAL ENCOUNTER (OUTPATIENT)
Dept: MRI IMAGING | Facility: HOSPITAL | Age: 89
Discharge: HOME | End: 2024-08-22
Attending: NURSE PRACTITIONER | Admitting: NURSE PRACTITIONER
Payer: COMMERCIAL

## 2024-08-22 DIAGNOSIS — M50.30 DDD (DEGENERATIVE DISC DISEASE), CERVICAL: ICD-10-CM

## 2024-08-22 DIAGNOSIS — M54.12 RIGHT CERVICAL RADICULOPATHY: Primary | ICD-10-CM

## 2024-08-22 DIAGNOSIS — R52 SEVERE PAIN: ICD-10-CM

## 2024-08-22 DIAGNOSIS — M11.20 CALCIUM PYROPHOSPHATE DEPOSITION DISEASE (CPPD): ICD-10-CM

## 2024-08-22 DIAGNOSIS — M48.02 CERVICAL STENOSIS OF SPINAL CANAL: ICD-10-CM

## 2024-08-22 DIAGNOSIS — G95.89 MYELOMALACIA OF CERVICAL CORD (H): ICD-10-CM

## 2024-08-22 DIAGNOSIS — M54.12 RIGHT CERVICAL RADICULOPATHY: ICD-10-CM

## 2024-08-22 DIAGNOSIS — M54.2 NECK PAIN ON RIGHT SIDE: ICD-10-CM

## 2024-08-22 DIAGNOSIS — M48.02 FORAMINAL STENOSIS OF CERVICAL REGION: ICD-10-CM

## 2024-08-22 DIAGNOSIS — M43.12 SPONDYLOLISTHESIS OF CERVICAL REGION: ICD-10-CM

## 2024-08-22 PROCEDURE — 72141 MRI NECK SPINE W/O DYE: CPT

## 2024-08-22 NOTE — TELEPHONE ENCOUNTER
"Spoke to pt regarding results and recommendations. Pt verbalized understanding then asked that I call her son, Parish, to go over everything. Spoke to Parish, who would like to get patient scheduled for TFESI.  will call to schedule.    Parish would also like Krista to provide more information on why she is referring to neurosurgery. He states is mom is \"averse to surgery\" and \"very frail.\" He is wondering how necessary this recommended referral is, and if Krista could give him an idea of what type of surgeries may be considered. I explained that would be covered at the consult, but he is trying to decide if the consult would be beneficial and would Krista to speak to this.   "

## 2024-08-22 NOTE — TELEPHONE ENCOUNTER
----- Message from Krista Morris sent at 8/22/2024  1:22 PM CDT -----  Please call patient and notify her that I did review her cervical spine MRI and there is significant degenerative changes with significant spinal stenosis/nerve compression at C3-4 with inflammation within the spinal cord, as well as severe right nerve compression at this level likely contributing to her current pain.  Due to this inflammation in the spinal cord, I would like to get a cervical flexion-extension x-ray and surgical consult.  In the meantime we could also trial a right C3-4 TFESI to see if we can calm severe right neck pain, I did place an order for this injection as well as flexion-extension x-ray and surgical consult.  She is safe to move forward with the epidural steroid injection at any time however.

## 2024-08-23 DIAGNOSIS — F41.9 CHRONIC ANXIETY: ICD-10-CM

## 2024-08-23 RX ORDER — LORAZEPAM 0.5 MG/1
0.5 TABLET ORAL
Qty: 30 TABLET | Refills: 0 | Status: SHIPPED | OUTPATIENT
Start: 2024-08-23 | End: 2024-09-20

## 2024-08-26 NOTE — TELEPHONE ENCOUNTER
"Per PSP,   \"Typically with any severe stenosis with inflammation within the cervical spine cord, it is safest to have any patient evaluated by the neurosurgeon in case of worsening/concerning findings.  I agree that surgery is not a good recommendation at this time however again we would like the consultation with a neurosurgeon due to the spinal cord findings.  If the patient prefers to have a discussion first before having the consult with the surgeon we can do that as well.    Again this consult is more of a formality due to the severe spinal stenosis.  There is risk of not having surgery as well in this case, which the neurosurgeon typically should discuss more in detail with the patient.\"       Call placed to [facility], with response. He stated understanding. He will call to schedule neurosurgery referral. He will discuss with patient and they will decide whether or not to keep the 9/4 appt with Krista.     He also inquires about scheduling injection sooner due to patients severe pain. Will reach out to PA team to see about approval.   "

## 2024-08-27 NOTE — TELEPHONE ENCOUNTER
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

## 2024-08-29 ENCOUNTER — MEDICAL CORRESPONDENCE (OUTPATIENT)
Dept: HEALTH INFORMATION MANAGEMENT | Facility: CLINIC | Age: 89
End: 2024-08-29

## 2024-08-29 ENCOUNTER — TELEPHONE (OUTPATIENT)
Dept: PHYSICAL MEDICINE AND REHAB | Facility: CLINIC | Age: 89
End: 2024-08-29
Payer: COMMERCIAL

## 2024-08-29 NOTE — TELEPHONE ENCOUNTER
Prior Authorization Retail Medication Request    Medication/Dose: diazePAM 5mg  Diagnosis and ICD code (if different than what is on RX):    New/renewal/insurance change PA/secondary ins. PA:  Previously Tried and Failed:    Rationale:      Insurance   Primary: BCBS Medicare Advantage   Insurance ID:  [id number]    Secondary (if applicable):  Insurance ID:      Pharmacy Information (if different than what is on RX)  Name: [facility]   Phone:  [phone]  Fax: [phone]

## 2024-09-03 NOTE — TELEPHONE ENCOUNTER
Prior Authorization Approval    Authorization Effective Date: 6/5/2024  Authorization Expiration Date: 9/3/2025  Medication: diazePAM 5mg-APPROVED  Reference #:     Insurance Company: Other (see comments)Comment:  Prime Medicare [phone]  Which Pharmacy is filling the prescription (Not needed for infusion/clinic administered): [facility] [address] [place] [address] [address]  Pharmacy Notified: Yes  Patient Notified: Instructed pharmacy to notify patient when script is ready to /ship.

## 2024-09-03 NOTE — TELEPHONE ENCOUNTER
Retail Pharmacy Prior Authorization Team   Phone: [phone]    PA Initiation    Medication: DIAZEPAM 5 MG PO TABS  Insurance Company: Other (see comments)Comment:  Prime Medicare [phone]  Pharmacy Filling the Rx: [facility] [address] [place] [address] [address]  Filling Pharmacy Phone: [phone]  Filling Pharmacy Fax:    Start Date: 9/3/2024

## 2024-09-04 ENCOUNTER — HOSPITAL ENCOUNTER (OUTPATIENT)
Dept: GENERAL RADIOLOGY | Facility: HOSPITAL | Age: 89
Discharge: HOME | End: 2024-09-04
Attending: NURSE PRACTITIONER | Admitting: NURSE PRACTITIONER
Payer: COMMERCIAL

## 2024-09-04 ENCOUNTER — PRE VISIT (OUTPATIENT)
Dept: NEUROSURGERY | Facility: CLINIC | Age: 89
End: 2024-09-04

## 2024-09-04 ENCOUNTER — OFFICE VISIT (OUTPATIENT)
Dept: NEUROSURGERY | Facility: CLINIC | Age: 89
End: 2024-09-04
Attending: NURSE PRACTITIONER
Payer: COMMERCIAL

## 2024-09-04 VITALS
SYSTOLIC BLOOD PRESSURE: 146 MMHG | HEART RATE: 58 BPM | DIASTOLIC BLOOD PRESSURE: 67 MMHG | OXYGEN SATURATION: 97 % | BODY MASS INDEX: 21.32 KG/M2 | WEIGHT: 102 LBS

## 2024-09-04 DIAGNOSIS — M48.02 CERVICAL STENOSIS OF SPINAL CANAL: ICD-10-CM

## 2024-09-04 DIAGNOSIS — M54.81 OCCIPITAL NEURALGIA OF RIGHT SIDE: Primary | ICD-10-CM

## 2024-09-04 DIAGNOSIS — M43.12 SPONDYLOLISTHESIS OF CERVICAL REGION: ICD-10-CM

## 2024-09-04 DIAGNOSIS — M48.02 FORAMINAL STENOSIS OF CERVICAL REGION: ICD-10-CM

## 2024-09-04 DIAGNOSIS — G95.89 MYELOMALACIA OF CERVICAL CORD (H): ICD-10-CM

## 2024-09-04 DIAGNOSIS — M54.12 RIGHT CERVICAL RADICULOPATHY: ICD-10-CM

## 2024-09-04 PROCEDURE — 99204 OFFICE O/P NEW MOD 45 MIN: CPT | Performed by: STUDENT IN AN ORGANIZED HEALTH CARE EDUCATION/TRAINING PROGRAM

## 2024-09-04 PROCEDURE — 72050 X-RAY EXAM NECK SPINE 4/5VWS: CPT

## 2024-09-04 ASSESSMENT — PAIN SCALES - GENERAL: PAINLEVEL: EXTREME PAIN (9)

## 2024-09-04 NOTE — PROGRESS NOTES
Dear Ms. Morris and Dr. Levine,     Thank you for the opportunity to participate in the care of Mariluz Arana.    As you know, she is a 89 yo female presenting with concerns of ongoing neck pain that radiates into her scalp. Her pain developed after undergoing physical therapy for neck stiffness in May/June 2024. Shortly thereafter, she lost function of her ability to raise her elbow above the horizontal plane. For this reason, she underwent evaluation and tried an injection in her shoulder. This did not yield any improvement in her right shoulder function. Additionally, she reports vivid dreams and feeling hyper after the steroid injection in her shoulder. She denies any pain within the arm itself.     Her chief complaint that is affecting her, however, is the neck pain that radiates into her scalp. It worsens with chewing. She has to stand up to relieve her pain. She has not tried an injection to date but is scheduled to undergo a C3-4 trans-foraminal epidural steroid injection. She denies any neck trauma or whiplash injury recently or remotely. She has had neck stiffness for quite some time.     Prior to this latest episode of pain and arm weakness, the patient had lost function in her left shoulder and the ability to abduct it many years ago. She is unable to recall how long ago she lost her left arm function.      On review of systems, she reports a host of issues including: overall poor vision, wobbliness with ambulation, and generalized fatigue. She reports overall poor hand coordination and dexterity for quite some time as well. She attributes loss of function to her osteoarthritis of her hands.     Her medical history is notable for:   Aortic stenosis  Cataracts  Chronic anxiety  Chronic fatigue  Gastroesophageal reflux   Hyperlipidemia  Hypertension  Irritable bowel syndrome  Meniere's disease  Osteoarthritis  Squamous cell carcinoma  Peripheral venous insufficiency    On exam, she is awake and  alert. The patient is overall frail. She has no dysarthria but appears to have some issues with wordfinding throughout the history gathering. She has no facial asymmetry. She ambulates with a 4 wheeled walker. Her neck is very stiff and she turns her full body to look at the computer screen. She has 3/5 strength in her bilateral deltoids and cannot elevate her elbows beyond the horizontal plane of her shoulders. She has otherwise 4/5 strength in her biceps, triceps, and  bilaterally. Her lower extremities are approximately 4/5 strength as well.     Her MRI C spine was reviewed. From the images, there is extensive cerebellar atrophy. 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 At the level of C3-4 there is central canal stenosis as well as severe foraminal stenosis on the right and moderate left foraminal stenosis. Caudal to C3-4, the remainder of the visualized cervical spinal canal appears unremarkable or mild in stenosis. There is severe bilateral neuro-foraminal stenosis at C4-5.    Her XR C spine was reviewed. There is retrolisthesis at C3-4 (grade 1) and anterolisthesis at C6-7 (grade 1).     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 Presumably, this is coming from her severe degenerative disc disease of the cervical spine, especially at C1-2 and C3-4.     I outlined and counseled the patient and her daughter Jaycee that any neurosurgical intervention for addressing her basilar invagination and/or her cervical foraminal stenosis and cervical canal stenosis would be exceptionally high risk at her age and degree of frailty. I certainly do not think that any type of surgical intervention to address her pain and/or shoulder weakness would confer greater benefit than risk to the patient.     In light of this, I agreed with  "the patient undergoing a C3-4 trans-foraminal epidural steroid injection but also suggested 1. A more generalized occipital nerve block and 2. Neurology referral for consideration of medication management for her occipital neuralgia. The patient and daughter were amenable and agreeable to this plan. She can follow-up as needed.     Marcel \"Semaj\" MD Nara    of Neurosurgery, [facility]  Text pager link: [phone]    "

## 2024-09-04 NOTE — NURSING NOTE
"Mariluz Arana is a 89 year old female who presents for:  Chief Complaint   Patient presents with    Consult        Initial Vitals:  BP (!) 146/67 (BP Location: Right arm, Patient Position: Sitting, Cuff Size: Adult Regular)   Pulse 58   Wt 102 lb (46.3 kg)   LMP  (LMP Unknown)   SpO2 97%   BMI 21.32 kg/m   Estimated body mass index is 21.32 kg/m  as calculated from the following:    Height as of 8/20/24: 4' 10\" (1.473 m).    Weight as of this encounter: 102 lb (46.3 kg).. Body surface area is 1.38 meters squared. BP completed using cuff size: regular  Extreme Pain (9)    Gilberto Parikh    "

## 2024-09-04 NOTE — LETTER
9/4/2024      Mariluz Arana  [address] [place] [address] [address]  [facility] [address]      Dear Colleague,    Thank you for referring your patient, Mariluz Arana, to the [facility]. Please see a copy of my visit note below.    Dear Ms. Morris and Dr. Levine,     Thank you for the opportunity to participate in the care of Mariluz Arana.    As you know, she is a 89 yo female presenting with concerns of ongoing neck pain that radiates into her scalp. Her pain developed after undergoing physical therapy for neck stiffness in May/June 2024. Shortly thereafter, she lost function of her ability to raise her elbow above the horizontal plane. For this reason, she underwent evaluation and tried an injection in her shoulder. This did not yield any improvement in her right shoulder function. Additionally, she reports vivid dreams and feeling hyper after the steroid injection in her shoulder. She denies any pain within the arm itself.     Her chief complaint that is affecting her, however, is the neck pain that radiates into her scalp. It worsens with chewing. She has to stand up to relieve her pain. She has not tried an injection to date but is scheduled to undergo a C3-4 trans-foraminal epidural steroid injection. She denies any neck trauma or whiplash injury recently or remotely. She has had neck stiffness for quite some time.     Prior to this latest episode of pain and arm weakness, the patient had lost function in her left shoulder and the ability to abduct it many years ago. She is unable to recall how long ago she lost her left arm function.      On review of systems, she reports a host of issues including: overall poor vision, wobbliness with ambulation, and generalized fatigue. She reports overall poor hand coordination and dexterity for quite some time as well. She attributes loss of function to her osteoarthritis of her hands.     Her medical history is notable for:   Aortic  Airway    Date/Time: 12/17/2020 7:28 AM  Performed by: Deborah Wagner M.D.  Authorized by: Deborah Wagner M.D.     Location:  OR  Urgency:  Elective  Difficult Airway: No    Indications for Airway Management:  Anesthesia      Spontaneous Ventilation: absent    Sedation Level:  Deep  Preoxygenated: Yes    Patient Position:  Sniffing  Mask Difficulty Assessment:  1 - vent by mask  Final Airway Type:  Endotracheal airway  Final Endotracheal Airway:  ETT  Cuffed: Yes    Technique Used for Successful ETT Placement:  Direct laryngoscopy    Insertion Site:  Oral  Blade Type:  Shelly  Laryngoscope Blade/Videolaryngoscope Blade Size:  3  ETT Size (mm):  7.0  Measured from:  Teeth  ETT to Teeth (cm):  22  Placement Verified by: auscultation and capnometry    Cormack-Lehane Classification:  Grade IIb - view of arytenoids or posterior of glottis only  Number of Attempts at Approach:  1  Number of Other Approaches Attempted:  0           stenosis  Cataracts  Chronic anxiety  Chronic fatigue  Gastroesophageal reflux   Hyperlipidemia  Hypertension  Irritable bowel syndrome  Meniere's disease  Osteoarthritis  Squamous cell carcinoma  Peripheral venous insufficiency    On exam, she is awake and alert. The patient is overall frail. She has no dysarthria but appears to have some issues with wordfinding throughout the history gathering. She has no facial asymmetry. She ambulates with a 4 wheeled walker. Her neck is very stiff and she turns her full body to look at the computer screen. She has 3/5 strength in her bilateral deltoids and cannot elevate her elbows beyond the horizontal plane of her shoulders. She has otherwise 4/5 strength in her biceps, triceps, and  bilaterally. Her lower extremities are approximately 4/5 strength as well.     Her MRI C spine was reviewed. From the images, there is extensive cerebellar atrophy. 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 At the level of C3-4 there is central canal stenosis as well as severe foraminal stenosis on the right and moderate left foraminal stenosis. Caudal to C3-4, the remainder of the visualized cervical spinal canal appears unremarkable or mild in stenosis. There is severe bilateral neuro-foraminal stenosis at C4-5.    Her XR C spine was reviewed. There is retrolisthesis at C3-4 (grade 1) and anterolisthesis at C6-7 (grade 1).     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 Presumably, this is coming from her severe degenerative disc disease of the cervical spine, especially at C1-2 and C3-4.     I outlined and counseled the patient and her daughter Jaycee that any neurosurgical intervention for addressing her basilar invagination and/or her cervical foraminal stenosis and cervical canal stenosis would be  "exceptionally high risk at her age and degree of frailty. I certainly do not think that any type of surgical intervention to address her pain and/or shoulder weakness would confer greater benefit than risk to the patient.     In light of this, I agreed with the patient undergoing a C3-4 trans-foraminal epidural steroid injection but also suggested 1. A more generalized occipital nerve block and 2. Neurology referral for consideration of medication management for her occipital neuralgia. The patient and daughter were amenable and agreeable to this plan. She can follow-up as needed.     Marcel \"Semaj\" MD Nara    of Neurosurgery, [facility] pager link: [phone]      Again, thank you for allowing me to participate in the care of your patient.        Sincerely,        Marcel Bains MD  "

## 2024-09-05 ENCOUNTER — TELEPHONE (OUTPATIENT)
Dept: NEUROSURGERY | Facility: CLINIC | Age: 89
End: 2024-09-05

## 2024-09-05 NOTE — TELEPHONE ENCOUNTER
Procedure ordered? YES    What insurance are we billing for this procedure?  BCBS/MEDICARE  IF SCHEDULING AT [facility] OR SPINE PLEASE SCHEDULE AT LEAST 7-10 BUSINESS DAYS OUT SO A PA CAN BE OBTAINED    Is a  PAIN  order available to link to injection appointment or does one need to be transcribed?  YES:     PAIN US Guided Occipital NB      If needed, route to CN to assist in doing so.    Is  needed?: No   If YES, please initiate in-person  request.    Will patient have a ?  Yes    All fluoro procedures require a .  These US procedures also require a : piriformis, pudendal, scalene, & carpal tunnel.    Is patient taking any blood thinners (i.e. Plavix, coumadin, jantoven, warfarin, heparin, Xarelto, Pradaxa, Eliquis, Brilinta, or Effient, etc)? No   If YES, schedule out 2 weeks, and route to RN pool to determine if medication hold is needed.    Is patient taking aspirin? No   For CERVICAL procedures, route message to Care Navigation so they can seek approval from managing provider to hold aspirin for 6 days prior to the procedure.    Does patient have an allergy to contrast dye or iodine?  No  If YES, OK to schedule. Route to RN pool AND add allergy information to appointment notes    Is patient diabetic? No If YES, blood glucose level will be checked on day of procedure and will need to be 300mg/dL or below (for steroid injections).    Does patient have an active infection or treated for one within the past week? No   If YES, do NOT schedule and route to Care Navigation.     Is patient currently taking any antibiotics?  No  For patients on chronic, preventative, or prophylactic antibiotics, procedures may be scheduled.   For patients on antibiotics for active or recent infection: antibiotic course must have been completed and symptom-free of infection to safely proceed with injection.  Send to Care Navigation if unsure.    Is patient actively being treated for cancer or  immunocompromised? No  If YES, do NOT schedule and route to RN pool.     Any chance of pregnancy? NO   If YES, do NOT schedule and route to RN pool.    Reminders:  -  If you are started on any steroids or antibiotics between now and your appointment, you must contact us because it may affect our ability to perform your procedure.   reviewed and discussed briefly    -  Informed patient that it is OK to take normal medications before the procedure and must hold blood thinners as instructed.  reviewed and discussed briefly    -  Patients scheduled for MBBs should not take pain meds prior to injection and pain rating needs to be 5/10 or greater the day of the procedure.    -  Informed cervical injection patients that an IV will be placed as a precautionary measure.  reviewed and not discussed    -  Patients should eat a light meal prior to their procedure appointment.  reviewed and discussed briefly    -  All radiofrequency ablations are in a 40 minute time slot and not to be scheduled until in-basket message has been sent by the procedure team that the PA has been  received.  not discussed     -  Spinal cord stimulator trial scheduling is coordinated by the procedure team.    -  Care Navigation (#[phone]) is available to assist patients with additional questions.  reviewed and discussed briefly    -  Cervical TFESIs with Dr. Coker only.

## 2024-09-06 DIAGNOSIS — H81.03 MENIERE'S DISEASE, BILATERAL: ICD-10-CM

## 2024-09-06 RX ORDER — MECLIZINE HYDROCHLORIDE 25 MG/1
25 TABLET ORAL 3 TIMES DAILY PRN
Qty: 30 TABLET | Refills: 1 | Status: SHIPPED | OUTPATIENT
Start: 2024-09-06

## 2024-09-12 ENCOUNTER — RADIOLOGY INJECTION OFFICE VISIT (OUTPATIENT)
Dept: PHYSICAL MEDICINE AND REHAB | Facility: CLINIC | Age: 89
End: 2024-09-12
Attending: STUDENT IN AN ORGANIZED HEALTH CARE EDUCATION/TRAINING PROGRAM
Payer: COMMERCIAL

## 2024-09-12 VITALS
OXYGEN SATURATION: 97 % | HEART RATE: 67 BPM | SYSTOLIC BLOOD PRESSURE: 166 MMHG | TEMPERATURE: 97.3 F | DIASTOLIC BLOOD PRESSURE: 72 MMHG

## 2024-09-12 PROCEDURE — 76942 ECHO GUIDE FOR BIOPSY: CPT | Performed by: PAIN MEDICINE

## 2024-09-12 PROCEDURE — 64405 NJX AA&/STRD GR OCPL NRV: CPT | Mod: RT | Performed by: PAIN MEDICINE

## 2024-09-12 RX ORDER — LIDOCAINE HYDROCHLORIDE 10 MG/ML
INJECTION, SOLUTION EPIDURAL; INFILTRATION; INTRACAUDAL; PERINEURAL
Status: COMPLETED | OUTPATIENT
Start: 2024-09-12 | End: 2024-09-12

## 2024-09-12 RX ORDER — TRIAMCINOLONE ACETONIDE 40 MG/ML
INJECTION, SUSPENSION INTRA-ARTICULAR; INTRAMUSCULAR
Status: COMPLETED | OUTPATIENT
Start: 2024-09-12 | End: 2024-09-12

## 2024-09-12 RX ORDER — BUPIVACAINE HYDROCHLORIDE 2.5 MG/ML
INJECTION, SOLUTION EPIDURAL; INFILTRATION; INTRACAUDAL
Status: COMPLETED | OUTPATIENT
Start: 2024-09-12 | End: 2024-09-12

## 2024-09-12 RX ADMIN — LIDOCAINE HYDROCHLORIDE 1 ML: 10 INJECTION, SOLUTION EPIDURAL; INFILTRATION; INTRACAUDAL; PERINEURAL at 14:54

## 2024-09-12 RX ADMIN — BUPIVACAINE HYDROCHLORIDE 2 ML: 2.5 INJECTION, SOLUTION EPIDURAL; INFILTRATION; INTRACAUDAL at 14:56

## 2024-09-12 RX ADMIN — TRIAMCINOLONE ACETONIDE 20 MG: 40 INJECTION, SUSPENSION INTRA-ARTICULAR; INTRAMUSCULAR at 14:54

## 2024-09-12 ASSESSMENT — PAIN SCALES - GENERAL: PAINLEVEL: SEVERE PAIN (6)

## 2024-09-12 NOTE — PATIENT INSTRUCTIONS
DISCHARGE INSTRUCTIONS  During office hours (8:00 a.m.- 4:00 p.m.) questions or concerns may be answered  by calling Spine Center Navigation Nurses at  [phone].  Messages received after hours will be returned the following business day.      In the case of an emergency, please dial 911 or seek assistance at the nearest Emergency Room/Urgent Care facility.     You may experience an increase in your symptoms for the first 2 days (It may take anywhere between 2 days- 2 weeks for the steroid to have maximum effect).    You may use ice on the injection site, as frequently as 20 minutes each hour if needed.    You may take your pain medicine.    You may shower. No swimming, tub bath or hot tub for 48 hours.  You may remove your bandaid/bandage as soon as you are home.    You may resume light activities, as tolerated.    Resume your usual diet as tolerated.    POSSIBLE STEROID SIDE EFFECTS (If steroid/cortisone was used for your procedure)  -If you experience these symptoms, it should only last for a short period  Swelling of the legs              Skin redness (flushing)     Mouth (oral) irritation   Blood sugar (glucose) levels            Sweats                    Mood changes  Headache  Sleeplessness  Weakened immune system for up to 14 days, which could increase the risk of ermelinda the COVID-19 virus and/or experiencing more severe symptoms of the disease, if exposed.  Decreased effectiveness of the flu vaccine if given within 2 weeks of the steroid.         POSSIBLE PROCEDURE SIDE EFFECTS  -Call the Spine Center if you are concerned  Increased Pain           Increased numbness/tingling      Nausea/Vomiting          Bruising/bleeding at site      Redness or swelling                                              Difficulty walking      Weakness           Fever greater than 100.5    Cards following  Cont Heparin infusion  Hopefully C today

## 2024-09-16 ENCOUNTER — OFFICE VISIT (OUTPATIENT)
Dept: INTERNAL MEDICINE | Facility: CLINIC | Age: 89
End: 2024-09-16
Payer: COMMERCIAL

## 2024-09-16 VITALS
TEMPERATURE: 98.2 F | OXYGEN SATURATION: 98 % | RESPIRATION RATE: 18 BRPM | WEIGHT: 104 LBS | BODY MASS INDEX: 21.83 KG/M2 | HEART RATE: 70 BPM | HEIGHT: 58 IN | SYSTOLIC BLOOD PRESSURE: 156 MMHG | DIASTOLIC BLOOD PRESSURE: 82 MMHG

## 2024-09-16 DIAGNOSIS — M47.20 OSTEOARTHRITIS OF SPINE WITH RADICULOPATHY, UNSPECIFIED SPINAL REGION: Primary | ICD-10-CM

## 2024-09-16 DIAGNOSIS — I35.0 AORTIC VALVE STENOSIS, ETIOLOGY OF CARDIAC VALVE DISEASE UNSPECIFIED: ICD-10-CM

## 2024-09-16 DIAGNOSIS — I10 PRIMARY HYPERTENSION: ICD-10-CM

## 2024-09-16 DIAGNOSIS — F41.9 CHRONIC ANXIETY: ICD-10-CM

## 2024-09-16 PROCEDURE — 99215 OFFICE O/P EST HI 40 MIN: CPT | Performed by: INTERNAL MEDICINE

## 2024-09-16 PROCEDURE — G2211 COMPLEX E/M VISIT ADD ON: HCPCS | Performed by: INTERNAL MEDICINE

## 2024-09-16 RX ORDER — DULOXETIN HYDROCHLORIDE 20 MG/1
20 CAPSULE, DELAYED RELEASE ORAL DAILY
Qty: 90 CAPSULE | Refills: 3 | Status: SHIPPED | OUTPATIENT
Start: 2024-09-16

## 2024-09-16 NOTE — PROGRESS NOTES
Assessment & Plan     Osteoarthritis of spine with radiculopathy, unspecified spinal region  89-year-old woman with ongoing severe neck pain with radiculopathy.  MRI showing multilevel advanced degenerative changes involving cervical spine with high-grade moderate to severe neuroforaminal stenosis on the right at C3-C4.  Disc osteophyte complex at C3-C4.  She was evaluated by neurosurgery.  Surgery risk would be extremely high and certainly not recommended.  Occipital nerve block attempted and she is yet to notice any benefit.  She may be having some side effects from the injection making her feel more anxious and giving her vivid unpleasant dreams plus she is having a flushed feeling.  I reminded her that these side effects should be transient.  She is scheduled for an injection at C3-C4 on September 26 and I am urging her to proceed with that as planned.  She is scheduled to see a neurologist but the visit is not until February.  She tried gabapentin for her pain but could not tolerate and discontinued after 1 dose of 100 mg.  We discussed other options and I think she would be an excellent candidate for Cymbalta.  Would start with just 20 mg daily.  This should provide some help with pain relief but can also be very useful to help manage her underlying anxiety issues that are being exacerbated by pain.  - DULoxetine (CYMBALTA) 20 MG capsule; Take 1 capsule (20 mg) by mouth daily.    Unintentional weight loss  Chronic pain/anxiety is affecting her appetite and she is down to 104 pounds today.  That her pain control as needed    Primary hypertension  Blood pressure is not at goal but pain is likely a contributing factor.  Blood pressure looking better last week when checked at her facility and she will continue her current doses of atenolol, felodipine and valsartan for now    Aortic valve stenosis, etiology of cardiac valve disease unspecified  Known severe aortic valve stenosis.  She declines any interventions.  " She is asymptomatic    Chronic anxiety  Duloxetine should prove helpful for anxiety.  Discussed potential side effects of the medication.  Reassess in 6 weeks  - DULoxetine (CYMBALTA) 20 MG capsule; Take 1 capsule (20 mg) by mouth daily.    40 minutes spent in the management this patient including reviewing records, obtaining history and discussing medical problems with her and her family, ordering medication and documenting visit      The longitudinal plan of care for the diagnosis(es)/condition(s) as documented were addressed during this visit. Due to the added complexity in care, I will continue to support Mariluz in the subsequent management and with ongoing continuity of care.       Follow-up in 6 weeks    Alonzo Mcgraw is a 89 year old, presenting for the following health issues: Here to follow-up hypertension and aortic stenosis plus worsening neck pain with cervical radiculopathy.  See assessment and plan for details  Follow Up      9/16/2024    12:59 PM   Additional Questions   Roomed by      History of Present Illness       Hypertension: She presents for follow up of hypertension.  She does check blood pressure  regularly outside of the clinic. Outside blood pressures have been over 140/90. She follows a low salt diet.     She eats 2-3 servings of fruits and vegetables daily.She consumes 1 sweetened beverage(s) daily.She exercises with enough effort to increase her heart rate 9 or less minutes per day.  She exercises with enough effort to increase her heart rate 3 or less days per week.   She is taking medications regularly.           Review of Systems  Constitutional, HEENT, cardiovascular, pulmonary, gi and gu systems are negative, except as otherwise noted.      Objective    BP (!) 156/82 (BP Location: Right arm, Patient Position: Sitting, Cuff Size: Adult Regular)   Pulse 70   Temp 98.2  F (36.8  C) (Oral)   Resp 18   Ht 1.473 m (4' 10\")   Wt 47.2 kg (104 lb)   LMP  (LMP Unknown)   " SpO2 98%   BMI 21.74 kg/m    Body mass index is 21.74 kg/m .  Physical Exam       Anxious appearing elderly woman  Loss of function in right arm due to pain/weakness  3/6 systolic murmur unchanged        Signed Electronically by: Maik Levine MD

## 2024-09-18 ENCOUNTER — TELEPHONE (OUTPATIENT)
Dept: PHYSICAL MEDICINE AND REHAB | Facility: CLINIC | Age: 89
End: 2024-09-18
Payer: COMMERCIAL

## 2024-09-18 NOTE — TELEPHONE ENCOUNTER
"Patient in to clinic on 9/12 for Right greater occipital nerve block at the level of C2 as ordered per neurosurgery.  Patient calling today as she continues to feel hot and per note from PCP on 9/16 \"making her feel more anxious and giving her vivid unpleasant dream.\" Patient states her \"mind won't quit racing. I'm desperate. I don't know what to do or where to turn.\" Wondering when this will subside and what she can do for it.    Patient denies having a fever. She was prescribed  Duloxetine per her PCP on 9/16 but has not started.     Explained Dr. Coker will be notified of the update. Patient will be called with response.  Stated understanding.    "

## 2024-09-19 NOTE — TELEPHONE ENCOUNTER
"Phone call to patient to relay information. Primary number listed is that of her son Parish. Information relayed to him and then call placed to patient. She asks that her number be the primary, not her son's cell number.     Again explained the symptoms she is experiencing are potential side effects from the steroid. They are temporary and she be lessening for her. She is encouraged to take the Duloxetine as prescribed by her PCP on 9/16. Stated understanding. \"I am hesitant to have the second shot if this is how I am going to feel, but I need to do it because I am in pain.\"   "

## 2024-09-19 NOTE — TELEPHONE ENCOUNTER
Please call the patient let her know that this could potentially be side effect from steroid medication.  This should resolve over the next few days.

## 2024-09-20 DIAGNOSIS — F41.9 CHRONIC ANXIETY: ICD-10-CM

## 2024-09-20 RX ORDER — LORAZEPAM 0.5 MG/1
0.5 TABLET ORAL
Qty: 30 TABLET | Refills: 0 | Status: SHIPPED | OUTPATIENT
Start: 2024-09-20

## 2024-09-30 ENCOUNTER — TELEPHONE (OUTPATIENT)
Dept: INTERNAL MEDICINE | Facility: CLINIC | Age: 89
End: 2024-09-30
Payer: COMMERCIAL

## 2024-09-30 DIAGNOSIS — I35.0 AORTIC VALVE STENOSIS, ETIOLOGY OF CARDIAC VALVE DISEASE UNSPECIFIED: ICD-10-CM

## 2024-09-30 DIAGNOSIS — M47.812 OSTEOARTHRITIS OF CERVICAL SPINE, UNSPECIFIED SPINAL OSTEOARTHRITIS COMPLICATION STATUS: ICD-10-CM

## 2024-09-30 DIAGNOSIS — R53.82 CHRONIC FATIGUE: ICD-10-CM

## 2024-09-30 DIAGNOSIS — R29.898 MUSCULAR DECONDITIONING: Primary | ICD-10-CM

## 2024-09-30 NOTE — TELEPHONE ENCOUNTER
S-(situation):   Home care nurse calling stating family is going to call and request hospice referral to [facility]      B-(background):   Patient has had [facility]      A-(assessment):         R-(recommendations):     When this referral is completed [facility] would like the referral also faxed to them.    TAMARA Castillo     [phone] phone  [phone] fax    TAMARA Mcintyre  [facility]  [phone]    [facility]   Monday  - Thursday 7 AM - 6 PM    Friday  7 AM - 5 PM     -Please call your clinic for assistance from a nurse after hours.

## 2024-10-01 NOTE — TELEPHONE ENCOUNTER
Referral Request    Orders being requested: Hospice care     Reason service is needed/diagnosis: Son mentioned that pt is needing more assistance     When are orders needed by: As soon as available     Has this been discussed with Provider: No    Does patient have a preference on a Group/Provider/Facility? Originally requested [facility] but open to other options. Wanting someone to come to pt on site at [facility] if possible     Does patient have an appointment scheduled?: Yes: 10/29    Okay to leave a detailed message?: Yes at Cell number on file:    Telephone Information:   Mobile [phone]

## 2024-10-07 ENCOUNTER — TELEPHONE (OUTPATIENT)
Dept: INTERNAL MEDICINE | Facility: CLINIC | Age: 89
End: 2024-10-07
Payer: COMMERCIAL

## 2024-10-07 NOTE — TELEPHONE ENCOUNTER
Call to Parish and left generic message that referral has been sent to the fax number he provided. Instructed to call back with any questions.

## 2024-10-07 NOTE — TELEPHONE ENCOUNTER
Order/Referral Request    Who is requesting: Mata Lugo    Orders being requested: hospice care    Reason service is needed/diagnosis: hospice care    When are orders needed by: asap    Has this been discussed with Provider: Yes    Does patient have a preference on a Group/Provider/Facility? Fax to [facility] Fax number is [phone]    Does patient have an appointment scheduled?: No    Where to send orders: Fax -  [phone]    Okay to leave a detailed message?: Yes at Home number on file [phone]

## 2024-10-07 NOTE — TELEPHONE ENCOUNTER
Call to [facility], their fax number is [phone]. Once referral is received, they will reach out to family to schedule. Provided them with Parish's phone number to schedule.     Call to patient's son Parish. States they may want referral sent elsewhere. They are okay with referral being sent to [facility] for now and will call back if they are wanting it sent elsewhere.

## 2024-10-14 DIAGNOSIS — I10 ESSENTIAL (PRIMARY) HYPERTENSION: ICD-10-CM

## 2024-10-15 RX ORDER — ATENOLOL 25 MG/1
25 TABLET ORAL EVERY MORNING
Qty: 90 TABLET | Refills: 0 | Status: SHIPPED | OUTPATIENT
Start: 2024-10-15

## 2024-10-16 DIAGNOSIS — F41.9 CHRONIC ANXIETY: ICD-10-CM

## 2024-10-17 RX ORDER — LORAZEPAM 0.5 MG/1
0.5 TABLET ORAL
Qty: 30 TABLET | Refills: 0 | Status: SHIPPED | OUTPATIENT
Start: 2024-10-17

## 2024-10-21 ENCOUNTER — TELEPHONE (OUTPATIENT)
Dept: INTERNAL MEDICINE | Facility: CLINIC | Age: 89
End: 2024-10-21
Payer: COMMERCIAL

## 2024-10-21 NOTE — TELEPHONE ENCOUNTER
Patient Returning Call    Reason for call:  Verbal orders    Information relayed to patient:  Will leave message with care team, for provider Dr. Levine     Patient has additional questions:  Yes    What are your questions/concerns:  TAMARA Pace calling would like to inform PCP that pt was admitted to hospice today 10.21.24. Pt did choose PCP for following attendee, Katelynn would like a verbal if provider agrees. Pls call and advise. Thanks.     Okay to leave a detailed message?: Yes at Other phone number:  TAMARA Pace [phone]

## 2024-10-25 DIAGNOSIS — I10 PRIMARY HYPERTENSION: ICD-10-CM

## 2024-10-25 RX ORDER — FELODIPINE 2.5 MG/1
TABLET, EXTENDED RELEASE ORAL
Qty: 90 TABLET | Refills: 3 | Status: SHIPPED | OUTPATIENT
Start: 2024-10-25

## 2024-10-29 ENCOUNTER — OFFICE VISIT (OUTPATIENT)
Dept: INTERNAL MEDICINE | Facility: CLINIC | Age: 89
End: 2024-10-29
Payer: COMMERCIAL

## 2024-10-29 VITALS
OXYGEN SATURATION: 99 % | SYSTOLIC BLOOD PRESSURE: 164 MMHG | HEIGHT: 58 IN | TEMPERATURE: 97.8 F | HEART RATE: 57 BPM | RESPIRATION RATE: 18 BRPM | BODY MASS INDEX: 21.41 KG/M2 | DIASTOLIC BLOOD PRESSURE: 84 MMHG | WEIGHT: 102 LBS

## 2024-10-29 DIAGNOSIS — R60.0 PERIPHERAL EDEMA: ICD-10-CM

## 2024-10-29 DIAGNOSIS — R09.81 SINUS CONGESTION: ICD-10-CM

## 2024-10-29 DIAGNOSIS — R42 DIZZINESS: ICD-10-CM

## 2024-10-29 DIAGNOSIS — F41.9 CHRONIC ANXIETY: ICD-10-CM

## 2024-10-29 DIAGNOSIS — R53.82 CHRONIC FATIGUE: ICD-10-CM

## 2024-10-29 DIAGNOSIS — M47.20 OSTEOARTHRITIS OF SPINE WITH RADICULOPATHY, UNSPECIFIED SPINAL REGION: ICD-10-CM

## 2024-10-29 DIAGNOSIS — I35.0 AORTIC VALVE STENOSIS, ETIOLOGY OF CARDIAC VALVE DISEASE UNSPECIFIED: Primary | ICD-10-CM

## 2024-10-29 DIAGNOSIS — I10 PRIMARY HYPERTENSION: ICD-10-CM

## 2024-10-29 LAB
ERYTHROCYTE [DISTWIDTH] IN BLOOD BY AUTOMATED COUNT: 12.4 % (ref 10–15)
HCT VFR BLD AUTO: 39.2 % (ref 35–47)
HGB BLD-MCNC: 12.8 G/DL (ref 11.7–15.7)
MCH RBC QN AUTO: 32.1 PG (ref 26.5–33)
MCHC RBC AUTO-ENTMCNC: 32.7 G/DL (ref 31.5–36.5)
MCV RBC AUTO: 98 FL (ref 78–100)
PLATELET # BLD AUTO: 243 10E3/UL (ref 150–450)
RBC # BLD AUTO: 3.99 10E6/UL (ref 3.8–5.2)
WBC # BLD AUTO: 7.9 10E3/UL (ref 4–11)

## 2024-10-29 PROCEDURE — 36415 COLL VENOUS BLD VENIPUNCTURE: CPT | Performed by: INTERNAL MEDICINE

## 2024-10-29 PROCEDURE — 85027 COMPLETE CBC AUTOMATED: CPT | Performed by: INTERNAL MEDICINE

## 2024-10-29 PROCEDURE — 99215 OFFICE O/P EST HI 40 MIN: CPT | Performed by: INTERNAL MEDICINE

## 2024-10-29 PROCEDURE — 91320 SARSCV2 VAC 30MCG TRS-SUC IM: CPT | Performed by: INTERNAL MEDICINE

## 2024-10-29 PROCEDURE — G2211 COMPLEX E/M VISIT ADD ON: HCPCS | Performed by: INTERNAL MEDICINE

## 2024-10-29 PROCEDURE — 80048 BASIC METABOLIC PNL TOTAL CA: CPT | Performed by: INTERNAL MEDICINE

## 2024-10-29 PROCEDURE — 90480 ADMN SARSCOV2 VAC 1/ONLY CMP: CPT | Performed by: INTERNAL MEDICINE

## 2024-10-29 RX ORDER — VALSARTAN 80 MG/1
80 TABLET ORAL 2 TIMES DAILY
Qty: 180 TABLET | Refills: 3 | Status: SHIPPED | OUTPATIENT
Start: 2024-10-29

## 2024-10-29 RX ORDER — ACETAMINOPHEN 500 MG
1000 TABLET ORAL EVERY 8 HOURS PRN
COMMUNITY
Start: 2024-10-29

## 2024-10-29 RX ORDER — FLUTICASONE PROPIONATE 50 MCG
2 SPRAY, SUSPENSION (ML) NASAL 2 TIMES DAILY
COMMUNITY
Start: 2024-10-29

## 2024-10-29 NOTE — LETTER
October 31, 2024      Mariluz AVERY [address]   [facility] [address]        Dear Mariluz,    CBC is normal.  No anemia.  Normal kidney function.  Sodium is in the low normal range but stable.    Resulted Orders   CBC with platelets   Result Value Ref Range    WBC Count 7.9 4.0 - 11.0 10e3/uL    RBC Count 3.99 3.80 - 5.20 10e6/uL    Hemoglobin 12.8 11.7 - 15.7 g/dL    Hematocrit 39.2 35.0 - 47.0 %    MCV 98 78 - 100 fL    MCH 32.1 26.5 - 33.0 pg    MCHC 32.7 31.5 - 36.5 g/dL    RDW 12.4 10.0 - 15.0 %    Platelet Count 243 150 - 450 10e3/uL   Basic metabolic panel  (Ca, Cl, CO2, Creat, Gluc, K, Na, BUN)   Result Value Ref Range    Sodium 132 (L) 135 - 145 mmol/L    Potassium 4.4 3.4 - 5.3 mmol/L    Chloride 94 (L) 98 - 107 mmol/L    Carbon Dioxide (CO2) 24 22 - 29 mmol/L    Anion Gap 14 7 - 15 mmol/L    Urea Nitrogen 17.2 8.0 - 23.0 mg/dL    Creatinine 0.80 0.51 - 0.95 mg/dL    GFR Estimate 68 >60 mL/min/1.73m2      Comment:      eGFR calculated using 2021 CKD-EPI equation.    Calcium 9.9 8.8 - 10.4 mg/dL      Comment:      Reference intervals for this test were updated on 7/16/2024 to reflect our healthy population more accurately. There may be differences in the flagging of prior results with similar values performed with this method. Those prior results can be interpreted in the context of the updated reference intervals.    Glucose 96 70 - 99 mg/dL       If you have any questions or concerns, please call the clinic at the number listed above.       Sincerely,      Maik Levine MD

## 2024-10-29 NOTE — PROGRESS NOTES
Assessment & Plan     Aortic valve stenosis, etiology of cardiac valve disease unspecified  89-year-old woman with severe aortic valve stenosis.  She has declined any interventions.  She is likely starting to have more symptoms.  Although there is no chest pain, she is experiencing more fatigue and decreased exercise tolerance.  She is now signed onto hospice/palliative care.  I clarified her wishes.  She does not want to be in hospice and comfort care only but does wish to continue to receive treatment of her chronic medical problems.  Palliative care would be appropriate providing her some additional services but continuing her current medications and management of her chronic problems.  Getting her blood pressure under better control should improve some of her symptoms related to aortic valve stenosis but ultimately she understands that her condition will decline and will eventually cause her significant problems including possible sudden death if intervention is not completed.  She seems comfortable with this.  Her CODE STATUS is DNR.    Primary hypertension  Blood pressure is not at goal.  Will increase valsartan to 80 mg twice daily and continue current doses of felodipine and atenolol.  Her blood pressure will continue to be monitored at her facility  - valsartan (DIOVAN) 80 MG tablet; Take 1 tablet (80 mg) by mouth 2 times daily.  - CBC with platelets; Future  - Basic metabolic panel  (Ca, Cl, CO2, Creat, Gluc, K, Na, BUN); Future    Chronic fatigue  Chronic fatigue is likely multifactorial but could be related to worsening aortic valve stenosis.  Rechecking CBC and BMP.    Osteoarthritis of spine with radiculopathy, unspecified spinal region  Severe neck pain.  No immediate improvement following epidural steroid injection which caused her increasing anxiety.  However, at this point her pain seems much improved from what it was 6 weeks ago.  She is able to get by with acetaminophen extra strength 1 or  2/day.  Cymbalta was tried but she was not able to tolerate having severe diarrhea even after 1 dose.  She is not interested in trying other medication such as gabapentin.  - acetaminophen (TYLENOL) 500 MG tablet; Take 2 tablets (1,000 mg) by mouth every 8 hours as needed for pain.    Chronic anxiety  Severe anxiety.  Unable to tolerate any medication that has ever been prescribed.  I am hoping that therapy/counseling can be offered through palliative care    Peripheral edema  Peripheral edema is slightly increased from before.  This may reflect worsening cardiac function or could be side effect of her calcium channel blocker she does not tolerate diuretics with history of hyponatremia    Dizziness  She has had some intermittent dizziness likely inner ear.  Will monitor.  I doubt this is related to hypotension seeing her blood pressure readings in the last 2 weeks    Sinus congestion  Her main complaint seems to be sinus congestion that she has experienced chronically.  I suspect this is related to allergies.  She has used Flonase intermittently and I am encouraging her to start using twice daily with 2 sprays each nostril and emphasizing that it will take 3-4 weeks to see any benefit  - fluticasone (FLONASE) 50 MCG/ACT nasal spray; Spray 2 sprays into both nostrils 2 times daily.    42 minutes spent in the management this patient including reviewing records and discussing medical concerns with her and her daughter along with ordering appropriate studies and documenting visit.      The longitudinal plan of care for the diagnosis(es)/condition(s) as documented were addressed during this visit. Due to the added complexity in care, I will continue to support Mariluz in the subsequent management and with ongoing continuity of care.       Follow-up in 2 months    Subjective   Mariluz is a 89 year old, presenting for the following health issues: Here to discuss multiple issues including severe aortic stenosis,  "hypertension that is not adequately controlled, severe osteoarthritis involving neck with chronic pain and chronic sinus congestion.  See assessment and plan for details  Follow Up      10/29/2024    12:53 PM   Additional Questions   Roomed by            Review of Systems  Denies chest pain.      Objective    BP (!) 164/84 (BP Location: Right arm, Patient Position: Sitting, Cuff Size: Adult Regular)   Pulse 57   Temp 97.8  F (36.6  C) (Oral)   Resp 18   Ht 1.473 m (4' 10\")   Wt 46.3 kg (102 lb)   LMP  (LMP Unknown)   SpO2 99%   Breastfeeding No   BMI 21.32 kg/m    Body mass index is 21.32 kg/m .  Physical Exam   Anxious elderly woman  Lungs clear bilaterally  Heart regular rate and rhythm with 3/6 systolic murmur  Trace to 1+ bilateral edema involving feet and ankles      Signed Electronically by: Maik Levine MD    "

## 2024-10-30 LAB
ANION GAP SERPL CALCULATED.3IONS-SCNC: 14 MMOL/L (ref 7–15)
BUN SERPL-MCNC: 17.2 MG/DL (ref 8–23)
CALCIUM SERPL-MCNC: 9.9 MG/DL (ref 8.8–10.4)
CHLORIDE SERPL-SCNC: 94 MMOL/L (ref 98–107)
CREAT SERPL-MCNC: 0.8 MG/DL (ref 0.51–0.95)
EGFRCR SERPLBLD CKD-EPI 2021: 68 ML/MIN/1.73M2
GLUCOSE SERPL-MCNC: 96 MG/DL (ref 70–99)
HCO3 SERPL-SCNC: 24 MMOL/L (ref 22–29)
POTASSIUM SERPL-SCNC: 4.4 MMOL/L (ref 3.4–5.3)
SODIUM SERPL-SCNC: 132 MMOL/L (ref 135–145)

## 2024-10-31 DIAGNOSIS — H81.03 MENIERE'S DISEASE, BILATERAL: ICD-10-CM

## 2024-10-31 RX ORDER — MECLIZINE HYDROCHLORIDE 25 MG/1
25 TABLET ORAL 3 TIMES DAILY PRN
Qty: 30 TABLET | Refills: 1 | Status: SHIPPED | OUTPATIENT
Start: 2024-10-31

## 2024-11-05 ENCOUNTER — MEDICAL CORRESPONDENCE (OUTPATIENT)
Dept: HEALTH INFORMATION MANAGEMENT | Facility: CLINIC | Age: 89
End: 2024-11-05

## 2024-11-16 DIAGNOSIS — F41.9 CHRONIC ANXIETY: ICD-10-CM

## 2024-11-20 RX ORDER — LORAZEPAM 0.5 MG/1
0.5 TABLET ORAL
Qty: 30 TABLET | Refills: 0 | Status: SHIPPED | OUTPATIENT
Start: 2024-11-20

## 2024-12-07 DIAGNOSIS — H81.03 MENIERE'S DISEASE, BILATERAL: ICD-10-CM

## 2024-12-09 RX ORDER — MECLIZINE HYDROCHLORIDE 25 MG/1
25 TABLET ORAL 3 TIMES DAILY PRN
Qty: 30 TABLET | Refills: 1 | Status: SHIPPED | OUTPATIENT
Start: 2024-12-09

## 2024-12-16 DIAGNOSIS — F41.9 CHRONIC ANXIETY: ICD-10-CM

## 2024-12-16 RX ORDER — LORAZEPAM 0.5 MG/1
0.5 TABLET ORAL
Qty: 30 TABLET | Refills: 0 | Status: SHIPPED | OUTPATIENT
Start: 2024-12-16

## 2024-12-22 DIAGNOSIS — H81.03 MENIERE'S DISEASE, BILATERAL: ICD-10-CM

## 2024-12-23 RX ORDER — MECLIZINE HYDROCHLORIDE 25 MG/1
25 TABLET ORAL 3 TIMES DAILY PRN
Qty: 30 TABLET | Refills: 1 | Status: SHIPPED | OUTPATIENT
Start: 2024-12-23

## 2025-01-07 ENCOUNTER — OFFICE VISIT (OUTPATIENT)
Dept: INTERNAL MEDICINE | Facility: CLINIC | Age: OVER 89
End: 2025-01-07
Payer: COMMERCIAL

## 2025-01-07 VITALS
SYSTOLIC BLOOD PRESSURE: 170 MMHG | DIASTOLIC BLOOD PRESSURE: 80 MMHG | HEIGHT: 58 IN | RESPIRATION RATE: 16 BRPM | HEART RATE: 64 BPM | BODY MASS INDEX: 21.62 KG/M2 | TEMPERATURE: 97.8 F | WEIGHT: 103 LBS

## 2025-01-07 DIAGNOSIS — E78.00 HYPERCHOLESTEROLEMIA: ICD-10-CM

## 2025-01-07 DIAGNOSIS — H54.7 VISUAL IMPAIRMENT: ICD-10-CM

## 2025-01-07 DIAGNOSIS — I35.0 AORTIC VALVE STENOSIS, ETIOLOGY OF CARDIAC VALVE DISEASE UNSPECIFIED: ICD-10-CM

## 2025-01-07 DIAGNOSIS — I10 PRIMARY HYPERTENSION: Primary | ICD-10-CM

## 2025-01-07 PROCEDURE — 99214 OFFICE O/P EST MOD 30 MIN: CPT | Performed by: INTERNAL MEDICINE

## 2025-01-07 PROCEDURE — G2211 COMPLEX E/M VISIT ADD ON: HCPCS | Performed by: INTERNAL MEDICINE

## 2025-01-07 PROCEDURE — 90662 IIV NO PRSV INCREASED AG IM: CPT | Performed by: INTERNAL MEDICINE

## 2025-01-07 PROCEDURE — G0008 ADMIN INFLUENZA VIRUS VAC: HCPCS | Performed by: INTERNAL MEDICINE

## 2025-01-07 NOTE — PROGRESS NOTES
"  Assessment & Plan     Primary hypertension  89-year-old woman with labile blood pressures.  She continues on felodipine, atenolol and valsartan.  The valsartan was increased to 80 mg twice daily.  Despite elevated blood pressure today at the office which is not unexpected and usually attributed to \"whitecoat hypertension\", her blood pressure readings at her facility have been generally quite good.  Occasionally in the 150s.  She becomes symptomatic if her systolic is less than 120 and she has been holding her afternoon dose of valsartan which seems reasonable.  With that said, the majority of her readings indicate good control of blood pressure and will continue her current regimen.    Aortic valve stenosis, etiology of cardiac valve disease unspecified  Severe aortic valve stenosis.  She has declined any interventions which is not inappropriate at age 89.  Fortunately, symptoms are generally stable.  She does have easy fatigue and decreasing exercise tolerance but no chest pain or increasing shortness of breath.  She is now signed onto hospice/palliative care which is appropriate.  Her CODE STATUS is DNR.  She is benefiting from the increased services that are being provided.    Hypercholesterolemia  We discussed the pros and cons of continuing simvastatin.  As she is tolerating medication without any side effect, I think it is reasonable for her to continue as it does lower her stroke risk.    Peripheral edema being managed with compression stockings.  Diuretics are avoided with history of hyponatremia    Visual impairment  She describes worsening vision.  I am recommending that she follow-up with her ophthalmologist    The longitudinal plan of care for the diagnosis(es)/condition(s) as documented were addressed during this visit. Due to the added complexity in care, I will continue to support Mariluz in the subsequent management and with ongoing continuity of care.             Follow-up in 3 " "aden Mcgraw is a 89 year old, presenting for the following health issues: Here to follow-up medical problems including hypertension and aortic valve stenosis.  See assessment and plan for details  Follow Up      1/7/2025    12:55 PM   Additional Questions   Roomed by      History of Present Illness       Hypertension: She presents for follow up of hypertension.  She does check blood pressure  regularly outside of the clinic. Outside blood pressures have been over 140/90. She follows a low salt diet.     She eats 2-3 servings of fruits and vegetables daily.She consumes 1 sweetened beverage(s) daily.She exercises with enough effort to increase her heart rate 9 or less minutes per day.  She exercises with enough effort to increase her heart rate 3 or less days per week.   She is taking medications regularly.                 Review of Systems  No chest pain or increasing shortness of breath.  Edema is under control.  Chronic neck pain is manageable.      Objective    BP (!) 170/84 (BP Location: Right arm, Patient Position: Sitting, Cuff Size: Adult Regular)   Pulse 64   Temp 97.8  F (36.6  C) (Oral)   Resp 16   Ht 1.473 m (4' 10\")   Wt 46.7 kg (103 lb)   LMP  (LMP Unknown)   BMI 21.53 kg/m    Body mass index is 21.53 kg/m .  Physical Exam     Pleasant although mildly anxious elderly woman who seems in good spirits  Lungs clear bilaterally  Heart with 3/6 systolic murmur unchanged  1+ bilateral lower extremity edema stable            Signed Electronically by: Maik Levine MD    "

## 2025-01-08 DIAGNOSIS — H81.03 MENIERE'S DISEASE, BILATERAL: ICD-10-CM

## 2025-01-08 RX ORDER — MECLIZINE HYDROCHLORIDE 25 MG/1
25 TABLET ORAL 3 TIMES DAILY PRN
Qty: 30 TABLET | Refills: 1 | OUTPATIENT
Start: 2025-01-08

## 2025-01-13 DIAGNOSIS — F41.9 CHRONIC ANXIETY: ICD-10-CM

## 2025-01-13 RX ORDER — LORAZEPAM 0.5 MG/1
0.5 TABLET ORAL
Qty: 30 TABLET | Refills: 0 | Status: SHIPPED | OUTPATIENT
Start: 2025-01-13

## 2025-01-14 DIAGNOSIS — I10 ESSENTIAL (PRIMARY) HYPERTENSION: ICD-10-CM

## 2025-01-14 RX ORDER — ATENOLOL 25 MG/1
25 TABLET ORAL EVERY MORNING
Qty: 90 TABLET | Refills: 3 | Status: SHIPPED | OUTPATIENT
Start: 2025-01-14

## 2025-01-20 DIAGNOSIS — R09.81 SINUS CONGESTION: ICD-10-CM

## 2025-01-20 RX ORDER — FLUTICASONE PROPIONATE 50 MCG
2 SPRAY, SUSPENSION (ML) NASAL DAILY
Qty: 48 G | Refills: 2 | OUTPATIENT
Start: 2025-01-20

## 2025-02-01 PROBLEM — F41.9 CHRONIC ANXIETY: Status: RESOLVED | Noted: 2018-04-17 | Resolved: 2025-02-01

## 2025-02-01 PROBLEM — E87.1 HYPONATREMIA: Status: RESOLVED | Noted: 2021-07-08 | Resolved: 2025-02-01

## 2025-02-01 PROBLEM — R35.1 NOCTURIA: Status: RESOLVED | Noted: 2023-02-28 | Resolved: 2025-02-01

## 2025-02-01 PROBLEM — R20.0 BILATERAL HAND NUMBNESS: Status: RESOLVED | Noted: 2023-04-17 | Resolved: 2025-02-01

## 2025-02-01 PROBLEM — M48.02 CERVICAL STENOSIS OF SPINE: Status: ACTIVE | Noted: 2025-02-01

## 2025-02-01 PROBLEM — E83.42 HYPOMAGNESEMIA: Status: RESOLVED | Noted: 2021-02-09 | Resolved: 2025-02-01

## 2025-02-01 PROBLEM — R42 DIZZINESS: Status: RESOLVED | Noted: 2024-10-29 | Resolved: 2025-02-01

## 2025-02-01 PROBLEM — R09.81 SINUS CONGESTION: Status: RESOLVED | Noted: 2024-10-29 | Resolved: 2025-02-01

## 2025-02-01 PROBLEM — Z91.81 RISK FOR FALLS: Status: RESOLVED | Noted: 2021-02-09 | Resolved: 2025-02-01

## 2025-02-17 DIAGNOSIS — F41.9 CHRONIC ANXIETY: ICD-10-CM

## 2025-02-17 RX ORDER — LORAZEPAM 0.5 MG/1
0.5 TABLET ORAL
Qty: 30 TABLET | Refills: 0 | Status: SHIPPED | OUTPATIENT
Start: 2025-02-17

## 2025-03-17 DIAGNOSIS — F41.9 CHRONIC ANXIETY: ICD-10-CM

## 2025-03-17 RX ORDER — LORAZEPAM 0.5 MG/1
0.5 TABLET ORAL
Qty: 30 TABLET | Refills: 0 | Status: SHIPPED | OUTPATIENT
Start: 2025-03-17

## 2025-04-08 ENCOUNTER — OFFICE VISIT (OUTPATIENT)
Dept: INTERNAL MEDICINE | Facility: CLINIC | Age: OVER 89
End: 2025-04-08
Payer: COMMERCIAL

## 2025-04-08 VITALS
BODY MASS INDEX: 22.67 KG/M2 | DIASTOLIC BLOOD PRESSURE: 80 MMHG | HEIGHT: 58 IN | HEART RATE: 68 BPM | RESPIRATION RATE: 18 BRPM | TEMPERATURE: 97.8 F | WEIGHT: 108 LBS | OXYGEN SATURATION: 100 % | SYSTOLIC BLOOD PRESSURE: 160 MMHG

## 2025-04-08 DIAGNOSIS — R60.0 PERIPHERAL EDEMA: ICD-10-CM

## 2025-04-08 DIAGNOSIS — I10 PRIMARY HYPERTENSION: Primary | ICD-10-CM

## 2025-04-08 DIAGNOSIS — I35.0 AORTIC VALVE STENOSIS, ETIOLOGY OF CARDIAC VALVE DISEASE UNSPECIFIED: ICD-10-CM

## 2025-04-08 DIAGNOSIS — M47.812 SPONDYLOSIS OF CERVICAL REGION WITHOUT MYELOPATHY OR RADICULOPATHY: ICD-10-CM

## 2025-04-08 DIAGNOSIS — J30.89 NON-SEASONAL ALLERGIC RHINITIS, UNSPECIFIED TRIGGER: ICD-10-CM

## 2025-04-08 LAB
ANION GAP SERPL CALCULATED.3IONS-SCNC: 13 MMOL/L (ref 7–15)
BUN SERPL-MCNC: 22.4 MG/DL (ref 8–23)
CALCIUM SERPL-MCNC: 10.1 MG/DL (ref 8.8–10.4)
CHLORIDE SERPL-SCNC: 97 MMOL/L (ref 98–107)
CREAT SERPL-MCNC: 0.88 MG/DL (ref 0.51–0.95)
EGFRCR SERPLBLD CKD-EPI 2021: 61 ML/MIN/1.73M2
ERYTHROCYTE [DISTWIDTH] IN BLOOD BY AUTOMATED COUNT: 12.3 % (ref 10–15)
GLUCOSE SERPL-MCNC: 99 MG/DL (ref 70–99)
HCO3 SERPL-SCNC: 24 MMOL/L (ref 22–29)
HCT VFR BLD AUTO: 37.1 % (ref 35–47)
HGB BLD-MCNC: 11.9 G/DL (ref 11.7–15.7)
MCH RBC QN AUTO: 31.7 PG (ref 26.5–33)
MCHC RBC AUTO-ENTMCNC: 32.1 G/DL (ref 31.5–36.5)
MCV RBC AUTO: 99 FL (ref 78–100)
PLATELET # BLD AUTO: 235 10E3/UL (ref 150–450)
POTASSIUM SERPL-SCNC: 4.7 MMOL/L (ref 3.4–5.3)
RBC # BLD AUTO: 3.75 10E6/UL (ref 3.8–5.2)
SODIUM SERPL-SCNC: 134 MMOL/L (ref 135–145)
WBC # BLD AUTO: 7.3 10E3/UL (ref 4–11)

## 2025-04-08 PROCEDURE — 99214 OFFICE O/P EST MOD 30 MIN: CPT | Performed by: INTERNAL MEDICINE

## 2025-04-08 PROCEDURE — 3078F DIAST BP <80 MM HG: CPT | Performed by: INTERNAL MEDICINE

## 2025-04-08 PROCEDURE — 85027 COMPLETE CBC AUTOMATED: CPT | Performed by: INTERNAL MEDICINE

## 2025-04-08 PROCEDURE — 36415 COLL VENOUS BLD VENIPUNCTURE: CPT | Performed by: INTERNAL MEDICINE

## 2025-04-08 PROCEDURE — G2211 COMPLEX E/M VISIT ADD ON: HCPCS | Performed by: INTERNAL MEDICINE

## 2025-04-08 PROCEDURE — 80048 BASIC METABOLIC PNL TOTAL CA: CPT | Performed by: INTERNAL MEDICINE

## 2025-04-08 PROCEDURE — 3077F SYST BP >= 140 MM HG: CPT | Performed by: INTERNAL MEDICINE

## 2025-04-08 RX ORDER — FLUTICASONE PROPIONATE 50 MCG
2 SPRAY, SUSPENSION (ML) NASAL DAILY
Qty: 48 G | Refills: 3 | Status: SHIPPED | OUTPATIENT
Start: 2025-04-08

## 2025-04-08 NOTE — PROGRESS NOTES
Assessment & Plan     Primary hypertension  89-year-old woman here to follow-up medical problems including hypertension.  She has a long history of having a labile blood pressure readings.  However, she has been generally stable with the combination of valsartan, felodipine, and atenolol spaced out throughout the day.  Although her blood pressure is not at goal today at the office, I reviewed her BP readings from her assisted living facility and blood pressure is generally very well-controlled including 134/68 this past week.  Will continue current medications without changes.  Diuretics are avoided with her history of hyponatremia.  - CBC with platelets; Future  - Basic metabolic panel  (Ca, Cl, CO2, Creat, Gluc, K, Na, BUN); Future    Aortic valve stenosis, etiology of cardiac valve disease unspecified  Severe aortic valve stenosis.  She is now on hospice/palliative care.  She is not interested in any aggressive interventions and will continue current medical management.  Fortunately, her symptoms are not progressing.  No increasing shortness of breath or chest pain/palpitations    Peripheral edema  Edema is well-controlled wearing thigh-high compression stockings.  Unable to tolerate diuretics because of hyponatremia    Spondylosis of cervical region without myelopathy or radiculopathy  Chronic neck pain from severe arthritis involving cervical spine has been much more manageable.  She is using acetaminophen as needed.  Her appetite has improved and she was able to gain back a few pounds with weight currently 180 pounds    Non-seasonal allergic rhinitis, unspecified trigger  She is using Flonase to manage allergy symptoms and a new prescription will be sent to her pharmacy  - fluticasone (FLONASE) 50 MCG/ACT nasal spray; Spray 2 sprays into both nostrils daily.      The longitudinal plan of care for the diagnosis(es)/condition(s) as documented were addressed during this visit. Due to the added complexity in  "care, I will continue to support Mariluz in the subsequent management and with ongoing continuity of care.       See Patient Instructions    Subjective   Mariluz is a 89 year old, presenting for the following health issues: Here to follow-up multiple chronic medical problems including severe aortic stenosis, hypertension, and arthritis involving cervical spine.  See assessment and plan for details  Follow Up    History of Present Illness       Hypertension: She presents for follow up of hypertension.  She does check blood pressure  regularly outside of the clinic. Outside blood pressures have been over 140/90. She follows a low salt diet.     She eats 2-3 servings of fruits and vegetables daily.She consumes 1 sweetened beverage(s) daily.She exercises with enough effort to increase her heart rate 9 or less minutes per day.  She exercises with enough effort to increase her heart rate 3 or less days per week.   She is taking medications regularly.                  Review of Systems  Constitutional, HEENT, cardiovascular, pulmonary, gi and gu systems are negative, except as otherwise noted.      Objective    BP (!) 160/80   Pulse 68   Temp 97.8  F (36.6  C) (Oral)   Resp 18   Ht 1.473 m (4' 10\")   Wt 49 kg (108 lb)   LMP  (LMP Unknown)   SpO2 100%   BMI 22.57 kg/m    Body mass index is 22.57 kg/m .  Physical Exam     Well-appearing elderly woman  Lungs clear bilaterally no rales or wheezes  Heart regular rate and rhythm with 3/6 systolic murmur  1+ bilateral lower extremity edema reasonably well-controlled with current compression stockings          Signed Electronically by: Maik Levine MD    "

## 2025-04-08 NOTE — LETTER
April 9, 2025      Mariluz AVERY [address]   [facility] [address]        Dear Mariluz,    Kidney function normal. Sodium is okay. No anemia.     Resulted Orders   CBC with platelets   Result Value Ref Range    WBC Count 7.3 4.0 - 11.0 10e3/uL    RBC Count 3.75 (L) 3.80 - 5.20 10e6/uL    Hemoglobin 11.9 11.7 - 15.7 g/dL    Hematocrit 37.1 35.0 - 47.0 %    MCV 99 78 - 100 fL    MCH 31.7 26.5 - 33.0 pg    MCHC 32.1 31.5 - 36.5 g/dL    RDW 12.3 10.0 - 15.0 %    Platelet Count 235 150 - 450 10e3/uL   Basic metabolic panel  (Ca, Cl, CO2, Creat, Gluc, K, Na, BUN)   Result Value Ref Range    Sodium 134 (L) 135 - 145 mmol/L    Potassium 4.7 3.4 - 5.3 mmol/L    Chloride 97 (L) 98 - 107 mmol/L    Carbon Dioxide (CO2) 24 22 - 29 mmol/L    Anion Gap 13 7 - 15 mmol/L    Urea Nitrogen 22.4 8.0 - 23.0 mg/dL    Creatinine 0.88 0.51 - 0.95 mg/dL    GFR Estimate 61 >60 mL/min/1.73m2      Comment:      eGFR calculated using 2021 CKD-EPI equation.    Calcium 10.1 8.8 - 10.4 mg/dL    Glucose 99 70 - 99 mg/dL       If you have any questions or concerns, please call the clinic at the number listed above.       Sincerely,      Maik Levine MD    Electronically signed

## 2025-04-14 ENCOUNTER — TELEPHONE (OUTPATIENT)
Dept: INTERNAL MEDICINE | Facility: CLINIC | Age: OVER 89
End: 2025-04-14
Payer: COMMERCIAL

## 2025-04-14 DIAGNOSIS — F41.9 CHRONIC ANXIETY: ICD-10-CM

## 2025-04-14 RX ORDER — LORAZEPAM 0.5 MG/1
0.5 TABLET ORAL
Qty: 30 TABLET | Refills: 0 | Status: SHIPPED | OUTPATIENT
Start: 2025-04-14

## 2025-04-14 NOTE — LETTER
April 14, 2025      Mariluz Arana  [address] [place] [address] [address]   [facility] [address]            Dear ,    We are writing to inform you of your test results.    Your test results fall within the expected range(s) or remain unchanged from previous results.  Please continue with current treatment plan.          If you have any questions or concerns, please call the clinic at the number listed above.       Sincerely,          Dr. Levine

## 2025-04-14 NOTE — TELEPHONE ENCOUNTER
I thought I had dictated a letter.  Not sure what happened.  You can let her know all her labs look fine and can put in the results in the mail

## 2025-04-14 NOTE — TELEPHONE ENCOUNTER
patient wants lab results mailed to her. Any comments on them before I send them out?    Marisela Dixon CMA

## 2025-05-08 DIAGNOSIS — F41.9 CHRONIC ANXIETY: ICD-10-CM

## 2025-05-08 RX ORDER — LORAZEPAM 0.5 MG/1
0.5 TABLET ORAL
Qty: 30 TABLET | Refills: 0 | Status: SHIPPED | OUTPATIENT
Start: 2025-05-08

## 2025-05-13 ENCOUNTER — TELEPHONE (OUTPATIENT)
Dept: INTERNAL MEDICINE | Facility: CLINIC | Age: OVER 89
End: 2025-05-13
Payer: COMMERCIAL

## 2025-05-13 NOTE — TELEPHONE ENCOUNTER
Sorry to offer any advice without any evaluation.  An appointment with me can be offered later this week.  20 minutes is probably okay.

## 2025-05-13 NOTE — TELEPHONE ENCOUNTER
[facility]    Pt would like provider to be asked,    Pt having UTI congestion and symptoms including coughing-non productive and crackles to upper lungs, runny nose x 3wks. She does not trust hospice providers. She is asking that DR Palomares particularly gives her some advice.   She has been taking robitussin and utilizing oxygen PRN.  is there anything else she should be doing?

## 2025-05-13 NOTE — TELEPHONE ENCOUNTER
Outgoing call to patient. Relayed provider message in detail. Patient states she needs to talk to her son Parish to figure out when he could take her. She will likely have Parish call back to schedule a day/time that works for him.    intact

## 2025-05-13 NOTE — TELEPHONE ENCOUNTER
Prior Authorization Request  Who s requesting:  Pharmacy  Pharmacy Name and Location: [facility]  Medication Name: Lorazepam  Insurance Plan: [facility] medicare  Insurance Member ID Number:  [id number]   CoverMyMeds Key: [id number]  Informed patient that prior authorizations can take up to 10 business days for response:   No  Okay to leave a detailed message:

## 2025-05-15 ENCOUNTER — ANCILLARY PROCEDURE (OUTPATIENT)
Dept: GENERAL RADIOLOGY | Facility: CLINIC | Age: OVER 89
End: 2025-05-15
Attending: INTERNAL MEDICINE
Payer: COMMERCIAL

## 2025-05-15 ENCOUNTER — OFFICE VISIT (OUTPATIENT)
Dept: INTERNAL MEDICINE | Facility: CLINIC | Age: OVER 89
End: 2025-05-15
Payer: COMMERCIAL

## 2025-05-15 VITALS
BODY MASS INDEX: 21.83 KG/M2 | HEART RATE: 73 BPM | RESPIRATION RATE: 18 BRPM | WEIGHT: 104 LBS | HEIGHT: 58 IN | OXYGEN SATURATION: 96 % | DIASTOLIC BLOOD PRESSURE: 70 MMHG | TEMPERATURE: 98.4 F | SYSTOLIC BLOOD PRESSURE: 160 MMHG

## 2025-05-15 DIAGNOSIS — I10 PRIMARY HYPERTENSION: ICD-10-CM

## 2025-05-15 DIAGNOSIS — R05.3 PERSISTENT COUGH FOR 3 WEEKS OR LONGER: ICD-10-CM

## 2025-05-15 DIAGNOSIS — R05.3 PERSISTENT COUGH FOR 3 WEEKS OR LONGER: Primary | ICD-10-CM

## 2025-05-15 RX ORDER — DOXYCYCLINE 100 MG/1
100 CAPSULE ORAL 2 TIMES DAILY
Qty: 20 CAPSULE | Refills: 0 | Status: SHIPPED | OUTPATIENT
Start: 2025-05-15 | End: 2025-05-25

## 2025-05-15 NOTE — TELEPHONE ENCOUNTER
Central Prior Authorization Team   Phone: [phone]    PA Initiation    Medication: Lorazepam 0.5mg  Insurance Company: [employer] - Phone [phone] Fax [phone]  Pharmacy Filling the Rx: [facility] - [address]  Filling Pharmacy Phone: [phone]  Filling Pharmacy Fax:    Start Date: 5/15/2025

## 2025-05-15 NOTE — PROGRESS NOTES
"  Assessment & Plan     Persistent cough for 3 weeks or longer  89-year-old woman with persistent cough for the past 4 weeks.  Attributed to viral URI and has been treated symptomatically with cough syrup and nasal sprays.  However, cough persists.  She describes drainage from her sinuses.  However, she also feels more breathless and is experiencing fatigue.  O2 sats are 96% on room air.  Lungs clear bilaterally without rales or wheezes.  Obtain chest x-ray to exclude pneumonia.  I suspect she may have developed a bacterial sinus infection superimposed on viral URI explaining persistence of symptoms and will start doxycycline 100 mg twice daily for 10 days.  - doxycycline hyclate (VIBRAMYCIN) 100 MG capsule; Take 1 capsule (100 mg) by mouth 2 times daily for 10 days.  - XR Chest 2 Views; Future    Primary hypertension  Blood pressure is not at goal at the office today but this is not unusual for her to demonstrate \"whitecoat hypertension\".  Blood pressure is being monitored regularly at her facility and her systolic has been in the 120-130s.  Will not make any changes in her current medication regimen including atenolol 25 mg daily, felodipine 2.5 mg daily and valsartan 80 mg twice daily    The longitudinal plan of care for the diagnosis(es)/condition(s) as documented were addressed during this visit. Due to the added complexity in care, I will continue to support Mariluz in the subsequent management and with ongoing continuity of care.             Follow-up  Return in about 2 months (around 7/15/2025) for follow-up for previously scheduled visit.    Subjective   Mariluz is a 89 year old, presenting for the following health issues: See assessment and plan for details  URI (Cough with white phlegm,sinus issues, PND, runny nose, fatigued, SOB X 1 month. )        5/15/2025     9:42 AM   Additional Questions   Roomed by      URI    History of Present Illness       Reason for visit:  Cough   She is taking medications " "regularly.            Review of Systems  No fevers or chills  No headache or bodyaches  No sore throat      Objective    BP (!) 160/70   Pulse 73   Temp 98.4  F (36.9  C) (Oral)   Resp 18   Ht 1.473 m (4' 10\")   Wt 47.2 kg (104 lb)   LMP  (LMP Unknown)   SpO2 96%   BMI 21.74 kg/m    Body mass index is 21.74 kg/m .  Physical Exam   Pleasant and well-appearing elderly woman who does not appear acutely short of breath with O2 sats 96% on room air  Lungs clear bilaterally without rales or wheezes  Heart regular rate and rhythm with 3/6 systolic murmur unchanged        Signed Electronically by: Maik Levine MD    "

## 2025-05-16 NOTE — TELEPHONE ENCOUNTER
Medication Appeal Initiation    We have initiated an appeal for the requested medication:  Medication: Lorazepam 0.5mg  Appeal Start Date:  5/16/2025  Insurance Company:  [employer]  Comments:       Faxed appeal to [facility]

## 2025-05-16 NOTE — TELEPHONE ENCOUNTER
PRIOR AUTHORIZATION DENIED    Medication: Lorazepam 0.5mg    Denial Date: 5/16/2025    Denial Rational: Per plan patient is now under hospice care and medication should be billed under Part B.   If medication is not being used for her hospice condition this may be appealed with this information from non-hospice provider        Appeal Information: This medication was denied. If physician would like to appeal because patient has contraindication or allergy to covered medication please write letter of medical necessity and route back to PA team to initiate.  If no further action is needed please close encounter thank you.

## 2025-05-16 NOTE — TELEPHONE ENCOUNTER
Please proceed with appeal letter.  She uses lorazepam to control her chronic anxiety and has been on the stable dose for years.  Thank you

## 2025-05-19 NOTE — TELEPHONE ENCOUNTER
PRIOR AUTHORIZATION DENIED    Medication: Lorazepam 0.5mg    Denial Date: 5/16/2025    Denial Rational: Original denial reasoning upheld          Appeal Information: 2nd level appeals are handled by clinic staff as they might require [id number] reviews or further assistance from clinic.  2nd level appeal information listed below.

## 2025-05-19 NOTE — TELEPHONE ENCOUNTER
I would let Mariluz's daughter know that her lorazepam is not being covered given her hospice status.  They may need to pay out-of-pocket for her to continue the medication.

## 2025-05-19 NOTE — TELEPHONE ENCOUNTER
Outgoing call to daughter to relay provider message. No answer. LMTCB. Both original PA and appeal denied.

## 2025-05-20 NOTE — TELEPHONE ENCOUNTER
Patient's son Parish calling because her sister had a few missed call and would like to touch base.     Inform him of PA status. Inform him that we have a prescription sent already. Parish will talk to patient and go from there. No further questions at this time.     Pay P. RN      No further questions.

## 2025-05-20 NOTE — TELEPHONE ENCOUNTER
Outgoing call to patient's daughter #2. No answer. LMTCB. When pt's daughter calls back anyone can relay message below. Thank you.

## 2025-06-17 DIAGNOSIS — F41.9 CHRONIC ANXIETY: ICD-10-CM

## 2025-06-17 RX ORDER — LORAZEPAM 0.5 MG/1
0.5 TABLET ORAL
Qty: 30 TABLET | Refills: 0 | Status: SHIPPED | OUTPATIENT
Start: 2025-06-17

## 2025-07-10 ENCOUNTER — MEDICAL CORRESPONDENCE (OUTPATIENT)
Dept: HEALTH INFORMATION MANAGEMENT | Facility: CLINIC | Age: OVER 89
End: 2025-07-10
Payer: COMMERCIAL

## 2025-07-14 DIAGNOSIS — F41.9 CHRONIC ANXIETY: ICD-10-CM

## 2025-07-14 RX ORDER — LORAZEPAM 0.5 MG/1
0.5 TABLET ORAL
Qty: 30 TABLET | Refills: 0 | Status: SHIPPED | OUTPATIENT
Start: 2025-07-14

## 2025-07-28 ENCOUNTER — OFFICE VISIT (OUTPATIENT)
Dept: INTERNAL MEDICINE | Facility: CLINIC | Age: OVER 89
End: 2025-07-28
Payer: COMMERCIAL

## 2025-07-28 VITALS
BODY MASS INDEX: 22.25 KG/M2 | HEART RATE: 63 BPM | WEIGHT: 106 LBS | HEIGHT: 58 IN | DIASTOLIC BLOOD PRESSURE: 80 MMHG | SYSTOLIC BLOOD PRESSURE: 150 MMHG | RESPIRATION RATE: 16 BRPM | OXYGEN SATURATION: 97 % | TEMPERATURE: 98 F

## 2025-07-28 DIAGNOSIS — H54.7 VISUAL IMPAIRMENT: ICD-10-CM

## 2025-07-28 DIAGNOSIS — E55.9 VITAMIN D DEFICIENCY: ICD-10-CM

## 2025-07-28 DIAGNOSIS — K58.2 IRRITABLE BOWEL SYNDROME WITH BOTH CONSTIPATION AND DIARRHEA: ICD-10-CM

## 2025-07-28 DIAGNOSIS — I87.2 VENOUS (PERIPHERAL) INSUFFICIENCY: ICD-10-CM

## 2025-07-28 DIAGNOSIS — I10 PRIMARY HYPERTENSION: ICD-10-CM

## 2025-07-28 DIAGNOSIS — Z00.00 ENCOUNTER FOR MEDICARE ANNUAL WELLNESS EXAM: Primary | ICD-10-CM

## 2025-07-28 DIAGNOSIS — Z85.828 HISTORY OF SKIN CANCER: ICD-10-CM

## 2025-07-28 DIAGNOSIS — R09.89 CHRONIC SINUS COMPLAINTS: ICD-10-CM

## 2025-07-28 DIAGNOSIS — F41.9 ANXIETY: ICD-10-CM

## 2025-07-28 DIAGNOSIS — R26.81 GAIT INSTABILITY: ICD-10-CM

## 2025-07-28 DIAGNOSIS — I35.0 AORTIC VALVE STENOSIS, ETIOLOGY OF CARDIAC VALVE DISEASE UNSPECIFIED: ICD-10-CM

## 2025-07-28 PROBLEM — F41.1 GENERALIZED ANXIETY DISORDER WITH PANIC ATTACKS: Status: RESOLVED | Noted: 2018-04-04 | Resolved: 2025-07-28

## 2025-07-28 PROBLEM — F41.0 GENERALIZED ANXIETY DISORDER WITH PANIC ATTACKS: Status: RESOLVED | Noted: 2018-04-04 | Resolved: 2025-07-28

## 2025-07-28 LAB
ALBUMIN SERPL BCG-MCNC: 4.2 G/DL (ref 3.5–5.2)
ALP SERPL-CCNC: 94 U/L (ref 40–150)
ALT SERPL W P-5'-P-CCNC: 10 U/L (ref 0–50)
ANION GAP SERPL CALCULATED.3IONS-SCNC: 8 MMOL/L (ref 7–15)
AST SERPL W P-5'-P-CCNC: 28 U/L (ref 0–45)
BILIRUB SERPL-MCNC: 0.5 MG/DL
BUN SERPL-MCNC: 17.7 MG/DL (ref 8–23)
CALCIUM SERPL-MCNC: 9.8 MG/DL (ref 8.8–10.4)
CHLORIDE SERPL-SCNC: 96 MMOL/L (ref 98–107)
CREAT SERPL-MCNC: 0.86 MG/DL (ref 0.51–0.95)
EGFRCR SERPLBLD CKD-EPI 2021: 62 ML/MIN/1.73M2
ERYTHROCYTE [DISTWIDTH] IN BLOOD BY AUTOMATED COUNT: 12.9 % (ref 10–15)
GLUCOSE SERPL-MCNC: 101 MG/DL (ref 70–99)
HCO3 SERPL-SCNC: 26 MMOL/L (ref 22–29)
HCT VFR BLD AUTO: 38.1 % (ref 35–47)
HGB BLD-MCNC: 12.2 G/DL (ref 11.7–15.7)
MCH RBC QN AUTO: 31.6 PG (ref 26.5–33)
MCHC RBC AUTO-ENTMCNC: 32 G/DL (ref 31.5–36.5)
MCV RBC AUTO: 99 FL (ref 78–100)
PLATELET # BLD AUTO: 236 10E3/UL (ref 150–450)
POTASSIUM SERPL-SCNC: 4.8 MMOL/L (ref 3.4–5.3)
PROT SERPL-MCNC: 7 G/DL (ref 6.4–8.3)
RBC # BLD AUTO: 3.86 10E6/UL (ref 3.8–5.2)
SODIUM SERPL-SCNC: 130 MMOL/L (ref 135–145)
TSH SERPL DL<=0.005 MIU/L-ACNC: 2.49 UIU/ML (ref 0.3–4.2)
VIT D+METAB SERPL-MCNC: 46 NG/ML (ref 20–50)
WBC # BLD AUTO: 7.5 10E3/UL (ref 4–11)

## 2025-07-28 PROCEDURE — 36415 COLL VENOUS BLD VENIPUNCTURE: CPT | Performed by: INTERNAL MEDICINE

## 2025-07-28 PROCEDURE — 3078F DIAST BP <80 MM HG: CPT | Performed by: INTERNAL MEDICINE

## 2025-07-28 PROCEDURE — 80053 COMPREHEN METABOLIC PANEL: CPT | Performed by: INTERNAL MEDICINE

## 2025-07-28 PROCEDURE — 99214 OFFICE O/P EST MOD 30 MIN: CPT | Mod: 25 | Performed by: INTERNAL MEDICINE

## 2025-07-28 PROCEDURE — 85027 COMPLETE CBC AUTOMATED: CPT | Performed by: INTERNAL MEDICINE

## 2025-07-28 PROCEDURE — G2211 COMPLEX E/M VISIT ADD ON: HCPCS | Performed by: INTERNAL MEDICINE

## 2025-07-28 PROCEDURE — 84443 ASSAY THYROID STIM HORMONE: CPT | Performed by: INTERNAL MEDICINE

## 2025-07-28 PROCEDURE — G0439 PPPS, SUBSEQ VISIT: HCPCS | Performed by: INTERNAL MEDICINE

## 2025-07-28 PROCEDURE — 3077F SYST BP >= 140 MM HG: CPT | Performed by: INTERNAL MEDICINE

## 2025-07-28 PROCEDURE — 82306 VITAMIN D 25 HYDROXY: CPT | Performed by: INTERNAL MEDICINE

## 2025-07-28 SDOH — HEALTH STABILITY: PHYSICAL HEALTH: ON AVERAGE, HOW MANY DAYS PER WEEK DO YOU ENGAGE IN MODERATE TO STRENUOUS EXERCISE (LIKE A BRISK WALK)?: 0 DAYS

## 2025-07-28 ASSESSMENT — SOCIAL DETERMINANTS OF HEALTH (SDOH): HOW OFTEN DO YOU GET TOGETHER WITH FRIENDS OR RELATIVES?: ONCE A WEEK

## 2025-07-28 NOTE — PATIENT INSTRUCTIONS
Patient Education   Preventive Care Advice   This is general advice we often give to help people stay healthy. Your care team may have specific advice just for you. Please talk to your care team about your own preventive care needs.  Lifestyle  Exercise at least 150 minutes each week (30 minutes a day, 5 days a week).  Do muscle strengthening activities 2 days a week. These help control your weight and prevent disease.  No smoking.  Wear sunscreen to prevent skin cancer.  Annual eye exam  Nutrition  Eat 5 or more servings of fruits and vegetables each day.  Try wheat bread, brown rice and whole grain pasta (instead of white bread, rice, and pasta).  Get enough calcium and vitamin D. Check the label on foods and aim for 100% of the RDA (recommended daily allowance).  Regular exams  Have a dental exam and cleaning every 6 months.  Older adults: Ask your care team how often to have memory testing.  See your health care team every year to talk about:  Any changes in your health.  Any medicines your care team has prescribed.  Preventive care, family planning, and ways to prevent chronic diseases.  Shots (vaccines)   COVID-19 shot: Recommended this fall if available  Flu shot: Get a flu shot every year.  Tetanus shot: Get a tetanus shot every 10 years.  Pneumonia vaccines have been completed.  I would recommend getting an RSV vaccine scheduled at your pharmacy  Shingles shot (for age 50 and up).  Shingrix is recommended.  This can be scheduled at your pharmacy  General health tests  Diabetes screening:  Cancer screening tests  Breast cancer scan (mammogram): If you've ever had breasts, begin having regular mammograms starting at age 40. This is a scan to check for breast cancer.  I would recommend seeing your dermatologist every 1 to 2 years for a total-body skin exam with your history of squamous cell skin cancer    For informational purposes only. Not to replace the advice of your health care provider. Copyright   2023  [facility]. All rights reserved. Clinically reviewed by the [facility]. [email or web address] [id number] - REV 6/25.  Learning About Activities of Daily Living  What are activities of daily living?     Activities of daily living (ADLs) are the basic self-care tasks you do every day. These include eating, bathing, dressing, and moving around.  As you age, and if you have health problems, you may find that it's harder to do some of these tasks. If so, your doctor can suggest ideas that may help.  To measure what kind of help you may need, your doctor will ask how well you are able to do ADLs. Let your doctor know if there are any tasks that you are having trouble doing. This is an important first step to getting help. And when you have the help you need, you can stay as independent as possible.  How will a doctor assess your ADLs?  Asking about ADLs is part of a routine health checkup your doctor will likely do as you age. Your health check might be done in a doctor's office, in your home, or at a hospital. The goal is to find out if you are having any problems that could make it hard to care for yourself or that make it unsafe for you to be on your own.  To measure your ADLs, your doctor will ask how hard it is for you to do routine tasks. Your doctor may also want to know if you have changed the way you do a task because of a health problem. Your doctor may watch how you:  Walk back and forth.  Keep your balance while you stand or walk.  Move from sitting to standing or from a bed to a chair.  Button or unbutton a shirt or sweater.  Remove and put on your shoes.  It's common to feel a little worried or anxious if you find you can't do all the things you used to be able to do. Talking with your doctor about ADLs is a way to make sure you're as safe as possible and able to care for yourself as well as you can. You may want to bring a caregiver, friend, or family member to your checkup. They  can help you talk to your doctor.  Follow-up care is a key part of your treatment and safety. Be sure to make and go to all appointments, and call your doctor if you are having problems. It's also a good idea to know your test results and keep a list of the medicines you take.  Current as of: October 24, 2024  Content Version: 14.5    6570-1547 [employer].   Care instructions adapted under license by your healthcare professional. If you have questions about a medical condition or this instruction, always ask your healthcare professional. [employer] disclaims any warranty or liability for your use of this information.    Preventing Falls: Care Instructions  Injuries and health problems such as trouble walking or poor eyesight can increase your risk of falling. So can some medicines. But there are things you can do to help prevent falls. You can exercise to get stronger. You can also arrange your home to make it safer.    Talk to your doctor about the medicines you take. Ask if any of them increase the risk of falls and whether they can be changed or stopped.   Try to exercise regularly. It can help improve your strength and balance. This can help lower your risk of falling.         Practice fall safety and prevention.   Wear low-heeled shoes that fit well and give your feet good support. Talk to your doctor if you have foot problems that make this hard.  Carry a cellphone or wear a medical alert device that you can use to call for help.  Use stepladders instead of chairs to reach high objects. Don't climb if you're at risk for falls. Ask for help, if needed.  Wear the correct eyeglasses, if you need them.        Make your home safer.   Remove rugs, cords, clutter, and furniture from walkways.  Keep your house well lit. Use night-lights in hallways and bathrooms.  Install and use sturdy handrails on stairways.  Wear nonskid footwear, even inside. Don't walk barefoot or in socks without shoes.    "     Be safe outside.   Use handrails, curb cuts, and ramps whenever possible.  Keep your hands free by using a shoulder bag or backpack.  Try to walk in well-lit areas. Watch out for uneven ground, changes in pavement, and debris.  Be careful in the winter. Walk on the grass or gravel when sidewalks are slippery. Use de-icer on steps and walkways. Add non-slip devices to shoes.    Put grab bars and nonskid mats in your shower or tub and near the toilet. Try to use a shower chair or bath bench when bathing.   Get into a tub or shower by putting in your weaker leg first. Get out with your strong side first. Have a phone or medical alert device in the bathroom with you.   Where can you learn more?  Go to https://www.LocalSort.Moleculin/patiented  Enter G117 in the search box to learn more about \"Preventing Falls: Care Instructions.\"  Current as of: July 31, 2024  Content Version: 14.5    5260-3871 [employer].   Care instructions adapted under license by your healthcare professional. If you have questions about a medical condition or this instruction, always ask your healthcare professional. [employer] disclaims any warranty or liability for your use of this information.    Learning About Stress  What is stress?     Stress is your body's response to a hard situation. Your body can have a physical, emotional, or mental response. Stress is a fact of life for most people, and it affects everyone differently. What causes stress for you may not be stressful for someone else.  A lot of things can cause stress. You may feel stress when you go on a job interview, take a test, or run a race. This kind of short-term stress is normal and even useful. It can help you if you need to work hard or react quickly. For example, stress can help you finish an important job on time.  Long-term stress is caused by ongoing stressful situations or events. Examples of long-term stress include long-term health problems, " ongoing problems at work, or conflicts in your family. Long-term stress can harm your health.  How does stress affect your health?  When you are stressed, your body responds as though you are in danger. It makes hormones that speed up your heart, make you breathe faster, and give you a burst of energy. This is called the fight-or-flight stress response. If the stress is over quickly, your body goes back to normal and no harm is done.  But if stress happens too often or lasts too long, it can have bad effects. Long-term stress can make you more likely to get sick, and it can make symptoms of some diseases worse. If you tense up when you are stressed, you may develop neck, shoulder, or low back pain. Stress is linked to high blood pressure and heart disease.  Stress also harms your emotional health. It can make you ramirez, tense, or depressed. Your relationships may suffer, and you may not do well at work or school.  What can you do to manage stress?  You can try these things to help manage stress:   Do something active. Exercise or activity can help reduce stress. Walking is a great way to get started. Even everyday activities such as housecleaning or yard work can help.  Try yoga or hiral chi. These techniques combine exercise and meditation. You may need some training at first to learn them.  Do something you enjoy. For example, listen to music or go to a movie. Practice your hobby or do volunteer work.  Meditate. This can help you relax, because you are not worrying about what happened before or what may happen in the future.  Do guided imagery. Imagine yourself in any setting that helps you feel calm. You can use online videos, books, or a teacher to guide you.  Do breathing exercises. For example:  From a standing position, bend forward from the waist with your knees slightly bent. Let your arms dangle close to the floor.  Breathe in slowly and deeply as you return to a standing position. Roll up slowly and lift  "your head last.  Hold your breath for just a few seconds in the standing position.  Breathe out slowly and bend forward from the waist.  Let your feelings out. Talk, laugh, cry, and express anger when you need to. Talking with supportive friends or family, a counselor, or a chucho leader about your feelings is a healthy way to relieve stress. Avoid discussing your feelings with people who make you feel worse.  Write. It may help to write about things that are bothering you. This helps you find out how much stress you feel and what is causing it. When you know this, you can find better ways to cope.  What can you do to prevent stress?  You might try some of these things to help prevent stress:  Manage your time. This helps you find time to do the things you want and need to do.  Get enough sleep. Your body recovers from the stresses of the day while you are sleeping.  Get support. Your family, friends, and community can make a difference in how you experience stress.  Limit your news feed. Avoid or limit time on social media or news that may make you feel stressed.  Do something active. Exercise or activity can help reduce stress. Walking is a great way to get started.  Where can you learn more?  Go to https://www.Dealflow.com.net/patiented  Enter N032 in the search box to learn more about \"Learning About Stress.\"  Current as of: October 24, 2024  Content Version: 14.5 2024-2025 [employer].   Care instructions adapted under license by your healthcare professional. If you have questions about a medical condition or this instruction, always ask your healthcare professional. [employer] disclaims any warranty or liability for your use of this information.    Learning About Being Active as an Older Adult  Why is being active important as you get older?     Being active is one of the best things you can do for your health. And it's never too late to start. Being active--or getting active, if you " aren't already--has definite benefits. It can:  Give you more energy,  Keep your mind sharp.  Improve balance to reduce your risk of falls.  Help you manage chronic illness with fewer medicines.  No matter how old you are, how fit you are, or what health problems you have, there is a form of activity that will work for you. And the more physical activity you can do, the better your overall health will be.  What kinds of activity can help you stay healthy?  Being more active will make your daily activities easier. Physical activity includes planned exercise and things you do in daily life. There are four types of activity:  Aerobic.  Doing aerobic activity makes your heart and lungs strong.  Includes walking, dancing, and gardening.  Aim for at least 2  hours spread throughout the week.  It improves your energy and can help you sleep better.  Muscle-strengthening.  This type of activity can help maintain muscle and strengthen bones.  Includes climbing stairs, using resistance bands, and lifting or carrying heavy loads.  Aim for at least twice a week.  It can help protect the knees and other joints.  Stretching.  Stretching gives you better range of motion in joints and muscles.  Includes upper arm stretches, calf stretches, and gentle yoga.  Aim for at least twice a week, preferably after your muscles are warmed up from other activities.  It can help you function better in daily life.  Balancing.  This helps you stay coordinated and have good posture.  Includes heel-to-toe walking, hiral chi, and certain types of yoga.  Aim for at least 3 days a week.  It can reduce your risk of falling.  Even if you have a hard time meeting the recommendations, it's better to be more active than less active. All activity done in each category counts toward your weekly total. You'd be surprised how daily things like carrying groceries, keeping up with grandchildren, and taking the stairs can add up.  What keeps you from being  "active?  If you've had a hard time being more active, you're not alone. Maybe you remember being able to do more. Or maybe you've never thought of yourself as being active. It's frustrating when you can't do the things you want. Being more active can help. What's holding you back?  Getting started.  Have a goal, but break it into easy tasks. Small steps build into big accomplishments.  Staying motivated.  If you feel like skipping your activity, remember your goal. Maybe you want to move better and stay independent. Every activity gets you one step closer.  Not feeling your best.  Start with 5 minutes of an activity you enjoy. Prove to yourself you can do it. As you get comfortable, increase your time.  You may not be where you want to be. But you're in the process of getting there. Everyone starts somewhere.  How can you find safe ways to stay active?  Talk with your doctor about any physical challenges you're facing. Make a plan with your doctor if you have a health problem or aren't sure how to get started with activity.  If you're already active, ask your doctor if there is anything you should change to stay safe as your body and health change.  If you tend to feel dizzy after you take medicine, avoid activity at that time. Try being active before you take your medicine. This will reduce your risk of falls.  If you plan to be active at home, make sure to clear your space before you get started. Remove things like TV cords, coffee tables, and throw rugs. It's safest to have plenty of space to move freely.  The key to getting more active is to take it slow and steady. Try to improve only a little bit at a time. Pick just one area to improve on at first. And if an activity hurts, stop and talk to your doctor.  Where can you learn more?  Go to https://www.healthwise.net/patiented  Enter P600 in the search box to learn more about \"Learning About Being Active as an Older Adult.\"  Current as of: July 31, 2024  Content " Version: 14.5 2024-2025 [employer].   Care instructions adapted under license by your healthcare professional. If you have questions about a medical condition or this instruction, always ask your healthcare professional. [employer] disclaims any warranty or liability for your use of this information.

## 2025-07-28 NOTE — PROGRESS NOTES
148/76 Preventive Care Visit  [facility]  Maik Levnie MD, Internal Medicine  Jul 28, 2025      Assessment & Plan     Encounter for Medicare annual wellness exam  Immunizations are reviewed and recommending RSV vaccine along with getting a flu shot and COVID vaccination this fall.  Shingrix has been recommended.  She has a health directive.  Non-smoker.  Minimal alcohol use.  Regular exercise and good diet habits discussed.  Further colonoscopy is not recommended at her age and with her comorbidities as the risk of the procedure would likely outweigh any benefit.  She has declined having any further mammograms for breast cancer screening which is understandable. Dementia and depression screening completed.  Recommending annual eye exam.  Recommending seeing a dentist every 6 months.  Skin exam performed and recommending regular use of sunblock.  We discussed seeing a dermatologist every 1 to 2 years.  Will screen for diabetes with fasting glucose.      Primary hypertension  Blood pressure not at goal at today's visit but she does have significant whitecoat hypertension and I reviewed her blood pressure readings from home and her BP is generally well-controlled with current medications which include valsartan, for zotepine, and atenolol.  Will not make any changes at this time.  Checking appropriate labs.  - CBC with platelets; Future  - Comprehensive metabolic panel (BMP + Alb, Alk Phos, ALT, AST, Total. Bili, TP); Future  - TSH with free T4 reflex; Future    Aortic valve stenosis, etiology of cardiac valve disease unspecified  Known severe aortic valve stenosis.  She declines any interventions which is reasonable at her age.  Fortunately, she remains asymptomatic.  She has enrolled in hospice and this will continue.    Gait instability  Unstable gait using walker whenever up ambulating.  Information provided to reduce fall risk    Anxiety  She is trying to find better ways to manage  her anxiety    Irritable bowel syndrome with both constipation and diarrhea  She has had chronic problems with IBS with frequent stools throughout the day.  We discussed using Metamucil powder 1 to spoon daily with a glass of water  - psyllium (METAMUCIL/KONSYL) 58.6 % powder; 1 teaspoon daily with glass of water    Venous (peripheral) insufficiency  Well-controlled with compression stockings.  Diuretics are avoided with history of hyponatremia    History of skin cancer  History of squamous cell skin cancer.  We discussed seeing a dermatologist every 2 years    Visual impairment  She is followed closely by ophthalmology    Chronic sinus complaints  Working with ENT.  She is trying Mucinex and Flonase    Vitamin D deficiency  Continues on vitamin D replacement and will recheck level  - Vitamin D Deficiency; Future    The longitudinal plan of care for the diagnosis(es)/condition(s) as documented were addressed during this visit. Due to the added complexity in care, I will continue to support Mariluz in the subsequent management and with ongoing continuity of care.   Patient has been advised of split billing requirements and indicates understanding: Yes    Counseling  Appropriate preventive services were addressed with this patient via screening, questionnaire, or discussion as appropriate for fall prevention, nutrition, physical activity, Tobacco-use cessation, social engagement, weight loss and cognition.  Checklist reviewing preventive services available has been given to the patient.  Reviewed patient's diet, addressing concerns and/or questions.   She is at risk for psychosocial distress and has been provided with information to reduce risk.   Updated plan of care.  Patient reported difficulty with activities of daily living were addressed today.The patient was provided with written information regarding signs of hearing loss.   Information on urinary incontinence and treatment options given to patient.   Reviewed  preventive health counseling, as reflected in patient instructions    Follow-up  Return in about 4 months (around 11/28/2025) for Follow-up appointment.    Alonzo Mcgraw is a 89 year old, presenting for the following:  Physical (AWV, not fasting)        7/28/2025    10:51 AM   Additional Questions   Roomed by            Advance Care Planning    Document on file is a Health Care Directive or POLST.        7/28/2025   General Health   How would you rate your overall physical health? Good   Feel stress (tense, anxious, or unable to sleep) Rather much   (!) STRESS CONCERN      7/28/2025   Nutrition   Diet: Low salt         7/28/2025   Exercise   Days per week of moderate/strenous exercise 0 days   (!) EXERCISE CONCERN      7/28/2025   Social Factors   Frequency of gathering with friends or relatives Once a week   Worry food won't last until get money to buy more No   Food not last or not have enough money for food? No   Do you have housing? (Housing is defined as stable permanent housing and does not include staying outside in a car, in a tent, in an abandoned building, in an overnight shelter, or couch-surfing.) Yes   Are you worried about losing your housing? No   Lack of transportation? No   Unable to get utilities (heat,electricity)? No         7/28/2025   Fall Risk   Fallen 2 or more times in the past year? No   Trouble with walking or balance? Yes   Gait Speed Test Interpretation Greater than 5.01 seconds - ABNORMAL           7/28/2025   Activities of Daily Living- Home Safety   Needs help with the following daily activites Transportation    Shopping    Preparing meals    Housework    Bathing    Laundry    Dressing   Do you have the help that you need? Yes for Some   Safety concerns in the home None of the above       Multiple values from one day are sorted in reverse-chronological order         7/28/2025   Dental   Dentist two times every year? Yes         7/28/2025   Hearing Screening   Hearing  concerns? (!) I FEEL THAT PEOPLE ARE MUMBLING OR NOT SPEAKING CLEARLY.    (!) I NEED TO ASK PEOPLE TO SPEAK UP OR REPEAT THEMSELVES.    (!) IT'S HARDER TO UNDERSTAND WOMEN'S VOICES THAN MEN'S VOICES.    (!) IT'S HARD TO FOLLOW A CONVERSATION IN A NOISY RESTAURANT OR CROWDED ROOM.    (!) TROUBLE UNDESTANDING A SPEAKER IN A PUBLIC MEETING OR [employer].    (!) TROUBLE UNDERSTANDING SOFT OR WHISPERED SPEECH.   Would you like a referral for hearing testing? No       Multiple values from one day are sorted in reverse-chronological order         7/28/2025   Driving Risk Screening   Patient/family members have concerns about driving No         7/28/2025   General Alertness/Fatigue Screening   Have you been more tired than usual lately? No         7/28/2025   Urinary Incontinence Screening   Bothered by leaking urine in past 6 months Yes         Today's PHQ-2 Score:       7/28/2025    10:51 AM   PHQ-2 ( 1999 [employer])   Q1: Little interest or pleasure in doing things 0   Q2: Feeling down, depressed or hopeless 0   PHQ-2 Score 0    Q1: Little interest or pleasure in doing things Not at all   Q2: Feeling down, depressed or hopeless Not at all   PHQ-2 Score 0       Patient-reported           7/28/2025   Substance Use   Alcohol more than 3/day or more than 7/wk Not Applicable   Do you have a current opioid prescription? No   How severe/bad is pain from 1 to 10? 0/10 (No Pain)   Do you use any other substances recreationally? No     Social History     Tobacco Use    Smoking status: Never     Passive exposure: Never    Smokeless tobacco: Never   Vaping Use    Vaping status: Never Used   Substance Use Topics    Alcohol use: Yes     Comment: rare    Drug use: No                    Reviewed and updated as needed this visit by Provider                    Past Medical History:   Diagnosis Date    Age-related cataract of left eye 09/21/2021    Aortic stenosis     Echocardiogram with moderate aortic stenosis September 2021     Arthritis     Osteoarthritis    Bilateral cataracts     Bilateral hand numbness 04/17/2023    Presumably carpal tunnel syndrome, previous surgery and already using wrist splints, not interested in further surgery    Chronic anxiety     Chronic fatigue 10/23/2023    Chronic sinus complaints 07/28/2025    Dizziness 10/29/2024    Dizziness and giddiness 09/17/2012    Gastroesophageal reflux disease     GERD (gastroesophageal reflux disease)     Heart murmur     Hypercholesteremia     Hypercholesteremia     Hypercholesterolemia 12/13/2021    Hypertension     Hyponatremia     Hyponatremia 07/08/2021    Irregular heart beat     Irritable bowel     Irritable bowel     Irritable bowel syndrome 07/22/2024    Meniere syndrome     Meniere's disease, bilateral     Non-seasonal allergic rhinitis 04/08/2025    Osteoarthritis of spine with radiculopathy, unspecified spinal region 07/08/2021    Osteoporosis     Peripheral edema 02/28/2023    Primary osteoarthritis of both hands 12/13/2021    Sinus congestion 10/29/2024    SOB (shortness of breath) 07/08/2021    Spondylosis of cervical region without myelopathy or radiculopathy 05/16/2024    Squamous cell carcinoma of skin of face 07/18/2023    Venous (peripheral) insufficiency 04/20/2022    Vertigo      Past Surgical History:   Procedure Laterality Date    CATARACT EXTRACTION Left     2021    DILATION AND CURETTAGE      DILATION AND CURETTAGE      ENDOLYMPHATIC SAC OPERATION  01/01/2012    enhancement    ENHANCE ENDOLYMPHATIC SAC, DEC SIGMOID SINUS, EXTND FACIAL RECESS APPRCH, MASTOIDECTOMY, BMT, COMBO  04/23/2012    Procedure:COMBINED ENHANCE ENDOLYMPHATIC SAC, DECOMPRESS SIGMOID SINUS, EXTENDED FACIAL RECESS APPROACH, COMPLETE MASTOIDECTOMY, MYRINGOTOMY, INSERT TUBE; Left Endolymphatic Sac Enhancement, Sigmoid Sinus Decom-  pression, Extended Facial Recess Approach, Myringotomy  , Complete Mastoidectomy; Surgeon:LINN MATHIS; Location:[facility]    INNER EAR SURGERY      Per Pt     KERATOTOMY ARCUATE WITH FEMTOSECOND LASER/IMAGING FOR ATIOL Right 07/19/2018    Procedure: KERATOTOMY ARCUATE WITH FEMTOSECOND LASER/IMAGING FOR ATIOL;  RIGHT EYE KERATOTOMY ARCUATE WITH FEMTOSECOND LASER/IMAGING FOR ATIOL ,  CAPSULOTOMY, LENS FRAGMENTATION, ARCUATE INCISIONS;  Surgeon: Roberth Chambers MD;  Location: [facility]    MASTOIDECTOMY  01/01/2012    PHACOEMULSIFICATION CLEAR CORNEA WITH TORIC INTRAOCULAR LENS IMPLANT Right 07/19/2018    Procedure: PHACOEMULSIFICATION CLEAR CORNEA WITH TORIC INTRAOCULAR LENS IMPLANT;  RIGHT EYE PHACOEMULSIFICATION CLEAR CORNEA WITH TORIC INTRAOCULAR LENS IMPLANT ;  Surgeon: Roberth Chambers MD;  Location: [facility]    TONSILLECTOMY      TONSILLECTOMY       Current providers sharing in care for this patient include:  Patient Care Team:  Maik Levine MD as PCP - General (Internal Medicine)  Maik Levine MD as Assigned PCP  Georgi Franco MD as MD (Cardiovascular Disease)  Giovany Yoder MD as MD (Dermatology)  Marcel Bains MD as Assigned Neuroscience Provider    The following health maintenance items are reviewed in Epic and correct as of today:  Health Maintenance   Topic Date Due    ZOSTER VACCINE (1 of 2) Never done    RSV VACCINE (1 - 1-dose 75+ series) Never done    COVID-19 VACCINE (8 - 2024-25 season) 04/29/2025    MEDICARE ANNUAL WELLNESS VISIT  05/16/2025    LIPID  05/16/2025    ANNUAL REVIEW OF HM ORDERS  01/07/2026    INFLUENZA VACCINE (1) 09/01/2025    DTAP/TDAP/TD VACCINE (2 - Td or Tdap) 11/11/2025    BMP  04/08/2026    FALL RISK ASSESSMENT  07/28/2026    ADVANCE CARE PLANNING  07/28/2030    PHQ-2 (once per calendar year)  Completed    PNEUMOCOCCAL VACCINE 50+ YEARS  Completed    HPV VACCINE (No Doses Required) Completed    MENINGITIS VACCINE  Aged Out         Review of Systems  Constitutional, HEENT, cardiovascular, pulmonary, GI, , musculoskeletal, neuro, skin, endocrine and psych systems are negative, except as otherwise noted.    "  Objective    Exam  BP (!) 146/76 (BP Location: Right arm, Patient Position: Sitting, Cuff Size: Adult Regular)   Pulse 63   Temp 98  F (36.7  C) (Oral)   Resp 16   Ht 1.473 m (4' 10\")   Wt 48.1 kg (106 lb)   LMP  (LMP Unknown)   SpO2 97%   BMI 22.15 kg/m     Estimated body mass index is 22.15 kg/m  as calculated from the following:    Height as of this encounter: 1.473 m (4' 10\").    Weight as of this encounter: 48.1 kg (106 lb).    Physical Exam  EYES: Eyelids, conjunctiva, and sclera were normal. Pupils were normal. Cornea, iris, and lens were normal bilaterally.  HEAD, EARS, NOSE, MOUTH, AND THROAT: Head and face were normal. TMs and external auditory canals are normal.  Oropharynx normal  NECK: Neck appearance was normal. There were no neck masses and the thyroid was not enlarged and no nodules are felt.  No lymphadenopathy.  RESPIRATORY: Breathing pattern was normal and the chest moved symmetrically.   Lung sounds were normal and there were no rales or wheezes.  CARDIOVASCULAR: Heart rate and rhythm were normal.  3/6 systolic murmur radiating to carotids.  Peripheral pulses in arms and legs were normal.  There was no peripheral edema.  No carotid bruits.  GASTROINTESTINAL:  Bowel sounds were present.   Palpation detected no tenderness, mass, or enlarged organs.   MUSCULOSKELETAL: Skeletal configuration was normal and muscle mass was normal for age. Joint appearance was overall normal.  LYMPHATIC: There were no enlarged nodes.  SKIN/HAIR/NAILS: Skin color was normal.  There were no concerning skin lesions.  NEUROLOGIC: The patient was alert and oriented.  Speech was normal. Cranial nerves were normal. Motor strength was normal for age.  Chronic age-related gait disturbance.  PSYCHIATRIC: PHQ 2 score is 0         7/28/2025   Mini Cog   Clock Draw Score 0 Abnormal   3 Item Recall 3 objects recalled   Mini Cog Total Score 3              Signed Electronically by: Maik Levine MD    "

## 2025-07-29 ENCOUNTER — RESULTS FOLLOW-UP (OUTPATIENT)
Dept: INTERNAL MEDICINE | Facility: CLINIC | Age: OVER 89
End: 2025-07-29
Payer: COMMERCIAL

## 2025-08-11 DIAGNOSIS — F41.9 CHRONIC ANXIETY: ICD-10-CM

## 2025-08-11 RX ORDER — LORAZEPAM 0.5 MG/1
0.5 TABLET ORAL
Qty: 30 TABLET | Refills: 0 | Status: SHIPPED | OUTPATIENT
Start: 2025-08-11

## 2025-08-19 ENCOUNTER — MEDICAL CORRESPONDENCE (OUTPATIENT)
Dept: HEALTH INFORMATION MANAGEMENT | Facility: CLINIC | Age: OVER 89
End: 2025-08-19
Payer: COMMERCIAL

## 2025-09-02 ENCOUNTER — MEDICAL CORRESPONDENCE (OUTPATIENT)
Dept: HEALTH INFORMATION MANAGEMENT | Facility: CLINIC | Age: OVER 89
End: 2025-09-02
Payer: COMMERCIAL

## (undated) DEVICE — EYE KNIFE SLIT XSTAR VISITEC 2.4MM 45DEG BEVEL UP 373724

## (undated) DEVICE — Device

## (undated) DEVICE — EYE TIP IRRIGATION & ASPIRATION POLYMER 35D BENT 8065751511

## (undated) DEVICE — GLOVE PROTEXIS MICRO 7.0  2D73PM70

## (undated) DEVICE — EYE SHIELD PLASTIC

## (undated) DEVICE — NUCLEUS HYDRODISSECTOR (CHANG)

## (undated) DEVICE — GLOVE PROTEXIS MICRO 8.0  2D73PM80

## (undated) DEVICE — EYE PACK CUSTOM ANTERIOR 30DEG TIP CENTURION PPK6682-04

## (undated) DEVICE — GLOVE PROTEXIS MICRO 6.5  2D73PM65

## (undated) DEVICE — PACK CATARACT CUSTOM SO DALE SEY32CTFCX

## (undated) DEVICE — EYE PACK BVI READYPAK KIT #1

## (undated) DEVICE — LINEN TOWEL PACK X5 5464

## (undated) DEVICE — EYE SOL BSS 500ML

## (undated) RX ORDER — EPINEPHRINE 1 MG/ML
INJECTION, SOLUTION, CONCENTRATE INTRAVENOUS
Status: DISPENSED
Start: 2018-07-19

## (undated) RX ORDER — TETRACAINE HYDROCHLORIDE 5 MG/ML
SOLUTION OPHTHALMIC
Status: DISPENSED
Start: 2018-07-19

## (undated) RX ORDER — LIDOCAINE HYDROCHLORIDE 10 MG/ML
INJECTION, SOLUTION EPIDURAL; INFILTRATION; INTRACAUDAL; PERINEURAL
Status: DISPENSED
Start: 2018-07-19